# Patient Record
Sex: FEMALE | Race: WHITE | Employment: OTHER | URBAN - METROPOLITAN AREA
[De-identification: names, ages, dates, MRNs, and addresses within clinical notes are randomized per-mention and may not be internally consistent; named-entity substitution may affect disease eponyms.]

---

## 2017-01-04 ENCOUNTER — TELEPHONE (OUTPATIENT)
Dept: FAMILY MEDICINE CLINIC | Age: 77
End: 2017-01-04

## 2017-01-04 ENCOUNTER — HOSPITAL ENCOUNTER (OUTPATIENT)
Dept: PHYSICAL THERAPY | Age: 77
Discharge: HOME | End: 2017-01-31
Admitting: ORTHOPAEDIC SURGERY

## 2017-01-05 RX ORDER — AMOXICILLIN AND CLAVULANATE POTASSIUM 875; 125 MG/1; MG/1
1 TABLET, FILM COATED ORAL 2 TIMES DAILY
Qty: 20 TABLET | Refills: 0 | Status: SHIPPED | OUTPATIENT
Start: 2017-01-05 | End: 2017-06-16 | Stop reason: SDUPTHER

## 2017-01-09 ENCOUNTER — OFFICE VISIT (OUTPATIENT)
Dept: FAMILY MEDICINE CLINIC | Age: 77
End: 2017-01-09

## 2017-01-09 ENCOUNTER — HOSPITAL ENCOUNTER (OUTPATIENT)
Dept: OTHER | Age: 77
Discharge: HOME | End: 2017-01-09
Attending: FAMILY MEDICINE | Admitting: FAMILY MEDICINE

## 2017-01-09 VITALS
OXYGEN SATURATION: 96 % | HEIGHT: 62 IN | HEART RATE: 106 BPM | WEIGHT: 213.8 LBS | BODY MASS INDEX: 39.34 KG/M2 | DIASTOLIC BLOOD PRESSURE: 86 MMHG | SYSTOLIC BLOOD PRESSURE: 126 MMHG | TEMPERATURE: 97.6 F

## 2017-01-09 DIAGNOSIS — Z01.818 PRE-OPERATIVE GENERAL PHYSICAL EXAMINATION: ICD-10-CM

## 2017-01-09 DIAGNOSIS — M17.11 PRIMARY OSTEOARTHRITIS OF RIGHT KNEE: ICD-10-CM

## 2017-01-09 DIAGNOSIS — Z01.818 PRE-OPERATIVE GENERAL PHYSICAL EXAMINATION: Primary | ICD-10-CM

## 2017-01-09 DIAGNOSIS — G47.33 OBSTRUCTIVE SLEEP APNEA SYNDROME: ICD-10-CM

## 2017-01-09 DIAGNOSIS — I10 ESSENTIAL HYPERTENSION: ICD-10-CM

## 2017-01-09 DIAGNOSIS — M06.9 RHEUMATOID ARTHRITIS INVOLVING MULTIPLE SITES, UNSPECIFIED RHEUMATOID FACTOR PRESENCE: ICD-10-CM

## 2017-01-09 DIAGNOSIS — E78.2 MIXED HYPERLIPIDEMIA: ICD-10-CM

## 2017-01-09 DIAGNOSIS — K21.9 GASTROESOPHAGEAL REFLUX DISEASE WITHOUT ESOPHAGITIS: ICD-10-CM

## 2017-01-09 DIAGNOSIS — J30.1 NON-SEASONAL ALLERGIC RHINITIS DUE TO POLLEN: ICD-10-CM

## 2017-01-09 DIAGNOSIS — F32.89 DEPRESSIVE DISORDER, NOT ELSEWHERE CLASSIFIED: ICD-10-CM

## 2017-01-09 DIAGNOSIS — J45.20 MILD INTERMITTENT ASTHMA WITHOUT COMPLICATION: ICD-10-CM

## 2017-01-09 LAB
A/G RATIO: 1.3 (ref 1.1–2.2)
ALBUMIN SERPL-MCNC: 4 G/DL (ref 3.4–5)
ALP BLD-CCNC: 56 U/L (ref 40–129)
ALT SERPL-CCNC: 18 U/L (ref 10–40)
ANION GAP SERPL CALCULATED.3IONS-SCNC: 15 MMOL/L (ref 3–16)
AST SERPL-CCNC: 29 U/L (ref 15–37)
BILIRUB SERPL-MCNC: 0.5 MG/DL (ref 0–1)
BUN BLDV-MCNC: 19 MG/DL (ref 7–20)
CALCIUM SERPL-MCNC: 11.2 MG/DL (ref 8.3–10.6)
CHLORIDE BLD-SCNC: 96 MMOL/L (ref 99–110)
CHOLESTEROL, TOTAL: 155 MG/DL (ref 0–199)
CO2: 28 MMOL/L (ref 21–32)
CREAT SERPL-MCNC: 1 MG/DL (ref 0.6–1.2)
GFR AFRICAN AMERICAN: >60
GFR NON-AFRICAN AMERICAN: 54
GLOBULIN: 3 G/DL
GLUCOSE BLD-MCNC: 97 MG/DL (ref 70–99)
HDLC SERPL-MCNC: 51 MG/DL (ref 40–60)
LDL CHOLESTEROL CALCULATED: 72 MG/DL
POTASSIUM SERPL-SCNC: 4.8 MMOL/L (ref 3.5–5.1)
SODIUM BLD-SCNC: 139 MMOL/L (ref 136–145)
TOTAL PROTEIN: 7 G/DL (ref 6.4–8.2)
TRIGL SERPL-MCNC: 161 MG/DL (ref 0–150)
VLDLC SERPL CALC-MCNC: 32 MG/DL

## 2017-01-09 PROCEDURE — 93000 ELECTROCARDIOGRAM COMPLETE: CPT | Performed by: FAMILY MEDICINE

## 2017-01-09 PROCEDURE — 99215 OFFICE O/P EST HI 40 MIN: CPT | Performed by: FAMILY MEDICINE

## 2017-01-10 ENCOUNTER — TELEPHONE (OUTPATIENT)
Dept: FAMILY MEDICINE CLINIC | Age: 77
End: 2017-01-10

## 2017-01-10 DIAGNOSIS — J84.9 INTERSTITIAL LUNG DISEASE (HCC): Primary | ICD-10-CM

## 2017-01-13 ENCOUNTER — HOSPITAL ENCOUNTER (OUTPATIENT)
Dept: CT IMAGING | Age: 77
Discharge: HOME | End: 2017-01-13
Attending: FAMILY MEDICINE | Admitting: FAMILY MEDICINE

## 2017-01-13 DIAGNOSIS — J84.9 INTERSTITIAL PULMONARY DISEASE (HCC): ICD-10-CM

## 2017-01-13 DIAGNOSIS — J84.9 INTERSTITIAL LUNG DISEASE (HCC): ICD-10-CM

## 2017-01-14 ENCOUNTER — PATIENT MESSAGE (OUTPATIENT)
Dept: FAMILY MEDICINE CLINIC | Age: 77
End: 2017-01-14

## 2017-01-14 DIAGNOSIS — J84.9 INTERSTITIAL LUNG DISEASE (HCC): Primary | ICD-10-CM

## 2017-01-16 ENCOUNTER — TELEPHONE (OUTPATIENT)
Dept: PULMONOLOGY | Age: 77
End: 2017-01-16

## 2017-01-17 ENCOUNTER — TELEPHONE (OUTPATIENT)
Dept: ORTHOPEDIC SURGERY | Age: 77
End: 2017-01-17

## 2017-01-18 ENCOUNTER — HOSPITAL ENCOUNTER (OUTPATIENT)
Dept: PREADMISSION TESTING | Age: 77
Discharge: HOME | End: 2017-01-18
Admitting: ORTHOPAEDIC SURGERY

## 2017-01-18 VITALS
HEIGHT: 62 IN | TEMPERATURE: 97.9 F | WEIGHT: 212.13 LBS | RESPIRATION RATE: 18 BRPM | SYSTOLIC BLOOD PRESSURE: 111 MMHG | DIASTOLIC BLOOD PRESSURE: 64 MMHG | BODY MASS INDEX: 39.04 KG/M2 | HEART RATE: 84 BPM

## 2017-01-18 LAB
A/G RATIO: 1.1 (ref 1.1–2.2)
ABO/RH: NORMAL
ALBUMIN SERPL-MCNC: 4 G/DL (ref 3.4–5)
ALP BLD-CCNC: 49 U/L (ref 40–129)
ALT SERPL-CCNC: 15 U/L (ref 10–40)
ANION GAP SERPL CALCULATED.3IONS-SCNC: 12 MMOL/L (ref 3–16)
ANTIBODY SCREEN: NORMAL
APTT: 35.7 SEC (ref 25.2–36.4)
AST SERPL-CCNC: 28 U/L (ref 15–37)
BACTERIA: ABNORMAL /HPF
BASOPHILS ABSOLUTE: 0.4 K/UL (ref 0–0.2)
BASOPHILS RELATIVE PERCENT: 3.6 %
BILIRUB SERPL-MCNC: 0.6 MG/DL (ref 0–1)
BILIRUBIN URINE: NEGATIVE
BLOOD, URINE: NEGATIVE
BUN BLDV-MCNC: 19 MG/DL (ref 7–20)
C-REACTIVE PROTEIN: 1.8 MG/L (ref 0–5.1)
CALCIUM SERPL-MCNC: 10.7 MG/DL (ref 8.3–10.6)
CHLORIDE BLD-SCNC: 96 MMOL/L (ref 99–110)
CLARITY: ABNORMAL
CO2: 30 MMOL/L (ref 21–32)
COLOR: YELLOW
CREAT SERPL-MCNC: 1 MG/DL (ref 0.6–1.2)
EOSINOPHILS ABSOLUTE: 0.6 K/UL (ref 0–0.6)
EOSINOPHILS RELATIVE PERCENT: 6 %
EPITHELIAL CELLS, UA: ABNORMAL /HPF
GFR AFRICAN AMERICAN: >60
GFR NON-AFRICAN AMERICAN: 54
GLOBULIN: 3.8 G/DL
GLUCOSE BLD-MCNC: 91 MG/DL (ref 70–99)
GLUCOSE URINE: NEGATIVE MG/DL
HCT VFR BLD CALC: 46.4 % (ref 36–48)
HEMOGLOBIN: 15 G/DL (ref 12–16)
INR BLD: 1.03 (ref 0.85–1.16)
KETONES, URINE: NEGATIVE MG/DL
LEUKOCYTE ESTERASE, URINE: ABNORMAL
LYMPHOCYTES ABSOLUTE: 3.7 K/UL (ref 1–5.1)
LYMPHOCYTES RELATIVE PERCENT: 37.1 %
MCH RBC QN AUTO: 29.3 PG (ref 26–34)
MCHC RBC AUTO-ENTMCNC: 32.4 G/DL (ref 31–36)
MCV RBC AUTO: 90.6 FL (ref 80–100)
MICROSCOPIC EXAMINATION: YES
MONOCYTES ABSOLUTE: 1 K/UL (ref 0–1.3)
MONOCYTES RELATIVE PERCENT: 10.2 %
NEUTROPHILS ABSOLUTE: 4.3 K/UL (ref 1.7–7.7)
NEUTROPHILS RELATIVE PERCENT: 43.1 %
NITRITE, URINE: NEGATIVE
PDW BLD-RTO: 15.3 % (ref 12.4–15.4)
PH UA: 6.5
PLATELET # BLD: ABNORMAL K/UL (ref 135–450)
PLATELET SLIDE REVIEW: ABNORMAL
PMV BLD AUTO: ABNORMAL FL (ref 5–10.5)
POTASSIUM SERPL-SCNC: 3.8 MMOL/L (ref 3.5–5.1)
PROTEIN UA: NEGATIVE MG/DL
PROTHROMBIN TIME: 11.8 SEC (ref 9.8–13)
RBC # BLD: 5.12 M/UL (ref 4–5.2)
RBC UA: ABNORMAL /HPF (ref 0–2)
SEDIMENTATION RATE, ERYTHROCYTE: 12 MM/HR (ref 0–30)
SODIUM BLD-SCNC: 138 MMOL/L (ref 136–145)
SPECIFIC GRAVITY UA: 1.01
TOTAL PROTEIN: 7.8 G/DL (ref 6.4–8.2)
UROBILINOGEN, URINE: 0.2 E.U./DL
WBC # BLD: 10.1 K/UL (ref 4–11)
WBC UA: ABNORMAL /HPF (ref 0–5)

## 2017-01-18 RX ORDER — IBUPROFEN 200 MG
1 CAPSULE ORAL DAILY
Status: ON HOLD | COMMUNITY
End: 2018-08-10 | Stop reason: CLARIF

## 2017-01-18 RX ORDER — OXYCODONE HYDROCHLORIDE AND ACETAMINOPHEN 5; 325 MG/1; MG/1
1 TABLET ORAL EVERY 4 HOURS PRN
COMMUNITY
End: 2017-02-09

## 2017-01-18 RX ORDER — CHLORHEXIDINE GLUCONATE 0.12 MG/ML
15 RINSE ORAL 2 TIMES DAILY
Status: CANCELLED | OUTPATIENT
Start: 2017-01-18

## 2017-01-19 ENCOUNTER — TELEPHONE (OUTPATIENT)
Dept: FAMILY MEDICINE CLINIC | Age: 77
End: 2017-01-19

## 2017-01-19 LAB — MRSA SCREEN RT-PCR: NORMAL

## 2017-01-19 RX ORDER — AMPICILLIN 500 MG/1
500 CAPSULE ORAL 4 TIMES DAILY
Qty: 28 CAPSULE | Refills: 0 | Status: SHIPPED | OUTPATIENT
Start: 2017-01-19 | End: 2017-01-26

## 2017-01-20 LAB
ORGANISM: ABNORMAL
URINE CULTURE, ROUTINE: ABNORMAL
URINE CULTURE, ROUTINE: ABNORMAL

## 2017-02-01 ENCOUNTER — OFFICE VISIT (OUTPATIENT)
Dept: FAMILY MEDICINE CLINIC | Age: 77
End: 2017-02-01

## 2017-02-01 ENCOUNTER — TELEPHONE (OUTPATIENT)
Dept: FAMILY MEDICINE CLINIC | Age: 77
End: 2017-02-01

## 2017-02-01 VITALS
TEMPERATURE: 97.9 F | WEIGHT: 212.4 LBS | OXYGEN SATURATION: 94 % | SYSTOLIC BLOOD PRESSURE: 110 MMHG | HEIGHT: 62 IN | DIASTOLIC BLOOD PRESSURE: 60 MMHG | HEART RATE: 97 BPM | BODY MASS INDEX: 39.08 KG/M2

## 2017-02-01 DIAGNOSIS — R21 RASH: ICD-10-CM

## 2017-02-01 DIAGNOSIS — I10 ESSENTIAL HYPERTENSION: ICD-10-CM

## 2017-02-01 DIAGNOSIS — N39.0 URINARY TRACT INFECTION, SITE UNSPECIFIED: Primary | ICD-10-CM

## 2017-02-01 LAB
BILIRUBIN, POC: NORMAL
BLOOD URINE, POC: NORMAL
CLARITY, POC: NORMAL
COLOR, POC: NORMAL
GLUCOSE URINE, POC: NORMAL
KETONES, POC: NORMAL
LEUKOCYTE EST, POC: NORMAL
NITRITE, POC: NORMAL
PH, POC: 7
PROTEIN, POC: NORMAL
SPECIFIC GRAVITY, POC: 1
UROBILINOGEN, POC: 0.2

## 2017-02-01 PROCEDURE — 4040F PNEUMOC VAC/ADMIN/RCVD: CPT | Performed by: NURSE PRACTITIONER

## 2017-02-01 PROCEDURE — 1123F ACP DISCUSS/DSCN MKR DOCD: CPT | Performed by: NURSE PRACTITIONER

## 2017-02-01 PROCEDURE — G8427 DOCREV CUR MEDS BY ELIG CLIN: HCPCS | Performed by: NURSE PRACTITIONER

## 2017-02-01 PROCEDURE — 1090F PRES/ABSN URINE INCON ASSESS: CPT | Performed by: NURSE PRACTITIONER

## 2017-02-01 PROCEDURE — G8399 PT W/DXA RESULTS DOCUMENT: HCPCS | Performed by: NURSE PRACTITIONER

## 2017-02-01 PROCEDURE — 81002 URINALYSIS NONAUTO W/O SCOPE: CPT | Performed by: NURSE PRACTITIONER

## 2017-02-01 PROCEDURE — 1036F TOBACCO NON-USER: CPT | Performed by: NURSE PRACTITIONER

## 2017-02-01 PROCEDURE — G8419 CALC BMI OUT NRM PARAM NOF/U: HCPCS | Performed by: NURSE PRACTITIONER

## 2017-02-01 PROCEDURE — 3014F SCREEN MAMMO DOC REV: CPT | Performed by: NURSE PRACTITIONER

## 2017-02-01 PROCEDURE — 99214 OFFICE O/P EST MOD 30 MIN: CPT | Performed by: NURSE PRACTITIONER

## 2017-02-01 PROCEDURE — 3017F COLORECTAL CA SCREEN DOC REV: CPT | Performed by: NURSE PRACTITIONER

## 2017-02-01 PROCEDURE — G8484 FLU IMMUNIZE NO ADMIN: HCPCS | Performed by: NURSE PRACTITIONER

## 2017-02-01 RX ORDER — CIPROFLOXACIN 500 MG/1
500 TABLET, FILM COATED ORAL 2 TIMES DAILY
Qty: 14 TABLET | Refills: 0 | Status: SHIPPED | OUTPATIENT
Start: 2017-02-01 | End: 2017-02-08

## 2017-02-01 RX ORDER — MOMETASONE FUROATE 1 MG/G
CREAM TOPICAL
Qty: 1 TUBE | Refills: 1 | Status: SHIPPED | OUTPATIENT
Start: 2017-02-01 | End: 2017-07-17

## 2017-02-02 ASSESSMENT — ENCOUNTER SYMPTOMS
ALLERGIC/IMMUNOLOGIC NEGATIVE: 1
EYE PAIN: 0
RHINORRHEA: 0
SORE THROAT: 0
DIARRHEA: 0
EYES NEGATIVE: 1
GASTROINTESTINAL NEGATIVE: 1
ORTHOPNEA: 0
BLURRED VISION: 0
RESPIRATORY NEGATIVE: 1
NAUSEA: 0
NAIL CHANGES: 0
VOMITING: 0
SHORTNESS OF BREATH: 0
COUGH: 0

## 2017-02-03 DIAGNOSIS — I10 ESSENTIAL HYPERTENSION: Primary | ICD-10-CM

## 2017-02-03 RX ORDER — LISINOPRIL 40 MG/1
40 TABLET ORAL DAILY
Qty: 180 TABLET | Refills: 1 | Status: SHIPPED | OUTPATIENT
Start: 2017-02-03 | End: 2017-04-13 | Stop reason: SDUPTHER

## 2017-02-05 LAB
ORGANISM: ABNORMAL
URINE CULTURE, ROUTINE: ABNORMAL

## 2017-02-08 ENCOUNTER — HOSPITAL ENCOUNTER (OUTPATIENT)
Dept: PULMONOLOGY | Age: 77
Discharge: HOME | End: 2017-02-08
Attending: INTERNAL MEDICINE | Admitting: INTERNAL MEDICINE

## 2017-02-08 VITALS — OXYGEN SATURATION: 96 %

## 2017-02-08 RX ORDER — ALBUTEROL SULFATE 2.5 MG/3ML
2.5 SOLUTION RESPIRATORY (INHALATION) ONCE
Status: COMPLETED | OUTPATIENT
Start: 2017-02-08 | End: 2017-02-08

## 2017-02-08 RX ADMIN — ALBUTEROL SULFATE 2.5 MG: 2.5 SOLUTION RESPIRATORY (INHALATION) at 10:54

## 2017-02-09 ENCOUNTER — HOSPITAL ENCOUNTER (OUTPATIENT)
Dept: PREADMISSION TESTING | Age: 77
Discharge: HOME | End: 2017-02-09
Attending: ORTHOPAEDIC SURGERY | Admitting: ORTHOPAEDIC SURGERY

## 2017-02-09 VITALS
HEART RATE: 67 BPM | DIASTOLIC BLOOD PRESSURE: 86 MMHG | HEIGHT: 62 IN | SYSTOLIC BLOOD PRESSURE: 144 MMHG | RESPIRATION RATE: 16 BRPM | TEMPERATURE: 96.5 F | WEIGHT: 212 LBS | BODY MASS INDEX: 39.01 KG/M2 | OXYGEN SATURATION: 98 %

## 2017-02-09 LAB
A/G RATIO: 1.6 (ref 1.1–2.2)
ABO/RH: NORMAL
ALBUMIN SERPL-MCNC: 4.3 G/DL (ref 3.4–5)
ALP BLD-CCNC: 41 U/L (ref 40–129)
ALT SERPL-CCNC: 10 U/L (ref 10–40)
ANION GAP SERPL CALCULATED.3IONS-SCNC: 12 MMOL/L (ref 3–16)
ANTIBODY SCREEN: NORMAL
APTT: 29.6 SEC (ref 25.2–36.4)
AST SERPL-CCNC: 17 U/L (ref 15–37)
BASOPHILS ABSOLUTE: 0.1 K/UL (ref 0–0.2)
BASOPHILS RELATIVE PERCENT: 1.4 %
BILIRUB SERPL-MCNC: 0.4 MG/DL (ref 0–1)
BILIRUBIN URINE: NEGATIVE
BLOOD, URINE: NEGATIVE
BUN BLDV-MCNC: 20 MG/DL (ref 7–20)
CALCIUM SERPL-MCNC: 10.1 MG/DL (ref 8.3–10.6)
CHLORIDE BLD-SCNC: 97 MMOL/L (ref 99–110)
CLARITY: CLEAR
CO2: 30 MMOL/L (ref 21–32)
COLOR: YELLOW
CREAT SERPL-MCNC: 0.9 MG/DL (ref 0.6–1.2)
EOSINOPHILS ABSOLUTE: 0.2 K/UL (ref 0–0.6)
EOSINOPHILS RELATIVE PERCENT: 1.4 %
GFR AFRICAN AMERICAN: >60
GFR NON-AFRICAN AMERICAN: >60
GLOBULIN: 2.7 G/DL
GLUCOSE BLD-MCNC: 97 MG/DL (ref 70–99)
GLUCOSE URINE: NEGATIVE MG/DL
HCT VFR BLD CALC: 43.8 % (ref 36–48)
HEMOGLOBIN: 14.1 G/DL (ref 12–16)
INR BLD: 1.05 (ref 0.85–1.16)
KETONES, URINE: NEGATIVE MG/DL
LEUKOCYTE ESTERASE, URINE: NEGATIVE
LYMPHOCYTES ABSOLUTE: 2.8 K/UL (ref 1–5.1)
LYMPHOCYTES RELATIVE PERCENT: 26 %
MCH RBC QN AUTO: 28.5 PG (ref 26–34)
MCHC RBC AUTO-ENTMCNC: 32.2 G/DL (ref 31–36)
MCV RBC AUTO: 88.5 FL (ref 80–100)
MICROSCOPIC EXAMINATION: NORMAL
MONOCYTES ABSOLUTE: 1 K/UL (ref 0–1.3)
MONOCYTES RELATIVE PERCENT: 9.4 %
NEUTROPHILS ABSOLUTE: 6.7 K/UL (ref 1.7–7.7)
NEUTROPHILS RELATIVE PERCENT: 61.8 %
NITRITE, URINE: NEGATIVE
PDW BLD-RTO: 15.1 % (ref 12.4–15.4)
PH UA: 7
PLATELET # BLD: 326 K/UL (ref 135–450)
PMV BLD AUTO: 8.7 FL (ref 5–10.5)
POTASSIUM SERPL-SCNC: 4.4 MMOL/L (ref 3.5–5.1)
PROTEIN UA: NEGATIVE MG/DL
PROTHROMBIN TIME: 12 SEC (ref 9.8–13)
RBC # BLD: 4.94 M/UL (ref 4–5.2)
SODIUM BLD-SCNC: 139 MMOL/L (ref 136–145)
SPECIFIC GRAVITY UA: 1.01
TOTAL PROTEIN: 7 G/DL (ref 6.4–8.2)
URINE TYPE: NORMAL
UROBILINOGEN, URINE: 0.2 E.U./DL
WBC # BLD: 10.8 K/UL (ref 4–11)

## 2017-02-09 RX ORDER — GABAPENTIN 300 MG/1
300 CAPSULE ORAL 3 TIMES DAILY
COMMUNITY

## 2017-02-10 LAB — MRSA SCREEN RT-PCR: NORMAL

## 2017-02-11 LAB — URINE CULTURE, ROUTINE: NORMAL

## 2017-02-15 ENCOUNTER — OFFICE VISIT (OUTPATIENT)
Dept: PULMONOLOGY | Age: 77
End: 2017-02-15

## 2017-02-15 VITALS
OXYGEN SATURATION: 97 % | SYSTOLIC BLOOD PRESSURE: 126 MMHG | HEIGHT: 61 IN | RESPIRATION RATE: 18 BRPM | BODY MASS INDEX: 39.46 KG/M2 | HEART RATE: 66 BPM | DIASTOLIC BLOOD PRESSURE: 76 MMHG | WEIGHT: 209 LBS

## 2017-02-15 DIAGNOSIS — J84.9 ILD (INTERSTITIAL LUNG DISEASE) (HCC): Primary | ICD-10-CM

## 2017-02-15 DIAGNOSIS — R06.09 DOE (DYSPNEA ON EXERTION): ICD-10-CM

## 2017-02-15 DIAGNOSIS — R05.3 CHRONIC COUGH: ICD-10-CM

## 2017-02-15 PROCEDURE — G8399 PT W/DXA RESULTS DOCUMENT: HCPCS | Performed by: INTERNAL MEDICINE

## 2017-02-15 PROCEDURE — 1090F PRES/ABSN URINE INCON ASSESS: CPT | Performed by: INTERNAL MEDICINE

## 2017-02-15 PROCEDURE — 1036F TOBACCO NON-USER: CPT | Performed by: INTERNAL MEDICINE

## 2017-02-15 PROCEDURE — G8427 DOCREV CUR MEDS BY ELIG CLIN: HCPCS | Performed by: INTERNAL MEDICINE

## 2017-02-15 PROCEDURE — 3014F SCREEN MAMMO DOC REV: CPT | Performed by: INTERNAL MEDICINE

## 2017-02-15 PROCEDURE — 1123F ACP DISCUSS/DSCN MKR DOCD: CPT | Performed by: INTERNAL MEDICINE

## 2017-02-15 PROCEDURE — 4040F PNEUMOC VAC/ADMIN/RCVD: CPT | Performed by: INTERNAL MEDICINE

## 2017-02-15 PROCEDURE — G8484 FLU IMMUNIZE NO ADMIN: HCPCS | Performed by: INTERNAL MEDICINE

## 2017-02-15 PROCEDURE — 99204 OFFICE O/P NEW MOD 45 MIN: CPT | Performed by: INTERNAL MEDICINE

## 2017-02-15 PROCEDURE — G8417 CALC BMI ABV UP PARAM F/U: HCPCS | Performed by: INTERNAL MEDICINE

## 2017-02-15 PROCEDURE — 3017F COLORECTAL CA SCREEN DOC REV: CPT | Performed by: INTERNAL MEDICINE

## 2017-02-20 PROBLEM — Z96.651 STATUS POST TOTAL RIGHT KNEE REPLACEMENT USING CEMENT: Status: ACTIVE | Noted: 2017-02-20

## 2017-02-23 ENCOUNTER — CARE COORDINATION (OUTPATIENT)
Dept: CASE MANAGEMENT | Age: 77
End: 2017-02-23

## 2017-02-23 ENCOUNTER — TELEPHONE (OUTPATIENT)
Dept: PHARMACY | Facility: CLINIC | Age: 77
End: 2017-02-23

## 2017-02-23 RX ORDER — SENNA AND DOCUSATE SODIUM 50; 8.6 MG/1; MG/1
1 TABLET, FILM COATED ORAL DAILY PRN
COMMUNITY
End: 2017-04-17

## 2017-02-24 ENCOUNTER — CARE COORDINATION (OUTPATIENT)
Dept: CASE MANAGEMENT | Age: 77
End: 2017-02-24

## 2017-03-01 ENCOUNTER — CARE COORDINATION (OUTPATIENT)
Dept: CASE MANAGEMENT | Age: 77
End: 2017-03-01

## 2017-03-06 ENCOUNTER — HOSPITAL ENCOUNTER (OUTPATIENT)
Dept: GENERAL RADIOLOGY | Facility: MEDICAL CENTER | Age: 77
Discharge: HOME | End: 2017-03-06
Attending: ORTHOPAEDIC SURGERY | Admitting: ORTHOPAEDIC SURGERY

## 2017-03-06 ENCOUNTER — OFFICE VISIT (OUTPATIENT)
Dept: ORTHOPEDIC SURGERY | Age: 77
End: 2017-03-06

## 2017-03-06 VITALS
SYSTOLIC BLOOD PRESSURE: 139 MMHG | DIASTOLIC BLOOD PRESSURE: 71 MMHG | BODY MASS INDEX: 39.83 KG/M2 | WEIGHT: 210.98 LBS | HEART RATE: 81 BPM | HEIGHT: 61 IN

## 2017-03-06 DIAGNOSIS — G89.29 CHRONIC PAIN OF RIGHT KNEE: ICD-10-CM

## 2017-03-06 DIAGNOSIS — M25.561 CHRONIC PAIN OF RIGHT KNEE: ICD-10-CM

## 2017-03-06 DIAGNOSIS — Z96.651 STATUS POST TOTAL RIGHT KNEE REPLACEMENT USING CEMENT: Primary | ICD-10-CM

## 2017-03-06 PROCEDURE — 73562 X-RAY EXAM OF KNEE 3: CPT | Performed by: ORTHOPAEDIC SURGERY

## 2017-03-06 PROCEDURE — 99024 POSTOP FOLLOW-UP VISIT: CPT | Performed by: ORTHOPAEDIC SURGERY

## 2017-03-08 ENCOUNTER — CARE COORDINATION (OUTPATIENT)
Dept: CASE MANAGEMENT | Age: 77
End: 2017-03-08

## 2017-03-13 ENCOUNTER — HOSPITAL ENCOUNTER (OUTPATIENT)
Dept: PHYSICAL THERAPY | Age: 77
Discharge: HOME | End: 2017-03-31
Admitting: ORTHOPAEDIC SURGERY

## 2017-03-15 ENCOUNTER — TELEPHONE (OUTPATIENT)
Dept: ORTHOPEDIC SURGERY | Age: 77
End: 2017-03-15

## 2017-03-15 DIAGNOSIS — Z96.651 STATUS POST TOTAL RIGHT KNEE REPLACEMENT USING CEMENT: ICD-10-CM

## 2017-03-15 DIAGNOSIS — G89.29 CHRONIC PAIN OF RIGHT KNEE: Primary | ICD-10-CM

## 2017-03-15 DIAGNOSIS — M25.561 CHRONIC PAIN OF RIGHT KNEE: Primary | ICD-10-CM

## 2017-03-15 RX ORDER — METHYLPREDNISOLONE 4 MG/1
TABLET ORAL
Qty: 1 KIT | Refills: 0 | Status: SHIPPED | OUTPATIENT
Start: 2017-03-15 | End: 2017-04-17

## 2017-03-16 ENCOUNTER — CARE COORDINATION (OUTPATIENT)
Dept: CASE MANAGEMENT | Age: 77
End: 2017-03-16

## 2017-03-22 ENCOUNTER — TELEPHONE (OUTPATIENT)
Dept: ORTHOPEDIC SURGERY | Age: 77
End: 2017-03-22

## 2017-03-25 ENCOUNTER — CARE COORDINATION (OUTPATIENT)
Dept: CASE MANAGEMENT | Age: 77
End: 2017-03-25

## 2017-03-30 ENCOUNTER — TELEPHONE (OUTPATIENT)
Dept: PULMONOLOGY | Age: 77
End: 2017-03-30

## 2017-04-03 ENCOUNTER — TELEPHONE (OUTPATIENT)
Dept: PULMONOLOGY | Age: 77
End: 2017-04-03

## 2017-04-03 ENCOUNTER — HOSPITAL ENCOUNTER (OUTPATIENT)
Dept: OTHER | Age: 77
Discharge: HOME | End: 2017-04-03
Attending: INTERNAL MEDICINE | Admitting: INTERNAL MEDICINE

## 2017-04-03 DIAGNOSIS — R05.9 COUGH: ICD-10-CM

## 2017-04-03 DIAGNOSIS — R05.9 COUGH: Primary | ICD-10-CM

## 2017-04-04 ENCOUNTER — OFFICE VISIT (OUTPATIENT)
Dept: PULMONOLOGY | Age: 77
End: 2017-04-04

## 2017-04-04 VITALS
RESPIRATION RATE: 16 BRPM | HEIGHT: 61 IN | DIASTOLIC BLOOD PRESSURE: 82 MMHG | SYSTOLIC BLOOD PRESSURE: 120 MMHG | WEIGHT: 209 LBS | BODY MASS INDEX: 39.46 KG/M2 | OXYGEN SATURATION: 93 % | HEART RATE: 77 BPM

## 2017-04-04 DIAGNOSIS — J84.9 ILD (INTERSTITIAL LUNG DISEASE) (HCC): Primary | ICD-10-CM

## 2017-04-04 DIAGNOSIS — M06.9 RHEUMATOID ARTHRITIS, INVOLVING UNSPECIFIED SITE, UNSPECIFIED RHEUMATOID FACTOR PRESENCE: ICD-10-CM

## 2017-04-04 DIAGNOSIS — Z92.21 HX OF METHOTREXATE THERAPY: ICD-10-CM

## 2017-04-04 PROCEDURE — 99214 OFFICE O/P EST MOD 30 MIN: CPT | Performed by: INTERNAL MEDICINE

## 2017-04-04 PROCEDURE — 1090F PRES/ABSN URINE INCON ASSESS: CPT | Performed by: INTERNAL MEDICINE

## 2017-04-04 PROCEDURE — G8427 DOCREV CUR MEDS BY ELIG CLIN: HCPCS | Performed by: INTERNAL MEDICINE

## 2017-04-04 PROCEDURE — 1123F ACP DISCUSS/DSCN MKR DOCD: CPT | Performed by: INTERNAL MEDICINE

## 2017-04-04 PROCEDURE — 3017F COLORECTAL CA SCREEN DOC REV: CPT | Performed by: INTERNAL MEDICINE

## 2017-04-04 PROCEDURE — 1036F TOBACCO NON-USER: CPT | Performed by: INTERNAL MEDICINE

## 2017-04-04 PROCEDURE — 4040F PNEUMOC VAC/ADMIN/RCVD: CPT | Performed by: INTERNAL MEDICINE

## 2017-04-04 PROCEDURE — G8399 PT W/DXA RESULTS DOCUMENT: HCPCS | Performed by: INTERNAL MEDICINE

## 2017-04-04 PROCEDURE — G8417 CALC BMI ABV UP PARAM F/U: HCPCS | Performed by: INTERNAL MEDICINE

## 2017-04-10 ENCOUNTER — TELEPHONE (OUTPATIENT)
Dept: FAMILY MEDICINE CLINIC | Age: 77
End: 2017-04-10

## 2017-04-10 DIAGNOSIS — I10 ESSENTIAL HYPERTENSION: ICD-10-CM

## 2017-04-10 DIAGNOSIS — E78.2 MIXED HYPERLIPIDEMIA: Primary | ICD-10-CM

## 2017-04-12 DIAGNOSIS — E78.2 MIXED HYPERLIPIDEMIA: ICD-10-CM

## 2017-04-12 DIAGNOSIS — I10 ESSENTIAL HYPERTENSION: ICD-10-CM

## 2017-04-12 LAB
A/G RATIO: 1.5 (ref 1.1–2.2)
ALBUMIN SERPL-MCNC: 4.1 G/DL (ref 3.4–5)
ALP BLD-CCNC: 58 U/L (ref 40–129)
ALT SERPL-CCNC: 16 U/L (ref 10–40)
ANION GAP SERPL CALCULATED.3IONS-SCNC: 13 MMOL/L (ref 3–16)
AST SERPL-CCNC: 20 U/L (ref 15–37)
BILIRUB SERPL-MCNC: 0.4 MG/DL (ref 0–1)
BUN BLDV-MCNC: 21 MG/DL (ref 7–20)
CALCIUM SERPL-MCNC: 10.6 MG/DL (ref 8.3–10.6)
CHLORIDE BLD-SCNC: 101 MMOL/L (ref 99–110)
CO2: 30 MMOL/L (ref 21–32)
CREAT SERPL-MCNC: 0.9 MG/DL (ref 0.6–1.2)
GFR AFRICAN AMERICAN: >60
GFR NON-AFRICAN AMERICAN: >60
GLOBULIN: 2.8 G/DL
GLUCOSE BLD-MCNC: 91 MG/DL (ref 70–99)
POTASSIUM SERPL-SCNC: 4.6 MMOL/L (ref 3.5–5.1)
SODIUM BLD-SCNC: 144 MMOL/L (ref 136–145)
TOTAL PROTEIN: 6.9 G/DL (ref 6.4–8.2)

## 2017-04-13 ENCOUNTER — OFFICE VISIT (OUTPATIENT)
Dept: FAMILY MEDICINE CLINIC | Age: 77
End: 2017-04-13

## 2017-04-13 ENCOUNTER — HOSPITAL ENCOUNTER (OUTPATIENT)
Dept: PHYSICAL THERAPY | Age: 77
Discharge: HOME | End: 2017-04-13
Admitting: ORTHOPAEDIC SURGERY

## 2017-04-13 VITALS
TEMPERATURE: 98.5 F | BODY MASS INDEX: 39.68 KG/M2 | OXYGEN SATURATION: 90 % | SYSTOLIC BLOOD PRESSURE: 130 MMHG | DIASTOLIC BLOOD PRESSURE: 78 MMHG | WEIGHT: 210 LBS | HEART RATE: 89 BPM

## 2017-04-13 DIAGNOSIS — F32.4 MAJOR DEPRESSIVE DISORDER WITH SINGLE EPISODE, IN PARTIAL REMISSION (HCC): Primary | ICD-10-CM

## 2017-04-13 DIAGNOSIS — F51.04 PSYCHOPHYSIOLOGICAL INSOMNIA: ICD-10-CM

## 2017-04-13 DIAGNOSIS — I10 ESSENTIAL HYPERTENSION: ICD-10-CM

## 2017-04-13 DIAGNOSIS — F32.89 DEPRESSIVE DISORDER, NOT ELSEWHERE CLASSIFIED: ICD-10-CM

## 2017-04-13 DIAGNOSIS — K21.9 GASTROESOPHAGEAL REFLUX DISEASE WITHOUT ESOPHAGITIS: ICD-10-CM

## 2017-04-13 DIAGNOSIS — E78.2 MIXED HYPERLIPIDEMIA: ICD-10-CM

## 2017-04-13 PROCEDURE — 3017F COLORECTAL CA SCREEN DOC REV: CPT | Performed by: FAMILY MEDICINE

## 2017-04-13 PROCEDURE — G8417 CALC BMI ABV UP PARAM F/U: HCPCS | Performed by: FAMILY MEDICINE

## 2017-04-13 PROCEDURE — 1036F TOBACCO NON-USER: CPT | Performed by: FAMILY MEDICINE

## 2017-04-13 PROCEDURE — 99214 OFFICE O/P EST MOD 30 MIN: CPT | Performed by: FAMILY MEDICINE

## 2017-04-13 PROCEDURE — G8399 PT W/DXA RESULTS DOCUMENT: HCPCS | Performed by: FAMILY MEDICINE

## 2017-04-13 PROCEDURE — 1123F ACP DISCUSS/DSCN MKR DOCD: CPT | Performed by: FAMILY MEDICINE

## 2017-04-13 PROCEDURE — 1090F PRES/ABSN URINE INCON ASSESS: CPT | Performed by: FAMILY MEDICINE

## 2017-04-13 PROCEDURE — 4040F PNEUMOC VAC/ADMIN/RCVD: CPT | Performed by: FAMILY MEDICINE

## 2017-04-13 PROCEDURE — G8428 CUR MEDS NOT DOCUMENT: HCPCS | Performed by: FAMILY MEDICINE

## 2017-04-13 RX ORDER — LISINOPRIL 40 MG/1
40 TABLET ORAL DAILY
Qty: 90 TABLET | Refills: 1 | Status: SHIPPED | OUTPATIENT
Start: 2017-04-13 | End: 2017-10-10 | Stop reason: SDUPTHER

## 2017-04-13 RX ORDER — OXYCODONE HYDROCHLORIDE AND ACETAMINOPHEN 5; 325 MG/1; MG/1
1 TABLET ORAL EVERY 4 HOURS PRN
COMMUNITY
End: 2017-10-09 | Stop reason: SDUPTHER

## 2017-04-13 RX ORDER — FENOFIBRATE 145 MG/1
145 TABLET, COATED ORAL DAILY
Qty: 90 TABLET | Refills: 1 | Status: SHIPPED | OUTPATIENT
Start: 2017-04-13 | End: 2017-10-10 | Stop reason: SDUPTHER

## 2017-04-13 RX ORDER — ESCITALOPRAM OXALATE 10 MG/1
10 TABLET ORAL DAILY
Qty: 90 TABLET | Refills: 1 | Status: SHIPPED | OUTPATIENT
Start: 2017-04-13 | End: 2017-10-10 | Stop reason: SDUPTHER

## 2017-04-13 RX ORDER — HYDROCHLOROTHIAZIDE 25 MG/1
25 TABLET ORAL DAILY
Qty: 90 TABLET | Refills: 1 | Status: SHIPPED | OUTPATIENT
Start: 2017-04-13 | End: 2018-01-29 | Stop reason: SDUPTHER

## 2017-04-13 RX ORDER — TRAZODONE HYDROCHLORIDE 50 MG/1
50 TABLET ORAL NIGHTLY
Qty: 90 TABLET | Refills: 1 | Status: SHIPPED | OUTPATIENT
Start: 2017-04-13 | End: 2019-04-04 | Stop reason: CLARIF

## 2017-04-16 ASSESSMENT — ENCOUNTER SYMPTOMS
EYES NEGATIVE: 1
GASTROINTESTINAL NEGATIVE: 1
RESPIRATORY NEGATIVE: 1

## 2017-04-17 ENCOUNTER — OFFICE VISIT (OUTPATIENT)
Dept: ORTHOPEDIC SURGERY | Age: 77
End: 2017-04-17

## 2017-04-17 VITALS
HEART RATE: 83 BPM | BODY MASS INDEX: 39.65 KG/M2 | DIASTOLIC BLOOD PRESSURE: 80 MMHG | WEIGHT: 210 LBS | HEIGHT: 61 IN | SYSTOLIC BLOOD PRESSURE: 143 MMHG

## 2017-04-17 DIAGNOSIS — Z96.651 STATUS POST TOTAL RIGHT KNEE REPLACEMENT USING CEMENT: ICD-10-CM

## 2017-04-17 DIAGNOSIS — G89.29 CHRONIC PAIN OF RIGHT KNEE: Primary | ICD-10-CM

## 2017-04-17 DIAGNOSIS — M25.561 CHRONIC PAIN OF RIGHT KNEE: Primary | ICD-10-CM

## 2017-04-17 PROCEDURE — 99024 POSTOP FOLLOW-UP VISIT: CPT | Performed by: PHYSICIAN ASSISTANT

## 2017-04-21 ENCOUNTER — OFFICE VISIT (OUTPATIENT)
Dept: ORTHOPEDIC SURGERY | Age: 77
End: 2017-04-21

## 2017-04-21 VITALS
BODY MASS INDEX: 39.67 KG/M2 | DIASTOLIC BLOOD PRESSURE: 78 MMHG | SYSTOLIC BLOOD PRESSURE: 142 MMHG | WEIGHT: 210.1 LBS | HEART RATE: 66 BPM | HEIGHT: 61 IN

## 2017-04-21 DIAGNOSIS — Z96.611 S/P REVERSE TOTAL SHOULDER ARTHROPLASTY, RIGHT: Primary | ICD-10-CM

## 2017-04-21 PROCEDURE — 1036F TOBACCO NON-USER: CPT | Performed by: ORTHOPAEDIC SURGERY

## 2017-04-21 PROCEDURE — 4040F PNEUMOC VAC/ADMIN/RCVD: CPT | Performed by: ORTHOPAEDIC SURGERY

## 2017-04-21 PROCEDURE — G8417 CALC BMI ABV UP PARAM F/U: HCPCS | Performed by: ORTHOPAEDIC SURGERY

## 2017-04-21 PROCEDURE — 1123F ACP DISCUSS/DSCN MKR DOCD: CPT | Performed by: ORTHOPAEDIC SURGERY

## 2017-04-21 PROCEDURE — 3017F COLORECTAL CA SCREEN DOC REV: CPT | Performed by: ORTHOPAEDIC SURGERY

## 2017-04-21 PROCEDURE — 1090F PRES/ABSN URINE INCON ASSESS: CPT | Performed by: ORTHOPAEDIC SURGERY

## 2017-04-21 PROCEDURE — G8428 CUR MEDS NOT DOCUMENT: HCPCS | Performed by: ORTHOPAEDIC SURGERY

## 2017-04-21 PROCEDURE — 73030 X-RAY EXAM OF SHOULDER: CPT | Performed by: ORTHOPAEDIC SURGERY

## 2017-04-21 PROCEDURE — 99213 OFFICE O/P EST LOW 20 MIN: CPT | Performed by: ORTHOPAEDIC SURGERY

## 2017-04-21 PROCEDURE — G8399 PT W/DXA RESULTS DOCUMENT: HCPCS | Performed by: ORTHOPAEDIC SURGERY

## 2017-04-24 DIAGNOSIS — K21.9 GASTROESOPHAGEAL REFLUX DISEASE WITHOUT ESOPHAGITIS: ICD-10-CM

## 2017-04-24 RX ORDER — DEXLANSOPRAZOLE 60 MG/1
60 CAPSULE, DELAYED RELEASE ORAL DAILY
Qty: 90 CAPSULE | Refills: 1 | Status: SHIPPED | OUTPATIENT
Start: 2017-04-24 | End: 2017-10-11 | Stop reason: SDUPTHER

## 2017-05-23 ENCOUNTER — OFFICE VISIT (OUTPATIENT)
Dept: FAMILY MEDICINE CLINIC | Age: 77
End: 2017-05-23

## 2017-05-23 ENCOUNTER — HOSPITAL ENCOUNTER (OUTPATIENT)
Dept: OTHER | Age: 77
Discharge: HOME | End: 2017-05-23
Attending: FAMILY MEDICINE | Admitting: FAMILY MEDICINE

## 2017-05-23 VITALS
TEMPERATURE: 97.6 F | DIASTOLIC BLOOD PRESSURE: 76 MMHG | BODY MASS INDEX: 38.85 KG/M2 | WEIGHT: 205.8 LBS | SYSTOLIC BLOOD PRESSURE: 112 MMHG

## 2017-05-23 DIAGNOSIS — M06.09 RHEUMATOID ARTHRITIS OF MULTIPLE SITES WITH NEGATIVE RHEUMATOID FACTOR (HCC): ICD-10-CM

## 2017-05-23 DIAGNOSIS — M47.816 OSTEOARTHRITIS OF LUMBAR SPINE, UNSPECIFIED SPINAL OSTEOARTHRITIS COMPLICATION STATUS: Primary | ICD-10-CM

## 2017-05-23 DIAGNOSIS — M47.816 OSTEOARTHRITIS OF LUMBAR SPINE, UNSPECIFIED SPINAL OSTEOARTHRITIS COMPLICATION STATUS: ICD-10-CM

## 2017-05-23 DIAGNOSIS — L72.3 SEBACEOUS CYST: ICD-10-CM

## 2017-05-23 DIAGNOSIS — R60.0 BILATERAL EDEMA OF LOWER EXTREMITY: ICD-10-CM

## 2017-05-23 PROBLEM — M06.9 RHEUMATOID ARTHRITIS INVOLVING MULTIPLE SITES (HCC): Status: RESOLVED | Noted: 2017-01-09 | Resolved: 2017-05-23

## 2017-05-23 LAB
BASOPHILS ABSOLUTE: 0.1 K/UL (ref 0–0.2)
BASOPHILS RELATIVE PERCENT: 1.2 %
EOSINOPHILS ABSOLUTE: 0.3 K/UL (ref 0–0.6)
EOSINOPHILS RELATIVE PERCENT: 3.8 %
HCT VFR BLD CALC: 41.8 % (ref 36–48)
HEMOGLOBIN: 13.1 G/DL (ref 12–16)
LYMPHOCYTES ABSOLUTE: 3.1 K/UL (ref 1–5.1)
LYMPHOCYTES RELATIVE PERCENT: 43.4 %
MCH RBC QN AUTO: 26.8 PG (ref 26–34)
MCHC RBC AUTO-ENTMCNC: 31.4 G/DL (ref 31–36)
MCV RBC AUTO: 85.4 FL (ref 80–100)
MONOCYTES ABSOLUTE: 0.6 K/UL (ref 0–1.3)
MONOCYTES RELATIVE PERCENT: 8.7 %
NEUTROPHILS ABSOLUTE: 3 K/UL (ref 1.7–7.7)
NEUTROPHILS RELATIVE PERCENT: 42.9 %
PDW BLD-RTO: 14.9 % (ref 12.4–15.4)
PLATELET # BLD: 238 K/UL (ref 135–450)
PMV BLD AUTO: 10.4 FL (ref 5–10.5)
RBC # BLD: 4.89 M/UL (ref 4–5.2)
WBC # BLD: 7 K/UL (ref 4–11)

## 2017-05-23 PROCEDURE — 99214 OFFICE O/P EST MOD 30 MIN: CPT | Performed by: FAMILY MEDICINE

## 2017-05-23 PROCEDURE — G8399 PT W/DXA RESULTS DOCUMENT: HCPCS | Performed by: FAMILY MEDICINE

## 2017-05-23 PROCEDURE — 1090F PRES/ABSN URINE INCON ASSESS: CPT | Performed by: FAMILY MEDICINE

## 2017-05-23 PROCEDURE — G8427 DOCREV CUR MEDS BY ELIG CLIN: HCPCS | Performed by: FAMILY MEDICINE

## 2017-05-23 PROCEDURE — G8417 CALC BMI ABV UP PARAM F/U: HCPCS | Performed by: FAMILY MEDICINE

## 2017-05-23 PROCEDURE — 1036F TOBACCO NON-USER: CPT | Performed by: FAMILY MEDICINE

## 2017-05-23 PROCEDURE — 3017F COLORECTAL CA SCREEN DOC REV: CPT | Performed by: FAMILY MEDICINE

## 2017-05-23 PROCEDURE — 1123F ACP DISCUSS/DSCN MKR DOCD: CPT | Performed by: FAMILY MEDICINE

## 2017-05-23 PROCEDURE — 4040F PNEUMOC VAC/ADMIN/RCVD: CPT | Performed by: FAMILY MEDICINE

## 2017-05-23 RX ORDER — PREDNISONE 1 MG/1
5 TABLET ORAL DAILY
Qty: 90 TABLET | Refills: 1 | Status: SHIPPED | OUTPATIENT
Start: 2017-05-23 | End: 2017-06-26 | Stop reason: ALTCHOICE

## 2017-05-23 ASSESSMENT — ENCOUNTER SYMPTOMS
GASTROINTESTINAL NEGATIVE: 1
RESPIRATORY NEGATIVE: 1
EYES NEGATIVE: 1

## 2017-05-24 LAB
ALBUMIN SERPL-MCNC: 4.4 G/DL (ref 3.4–5)
ANION GAP SERPL CALCULATED.3IONS-SCNC: 17 MMOL/L (ref 3–16)
BUN BLDV-MCNC: 18 MG/DL (ref 7–20)
CALCIUM SERPL-MCNC: 10.8 MG/DL (ref 8.3–10.6)
CHLORIDE BLD-SCNC: 98 MMOL/L (ref 99–110)
CO2: 26 MMOL/L (ref 21–32)
CREAT SERPL-MCNC: 1 MG/DL (ref 0.6–1.2)
GFR AFRICAN AMERICAN: >60
GFR NON-AFRICAN AMERICAN: 54
GLUCOSE BLD-MCNC: 102 MG/DL (ref 70–99)
PHOSPHORUS: 3.2 MG/DL (ref 2.5–4.9)
POTASSIUM SERPL-SCNC: 4.2 MMOL/L (ref 3.5–5.1)
SODIUM BLD-SCNC: 141 MMOL/L (ref 136–145)
T4 FREE: 1 NG/DL (ref 0.9–1.8)
TSH SERPL DL<=0.05 MIU/L-ACNC: 1.01 UIU/ML (ref 0.27–4.2)

## 2017-06-02 ENCOUNTER — CARE COORDINATION (OUTPATIENT)
Dept: CASE MANAGEMENT | Age: 77
End: 2017-06-02

## 2017-06-16 ENCOUNTER — OFFICE VISIT (OUTPATIENT)
Dept: FAMILY MEDICINE CLINIC | Age: 77
End: 2017-06-16

## 2017-06-16 VITALS
DIASTOLIC BLOOD PRESSURE: 88 MMHG | BODY MASS INDEX: 37.1 KG/M2 | TEMPERATURE: 98 F | SYSTOLIC BLOOD PRESSURE: 110 MMHG | HEIGHT: 62 IN | HEART RATE: 66 BPM | OXYGEN SATURATION: 95 % | WEIGHT: 201.6 LBS

## 2017-06-16 DIAGNOSIS — J40 BRONCHITIS: Primary | ICD-10-CM

## 2017-06-16 PROCEDURE — 99213 OFFICE O/P EST LOW 20 MIN: CPT | Performed by: FAMILY MEDICINE

## 2017-06-16 PROCEDURE — 1090F PRES/ABSN URINE INCON ASSESS: CPT | Performed by: FAMILY MEDICINE

## 2017-06-16 PROCEDURE — G8427 DOCREV CUR MEDS BY ELIG CLIN: HCPCS | Performed by: FAMILY MEDICINE

## 2017-06-16 PROCEDURE — G8399 PT W/DXA RESULTS DOCUMENT: HCPCS | Performed by: FAMILY MEDICINE

## 2017-06-16 PROCEDURE — 1123F ACP DISCUSS/DSCN MKR DOCD: CPT | Performed by: FAMILY MEDICINE

## 2017-06-16 PROCEDURE — 4040F PNEUMOC VAC/ADMIN/RCVD: CPT | Performed by: FAMILY MEDICINE

## 2017-06-16 PROCEDURE — G8417 CALC BMI ABV UP PARAM F/U: HCPCS | Performed by: FAMILY MEDICINE

## 2017-06-16 PROCEDURE — 1036F TOBACCO NON-USER: CPT | Performed by: FAMILY MEDICINE

## 2017-06-16 PROCEDURE — 3017F COLORECTAL CA SCREEN DOC REV: CPT | Performed by: FAMILY MEDICINE

## 2017-06-16 RX ORDER — AMOXICILLIN AND CLAVULANATE POTASSIUM 875; 125 MG/1; MG/1
1 TABLET, FILM COATED ORAL 2 TIMES DAILY
Qty: 20 TABLET | Refills: 0 | Status: SHIPPED | OUTPATIENT
Start: 2017-06-16 | End: 2017-06-26 | Stop reason: ALTCHOICE

## 2017-06-17 ASSESSMENT — ENCOUNTER SYMPTOMS
GASTROINTESTINAL NEGATIVE: 1
RESPIRATORY NEGATIVE: 1
EYES NEGATIVE: 1

## 2017-06-26 ENCOUNTER — OFFICE VISIT (OUTPATIENT)
Dept: FAMILY MEDICINE CLINIC | Age: 77
End: 2017-06-26

## 2017-06-26 VITALS
BODY MASS INDEX: 38.44 KG/M2 | TEMPERATURE: 97.6 F | SYSTOLIC BLOOD PRESSURE: 116 MMHG | WEIGHT: 203.6 LBS | OXYGEN SATURATION: 96 % | HEIGHT: 61 IN | DIASTOLIC BLOOD PRESSURE: 64 MMHG | HEART RATE: 101 BPM

## 2017-06-26 DIAGNOSIS — J40 BRONCHITIS: Primary | ICD-10-CM

## 2017-06-26 DIAGNOSIS — M47.819 OSTEOARTHRITIS OF SPINE AT MULTIPLE LEVELS: ICD-10-CM

## 2017-06-26 DIAGNOSIS — M06.09 RHEUMATOID ARTHRITIS OF MULTIPLE SITES WITH NEGATIVE RHEUMATOID FACTOR (HCC): ICD-10-CM

## 2017-06-26 PROCEDURE — G8417 CALC BMI ABV UP PARAM F/U: HCPCS | Performed by: NURSE PRACTITIONER

## 2017-06-26 PROCEDURE — 99213 OFFICE O/P EST LOW 20 MIN: CPT | Performed by: NURSE PRACTITIONER

## 2017-06-26 PROCEDURE — 3017F COLORECTAL CA SCREEN DOC REV: CPT | Performed by: NURSE PRACTITIONER

## 2017-06-26 PROCEDURE — G8427 DOCREV CUR MEDS BY ELIG CLIN: HCPCS | Performed by: NURSE PRACTITIONER

## 2017-06-26 PROCEDURE — 4040F PNEUMOC VAC/ADMIN/RCVD: CPT | Performed by: NURSE PRACTITIONER

## 2017-06-26 PROCEDURE — G8399 PT W/DXA RESULTS DOCUMENT: HCPCS | Performed by: NURSE PRACTITIONER

## 2017-06-26 PROCEDURE — 1123F ACP DISCUSS/DSCN MKR DOCD: CPT | Performed by: NURSE PRACTITIONER

## 2017-06-26 PROCEDURE — 1090F PRES/ABSN URINE INCON ASSESS: CPT | Performed by: NURSE PRACTITIONER

## 2017-06-26 PROCEDURE — 1036F TOBACCO NON-USER: CPT | Performed by: NURSE PRACTITIONER

## 2017-06-26 RX ORDER — LEVOFLOXACIN 500 MG/1
500 TABLET, FILM COATED ORAL DAILY
Qty: 10 TABLET | Refills: 0 | Status: SHIPPED | OUTPATIENT
Start: 2017-06-26 | End: 2018-03-26 | Stop reason: SDUPTHER

## 2017-06-26 ASSESSMENT — ENCOUNTER SYMPTOMS
HEMOPTYSIS: 0
SORE THROAT: 0
HEARTBURN: 0
COUGH: 1
SHORTNESS OF BREATH: 0
GASTROINTESTINAL NEGATIVE: 1
RHINORRHEA: 1
SINUS PRESSURE: 1
EYES NEGATIVE: 1
WHEEZING: 0
ALLERGIC/IMMUNOLOGIC NEGATIVE: 1

## 2017-06-28 ENCOUNTER — OFFICE VISIT (OUTPATIENT)
Dept: PULMONOLOGY | Age: 77
End: 2017-06-28

## 2017-06-28 ENCOUNTER — HOSPITAL ENCOUNTER (OUTPATIENT)
Dept: CT IMAGING | Age: 77
Discharge: HOME | End: 2017-06-28
Attending: INTERNAL MEDICINE | Admitting: INTERNAL MEDICINE

## 2017-06-28 VITALS
OXYGEN SATURATION: 96 % | RESPIRATION RATE: 16 BRPM | DIASTOLIC BLOOD PRESSURE: 82 MMHG | SYSTOLIC BLOOD PRESSURE: 122 MMHG | HEART RATE: 80 BPM | HEIGHT: 61 IN | WEIGHT: 206 LBS | BODY MASS INDEX: 38.89 KG/M2

## 2017-06-28 DIAGNOSIS — J84.9 ILD (INTERSTITIAL LUNG DISEASE) (HCC): ICD-10-CM

## 2017-06-28 DIAGNOSIS — J84.9 ILD (INTERSTITIAL LUNG DISEASE) (HCC): Primary | ICD-10-CM

## 2017-06-28 DIAGNOSIS — J20.9 ACUTE BRONCHITIS, UNSPECIFIED ORGANISM: ICD-10-CM

## 2017-06-28 DIAGNOSIS — M06.9 RHEUMATOID ARTHRITIS, INVOLVING UNSPECIFIED SITE, UNSPECIFIED RHEUMATOID FACTOR PRESENCE: ICD-10-CM

## 2017-06-28 DIAGNOSIS — Z92.21 HX OF METHOTREXATE THERAPY: ICD-10-CM

## 2017-06-28 DIAGNOSIS — J84.9 INTERSTITIAL PULMONARY DISEASE (HCC): ICD-10-CM

## 2017-06-28 PROCEDURE — 1123F ACP DISCUSS/DSCN MKR DOCD: CPT | Performed by: INTERNAL MEDICINE

## 2017-06-28 PROCEDURE — G8427 DOCREV CUR MEDS BY ELIG CLIN: HCPCS | Performed by: INTERNAL MEDICINE

## 2017-06-28 PROCEDURE — 4040F PNEUMOC VAC/ADMIN/RCVD: CPT | Performed by: INTERNAL MEDICINE

## 2017-06-28 PROCEDURE — G8399 PT W/DXA RESULTS DOCUMENT: HCPCS | Performed by: INTERNAL MEDICINE

## 2017-06-28 PROCEDURE — 3017F COLORECTAL CA SCREEN DOC REV: CPT | Performed by: INTERNAL MEDICINE

## 2017-06-28 PROCEDURE — G8417 CALC BMI ABV UP PARAM F/U: HCPCS | Performed by: INTERNAL MEDICINE

## 2017-06-28 PROCEDURE — 1090F PRES/ABSN URINE INCON ASSESS: CPT | Performed by: INTERNAL MEDICINE

## 2017-06-28 PROCEDURE — 1036F TOBACCO NON-USER: CPT | Performed by: INTERNAL MEDICINE

## 2017-06-28 PROCEDURE — 99214 OFFICE O/P EST MOD 30 MIN: CPT | Performed by: INTERNAL MEDICINE

## 2017-07-05 DIAGNOSIS — J30.1 NON-SEASONAL ALLERGIC RHINITIS DUE TO POLLEN: ICD-10-CM

## 2017-07-05 RX ORDER — MONTELUKAST SODIUM 10 MG/1
10 TABLET ORAL NIGHTLY
Qty: 90 TABLET | Refills: 1 | Status: SHIPPED | OUTPATIENT
Start: 2017-07-05 | End: 2017-12-21 | Stop reason: SDUPTHER

## 2017-07-11 DIAGNOSIS — I10 ESSENTIAL HYPERTENSION: ICD-10-CM

## 2017-07-11 DIAGNOSIS — E78.2 MIXED HYPERLIPIDEMIA: ICD-10-CM

## 2017-07-11 LAB
A/G RATIO: 1.3 (ref 1.1–2.2)
ALBUMIN SERPL-MCNC: 4.1 G/DL (ref 3.4–5)
ALP BLD-CCNC: 58 U/L (ref 40–129)
ALT SERPL-CCNC: 14 U/L (ref 10–40)
ANION GAP SERPL CALCULATED.3IONS-SCNC: 14 MMOL/L (ref 3–16)
AST SERPL-CCNC: 23 U/L (ref 15–37)
BILIRUB SERPL-MCNC: 0.7 MG/DL (ref 0–1)
BUN BLDV-MCNC: 22 MG/DL (ref 7–20)
CALCIUM SERPL-MCNC: 10.6 MG/DL (ref 8.3–10.6)
CHLORIDE BLD-SCNC: 99 MMOL/L (ref 99–110)
CHOLESTEROL, TOTAL: 158 MG/DL (ref 0–199)
CO2: 30 MMOL/L (ref 21–32)
CREAT SERPL-MCNC: 0.9 MG/DL (ref 0.6–1.2)
GFR AFRICAN AMERICAN: >60
GFR NON-AFRICAN AMERICAN: >60
GLOBULIN: 3.1 G/DL
GLUCOSE BLD-MCNC: 78 MG/DL (ref 70–99)
HDLC SERPL-MCNC: 67 MG/DL (ref 40–60)
LDL CHOLESTEROL CALCULATED: 64 MG/DL
POTASSIUM SERPL-SCNC: 4.5 MMOL/L (ref 3.5–5.1)
SODIUM BLD-SCNC: 143 MMOL/L (ref 136–145)
TOTAL PROTEIN: 7.2 G/DL (ref 6.4–8.2)
TRIGL SERPL-MCNC: 134 MG/DL (ref 0–150)
VLDLC SERPL CALC-MCNC: 27 MG/DL

## 2017-07-14 ENCOUNTER — OFFICE VISIT (OUTPATIENT)
Dept: FAMILY MEDICINE CLINIC | Age: 77
End: 2017-07-14

## 2017-07-14 VITALS
HEART RATE: 82 BPM | BODY MASS INDEX: 37.99 KG/M2 | OXYGEN SATURATION: 95 % | WEIGHT: 201.2 LBS | HEIGHT: 61 IN | TEMPERATURE: 97.4 F | DIASTOLIC BLOOD PRESSURE: 60 MMHG | SYSTOLIC BLOOD PRESSURE: 110 MMHG

## 2017-07-14 DIAGNOSIS — J30.1 NON-SEASONAL ALLERGIC RHINITIS DUE TO POLLEN: Primary | ICD-10-CM

## 2017-07-14 DIAGNOSIS — I10 ESSENTIAL HYPERTENSION: ICD-10-CM

## 2017-07-14 DIAGNOSIS — E78.2 MIXED HYPERLIPIDEMIA: ICD-10-CM

## 2017-07-14 PROCEDURE — 1123F ACP DISCUSS/DSCN MKR DOCD: CPT | Performed by: FAMILY MEDICINE

## 2017-07-14 PROCEDURE — 4040F PNEUMOC VAC/ADMIN/RCVD: CPT | Performed by: FAMILY MEDICINE

## 2017-07-14 PROCEDURE — 1090F PRES/ABSN URINE INCON ASSESS: CPT | Performed by: FAMILY MEDICINE

## 2017-07-14 PROCEDURE — 99214 OFFICE O/P EST MOD 30 MIN: CPT | Performed by: FAMILY MEDICINE

## 2017-07-14 PROCEDURE — G8417 CALC BMI ABV UP PARAM F/U: HCPCS | Performed by: FAMILY MEDICINE

## 2017-07-14 PROCEDURE — 1036F TOBACCO NON-USER: CPT | Performed by: FAMILY MEDICINE

## 2017-07-14 PROCEDURE — G8427 DOCREV CUR MEDS BY ELIG CLIN: HCPCS | Performed by: FAMILY MEDICINE

## 2017-07-14 PROCEDURE — G8399 PT W/DXA RESULTS DOCUMENT: HCPCS | Performed by: FAMILY MEDICINE

## 2017-07-14 PROCEDURE — 3017F COLORECTAL CA SCREEN DOC REV: CPT | Performed by: FAMILY MEDICINE

## 2017-07-14 RX ORDER — LEFLUNOMIDE 20 MG/1
20 TABLET ORAL DAILY
Status: ON HOLD | COMMUNITY
Start: 2017-06-29 | End: 2019-04-17 | Stop reason: HOSPADM

## 2017-07-16 ASSESSMENT — ENCOUNTER SYMPTOMS
GASTROINTESTINAL NEGATIVE: 1
RESPIRATORY NEGATIVE: 1
EYES NEGATIVE: 1

## 2017-07-17 ENCOUNTER — OFFICE VISIT (OUTPATIENT)
Dept: ORTHOPEDIC SURGERY | Age: 77
End: 2017-07-17

## 2017-07-17 VITALS
DIASTOLIC BLOOD PRESSURE: 85 MMHG | HEART RATE: 76 BPM | WEIGHT: 210 LBS | BODY MASS INDEX: 39.65 KG/M2 | HEIGHT: 61 IN | SYSTOLIC BLOOD PRESSURE: 139 MMHG

## 2017-07-17 DIAGNOSIS — Z96.651 STATUS POST TOTAL RIGHT KNEE REPLACEMENT USING CEMENT: ICD-10-CM

## 2017-07-17 DIAGNOSIS — M06.09 RHEUMATOID ARTHRITIS OF MULTIPLE SITES WITH NEGATIVE RHEUMATOID FACTOR (HCC): ICD-10-CM

## 2017-07-17 DIAGNOSIS — G89.29 CHRONIC PAIN OF RIGHT KNEE: Primary | ICD-10-CM

## 2017-07-17 DIAGNOSIS — M25.561 CHRONIC PAIN OF RIGHT KNEE: Primary | ICD-10-CM

## 2017-07-17 PROCEDURE — 1123F ACP DISCUSS/DSCN MKR DOCD: CPT | Performed by: ORTHOPAEDIC SURGERY

## 2017-07-17 PROCEDURE — 1036F TOBACCO NON-USER: CPT | Performed by: ORTHOPAEDIC SURGERY

## 2017-07-17 PROCEDURE — 4040F PNEUMOC VAC/ADMIN/RCVD: CPT | Performed by: ORTHOPAEDIC SURGERY

## 2017-07-17 PROCEDURE — 1090F PRES/ABSN URINE INCON ASSESS: CPT | Performed by: ORTHOPAEDIC SURGERY

## 2017-07-17 PROCEDURE — 99213 OFFICE O/P EST LOW 20 MIN: CPT | Performed by: ORTHOPAEDIC SURGERY

## 2017-07-17 PROCEDURE — G8427 DOCREV CUR MEDS BY ELIG CLIN: HCPCS | Performed by: ORTHOPAEDIC SURGERY

## 2017-07-17 PROCEDURE — G8399 PT W/DXA RESULTS DOCUMENT: HCPCS | Performed by: ORTHOPAEDIC SURGERY

## 2017-07-17 PROCEDURE — G8417 CALC BMI ABV UP PARAM F/U: HCPCS | Performed by: ORTHOPAEDIC SURGERY

## 2017-07-17 PROCEDURE — 3017F COLORECTAL CA SCREEN DOC REV: CPT | Performed by: ORTHOPAEDIC SURGERY

## 2017-07-20 ENCOUNTER — TELEPHONE (OUTPATIENT)
Dept: FAMILY MEDICINE CLINIC | Age: 77
End: 2017-07-20

## 2017-09-07 ENCOUNTER — OFFICE VISIT (OUTPATIENT)
Dept: FAMILY MEDICINE CLINIC | Age: 77
End: 2017-09-07

## 2017-09-07 VITALS
WEIGHT: 206.6 LBS | DIASTOLIC BLOOD PRESSURE: 70 MMHG | SYSTOLIC BLOOD PRESSURE: 106 MMHG | HEIGHT: 61 IN | TEMPERATURE: 98.3 F | BODY MASS INDEX: 39.01 KG/M2

## 2017-09-07 DIAGNOSIS — L72.3 SEBACEOUS CYST: Primary | ICD-10-CM

## 2017-09-07 DIAGNOSIS — S39.012A LUMBAR STRAIN, INITIAL ENCOUNTER: ICD-10-CM

## 2017-09-07 DIAGNOSIS — M47.816 OSTEOARTHRITIS OF LUMBAR SPINE, UNSPECIFIED SPINAL OSTEOARTHRITIS COMPLICATION STATUS: ICD-10-CM

## 2017-09-07 PROCEDURE — G8399 PT W/DXA RESULTS DOCUMENT: HCPCS | Performed by: FAMILY MEDICINE

## 2017-09-07 PROCEDURE — 4040F PNEUMOC VAC/ADMIN/RCVD: CPT | Performed by: FAMILY MEDICINE

## 2017-09-07 PROCEDURE — 1090F PRES/ABSN URINE INCON ASSESS: CPT | Performed by: FAMILY MEDICINE

## 2017-09-07 PROCEDURE — G8417 CALC BMI ABV UP PARAM F/U: HCPCS | Performed by: FAMILY MEDICINE

## 2017-09-07 PROCEDURE — 1036F TOBACCO NON-USER: CPT | Performed by: FAMILY MEDICINE

## 2017-09-07 PROCEDURE — 99213 OFFICE O/P EST LOW 20 MIN: CPT | Performed by: FAMILY MEDICINE

## 2017-09-07 PROCEDURE — G8427 DOCREV CUR MEDS BY ELIG CLIN: HCPCS | Performed by: FAMILY MEDICINE

## 2017-09-07 PROCEDURE — 3017F COLORECTAL CA SCREEN DOC REV: CPT | Performed by: FAMILY MEDICINE

## 2017-09-07 PROCEDURE — 1123F ACP DISCUSS/DSCN MKR DOCD: CPT | Performed by: FAMILY MEDICINE

## 2017-09-10 ASSESSMENT — ENCOUNTER SYMPTOMS
EYES NEGATIVE: 1
RESPIRATORY NEGATIVE: 1
GASTROINTESTINAL NEGATIVE: 1

## 2017-10-06 DIAGNOSIS — I10 ESSENTIAL HYPERTENSION: ICD-10-CM

## 2017-10-06 DIAGNOSIS — E78.2 MIXED HYPERLIPIDEMIA: ICD-10-CM

## 2017-10-06 LAB
A/G RATIO: 1.4 (ref 1.1–2.2)
ALBUMIN SERPL-MCNC: 3.9 G/DL (ref 3.4–5)
ALP BLD-CCNC: 42 U/L (ref 40–129)
ALT SERPL-CCNC: 25 U/L (ref 10–40)
ANION GAP SERPL CALCULATED.3IONS-SCNC: 12 MMOL/L (ref 3–16)
AST SERPL-CCNC: 22 U/L (ref 15–37)
BILIRUB SERPL-MCNC: 0.6 MG/DL (ref 0–1)
BUN BLDV-MCNC: 24 MG/DL (ref 7–20)
CALCIUM SERPL-MCNC: 10.4 MG/DL (ref 8.3–10.6)
CHLORIDE BLD-SCNC: 100 MMOL/L (ref 99–110)
CHOLESTEROL, TOTAL: 213 MG/DL (ref 0–199)
CO2: 30 MMOL/L (ref 21–32)
CREAT SERPL-MCNC: 0.8 MG/DL (ref 0.6–1.2)
GFR AFRICAN AMERICAN: >60
GFR NON-AFRICAN AMERICAN: >60
GLOBULIN: 2.7 G/DL
GLUCOSE BLD-MCNC: 88 MG/DL (ref 70–99)
HDLC SERPL-MCNC: 55 MG/DL (ref 40–60)
LDL CHOLESTEROL CALCULATED: 109 MG/DL
POTASSIUM SERPL-SCNC: 4 MMOL/L (ref 3.5–5.1)
SODIUM BLD-SCNC: 142 MMOL/L (ref 136–145)
TOTAL PROTEIN: 6.6 G/DL (ref 6.4–8.2)
TRIGL SERPL-MCNC: 245 MG/DL (ref 0–150)
VLDLC SERPL CALC-MCNC: 49 MG/DL

## 2017-10-09 ENCOUNTER — OFFICE VISIT (OUTPATIENT)
Dept: FAMILY MEDICINE CLINIC | Age: 77
End: 2017-10-09

## 2017-10-09 ENCOUNTER — HOSPITAL ENCOUNTER (OUTPATIENT)
Dept: MRI IMAGING | Age: 77
Discharge: HOME | End: 2017-10-09
Attending: NURSE PRACTITIONER | Admitting: NURSE PRACTITIONER

## 2017-10-09 VITALS
HEART RATE: 101 BPM | HEIGHT: 60 IN | BODY MASS INDEX: 40.64 KG/M2 | DIASTOLIC BLOOD PRESSURE: 80 MMHG | OXYGEN SATURATION: 90 % | TEMPERATURE: 98 F | SYSTOLIC BLOOD PRESSURE: 140 MMHG | WEIGHT: 207 LBS

## 2017-10-09 DIAGNOSIS — M25.511 PAIN IN RIGHT SHOULDER: ICD-10-CM

## 2017-10-09 DIAGNOSIS — E78.2 MIXED HYPERLIPIDEMIA: ICD-10-CM

## 2017-10-09 DIAGNOSIS — R35.0 URINARY FREQUENCY: ICD-10-CM

## 2017-10-09 DIAGNOSIS — M25.511 ACUTE PAIN OF RIGHT SHOULDER: ICD-10-CM

## 2017-10-09 DIAGNOSIS — M25.511 ACUTE PAIN OF RIGHT SHOULDER: Primary | ICD-10-CM

## 2017-10-09 LAB
BILIRUBIN, POC: NORMAL
BLOOD URINE, POC: NORMAL
CLARITY, POC: CLEAR
COLOR, POC: YELLOW
GLUCOSE URINE, POC: NORMAL
KETONES, POC: NORMAL
LEUKOCYTE EST, POC: NORMAL
NITRITE, POC: NORMAL
PH, POC: 6.5
PROTEIN, POC: NORMAL
SPECIFIC GRAVITY, POC: 1.01
UROBILINOGEN, POC: 0.2

## 2017-10-09 PROCEDURE — 1123F ACP DISCUSS/DSCN MKR DOCD: CPT | Performed by: NURSE PRACTITIONER

## 2017-10-09 PROCEDURE — G8399 PT W/DXA RESULTS DOCUMENT: HCPCS | Performed by: NURSE PRACTITIONER

## 2017-10-09 PROCEDURE — 81002 URINALYSIS NONAUTO W/O SCOPE: CPT | Performed by: NURSE PRACTITIONER

## 2017-10-09 PROCEDURE — 4040F PNEUMOC VAC/ADMIN/RCVD: CPT | Performed by: NURSE PRACTITIONER

## 2017-10-09 PROCEDURE — 3017F COLORECTAL CA SCREEN DOC REV: CPT | Performed by: NURSE PRACTITIONER

## 2017-10-09 PROCEDURE — G8427 DOCREV CUR MEDS BY ELIG CLIN: HCPCS | Performed by: NURSE PRACTITIONER

## 2017-10-09 PROCEDURE — G8417 CALC BMI ABV UP PARAM F/U: HCPCS | Performed by: NURSE PRACTITIONER

## 2017-10-09 PROCEDURE — 1090F PRES/ABSN URINE INCON ASSESS: CPT | Performed by: NURSE PRACTITIONER

## 2017-10-09 PROCEDURE — 1036F TOBACCO NON-USER: CPT | Performed by: NURSE PRACTITIONER

## 2017-10-09 PROCEDURE — G8484 FLU IMMUNIZE NO ADMIN: HCPCS | Performed by: NURSE PRACTITIONER

## 2017-10-09 PROCEDURE — 99214 OFFICE O/P EST MOD 30 MIN: CPT | Performed by: NURSE PRACTITIONER

## 2017-10-09 RX ORDER — CIPROFLOXACIN 500 MG/1
500 TABLET, FILM COATED ORAL 2 TIMES DAILY
Qty: 20 TABLET | Refills: 0 | Status: SHIPPED | OUTPATIENT
Start: 2017-10-09 | End: 2017-10-19

## 2017-10-09 RX ORDER — BIOTIN 10000 MCG
CAPSULE ORAL
Status: ON HOLD | COMMUNITY
End: 2018-08-10 | Stop reason: CLARIF

## 2017-10-09 RX ORDER — MINOCYCLINE HYDROCHLORIDE 100 MG/1
CAPSULE ORAL
COMMUNITY
Start: 2017-10-03 | End: 2017-10-26 | Stop reason: SDUPTHER

## 2017-10-09 RX ORDER — OXYCODONE HYDROCHLORIDE AND ACETAMINOPHEN 5; 325 MG/1; MG/1
1 TABLET ORAL 2 TIMES DAILY
Status: ON HOLD | COMMUNITY
Start: 2017-10-03 | End: 2018-08-10 | Stop reason: CLARIF

## 2017-10-09 ASSESSMENT — ENCOUNTER SYMPTOMS
SHORTNESS OF BREATH: 0
EYES NEGATIVE: 1
VOMITING: 0
GASTROINTESTINAL NEGATIVE: 1
NAUSEA: 0
RESPIRATORY NEGATIVE: 1
ALLERGIC/IMMUNOLOGIC NEGATIVE: 1

## 2017-10-09 NOTE — PROGRESS NOTES
10/9/17    Trisha Tierney  1940      Chief Complaint   Patient presents with    Urinary Frequency    Shoulder Pain     Rt-shoulder pain     Hyperlipidemia       Vitals:    10/09/17 1043   BP: (!) 140/80   Pulse: 101   Temp: 98 °F (36.7 °C)   SpO2: 90%         Immunization History   Administered Date(s) Administered    Influenza Nasal 10/22/2008    Influenza Vaccine, unspecified formulation 09/30/2016, 09/27/2017    Influenza, High Dose 10/08/2015, 09/27/2017    Pneumococcal 13-valent Conjugate ([id number]) 10/19/2015    Pneumococcal Polysaccharide ([id number]) 09/30/2009    Tdap (Boostrix, Adacel) 08/22/2014    Zoster 09/30/2009       Allergies   Allergen Reactions    Sulfa Antibiotics     Lomotil  [Diphenoxylate-Atropine] Other (See Comments)     Changes in vision     Outpatient Prescriptions Marked as Taking for the 10/9/17 encounter (Office Visit) with Brent Hudson NP   Medication Sig Dispense Refill    oxyCODONE-acetaminophen (PERCOCET) 5-325 MG per tablet Take 1 tablet by mouth .  Biotin 10 MG CAPS Take 1 capsule by mouth daily.  LACTOBACILLUS PO Take 1 capsule by mouth daily.       minocycline (MINOCIN;DYNACIN) 100 MG capsule       ciprofloxacin (CIPRO) 500 MG tablet Take 1 tablet by mouth 2 times daily for 10 days 20 tablet 0    leflunomide (ARAVA) 20 MG tablet Take 20 mg by mouth      triamcinolone (KENALOG) 0.1 % ointment       montelukast (SINGULAIR) 10 MG tablet Take 1 tablet by mouth nightly 90 tablet 1    dexlansoprazole (DEXILANT) 60 MG CPDR delayed release capsule Take 1 capsule by mouth daily 90 capsule 1    lisinopril (PRINIVIL;ZESTRIL) 40 MG tablet Take 1 tablet by mouth daily 90 tablet 1    escitalopram (LEXAPRO) 10 MG tablet Take 1 tablet by mouth daily 90 tablet 1    fenofibrate (TRICOR) 145 MG tablet Take 1 tablet by mouth daily 90 tablet 1    hydrochlorothiazide (HYDRODIURIL) 25 MG tablet Take 1 tablet by mouth daily 90 tablet 1    traZODone KNEE ARTHROPLASTY             SALPINGO-OOPHORECTOMY      SHOULDER ARTHROPLASTY Right      Family History   Problem Relation Age of Onset    Cancer Mother     Heart Disease Father     High Blood Pressure Father     Diabetes Brother     High Blood Pressure Brother     Arthritis Sister     Asthma Sister     High Cholesterol Sister      Social History     Social History    Marital status:      Spouse name: N/A    Number of children: N/A    Years of education: N/A     Occupational History    Not on file. Social History Main Topics    Smoking status: Former Smoker    Smokeless tobacco: Former User      Comment: quit age 29    Alcohol use Yes      Comment: social     Drug use: No    Sexual activity: No     Other Topics Concern    Not on file     Social History Narrative    No narrative on file         Any recent diagnostic tests, hospital reports, office notes or consultation letters were reviewed prior to and during the visit. Urinary Frequency    This is a new problem. The current episode started 1 to 4 weeks ago. The problem has been gradually worsening. The patient is experiencing no pain. There has been no fever. Associated symptoms include frequency and urgency. Pertinent negatives include no chills, discharge, flank pain, hematuria, hesitancy, nausea, sweats or vomiting. She has tried nothing for the symptoms. Shoulder Pain    The pain is present in the right shoulder. This is a new problem. The current episode started 1 to 4 weeks ago. There has been no history of extremity trauma. The problem occurs intermittently. The problem has been unchanged. The quality of the pain is described as aching and sharp. The pain is moderate. Associated symptoms include a limited range of motion. Pertinent negatives include no fever, inability to bear weight, itching, joint locking, joint swelling, numbness, stiffness or tingling. Her past medical history is significant for osteoarthritis. There is no history of diabetes. Hyperlipidemia   This is a chronic problem. The current episode started more than 1 year ago. Recent lipid tests were reviewed and are high. She has no history of chronic renal disease, diabetes, hypothyroidism or liver disease. Pertinent negatives include no chest pain, focal sensory loss, focal weakness, leg pain, myalgias or shortness of breath. Current antihyperlipidemic treatment includes fibric acid derivatives. Compliance problems include adherence to diet. Review of Systems   Constitutional: Negative. Negative for chills and fever. HENT: Negative. Eyes: Negative. Respiratory: Negative. Negative for shortness of breath. Cardiovascular: Negative. Negative for chest pain. Gastrointestinal: Negative. Negative for nausea and vomiting. Endocrine: Negative. Genitourinary: Positive for frequency and urgency. Negative for flank pain, hematuria and hesitancy. Musculoskeletal: Negative. Negative for myalgias and stiffness. Right shoulder pain   Skin: Negative. Negative for itching. Allergic/Immunologic: Negative. Neurological: Negative. Negative for tingling, focal weakness and numbness. Hematological: Negative. Psychiatric/Behavioral: Negative. Physical Exam   Constitutional: She is oriented to person, place, and time. She appears well-developed and well-nourished. HENT:   Head: Normocephalic. Cardiovascular: Normal rate, regular rhythm, normal heart sounds and intact distal pulses. Pulmonary/Chest: Effort normal and breath sounds normal. No respiratory distress. She has no wheezes. She has no rales. Abdominal: Soft. Bowel sounds are normal. She exhibits no distension. There is no tenderness. There is no rebound. Musculoskeletal:        Right shoulder: She exhibits decreased range of motion, tenderness, bony tenderness, pain and decreased strength.  She exhibits no swelling, no effusion, no deformity, no

## 2017-10-10 DIAGNOSIS — E78.2 MIXED HYPERLIPIDEMIA: ICD-10-CM

## 2017-10-10 DIAGNOSIS — F32.89 DEPRESSIVE DISORDER, NOT ELSEWHERE CLASSIFIED: ICD-10-CM

## 2017-10-10 DIAGNOSIS — I10 ESSENTIAL HYPERTENSION: ICD-10-CM

## 2017-10-10 RX ORDER — LISINOPRIL 40 MG/1
40 TABLET ORAL DAILY
Qty: 90 TABLET | Refills: 1 | Status: SHIPPED | OUTPATIENT
Start: 2017-10-10 | End: 2018-03-05 | Stop reason: SDUPTHER

## 2017-10-10 RX ORDER — ESCITALOPRAM OXALATE 10 MG/1
10 TABLET ORAL DAILY
Qty: 90 TABLET | Refills: 1 | Status: SHIPPED | OUTPATIENT
Start: 2017-10-10 | End: 2018-03-05 | Stop reason: SDUPTHER

## 2017-10-10 RX ORDER — FENOFIBRATE 145 MG/1
145 TABLET, COATED ORAL DAILY
Qty: 90 TABLET | Refills: 1 | Status: SHIPPED | OUTPATIENT
Start: 2017-10-10 | End: 2018-03-05 | Stop reason: SDUPTHER

## 2017-10-11 DIAGNOSIS — K21.9 GASTROESOPHAGEAL REFLUX DISEASE WITHOUT ESOPHAGITIS: ICD-10-CM

## 2017-10-11 RX ORDER — DEXLANSOPRAZOLE 60 MG/1
60 CAPSULE, DELAYED RELEASE ORAL DAILY
Qty: 90 CAPSULE | Refills: 1 | Status: SHIPPED | OUTPATIENT
Start: 2017-10-11 | End: 2017-10-13 | Stop reason: CLARIF

## 2017-10-13 DIAGNOSIS — K21.9 GASTROESOPHAGEAL REFLUX DISEASE WITHOUT ESOPHAGITIS: Primary | ICD-10-CM

## 2017-10-13 RX ORDER — PANTOPRAZOLE SODIUM 40 MG/1
40 TABLET, DELAYED RELEASE ORAL DAILY
Qty: 90 TABLET | Refills: 1 | Status: SHIPPED | OUTPATIENT
Start: 2017-10-13 | End: 2018-03-05 | Stop reason: SDUPTHER

## 2017-10-20 ENCOUNTER — HOSPITAL ENCOUNTER (OUTPATIENT)
Dept: MRI IMAGING | Age: 77
Discharge: HOME | End: 2017-10-20
Attending: ORTHOPAEDIC SURGERY | Admitting: ORTHOPAEDIC SURGERY

## 2017-10-20 ENCOUNTER — OFFICE VISIT (OUTPATIENT)
Dept: ORTHOPEDIC SURGERY | Age: 77
End: 2017-10-20

## 2017-10-20 VITALS — WEIGHT: 207.01 LBS | BODY MASS INDEX: 40.64 KG/M2 | HEIGHT: 60 IN

## 2017-10-20 DIAGNOSIS — M25.512 PAIN IN LEFT SHOULDER: ICD-10-CM

## 2017-10-20 DIAGNOSIS — M75.102 TEAR OF LEFT ROTATOR CUFF, UNSPECIFIED TEAR EXTENT: ICD-10-CM

## 2017-10-20 DIAGNOSIS — M25.512 LEFT SHOULDER PAIN, UNSPECIFIED CHRONICITY: ICD-10-CM

## 2017-10-20 DIAGNOSIS — Z96.611 STATUS POST REVERSE TOTAL REPLACEMENT OF RIGHT SHOULDER: ICD-10-CM

## 2017-10-20 DIAGNOSIS — M25.512 LEFT SHOULDER PAIN, UNSPECIFIED CHRONICITY: Primary | ICD-10-CM

## 2017-10-20 DIAGNOSIS — M25.511 RIGHT SHOULDER PAIN, UNSPECIFIED CHRONICITY: ICD-10-CM

## 2017-10-20 PROCEDURE — G8399 PT W/DXA RESULTS DOCUMENT: HCPCS | Performed by: PHYSICIAN ASSISTANT

## 2017-10-20 PROCEDURE — G8427 DOCREV CUR MEDS BY ELIG CLIN: HCPCS | Performed by: PHYSICIAN ASSISTANT

## 2017-10-20 PROCEDURE — 4040F PNEUMOC VAC/ADMIN/RCVD: CPT | Performed by: PHYSICIAN ASSISTANT

## 2017-10-20 PROCEDURE — G8484 FLU IMMUNIZE NO ADMIN: HCPCS | Performed by: PHYSICIAN ASSISTANT

## 2017-10-20 PROCEDURE — 1036F TOBACCO NON-USER: CPT | Performed by: PHYSICIAN ASSISTANT

## 2017-10-20 PROCEDURE — 1090F PRES/ABSN URINE INCON ASSESS: CPT | Performed by: PHYSICIAN ASSISTANT

## 2017-10-20 PROCEDURE — G8417 CALC BMI ABV UP PARAM F/U: HCPCS | Performed by: PHYSICIAN ASSISTANT

## 2017-10-20 PROCEDURE — 1123F ACP DISCUSS/DSCN MKR DOCD: CPT | Performed by: PHYSICIAN ASSISTANT

## 2017-10-20 PROCEDURE — 99213 OFFICE O/P EST LOW 20 MIN: CPT | Performed by: PHYSICIAN ASSISTANT

## 2017-10-20 PROCEDURE — 3017F COLORECTAL CA SCREEN DOC REV: CPT | Performed by: PHYSICIAN ASSISTANT

## 2017-10-20 NOTE — PROGRESS NOTES
Patient History Form dated on October 20, 2017 and available in the patient's chart under the Media tab. PHYSICAL EXAMINATION: Inspection of the left shoulder reveals warm, dry, intact skin. There is no adenopathy. The distal neurovascular exam is grossly intact. She has tenderness over the infraspinatus and greater tuberosity. She also has mild tenderness over the anterior aspect of the shoulder. Range of motion reveals 30° of active forward elevation, 130° of passive forward elevation. She is unable position her hand her head. She has 30° of external rotation with her arm at her side. She has 85° of shoulder abduction. She has 80° of external rotation and 15° of internal rotation with arm maximally abducted. She has 4 minus out of 5 strength in forward elevation and external rotation. She has discomfort and weakness with a Whipple's test.  She has a negative belly press test.  She has a negative Yergason's test.    Examination of the contralateral shoulder reveals no atrophy or deformity. The skin is warm and dry. The incision is well-healed. She is tenderness over scapular spine. She denies tenderness over the acromioclavicular joint. There is expected atrophy about the right shoulder. Range of motion reveals 120° of active forward elevation. She has some difficulty positioning her hand behind her head. She has 30° of external rotation with the arm side. She has 70° of shoulder abduction. She has 70° of external rotation and 15° of internal rotation with her arm maximally abducted. She has 4 out of 5 strength in forward elevation and external rotation. The distal neurovascular exam is grossly intact. Cervical spine: The skin is warm and dry. There is no swelling, warmth, or erythema. Range of motion is within normal limits. There is no paraspinal or spinous process tenderness. Spurling's sign is negative and did not produce shoulder pain.  The distal neurovascular exam is grossly intact. X-RAYS: 4 views of the left shoulder were obtained in the office and reviewed today. The glenohumeral joint is minimally diminished. There is osteophytes formation visualized inferior aspect of humeral head and superior aspect of the glenoid. There is a laterally positioned acromion with changes the undersurface the lateral acromion and greater tuberosity. There are degenerative changes about the acromioclavicular joint. There is gently curving type II acromion visualized in the outlet view. Lung fields are clear with normal pulmonary markings. True AP and axillary views of the right shoulder reveal appropriately placed, well located reverse total shoulder arthroplasty components and cerclage cable. MRI: Images obtained October 9, 2017  Essentially nondiagnostic examination for the assessment of   intra-articular structures due to a significant amount a artifact   from the arthroplasty. If there is persistent concern, CT   examination would provide more diagnostic information. ASSESSMENT:    1. Left shoulder - acute left shoulder pain with probable rotator cuff tear  2. Right shoulder - 1.5 years after reverse total shoulder arthroplasty with probable scapular spine fracture. PLAN:  I had detailed discussion with Shaggy Canseco and her daughter regarding diagnosis and treatment options. In regards to her left shoulder, I recommended an MRI to evaluate the integrity of rotator cuff. In regards to her right shoulder, I recommended a CT scan with metal subtraction to evaluate the scapular spine for possible fracture. I will see her back for reevaluation following the scans, hopefully next week to determine her ability to continue with her trip to [facility]. She understands that her shoulder function will probably not change between now and her trip. We will consider a cortisone injection of the left shoulder at her follow-up appointment.   She and her daughter are in agreement with this

## 2017-10-23 ENCOUNTER — OFFICE VISIT (OUTPATIENT)
Dept: ORTHOPEDIC SURGERY | Age: 77
End: 2017-10-23

## 2017-10-23 VITALS — WEIGHT: 205.03 LBS | HEIGHT: 60 IN | BODY MASS INDEX: 40.25 KG/M2

## 2017-10-23 DIAGNOSIS — Z96.611 STATUS POST REVERSE TOTAL REPLACEMENT OF RIGHT SHOULDER: Primary | ICD-10-CM

## 2017-10-23 PROCEDURE — G8427 DOCREV CUR MEDS BY ELIG CLIN: HCPCS | Performed by: ORTHOPAEDIC SURGERY

## 2017-10-23 PROCEDURE — 99213 OFFICE O/P EST LOW 20 MIN: CPT | Performed by: ORTHOPAEDIC SURGERY

## 2017-10-23 PROCEDURE — 1036F TOBACCO NON-USER: CPT | Performed by: ORTHOPAEDIC SURGERY

## 2017-10-23 PROCEDURE — 1123F ACP DISCUSS/DSCN MKR DOCD: CPT | Performed by: ORTHOPAEDIC SURGERY

## 2017-10-23 PROCEDURE — 3017F COLORECTAL CA SCREEN DOC REV: CPT | Performed by: ORTHOPAEDIC SURGERY

## 2017-10-23 PROCEDURE — 1090F PRES/ABSN URINE INCON ASSESS: CPT | Performed by: ORTHOPAEDIC SURGERY

## 2017-10-23 PROCEDURE — G8399 PT W/DXA RESULTS DOCUMENT: HCPCS | Performed by: ORTHOPAEDIC SURGERY

## 2017-10-23 PROCEDURE — G8417 CALC BMI ABV UP PARAM F/U: HCPCS | Performed by: ORTHOPAEDIC SURGERY

## 2017-10-23 PROCEDURE — 4040F PNEUMOC VAC/ADMIN/RCVD: CPT | Performed by: ORTHOPAEDIC SURGERY

## 2017-10-23 PROCEDURE — G8484 FLU IMMUNIZE NO ADMIN: HCPCS | Performed by: ORTHOPAEDIC SURGERY

## 2017-10-23 NOTE — PROGRESS NOTES
HISTORY:  The patient returns today for evaluation of bilateral shoulder pain. The symptoms are essentially unchanged from previous visit. REVIEW OF SYSTEMS: Pertinent items are noted in the HPI. Review of systems was reviewed from Patient History Form dated on October 20, 2017 and available in the patient's chart under the Media tab. PHYSICAL EXAMINATION: Inspection of the left shoulder reveals warm, dry, intact skin. There is no adenopathy. The distal neurovascular exam is grossly intact. She has tenderness over the infraspinatus and greater tuberosity. She also has mild tenderness over the anterior aspect of the shoulder. Range of motion reveals 30° of active forward elevation, 130° of passive forward elevation. She is unable position her hand her head. She has 30° of external rotation with her arm at her side. She has 85° of shoulder abduction. She has 80° of external rotation and 15° of internal rotation with arm maximally abducted. She has 4 minus out of 5 strength in forward elevation and external rotation. She has discomfort and weakness with a Whipple's test.  She has a negative belly press test.  She has a negative Yergason's test.    Examination of the contralateral shoulder reveals no atrophy or deformity. The skin is warm and dry. The incision is well-healed. She is tenderness over scapular spine. She denies tenderness over the acromioclavicular joint. There is expected atrophy about the right shoulder. Range of motion reveals 120° of active forward elevation. She has some difficulty positioning her hand behind her head. She has 30° of external rotation with the arm side. She has 70° of shoulder abduction. She has 70° of external rotation and 15° of internal rotation with her arm maximally abducted. She has 4 out of 5 strength in forward elevation and external rotation. The distal neurovascular exam is grossly intact. Cervical spine: The skin is warm and dry.  There is

## 2017-10-24 NOTE — ADDENDUM NOTE
Encounter addended by: Rimma Manning MA on: 10/24/2017  1:22 PM<BR>    Actions taken: Letter status changed

## 2017-10-24 NOTE — LETTER
[facility]  Surgery Scheduling & Billing Form    MARGOT Skaggs E Cyndie Gonzales      Surgeon: Dima Bansal. Jaqueline Roper M.D. Assistant: Nolvia Trimble PA-C    Surgical Procedure: Left shoulder, video arthroscopy,   subacromial decompression, rotator cuff repair      CPT CODE 27805, 88084      Scheduled Date: 11/15/2017 Scheduled Time: 11:30am    Length of Surgery: 1 hour    Diagnosis: Tear of left rotator cuff, unspecified tear extent ICD-10-CM: M75.102            Classification:  Outpatient        Same Day Admission  In-Patient  Day Admit    Cardiac Clearance Needed:  YES          NO  PULMONARY CLEARANCE NEEDED    Anesthesia Type:   General MAC    IV Regional Local  Other:     PATIENT TAKE PREDNISONE DAILY        SCD:  Knee High Thigh High    Pre-admission Testing: Labs EKG       X-Ray H&P    Allergies: Allergies   Allergen Reactions    Sulfa Antibiotics     Lomotil  [Diphenoxylate-Atropine] Other (See Comments)     Changes in vision     Latex: YES          NO    C-ARM needed  -   Large          Mini    Special Equipment:   Rep Notified:       If no pre-admission testing is needed, specify reason (i.e. Testing at primary; no testing needed; etc)    Special Requests:   Remarks/Comments:  PATIENT TAKE PREDNISONE DAILY       Scheduled By: Neela Hoang 10/24/2017                  1              Patient Name:  Roge Crane                                          Patient :  1940          Insurance:  Payor: Mora Soliman / Plan: MEDICARE PART A AND B / Product Type: *No Product type* /                     Comments:                 Marko Núñez MD                 10/24/17                                           2                             [facility]    IN ACCORDANCE WITH OUR FORMULARY SYSTEM, A GENERIC EQUIVALENT DRUG MAY BE DISPENSED AND ADMINISTERED UNLESS D. A. W. IS WRITTEN WITH THE MEDICATION ORDER  PRE-SURGICAL PHYSICIAN ORDERS  ORTHOPEDIC SURGERY Patient Name Luis Alberto Diaz        1940    Surgical Procedure Left shoulder, video arthroscopy, subacromial decompression, rotator cuff repair       Date of Surgery 11/15/2017       Admit as Inpatient                        Outpatient    Height  5'0\"     Weight  205LBS      Allergies Sulfa antibiotics and Lomotil  [diphenoxylate-atropine]     MRSA positive or history:  No       Pre-surgery Testing Orders:    Pre-surgical Anesthesia Orders Per Anesthesiologist    Additional Testing:    U/A                Urine C/S                              CBC w Diff                              Type & Screen                              PTINR            PTT            Transferrin          Albumin     BMP    Other   CXR (medical reason)        Preop Antibiotic Prophylaxis / DAY OF SURGERY     Cefazolin IVPB per weight base protocol  ? Cefazolin 2 grams if <119.9 kg  ? Cefazolin 3 grams if ?120kg (?264 lbs. )  ? Pediatric(<14yo) Dosinmg/kg (maximum 2 grams)     If allergic to PCN     Clindamycin 900 mg IVPB   If allergic to PCN/Cephalosporin, positive MRSA/history or ? 72  age (risk of C-difficile):        Vancomycin IVPB per weight base protocol  ? Vancomycin 1 gm if < 80kg (<176 lbs. )  ? Vancomycin 1.5 gm if ?80kg to <120kg (?176 to <264 lbs. )  ?  Vancomycin 2 gm if  if ?120kg (?264 lbs.)   ADDITIONAL MEDICATIONS:     OFIRMEV IVPB 1gm over 15 minutes (Adjust to body weight when needed)     DVT PREVENTION:     Knee high Antithrombic wraps (Age 16 & older)         Bilateral               Right                  Left     Thigh MARCELINO Hose                   Signature                                                   Date 10/24/17 1:02 PM    Please fax at time of booking the case to the Scheduling office at  [facility] at [phone]                                                  Updated 2015            3

## 2017-10-26 ENCOUNTER — OFFICE VISIT (OUTPATIENT)
Dept: FAMILY MEDICINE CLINIC | Age: 77
End: 2017-10-26

## 2017-10-26 VITALS
TEMPERATURE: 97.9 F | OXYGEN SATURATION: 93 % | BODY MASS INDEX: 40.37 KG/M2 | HEIGHT: 60 IN | WEIGHT: 205.6 LBS | DIASTOLIC BLOOD PRESSURE: 88 MMHG | HEART RATE: 85 BPM | SYSTOLIC BLOOD PRESSURE: 138 MMHG

## 2017-10-26 DIAGNOSIS — I10 ESSENTIAL HYPERTENSION: ICD-10-CM

## 2017-10-26 DIAGNOSIS — M75.122 COMPLETE TEAR OF LEFT ROTATOR CUFF: Primary | ICD-10-CM

## 2017-10-26 DIAGNOSIS — M06.09 RHEUMATOID ARTHRITIS OF MULTIPLE SITES WITH NEGATIVE RHEUMATOID FACTOR (HCC): ICD-10-CM

## 2017-10-26 DIAGNOSIS — E78.2 MIXED HYPERLIPIDEMIA: ICD-10-CM

## 2017-10-26 DIAGNOSIS — D84.9 IMMUNOSUPPRESSION (HCC): ICD-10-CM

## 2017-10-26 DIAGNOSIS — Z01.818 PREOP GENERAL PHYSICAL EXAM: Primary | ICD-10-CM

## 2017-10-26 DIAGNOSIS — J30.89 CHRONIC NONSEASONAL ALLERGIC RHINITIS DUE TO POLLEN: ICD-10-CM

## 2017-10-26 DIAGNOSIS — J45.20 MILD INTERMITTENT ASTHMA WITHOUT COMPLICATION: ICD-10-CM

## 2017-10-26 DIAGNOSIS — I87.2 VENOUS STASIS DERMATITIS OF LEFT LOWER EXTREMITY: ICD-10-CM

## 2017-10-26 DIAGNOSIS — K21.9 GASTROESOPHAGEAL REFLUX DISEASE WITHOUT ESOPHAGITIS: ICD-10-CM

## 2017-10-26 DIAGNOSIS — F32.4 MAJOR DEPRESSIVE DISORDER WITH SINGLE EPISODE, IN PARTIAL REMISSION (HCC): ICD-10-CM

## 2017-10-26 DIAGNOSIS — M19.012 PRIMARY OSTEOARTHRITIS OF LEFT SHOULDER: ICD-10-CM

## 2017-10-26 DIAGNOSIS — J84.9 INTERSTITIAL LUNG DISEASE (HCC): ICD-10-CM

## 2017-10-26 PROCEDURE — 93000 ELECTROCARDIOGRAM COMPLETE: CPT | Performed by: FAMILY MEDICINE

## 2017-10-26 PROCEDURE — G8427 DOCREV CUR MEDS BY ELIG CLIN: HCPCS | Performed by: FAMILY MEDICINE

## 2017-10-26 PROCEDURE — L3670 SO ACRO/CLAV CAN WEB PRE OTS: HCPCS | Performed by: ORTHOPAEDIC SURGERY

## 2017-10-26 PROCEDURE — 3017F COLORECTAL CA SCREEN DOC REV: CPT | Performed by: FAMILY MEDICINE

## 2017-10-26 PROCEDURE — 1090F PRES/ABSN URINE INCON ASSESS: CPT | Performed by: FAMILY MEDICINE

## 2017-10-26 PROCEDURE — G8484 FLU IMMUNIZE NO ADMIN: HCPCS | Performed by: FAMILY MEDICINE

## 2017-10-26 PROCEDURE — G8417 CALC BMI ABV UP PARAM F/U: HCPCS | Performed by: FAMILY MEDICINE

## 2017-10-26 PROCEDURE — 4040F PNEUMOC VAC/ADMIN/RCVD: CPT | Performed by: FAMILY MEDICINE

## 2017-10-26 PROCEDURE — 99215 OFFICE O/P EST HI 40 MIN: CPT | Performed by: FAMILY MEDICINE

## 2017-10-26 RX ORDER — MINOCYCLINE HYDROCHLORIDE 100 MG/1
100 CAPSULE ORAL 2 TIMES DAILY
Qty: 28 CAPSULE | Refills: 0 | Status: SHIPPED | OUTPATIENT
Start: 2017-10-26 | End: 2017-11-09

## 2017-10-26 RX ORDER — OMEPRAZOLE 20 MG/1
20 CAPSULE, DELAYED RELEASE ORAL DAILY
COMMUNITY
End: 2017-10-26 | Stop reason: CLARIF

## 2017-10-26 RX ORDER — PREDNISONE 1 MG/1
10 TABLET ORAL DAILY
Status: ON HOLD | COMMUNITY
Start: 2017-10-23 | End: 2018-08-10 | Stop reason: CLARIF

## 2017-10-26 NOTE — PROGRESS NOTES
Subjective:      Praveena Hammer is a 78 y.o.  female who presents to the office today for a preoperative consultation at the request of surgeon Dr. Whit Culver who plans on performing L shoulder Rotator cuff repair on November 15. This consultation is requested for the specific conditions prompting preoperative evaluation (i.e. because of potential affect on operative risk): medicalproblems. Planned anesthesia is General.  The patient has the following known anesthesia issues:   Patient has a bleeding risk of : no recent abnormal bleeding, no remote history of abnormal bleeding, no use of Ca-channel blockers  Patient does not have objection to receiving blood products if needed.   Past Medical History:   Diagnosis Date    Allergic rhinitis 5/4/2010    Asthma 5/18/2014    Chicken pox     Depression     Diverticulosis of large intestine 5/4/2010    Essential hypertension 8/20/2015    Gastroesophageal reflux disease without esophagitis 8/20/2015    GERD (gastroesophageal reflux disease)     Hyperlipidemia     Hypertension     Interstitial lung disease ([address]     Mitral valve regurgitation 2/16/2011    Mixed hyperlipidemia 3/10/2016    Obesity     Osteoarthritis     Osteoporosis 5/4/2010    Polio     Prolonged emergence from general anesthesia     sats dropped    RA (rheumatoid arthritis) ([address]     Rheumatoid arthritis ([address] 5/4/2010    Sleep apnea 05/04/2010    uses BPAP     Patient Active Problem List    Diagnosis Date Noted    Osteoarthritis of lumbar spine 09/07/2017    Rheumatoid arthritis of multiple sites with negative rheumatoid factor ([address] 05/23/2017    Major depressive disorder with single episode, in partial remission ([address] 04/13/2017    Status post total right knee replacement using cement on 2/20/2017 02/20/2017    Interstitial lung disease ([address] 01/10/2017    Chronic nonseasonal allergic rhinitis due to pollen 01/09/2017    Mild intermittent asthma without complication Sister     Depression Sister     Depression Brother      Social History     Social History    Marital status:      Spouse name: N/A    Number of children: N/A    Years of education: N/A     Social History Main Topics    Smoking status: Former Smoker    Smokeless tobacco: Former User      Comment: quit age 29    Alcohol use Yes      Comment: social     Drug use: No    Sexual activity: No     Other Topics Concern    None     Social History Narrative    None     Current Outpatient Prescriptions   Medication Sig Dispense Refill    predniSONE (DELTASONE) 5 MG tablet Take 10 mg by mouth      minocycline (MINOCIN;DYNACIN) 100 MG capsule Take 1 capsule by mouth 2 times daily for 14 days 28 capsule 0    pantoprazole (PROTONIX) 40 MG tablet Take 1 tablet by mouth daily 90 tablet 1    escitalopram (LEXAPRO) 10 MG tablet Take 1 tablet by mouth daily 90 tablet 1    fenofibrate (TRICOR) 145 MG tablet Take 1 tablet by mouth daily 90 tablet 1    lisinopril (PRINIVIL;ZESTRIL) 40 MG tablet Take 1 tablet by mouth daily 90 tablet 1    oxyCODONE-acetaminophen (PERCOCET) 5-325 MG per tablet Take 1 tablet by mouth .  Biotin 10 MG CAPS Take 1 capsule by mouth daily.  LACTOBACILLUS PO Take 1 capsule by mouth daily.       leflunomide (ARAVA) 20 MG tablet Take 20 mg by mouth      triamcinolone (KENALOG) 0.1 % ointment       montelukast (SINGULAIR) 10 MG tablet Take 1 tablet by mouth nightly 90 tablet 1    hydrochlorothiazide (HYDRODIURIL) 25 MG tablet Take 1 tablet by mouth daily 90 tablet 1    traZODone (DESYREL) 50 MG tablet Take 1 tablet by mouth nightly 90 tablet 1    vitamin D (CHOLECALCIFEROL) 1000 UNITS TABS tablet Take 1,000 Units by mouth 4 times daily      gabapentin (NEURONTIN) 300 MG capsule Take 300 mg by mouth 4 times daily      calcium citrate (CALCITRATE) 950 MG tablet Take 1 tablet by mouth daily      sulindac (CLINORIL) 200 MG tablet Take 1 tablet by mouth daily 90 tablet 1   

## 2017-10-27 ENCOUNTER — OFFICE VISIT (OUTPATIENT)
Dept: PULMONOLOGY | Age: 77
End: 2017-10-27

## 2017-10-27 VITALS
HEART RATE: 74 BPM | SYSTOLIC BLOOD PRESSURE: 130 MMHG | DIASTOLIC BLOOD PRESSURE: 80 MMHG | HEIGHT: 60 IN | OXYGEN SATURATION: 95 % | WEIGHT: 206.4 LBS | RESPIRATION RATE: 16 BRPM | BODY MASS INDEX: 40.52 KG/M2

## 2017-10-27 DIAGNOSIS — Z92.21 HX OF METHOTREXATE THERAPY: ICD-10-CM

## 2017-10-27 DIAGNOSIS — J84.9 ILD (INTERSTITIAL LUNG DISEASE) (HCC): Primary | ICD-10-CM

## 2017-10-27 DIAGNOSIS — Z01.811 PREOP PULMONARY/RESPIRATORY EXAM: ICD-10-CM

## 2017-10-27 PROCEDURE — G8484 FLU IMMUNIZE NO ADMIN: HCPCS | Performed by: INTERNAL MEDICINE

## 2017-10-27 PROCEDURE — 1090F PRES/ABSN URINE INCON ASSESS: CPT | Performed by: INTERNAL MEDICINE

## 2017-10-27 PROCEDURE — 1123F ACP DISCUSS/DSCN MKR DOCD: CPT | Performed by: INTERNAL MEDICINE

## 2017-10-27 PROCEDURE — G8427 DOCREV CUR MEDS BY ELIG CLIN: HCPCS | Performed by: INTERNAL MEDICINE

## 2017-10-27 PROCEDURE — 4040F PNEUMOC VAC/ADMIN/RCVD: CPT | Performed by: INTERNAL MEDICINE

## 2017-10-27 PROCEDURE — 99214 OFFICE O/P EST MOD 30 MIN: CPT | Performed by: INTERNAL MEDICINE

## 2017-10-27 PROCEDURE — G8417 CALC BMI ABV UP PARAM F/U: HCPCS | Performed by: INTERNAL MEDICINE

## 2017-10-27 PROCEDURE — 1036F TOBACCO NON-USER: CPT | Performed by: INTERNAL MEDICINE

## 2017-10-27 PROCEDURE — G8399 PT W/DXA RESULTS DOCUMENT: HCPCS | Performed by: INTERNAL MEDICINE

## 2017-10-27 PROCEDURE — 3017F COLORECTAL CA SCREEN DOC REV: CPT | Performed by: INTERNAL MEDICINE

## 2017-10-27 NOTE — PROGRESS NOTES
90 tablet 1    lisinopril (PRINIVIL;ZESTRIL) 40 MG tablet Take 1 tablet by mouth daily 90 tablet 1    oxyCODONE-acetaminophen (PERCOCET) 5-325 MG per tablet Take 1 tablet by mouth .  Biotin 10 MG CAPS Take 1 capsule by mouth daily.  LACTOBACILLUS PO Take 1 capsule by mouth daily.  leflunomide (ARAVA) 20 MG tablet Take 20 mg by mouth      triamcinolone (KENALOG) 0.1 % ointment       montelukast (SINGULAIR) 10 MG tablet Take 1 tablet by mouth nightly 90 tablet 1    hydrochlorothiazide (HYDRODIURIL) 25 MG tablet Take 1 tablet by mouth daily 90 tablet 1    traZODone (DESYREL) 50 MG tablet Take 1 tablet by mouth nightly 90 tablet 1    vitamin D (CHOLECALCIFEROL) 1000 UNITS TABS tablet Take 1,000 Units by mouth 4 times daily      gabapentin (NEURONTIN) 300 MG capsule Take 300 mg by mouth 4 times daily      calcium citrate (CALCITRATE) 950 MG tablet Take 1 tablet by mouth daily      sulindac (CLINORIL) 200 MG tablet Take 1 tablet by mouth daily 90 tablet 1    cetirizine (ZYRTEC) 10 MG tablet Take 10 mg by mouth nightly       B Complex Vitamins (B COMPLEX 1) TABS Take 1 tablet by mouth Daily.  adalimumab (HUMIRA) 40 MG/0.8ML injection Inject 40 mg into the skin every 10 minutes Every 10 days      predniSONE (DELTASONE) 5 MG tablet Take 10 mg by mouth      Multiple Vitamins-Minerals (CENTRUM SILVER) TABS Take 1 tablet by mouth daily       folic acid (FOLVITE) 1 MG tablet Take 1 tablet by mouth daily. 90 tablet 1    Probiotic Product (PROBIOTIC FORMULA) CAPS Take 1 capsule by mouth daily. No current facility-administered medications on file prior to visit.         REVIEW OF SYSTEMS:    CONSTITUTIONAL: Negative for fevers and chills  HEENT: Negative for oropharyngeal exudate, post nasal drip, sinus pain / pressure, nasal congestion, ear pain  RESPIRATORY:  See HPI  CARDIOVASCULAR: Negative for chest pain, palpitations, edema  GASTROINTESTINAL: Negative for nausea, vomiting, diarrhea, discontinuation of her methotrexate. Her PFTs show a normal total lung capacity with only a mild decrease in diffusion capacity. Her oxygen saturation at baseline is 95%. In regards to her shoulder surgery, she is okay to proceed with surgery from a pulmonary perspective. Her risk for pulmonary complications is no more than the average patient.   Routine precautions for any postsurgical patient should be employed,such as incentive spirometry, early ambulation, and DVT prophylaxis but there are no other specific recommendations for Walker Weaver - a copy of note will be sent electronically to Dr. Sam MARES 1 year

## 2017-11-13 DIAGNOSIS — I87.2 VENOUS STASIS DERMATITIS OF LEFT LOWER EXTREMITY: ICD-10-CM

## 2017-11-13 DIAGNOSIS — I10 ESSENTIAL HYPERTENSION: ICD-10-CM

## 2017-11-13 DIAGNOSIS — Z01.818 PREOP GENERAL PHYSICAL EXAM: ICD-10-CM

## 2017-11-13 LAB
A/G RATIO: 1.3 (ref 1.1–2.2)
ALBUMIN SERPL-MCNC: 3.8 G/DL (ref 3.4–5)
ALP BLD-CCNC: 47 U/L (ref 40–129)
ALT SERPL-CCNC: 33 U/L (ref 10–40)
ANION GAP SERPL CALCULATED.3IONS-SCNC: 17 MMOL/L (ref 3–16)
AST SERPL-CCNC: 33 U/L (ref 15–37)
BASOPHILS ABSOLUTE: 0.1 K/UL (ref 0–0.2)
BASOPHILS RELATIVE PERCENT: 0.9 %
BILIRUB SERPL-MCNC: 0.9 MG/DL (ref 0–1)
BUN BLDV-MCNC: 23 MG/DL (ref 7–20)
CALCIUM SERPL-MCNC: 10.9 MG/DL (ref 8.3–10.6)
CHLORIDE BLD-SCNC: 97 MMOL/L (ref 99–110)
CO2: 27 MMOL/L (ref 21–32)
CREAT SERPL-MCNC: 1.1 MG/DL (ref 0.6–1.2)
EOSINOPHILS ABSOLUTE: 0.1 K/UL (ref 0–0.6)
EOSINOPHILS RELATIVE PERCENT: 1.8 %
GFR AFRICAN AMERICAN: 58
GFR NON-AFRICAN AMERICAN: 48
GLOBULIN: 2.9 G/DL
GLUCOSE BLD-MCNC: 114 MG/DL (ref 70–99)
HCT VFR BLD CALC: 42.9 % (ref 36–48)
HEMOGLOBIN: 13.8 G/DL (ref 12–16)
LYMPHOCYTES ABSOLUTE: 2 K/UL (ref 1–5.1)
LYMPHOCYTES RELATIVE PERCENT: 26.7 %
MCH RBC QN AUTO: 28.5 PG (ref 26–34)
MCHC RBC AUTO-ENTMCNC: 32.3 G/DL (ref 31–36)
MCV RBC AUTO: 88.5 FL (ref 80–100)
MONOCYTES ABSOLUTE: 0.8 K/UL (ref 0–1.3)
MONOCYTES RELATIVE PERCENT: 10.4 %
NEUTROPHILS ABSOLUTE: 4.5 K/UL (ref 1.7–7.7)
NEUTROPHILS RELATIVE PERCENT: 60.2 %
PDW BLD-RTO: 15.4 % (ref 12.4–15.4)
PLATELET # BLD: 206 K/UL (ref 135–450)
PMV BLD AUTO: 10.5 FL (ref 5–10.5)
POTASSIUM SERPL-SCNC: 4.2 MMOL/L (ref 3.5–5.1)
RBC # BLD: 4.84 M/UL (ref 4–5.2)
SODIUM BLD-SCNC: 141 MMOL/L (ref 136–145)
TOTAL PROTEIN: 6.7 G/DL (ref 6.4–8.2)
WBC # BLD: 7.4 K/UL (ref 4–11)

## 2017-11-15 ENCOUNTER — HOSPITAL ENCOUNTER (OUTPATIENT)
Dept: SURGERY | Age: 77
Discharge: HOME | End: 2017-11-15
Attending: ORTHOPAEDIC SURGERY | Admitting: ORTHOPAEDIC SURGERY

## 2017-11-15 VITALS
RESPIRATION RATE: 16 BRPM | HEIGHT: 60 IN | TEMPERATURE: 97.2 F | SYSTOLIC BLOOD PRESSURE: 134 MMHG | OXYGEN SATURATION: 93 % | HEART RATE: 69 BPM | DIASTOLIC BLOOD PRESSURE: 54 MMHG | WEIGHT: 206 LBS | BODY MASS INDEX: 40.44 KG/M2

## 2017-11-15 RX ORDER — OXYCODONE HYDROCHLORIDE AND ACETAMINOPHEN 5; 325 MG/1; MG/1
2 TABLET ORAL PRN
Status: ACTIVE | OUTPATIENT
Start: 2017-11-15 | End: 2017-11-15

## 2017-11-15 RX ORDER — MORPHINE SULFATE 2 MG/ML
1 INJECTION, SOLUTION INTRAMUSCULAR; INTRAVENOUS EVERY 5 MIN PRN
Status: DISCONTINUED | OUTPATIENT
Start: 2017-11-15 | End: 2017-11-16 | Stop reason: HOSPADM

## 2017-11-15 RX ORDER — LABETALOL HYDROCHLORIDE 5 MG/ML
5 INJECTION, SOLUTION INTRAVENOUS EVERY 10 MIN PRN
Status: DISCONTINUED | OUTPATIENT
Start: 2017-11-15 | End: 2017-11-16 | Stop reason: HOSPADM

## 2017-11-15 RX ORDER — HYDRALAZINE HYDROCHLORIDE 20 MG/ML
5 INJECTION INTRAMUSCULAR; INTRAVENOUS EVERY 10 MIN PRN
Status: DISCONTINUED | OUTPATIENT
Start: 2017-11-15 | End: 2017-11-16 | Stop reason: HOSPADM

## 2017-11-15 RX ORDER — ONDANSETRON 2 MG/ML
4 INJECTION INTRAMUSCULAR; INTRAVENOUS
Status: ACTIVE | OUTPATIENT
Start: 2017-11-15 | End: 2017-11-15

## 2017-11-15 RX ORDER — DIPHENHYDRAMINE HYDROCHLORIDE 50 MG/ML
12.5 INJECTION INTRAMUSCULAR; INTRAVENOUS
Status: ACTIVE | OUTPATIENT
Start: 2017-11-15 | End: 2017-11-15

## 2017-11-15 RX ORDER — FENTANYL CITRATE 50 UG/ML
INJECTION, SOLUTION INTRAMUSCULAR; INTRAVENOUS
Status: DISPENSED
Start: 2017-11-15 | End: 2017-11-15

## 2017-11-15 RX ORDER — LIDOCAINE HYDROCHLORIDE 10 MG/ML
2 INJECTION, SOLUTION INFILTRATION; PERINEURAL
Status: ACTIVE | OUTPATIENT
Start: 2017-11-15 | End: 2017-11-15

## 2017-11-15 RX ORDER — PROMETHAZINE HYDROCHLORIDE 25 MG/ML
6.25 INJECTION, SOLUTION INTRAMUSCULAR; INTRAVENOUS
Status: ACTIVE | OUTPATIENT
Start: 2017-11-15 | End: 2017-11-15

## 2017-11-15 RX ORDER — ACETAMINOPHEN 10 MG/ML
1000 INJECTION, SOLUTION INTRAVENOUS ONCE
Status: COMPLETED | OUTPATIENT
Start: 2017-11-15 | End: 2017-11-15

## 2017-11-15 RX ORDER — MORPHINE SULFATE 2 MG/ML
2 INJECTION, SOLUTION INTRAMUSCULAR; INTRAVENOUS EVERY 5 MIN PRN
Status: DISCONTINUED | OUTPATIENT
Start: 2017-11-15 | End: 2017-11-16 | Stop reason: HOSPADM

## 2017-11-15 RX ORDER — OXYCODONE HYDROCHLORIDE AND ACETAMINOPHEN 5; 325 MG/1; MG/1
1 TABLET ORAL PRN
Status: ACTIVE | OUTPATIENT
Start: 2017-11-15 | End: 2017-11-15

## 2017-11-15 RX ORDER — SODIUM CHLORIDE, SODIUM LACTATE, POTASSIUM CHLORIDE, CALCIUM CHLORIDE 600; 310; 30; 20 MG/100ML; MG/100ML; MG/100ML; MG/100ML
INJECTION, SOLUTION INTRAVENOUS CONTINUOUS
Status: DISCONTINUED | OUTPATIENT
Start: 2017-11-15 | End: 2017-11-16 | Stop reason: HOSPADM

## 2017-11-15 RX ADMIN — SODIUM CHLORIDE, SODIUM LACTATE, POTASSIUM CHLORIDE, CALCIUM CHLORIDE: 600; 310; 30; 20 INJECTION, SOLUTION INTRAVENOUS at 10:28

## 2017-11-15 RX ADMIN — ACETAMINOPHEN 1000 MG: 10 INJECTION, SOLUTION INTRAVENOUS at 10:29

## 2017-11-15 ASSESSMENT — PAIN SCALES - GENERAL
PAINLEVEL_OUTOF10: 0

## 2017-11-15 ASSESSMENT — PAIN - FUNCTIONAL ASSESSMENT: PAIN_FUNCTIONAL_ASSESSMENT: 0-10

## 2017-11-15 NOTE — ANESTHESIA POST-OP
Postoperative Anesthesia Note    Name:    Estevan Rodriguez  MRN:      [id number]    Patient Vitals for the past 12 hrs:   BP Temp Temp src Pulse Resp SpO2 Height Weight   11/15/17 1420 (!) 134/54 - - 69 16 96 % - -   11/15/17 1405 (!) 143/61 - - 68 16 98 % - -   11/15/17 1350 (!) 142/61 - - 92 16 94 % - -   11/15/17 1335 (!) 146/71 - - 91 16 94 % - -   11/15/17 1320 (!) 145/66 97.2 °F (36.2 °C) - 79 16 95 % - -   11/15/17 1315 139/61 - - 94 16 95 % - -   11/15/17 1310 (!) 133/59 - - 96 16 96 % - -   11/15/17 1305 134/75 97 °F (36.1 °C) Temporal 100 12 100 % - -   11/15/17 1000 (!) 161/77 97.1 °F (36.2 °C) Temporal 61 18 97 % 5' (1.524 m) 206 lb (93.4 kg)        LABS:    CBC  Lab Results   Component Value Date/Time    WBC 7.4 11/13/2017 01:23 PM    HGB 13.8 11/13/2017 01:23 PM    HCT 42.9 11/13/2017 01:23 PM     11/13/2017 01:23 PM     RENAL  Lab Results   Component Value Date/Time     11/13/2017 01:23 PM    K 4.2 11/13/2017 01:23 PM    CL 97 (L) 11/13/2017 01:23 PM    CO2 27 11/13/2017 01:23 PM    BUN 23 (H) 11/13/2017 01:23 PM    CREATININE 1.1 11/13/2017 01:23 PM    GLUCOSE 114 (H) 11/13/2017 01:23 PM    GLUCOSE 107 (H) 03/19/2012 09:03 AM     COAGS  Lab Results   Component Value Date/Time    PROTIME 11.0 02/20/2017 06:15 AM    INR 0.97 02/20/2017 06:15 AM    APTT 29.6 02/09/2017 09:55 AM       Intake & Output: In: 1200 [I.V.:1200]  Out: -     Nausea & Vomiting:  No    Level of Consciousness:  Awake    Pain Assessment:  Adequate analgesia    Anesthesia Complications:  No apparent anesthetic complications    SUMMARY      Vital signs stable  OK to discharge from Stage I post anesthesia care.   Care transferred from Anesthesiology department on discharge from perioperative area

## 2017-11-15 NOTE — ANESTHESIA PRE-OP
Department of Anesthesiology  Preprocedure Note       Name:  José Miguel Watkins   Age:  78 y.o.  :  1940                                          MRN:  [id number]         Date:  11/15/2017      Surgeon:    Procedure:    Medications prior to admission:   Prior to Admission medications    Medication Sig Start Date End Date Taking? Authorizing Provider   predniSONE (DELTASONE) 5 MG tablet Take 10 mg by mouth daily  10/23/17  Yes Historical Provider, MD   pantoprazole (PROTONIX) 40 MG tablet Take 1 tablet by mouth daily 10/13/17 10/13/18 Yes Luiz Navarro MD   escitalopram (LEXAPRO) 10 MG tablet Take 1 tablet by mouth daily 10/10/17  Yes Luiz Navarro MD   fenofibrate (TRICOR) 145 MG tablet Take 1 tablet by mouth daily 10/10/17  Yes Luiz Navarro MD   lisinopril (PRINIVIL;ZESTRIL) 40 MG tablet Take 1 tablet by mouth daily 10/10/17  Yes Luiz Navarro MD   oxyCODONE-acetaminophen (PERCOCET) 5-325 MG per tablet Take 1 tablet by mouth 2 times daily  . 10/3/17  Yes Historical Provider, MD   Biotin 10 MG CAPS Take 1 capsule by mouth daily.    Yes Historical Provider, MD   leflunomide (ARAVA) 20 MG tablet Take 20 mg by mouth daily  17  Yes Historical Provider, MD   triamcinolone (KENALOG) 0.1 % ointment Apply topically as needed  17  Yes Historical Provider, MD   montelukast (SINGULAIR) 10 MG tablet Take 1 tablet by mouth nightly 17  Yes Luiz Navarro MD   hydrochlorothiazide (HYDRODIURIL) 25 MG tablet Take 1 tablet by mouth daily 17  Yes Luiz Navarro MD   traZODone (DESYREL) 50 MG tablet Take 1 tablet by mouth nightly 17  Yes Luiz Navarro MD   vitamin D (CHOLECALCIFEROL) 1000 UNITS TABS tablet Take 1,000 Units by mouth 4 times daily   Yes Historical Provider, MD   gabapentin (NEURONTIN) 300 MG capsule Take 300 mg by mouth 4 times daily   Yes Historical Provider, MD   calcium citrate (CALCITRATE) 950 MG tablet Take 1 tablet by mouth daily   Yes Historical Provider, MD   sulindac (CLINORIL) 200 MG Q10 Min PRN Adalberto Bhatti MD        hydrALAZINE (APRESOLINE) injection 5 mg  5 mg Intravenous Q10 Min PRN Adalberto Bhatti MD           Allergies:     Allergies   Allergen Reactions    Sulfa Antibiotics     Lomotil  [Diphenoxylate-Atropine] Other (See Comments)     Changes in vision       Problem List:    Patient Active Problem List   Diagnosis Code    Candidiasis of skin and nails B37.2    Dermatophytosis of nail B35.1    Depressive disorder, not elsewhere classified F32.9    Sleep apnea G47.30    Diverticulosis of large intestine K57.30    Allergic rhinitis J30.9    Osteoporosis M81.0    Rheumatoid arthritis (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    Chronic pain of right knee M25.561, G89.29    Morbid obesity with BMI of 40.0-44.9, adult ([facility]) E66.01, Z68.41    Chronic nonseasonal allergic rhinitis due to pollen J30.89    Mild intermittent asthma without complication C23.38    Interstitial lung disease ([facility]) J84.9    Chronic low back pain M54.5, G89.29    Disorder resulting from impaired renal function N25.9    Spinal stenosis M48.00    Status post total right knee replacement using cement on 2/20/2017 Z96.651    Major depressive disorder with single episode, in partial remission ([facility]) F32.4    Rheumatoid arthritis of multiple sites with negative rheumatoid factor ([facility]) M06.09    Osteoarthritis of lumbar spine M47.816    Immunosuppression ([facility] [address] D89.9       Past Medical History:

## 2017-11-15 NOTE — PROCEDURES
Sofia Talamantes   1940  78 y.o. ULTRASOUND GUIDED INTERSCALENE BRACHIAL PLEXUS NERVE BLOCK   Discussed with patient risks, benefits and alternatives of block (including but not limited to infection, bleeding, and damage to nerves) all questions answered patient agrees with planned procedure  Surgical procedure: VAS Shoulder  Side: left  Requested by  for post operative pain control time-out completed   Pt sedated with Fentanyl 100mcg  Monitor on, patient supine, and site prepped with Chloraprep  Lidocaine 1% used for topical local anesthetic  22 gauge 50 mm Stimuplex needle used  Continuous Inplane dynamic ultrasound technique used, relevant anatomy identified (nerves, vessels, muscles), Local anesthetic spread visualized around nerves  Bupivacaine 0.5% 20cc injected in 5 cc increments after negative aspiration each time. Pt tolerated procedure well. No complications.   Comments:     Cicero Mcardle

## 2017-11-15 NOTE — OP NOTE
neck to fingertips was prepped with Hibiclens and alcohol and draped in the standard fashion. The forearm was well padded and secured in the [employer] extremity positioning device. A standard midglenoid posterior portal was established. The trocar and cannula were inserted. The trocar was removed and the arthroscope and inflow inserted. Systematic arthroscopy was performed and the findings are summarized below. 1.  Superior Labrum/Biceps Tendon-  Absent biceps tendon. Some labral fraying  2. Anterior/Posterior Labrum- Intact without fraying  3. Rotator Cuff- The subscapularis tendon was intact. There were full thickness tears of the supraspinatus tendon and anterior infraspinatus tendons. 4. Inferior Axillary Recess-  No loose bodies  5. Articular Surfaces-  Intact with some chondrocalcinosis    Intra-articular Debridement  Labral debridement was performed. Subacromial Decompression  The trocar and cannula were redirected to the subacromial space. The arthroscope and inflow were inserted. A lateral portal was established after localizing the subacromial space with a spinal needle. Using a both a shaver and a radiofrequency probe, the bursa and the tissue beneath the acromion were removed. The coracromial ligament was divided. A 5.5 mm bur was placed through the lateral portal and the anterior aspect of the acromion was removed. The arthroscope was then switched to the lateral portal and with the bur posteriorly, a smooth flat acromion was created. The arm was abducted to 90 degrees and internally and externally rotated. There was no evidence of outlet impingement. Rotator Cuff Repair  Hypertrophic bursa was debrided. The supraspinatus tendon tear was exposed and the configuration identified. There was a central flap with delamination. The tissue was fair in quality. The tendon was mobilized to achieve a tension free repair.   The greater tuberosity was abraded to a bleeding bony surface. The bone was fair in quality. Two medial row anchors were placed. Sutures were retrieved in a horizontal fashion using a retrograde technique. Suture limbs were isolated and tied using a modified locking sliding knot with one half hitch. Two [employer] Knotless anchors were place laterally to create a transosseus equivalent repair. The repair was under minimal tension. The arm was abducted, internally and externally rotated. Threre was no evidence of impingement. The [employer] bovine xenograft was inserted through a lateral portal and deployed. The graft was secured medially and peripherally with soft tissue staples. With the graft under some tension, 3 lateral bone anchors were placed. The graft was securely fixed. Closure  The shoulder was drained. Arthroscopic equipment was removed and the portals closed with 3-0 Nylon. Sterile dressings were applied. A sling was applied. The patient was awakened and taken to the postoperative area in stable condition.

## 2017-11-16 ENCOUNTER — TELEPHONE (OUTPATIENT)
Dept: FAMILY MEDICINE CLINIC | Age: 77
End: 2017-11-16

## 2017-11-16 NOTE — TELEPHONE ENCOUNTER
[facility] called requesting an order for SN/ Wound Care Nurse and [facility]  Surgery yesterday, 2nd time on same shoulder and unable to drive to come in for an appt or bathe  [place] health nurse says pt has multiple boils in her groin area and legs that are bleeding from the friction of her legs due to pts size  Also would like an order for a bath 2-3 times a week as she is unable to bathe herself due to surgery  Also has an open sore on her back that pt states she took care of but needs wound care as well  Please call Billee Dates at number listed

## 2017-11-16 NOTE — TELEPHONE ENCOUNTER
Evita Jacobson at [address] and gave her verbal ok. Jarochojoy Monica stated she will call us back if she needs anything.

## 2017-11-21 ENCOUNTER — OFFICE VISIT (OUTPATIENT)
Dept: ORTHOPEDIC SURGERY | Age: 77
End: 2017-11-21

## 2017-11-21 VITALS — WEIGHT: 205.91 LBS | HEIGHT: 60 IN | BODY MASS INDEX: 40.43 KG/M2

## 2017-11-21 DIAGNOSIS — Z98.890 S/P LEFT ROTATOR CUFF REPAIR: ICD-10-CM

## 2017-11-21 PROCEDURE — 99024 POSTOP FOLLOW-UP VISIT: CPT | Performed by: ORTHOPAEDIC SURGERY

## 2017-11-22 ENCOUNTER — HOSPITAL ENCOUNTER (OUTPATIENT)
Dept: PHYSICAL THERAPY | Age: 77
Discharge: HOME | End: 2017-11-30
Admitting: ORTHOPAEDIC SURGERY

## 2017-11-22 NOTE — PLAN OF CARE
[facility]  Orthopaedics and Sports Rehabilitation, [facility]  [address], [place], [address]  Phone: [phone]   Fax:     [phone]                                                       Physical Therapy Certification    Dear Referring Practitioner: Dr. Paty Wilson,    We had the pleasure of evaluating the following patient for physical therapy services at [address] [place] [address]. A summary of our findings can be found in the initial assessment below. This includes our plan of care. If you have any questions or concerns regarding these findings, please do not hesitate to contact me at the office phone number checked above. Thank you for the referral.       Physician Signature:_______________________________Date:__________________  By signing above (or electronic signature), therapists plan is approved by physician      Patient: Estevan Rodriguez   : 1940   MRN: [id number]  Referring Physician: Referring Practitioner: Dr. Paty Wilson      Evaluation Date: 2017      Medical Diagnosis Information:  Diagnosis: M75.102 (ICD-10-CM) - Tear of left rotator cuff   Treatment Diagnosis: Left Shoulder Pain                                         Insurance information: PT Insurance Information: Medicare    Precautions/ Contra-indications: RA, HTN, Osteoporosis, OA  Latex Allergy:  [x]NO      []YES  Preferred Language for Healthcare:   [x]English       []other:    SUBJECTIVE: Patient stated complaint:History of left shoulder pain for 2 months after a lifting injury. Patient states she then went to [facility] for 2 weeks and used a cane to help with ambulation which increased shoulder pain. Patient states she had left shoulder massive RCR on 11/15/17. Patient does live at home by herself, but may go stay with daughter for 6 weeks.      Relevant Medical History:Right Reverse TSA, RIght TKA  Functional Disability Index:Quickdash:    Pain Scale: 5/10  Easing factors: ice and (which will affect course of rehabilitation):   []None           Arthritic conditions   [x]Rheumatoid arthritis (M05.9)  [x]Osteoarthritis (M19.91)   Cardiovascular conditions   [x]Hypertension (I10)  []Hyperlipidemia (E78.5)  []Angina pectoris (I20)  []Atherosclerosis (I70)   Musculoskeletal conditions   []Disc pathology   []Congenital spine pathologies   []Prior surgical intervention  [x]Osteoporosis (M81.8)  []Osteopenia (M85.8)   Endocrine conditions   []Hypothyroid (E03.9)  []Hyperthyroid Gastrointestinal conditions   []Constipation (C55.19)   Metabolic conditions   []Morbid obesity (E66.01)  []Diabetes type 1(E10.65) or 2 (E11.65)   []Neuropathy (G60.9)     Pulmonary conditions   []Asthma (J45)  []Coughing   []COPD (J44.9)   Psychological Disorders  []Anxiety (F41.9)  []Depression (F32.9)   []Other:   []Other:          Barriers to/and or personal factors that will affect rehab potential:              [x]Age  []Sex              []Motivation/Lack of Motivation                        [x]Co-Morbidities              []Cognitive Function, education/learning barriers              []Environmental, home barriers              []profession/work barriers  []past PT/medical experience  []other:  Justification: RA, HTN, Osteoporosis can affect integrity of graft healing. PACEMAKER:  - Denied having a pacemaker that would contraindicate the use of electrical modalities. METAL IMPLANTS:  - Denied metal implants that would contraindicate the use of thermal modalities. CANCER HISTORY:  - Denied a history of cancer that would contraindicate thermal modalities. Falls Risk Assessment (30 days):   [x] Falls Risk assessed and no intervention required.   [] Falls Risk assessed and Patient requires intervention due to being higher risk   TUG score (>12s at risk):     [] Falls education provided, including       G-Codes:  PT G-Codes  Functional Assessment Tool Used: Kathlen Nydia  Score: 68%  Functional Limitation: Carrying, moving and handling objects  Carrying, Moving and Handling Objects Current Status (): At least 60 percent but less than 80 percent impaired, limited or restricted  Carrying, Moving and Handling Objects Goal Status (): At least 20 percent but less than 40 percent impaired, limited or restricted         MEDICARE CAP EXCEPTION DOCUMENTATION      I certify that this patient meets one of the below criteria necessary for becoming an exception to the Medicare cap on therapy services:    []  The patient has a condition identified by an ICD-9 code that has a direct and significant impact on the need for therapy. (Significantly impacts the rate of recovery.)                   []  The patient has a complexity identified by an ICD-9 code that has a direct and significant impact on the need for therapy. (Significantly impacts the rate of recovery and is associated with a primary condition.)         []  The patient has associated variables that influence the amount of treatment to include:  Social support, self-efficacy/motivation, prognosis, time since onset/acuity. []  The patient has generalized musculoskeletal conditions or a condition affecting multiple sites that will have a direct impact on the rate of recovery. [x]  The patient had a prior episode of outpatient therapy during this calendar year for a different condition. []  The patient has a mental or cognitive disorder in addition to the condition being treated that will have a direct and significant impact on the rate of recovery.               ASSESSMENT:   Functional Impairments   []Noted spinal or UE joint hypomobility   []Noted spinal or UE joint hypermobility   [x]Decreased UE functional ROM   [x]Decreased UE functional strength   []Abnormal reflexes/sensation/myotomal/dermatomal deficits   [x]Decreased RC/scapular/core strength and neuromuscular control   []other:      Functional Activity Limitations (from functional questionnaire and intake)   [x]Reduced ability to tolerate prolonged functional positions   [x]Reduced ability or difficulty with changes of positions or transfers between positions   [x]Reduced ability to maintain good posture and demonstrate good body mechanics with sitting, bending, and lifting   [x] Reduced ability or tolerance with driving and/or computer work   [x]Reduced ability to sleep   [x]Reduced ability to perform lifting, reaching, carrying tasks   [x]Reduced ability to tolerate impact through UE   [x]Reduced ability to reach behind back   [x]Reduced ability to  or hold objects   [x]Reduced ability to throw or toss an object   []other:    Participation Restrictions   [x]Reduced participation in self care activities   [x]Reduced participation in home management activities   [x]Reduced participation in work activities   [x]Reduced participation in social activities. []Reduced participation in sport/recreation activities. Classification:   [x]Signs/symptoms consistent with post-surgical status including decreased ROM, strength and function.   []Signs/symptoms consistent with joint sprain/strain   []Signs/symptoms consistent with shoulder impingement   []Signs/symptoms consistent with shoulder/elbow/wrist tendinopathy   []Signs/symptoms consistent with Rotator cuff tear   []Signs/symptoms consistent with labral tear   []Signs/symptoms consistent with postural dysfunction    []Signs/symptoms consistent with Glenohumeral IR Deficit - <45 degrees   []Signs/symptoms consistent with facet dysfunction of cervical/thoracic spine    []Signs/symptoms consistent with pathology which may benefit from Dry needling     []other:     Prognosis/Rehab Potential:      []Excellent   [x]Good    []Fair   []Poor    Tolerance of evaluation/treatment:    []Excellent   [x]Good    []Fair   []Poor    Physical Therapy Evaluation Complexity Justification  [x] A history of present problem with:  [] no personal factors and/or comorbidities weeks  1. Independent in HEP and progression per patient tolerance, in order to prevent re-injury. 2. Patient will have a decrease in pain to facilitate improvement in movement, function, and ADLs as indicated by Functional Deficits. Long Term Goals: To be achieved in: 12 weeks after next MD visit. 1. Disability index score of 30% or less for the Quickdash to assist with reaching prior level of function. 2. Patient will demonstrate increased AROM to 140 flex, 130 abd, C7 ER and L5 IR to allow for proper joint functioning as indicated by patients Functional Deficits. 3. Patient will demonstrate an increase in Strength to 4/5 left shoudler to allow for proper functional mobility as indicated by patients Functional Deficits. 4. Patient will return to all ADL reaching without increased symptoms or restriction.          Electronically signed by:  Jim Hill, Sil Hill 1

## 2017-12-01 ENCOUNTER — HOSPITAL ENCOUNTER (OUTPATIENT)
Dept: PHYSICAL THERAPY | Age: 77
Discharge: HOME | End: 2017-12-31
Attending: ORTHOPAEDIC SURGERY | Admitting: ORTHOPAEDIC SURGERY

## 2017-12-21 DIAGNOSIS — J30.1 NON-SEASONAL ALLERGIC RHINITIS DUE TO POLLEN: ICD-10-CM

## 2017-12-21 RX ORDER — MONTELUKAST SODIUM 10 MG/1
10 TABLET ORAL NIGHTLY
Qty: 90 TABLET | Refills: 1 | Status: SHIPPED | OUTPATIENT
Start: 2017-12-21 | End: 2018-03-05 | Stop reason: SDUPTHER

## 2017-12-26 ENCOUNTER — TELEPHONE (OUTPATIENT)
Dept: ORTHOPEDIC SURGERY | Age: 77
End: 2017-12-26

## 2017-12-26 NOTE — TELEPHONE ENCOUNTER
Patient left a voicemail stating something snapped in her shoulder this morning. She is wanting to be seen. She is leaving to go to her daughter's in [place]. She has percocet and is taking that and prednisone for her rheumatoid arthritis. She is not able to stay alone. After speaking with her on the phone she is going to see how she does taking the prednisone and if no better will go to an ER in [place] because she is not staying here.

## 2018-01-05 ENCOUNTER — OFFICE VISIT (OUTPATIENT)
Dept: ORTHOPEDIC SURGERY | Age: 78
End: 2018-01-05

## 2018-01-05 VITALS — WEIGHT: 205.91 LBS | HEIGHT: 60 IN | BODY MASS INDEX: 40.43 KG/M2

## 2018-01-05 DIAGNOSIS — Z98.890 S/P LEFT ROTATOR CUFF REPAIR: Primary | ICD-10-CM

## 2018-01-05 DIAGNOSIS — Z96.611 STATUS POST REVERSE TOTAL REPLACEMENT OF RIGHT SHOULDER: ICD-10-CM

## 2018-01-05 PROCEDURE — 99024 POSTOP FOLLOW-UP VISIT: CPT | Performed by: ORTHOPAEDIC SURGERY

## 2018-01-08 ENCOUNTER — TELEPHONE (OUTPATIENT)
Dept: FAMILY MEDICINE CLINIC | Age: 78
End: 2018-01-08

## 2018-01-10 ENCOUNTER — TELEPHONE (OUTPATIENT)
Dept: ORTHOPEDIC SURGERY | Age: 78
End: 2018-01-10

## 2018-01-10 NOTE — TELEPHONE ENCOUNTER
This kranthi called not stating where she was calling from [facility] the doctors shoulder protocol faxed to her @   F# [phone]

## 2018-01-11 ENCOUNTER — TELEPHONE (OUTPATIENT)
Dept: ORTHOPEDIC SURGERY | Age: 78
End: 2018-01-11

## 2018-01-29 DIAGNOSIS — I10 ESSENTIAL HYPERTENSION: ICD-10-CM

## 2018-01-29 RX ORDER — HYDROCHLOROTHIAZIDE 25 MG/1
25 TABLET ORAL DAILY
Qty: 90 TABLET | Refills: 1 | Status: SHIPPED | OUTPATIENT
Start: 2018-01-29 | End: 2018-03-05 | Stop reason: SDUPTHER

## 2018-02-05 ENCOUNTER — TELEPHONE (OUTPATIENT)
Dept: ORTHOPEDIC SURGERY | Age: 78
End: 2018-02-05

## 2018-02-05 DIAGNOSIS — M25.512 LEFT SHOULDER PAIN, UNSPECIFIED CHRONICITY: Primary | ICD-10-CM

## 2018-02-08 ENCOUNTER — TELEPHONE (OUTPATIENT)
Dept: ORTHOPEDIC SURGERY | Age: 78
End: 2018-02-08

## 2018-02-09 ENCOUNTER — TELEPHONE (OUTPATIENT)
Dept: ORTHOPEDIC SURGERY | Age: 78
End: 2018-02-09

## 2018-03-05 ENCOUNTER — OFFICE VISIT (OUTPATIENT)
Dept: FAMILY MEDICINE CLINIC | Age: 78
End: 2018-03-05

## 2018-03-05 VITALS
HEIGHT: 60 IN | SYSTOLIC BLOOD PRESSURE: 110 MMHG | DIASTOLIC BLOOD PRESSURE: 80 MMHG | TEMPERATURE: 98.6 F | WEIGHT: 218 LBS | BODY MASS INDEX: 42.8 KG/M2

## 2018-03-05 DIAGNOSIS — R73.9 HYPERGLYCEMIA: ICD-10-CM

## 2018-03-05 DIAGNOSIS — E78.2 MIXED HYPERLIPIDEMIA: ICD-10-CM

## 2018-03-05 DIAGNOSIS — K21.9 GASTROESOPHAGEAL REFLUX DISEASE WITHOUT ESOPHAGITIS: ICD-10-CM

## 2018-03-05 DIAGNOSIS — F32.4 MAJOR DEPRESSIVE DISORDER WITH SINGLE EPISODE, IN PARTIAL REMISSION (HCC): ICD-10-CM

## 2018-03-05 DIAGNOSIS — R29.6 FREQUENT FALLS: ICD-10-CM

## 2018-03-05 DIAGNOSIS — I10 ESSENTIAL HYPERTENSION: Primary | ICD-10-CM

## 2018-03-05 DIAGNOSIS — J30.89 CHRONIC NONSEASONAL ALLERGIC RHINITIS DUE TO POLLEN: ICD-10-CM

## 2018-03-05 PROCEDURE — 1090F PRES/ABSN URINE INCON ASSESS: CPT | Performed by: FAMILY MEDICINE

## 2018-03-05 PROCEDURE — G8428 CUR MEDS NOT DOCUMENT: HCPCS | Performed by: FAMILY MEDICINE

## 2018-03-05 PROCEDURE — G8399 PT W/DXA RESULTS DOCUMENT: HCPCS | Performed by: FAMILY MEDICINE

## 2018-03-05 PROCEDURE — G8417 CALC BMI ABV UP PARAM F/U: HCPCS | Performed by: FAMILY MEDICINE

## 2018-03-05 PROCEDURE — G8484 FLU IMMUNIZE NO ADMIN: HCPCS | Performed by: FAMILY MEDICINE

## 2018-03-05 PROCEDURE — 1123F ACP DISCUSS/DSCN MKR DOCD: CPT | Performed by: FAMILY MEDICINE

## 2018-03-05 PROCEDURE — 1036F TOBACCO NON-USER: CPT | Performed by: FAMILY MEDICINE

## 2018-03-05 PROCEDURE — 4040F PNEUMOC VAC/ADMIN/RCVD: CPT | Performed by: FAMILY MEDICINE

## 2018-03-05 PROCEDURE — 99214 OFFICE O/P EST MOD 30 MIN: CPT | Performed by: FAMILY MEDICINE

## 2018-03-05 RX ORDER — LISINOPRIL 40 MG/1
40 TABLET ORAL DAILY
Qty: 90 TABLET | Refills: 1 | Status: ON HOLD | OUTPATIENT
Start: 2018-03-05 | End: 2018-08-10 | Stop reason: CLARIF

## 2018-03-05 RX ORDER — MONTELUKAST SODIUM 10 MG/1
10 TABLET ORAL NIGHTLY
Qty: 90 TABLET | Refills: 1 | Status: SHIPPED | OUTPATIENT
Start: 2018-03-05 | End: 2019-04-04 | Stop reason: CLARIF

## 2018-03-05 RX ORDER — ESCITALOPRAM OXALATE 10 MG/1
10 TABLET ORAL DAILY
Qty: 90 TABLET | Refills: 1 | Status: SHIPPED | OUTPATIENT
Start: 2018-03-05 | End: 2018-08-09 | Stop reason: SDUPTHER

## 2018-03-05 RX ORDER — PANTOPRAZOLE SODIUM 40 MG/1
40 TABLET, DELAYED RELEASE ORAL DAILY
Qty: 90 TABLET | Refills: 1 | Status: SHIPPED | OUTPATIENT
Start: 2018-03-05 | End: 2018-05-10

## 2018-03-05 RX ORDER — HYDROCHLOROTHIAZIDE 25 MG/1
25 TABLET ORAL DAILY
Qty: 90 TABLET | Refills: 1 | Status: ON HOLD | OUTPATIENT
Start: 2018-03-05 | End: 2018-08-10 | Stop reason: CLARIF

## 2018-03-05 RX ORDER — FENOFIBRATE 145 MG/1
145 TABLET, COATED ORAL DAILY
Qty: 90 TABLET | Refills: 1 | Status: ON HOLD | OUTPATIENT
Start: 2018-03-05 | End: 2019-03-25

## 2018-03-05 ASSESSMENT — ENCOUNTER SYMPTOMS
RESPIRATORY NEGATIVE: 1
GASTROINTESTINAL NEGATIVE: 1
EYES NEGATIVE: 1

## 2018-03-05 NOTE — PROGRESS NOTES
3/5/18    Viral Lose  1940      Chief Complaint   Patient presents with    Hyperlipidemia    Hypertension     Hypertension: Patient here for follow-up of elevated blood pressure. She is exercising and is adherent to low salt diet. Blood pressure is well controlled at home. Cardiac symptoms dyspnea and fatigue. Patient denies chest pressure/discomfort, claudication and exertional chest pressure/discomfort. Cardiovascular risk factors: dyslipidemia and hypertension. Use of agents associated with hypertension: none. History of target organ damage: none. Dyslipidemia: Patient presents for evaluation of lipids. Compliance with treatment thus far has been good. A repeat fasting lipid profile was not done. The patient does not use medications that may worsen dyslipidemias (corticosteroids, progestins, anabolic steroids, diuretics, beta-blockers, amiodarone, cyclosporine, olanzapine). The patient exercises intermittently. The patient is not known to have coexisting coronary artery disease. Depression: Patient complains of depression. She complains of anhedonia. Onset was approximately several years ago, controlled since that time. She denies current suicidal and homicidal plan or intent. Family history significant for no psychiatric illness. Possible organic causes contributing are: none. Risk factors: previous episode of depression Previous treatment includes  and none. She complains of the following side effects from the treatment: none. GERD: Faiza complains of heartburn. This has been associated with no other symptoms. She denies belching, choking on food, difficulty swallowing, early satiety and heartburn. Symptoms have been present for several years. She denies dysphagia. She has not lost weight. She denies melena, hematochezia, hematemesis, and coffee ground emesis. Medical therapy in the past has included proton pump inhibitors.       /80   Temp 98.6 °F (37 °C)

## 2018-03-06 ENCOUNTER — OFFICE VISIT (OUTPATIENT)
Dept: ORTHOPEDIC SURGERY | Age: 78
End: 2018-03-06

## 2018-03-06 VITALS — HEIGHT: 60 IN | WEIGHT: 218.03 LBS | BODY MASS INDEX: 42.81 KG/M2

## 2018-03-06 DIAGNOSIS — M25.512 LEFT SHOULDER PAIN, UNSPECIFIED CHRONICITY: ICD-10-CM

## 2018-03-06 DIAGNOSIS — M25.511 RIGHT SHOULDER PAIN, UNSPECIFIED CHRONICITY: Primary | ICD-10-CM

## 2018-03-06 PROCEDURE — 4040F PNEUMOC VAC/ADMIN/RCVD: CPT | Performed by: PHYSICIAN ASSISTANT

## 2018-03-06 PROCEDURE — 1036F TOBACCO NON-USER: CPT | Performed by: PHYSICIAN ASSISTANT

## 2018-03-06 PROCEDURE — G8427 DOCREV CUR MEDS BY ELIG CLIN: HCPCS | Performed by: PHYSICIAN ASSISTANT

## 2018-03-06 PROCEDURE — 1123F ACP DISCUSS/DSCN MKR DOCD: CPT | Performed by: PHYSICIAN ASSISTANT

## 2018-03-06 PROCEDURE — 1090F PRES/ABSN URINE INCON ASSESS: CPT | Performed by: PHYSICIAN ASSISTANT

## 2018-03-06 PROCEDURE — 99213 OFFICE O/P EST LOW 20 MIN: CPT | Performed by: PHYSICIAN ASSISTANT

## 2018-03-06 PROCEDURE — G8417 CALC BMI ABV UP PARAM F/U: HCPCS | Performed by: PHYSICIAN ASSISTANT

## 2018-03-06 PROCEDURE — G8399 PT W/DXA RESULTS DOCUMENT: HCPCS | Performed by: PHYSICIAN ASSISTANT

## 2018-03-06 PROCEDURE — G8484 FLU IMMUNIZE NO ADMIN: HCPCS | Performed by: PHYSICIAN ASSISTANT

## 2018-03-06 NOTE — PROGRESS NOTES
shoulder reveals:  Images obtained February 14, 2018  1. Status post stress infraspinatus tendon repair. Recurrent, full-thickness, retracted tendon tears of both tendons. Spurring of a tiny volume of 4 anterior supraspinatus tendon fibers, otherwise complete in degree. There is severe atrophy and mild fatty infiltration of the supraspinatus muscle. Moderate atrophy and fatty infiltration of the infraspinatus muscle. 2.  Absence normal intra-articular biceps tendon tissue. ASSESSMENT:    1. Left shoulder - recurrent rotator cuff tear  2. Scaular spine fracture - improving after immobilization with abduction sling    PLAN:  I had a detailed discussion with the patient and her daughter. 1.  Left shoulder - she is not currently happy with her left shoulder and like to proceed with left reverse total shoulder arthroplasty and biceps tenodesis. Surgery will be scheduled at a mutually convenient time. She and her daughter are in agreement with this plan. Right shoulder - I recommended that she resume activities with her right shoulder as tolerated. I recommended she discontinue use of the sling. I will see her back for reevaluationas symptoms dictate.     Patient seen and examined by Kumar Boyer PA-C and Dr. Mary Perkins MD

## 2018-03-08 ENCOUNTER — TELEPHONE (OUTPATIENT)
Dept: FAMILY MEDICINE CLINIC | Age: 78
End: 2018-03-08

## 2018-03-08 RX ORDER — AMOXICILLIN 500 MG/1
500 CAPSULE ORAL 3 TIMES DAILY
Qty: 30 CAPSULE | Refills: 0 | Status: SHIPPED | OUTPATIENT
Start: 2018-03-08 | End: 2018-03-18

## 2018-03-08 NOTE — TELEPHONE ENCOUNTER
Pt reporting that she thinks she has a sinus infection. She had a low fever last night and has sinus congestion and pressure. Has an old RX left over from travel for Cipro and wants to know if she can take that for sinus infection? Please advise.   Thanks

## 2018-03-12 DIAGNOSIS — K21.9 GASTROESOPHAGEAL REFLUX DISEASE WITHOUT ESOPHAGITIS: ICD-10-CM

## 2018-03-12 DIAGNOSIS — E78.2 MIXED HYPERLIPIDEMIA: ICD-10-CM

## 2018-03-12 DIAGNOSIS — I10 ESSENTIAL HYPERTENSION: ICD-10-CM

## 2018-03-12 DIAGNOSIS — R73.9 HYPERGLYCEMIA: ICD-10-CM

## 2018-03-12 LAB
A/G RATIO: 1.7 (ref 1.1–2.2)
ALBUMIN SERPL-MCNC: 4.3 G/DL (ref 3.4–5)
ALP BLD-CCNC: 53 U/L (ref 40–129)
ALT SERPL-CCNC: 28 U/L (ref 10–40)
ANION GAP SERPL CALCULATED.3IONS-SCNC: 18 MMOL/L (ref 3–16)
AST SERPL-CCNC: 29 U/L (ref 15–37)
BASOPHILS ABSOLUTE: 0.1 K/UL (ref 0–0.2)
BASOPHILS RELATIVE PERCENT: 1.6 %
BILIRUB SERPL-MCNC: 0.7 MG/DL (ref 0–1)
BUN BLDV-MCNC: 20 MG/DL (ref 7–20)
CALCIUM SERPL-MCNC: 10.3 MG/DL (ref 8.3–10.6)
CHLORIDE BLD-SCNC: 99 MMOL/L (ref 99–110)
CHOLESTEROL, TOTAL: 188 MG/DL (ref 0–199)
CO2: 29 MMOL/L (ref 21–32)
CREAT SERPL-MCNC: 0.8 MG/DL (ref 0.6–1.2)
EOSINOPHILS ABSOLUTE: 0.7 K/UL (ref 0–0.6)
EOSINOPHILS RELATIVE PERCENT: 10.3 %
ESTIMATED AVERAGE GLUCOSE: 108.3 MG/DL
GFR AFRICAN AMERICAN: >60
GFR NON-AFRICAN AMERICAN: >60
GLOBULIN: 2.6 G/DL
GLUCOSE BLD-MCNC: 113 MG/DL (ref 70–99)
HBA1C MFR BLD: 5.4 %
HCT VFR BLD CALC: 40.5 % (ref 36–48)
HDLC SERPL-MCNC: 40 MG/DL (ref 40–60)
HEMOGLOBIN: 13.4 G/DL (ref 12–16)
LDL CHOLESTEROL CALCULATED: 91 MG/DL
LYMPHOCYTES ABSOLUTE: 3.5 K/UL (ref 1–5.1)
LYMPHOCYTES RELATIVE PERCENT: 50.7 %
MCH RBC QN AUTO: 28.8 PG (ref 26–34)
MCHC RBC AUTO-ENTMCNC: 33.2 G/DL (ref 31–36)
MCV RBC AUTO: 86.9 FL (ref 80–100)
MONOCYTES ABSOLUTE: 0.8 K/UL (ref 0–1.3)
MONOCYTES RELATIVE PERCENT: 11.2 %
NEUTROPHILS ABSOLUTE: 1.8 K/UL (ref 1.7–7.7)
NEUTROPHILS RELATIVE PERCENT: 26.2 %
PDW BLD-RTO: 14.7 % (ref 12.4–15.4)
PLATELET # BLD: 259 K/UL (ref 135–450)
PMV BLD AUTO: 9.9 FL (ref 5–10.5)
POTASSIUM SERPL-SCNC: 3.9 MMOL/L (ref 3.5–5.1)
RBC # BLD: 4.66 M/UL (ref 4–5.2)
SODIUM BLD-SCNC: 146 MMOL/L (ref 136–145)
T4 FREE: 1.1 NG/DL (ref 0.9–1.8)
TOTAL PROTEIN: 6.9 G/DL (ref 6.4–8.2)
TRIGL SERPL-MCNC: 284 MG/DL (ref 0–150)
TSH SERPL DL<=0.05 MIU/L-ACNC: 3.48 UIU/ML (ref 0.27–4.2)
VLDLC SERPL CALC-MCNC: 57 MG/DL
WBC # BLD: 6.9 K/UL (ref 4–11)

## 2018-03-19 ENCOUNTER — OFFICE VISIT (OUTPATIENT)
Dept: FAMILY MEDICINE CLINIC | Age: 78
End: 2018-03-19

## 2018-03-19 DIAGNOSIS — E66.01 MORBID OBESITY WITH BMI OF 40.0-44.9, ADULT (HCC): ICD-10-CM

## 2018-03-19 DIAGNOSIS — I10 ESSENTIAL HYPERTENSION: ICD-10-CM

## 2018-03-19 DIAGNOSIS — M06.9 RHEUMATOID ARTHRITIS, INVOLVING UNSPECIFIED SITE, UNSPECIFIED RHEUMATOID FACTOR PRESENCE: ICD-10-CM

## 2018-03-19 DIAGNOSIS — R05.9 COUGH: ICD-10-CM

## 2018-03-19 DIAGNOSIS — R82.90 ABNORMAL URINE FINDING: ICD-10-CM

## 2018-03-19 DIAGNOSIS — N39.0 URINARY TRACT INFECTION WITHOUT HEMATURIA, SITE UNSPECIFIED: Primary | ICD-10-CM

## 2018-03-19 LAB
BILIRUBIN, POC: NORMAL
BLOOD URINE, POC: NORMAL
CLARITY, POC: CLEAR
COLOR, POC: YELLOW
GLUCOSE URINE, POC: NORMAL
KETONES, POC: NORMAL
LEUKOCYTE EST, POC: NORMAL
NITRITE, POC: NORMAL
PH, POC: 7
PROTEIN, POC: NORMAL
SPECIFIC GRAVITY, POC: 1
UROBILINOGEN, POC: 0.2

## 2018-03-20 VITALS
WEIGHT: 218.8 LBS | HEIGHT: 60 IN | DIASTOLIC BLOOD PRESSURE: 88 MMHG | HEART RATE: 91 BPM | OXYGEN SATURATION: 95 % | BODY MASS INDEX: 42.96 KG/M2 | TEMPERATURE: 98.5 F | SYSTOLIC BLOOD PRESSURE: 150 MMHG

## 2018-03-20 ASSESSMENT — ENCOUNTER SYMPTOMS
RHINORRHEA: 0
EYES NEGATIVE: 1
BLURRED VISION: 0
SHORTNESS OF BREATH: 0
SORE THROAT: 0
NAUSEA: 0
COUGH: 1
HEARTBURN: 0
ALLERGIC/IMMUNOLOGIC NEGATIVE: 1
HEMOPTYSIS: 0
ORTHOPNEA: 0
GASTROINTESTINAL NEGATIVE: 1
WHEEZING: 1
VOMITING: 0

## 2018-03-20 NOTE — PROGRESS NOTES
mouth 4 times daily      calcium citrate (CALCITRATE) 950 MG tablet Take 1 tablet by mouth daily      sulindac (CLINORIL) 200 MG tablet Take 1 tablet by mouth daily 90 tablet 1    Probiotic Product (PROBIOTIC FORMULA) CAPS Take 1 capsule by mouth daily.  cetirizine (ZYRTEC) 10 MG tablet Take 10 mg by mouth nightly       B Complex Vitamins (B COMPLEX 1) TABS Take 1 tablet by mouth Daily.         adalimumab (HUMIRA) 40 MG/0.8ML injection Inject 40 mg into the skin every 10 minutes Every 10 days         Past Medical History:   Diagnosis Date    Allergic rhinitis 5/4/2010    Asthma 5/18/2014    Chicken pox     Depression     Diverticulosis of large intestine 5/4/2010    Essential hypertension 8/20/2015    Gastroesophageal reflux disease without esophagitis 8/20/2015    GERD (gastroesophageal reflux disease)     Hyperlipidemia     Hypertension     Interstitial lung disease ([address]     Mitral valve regurgitation 2/16/2011    Mixed hyperlipidemia 3/10/2016    Obesity     Osteoarthritis     Osteoporosis 5/4/2010    Polio     Prolonged emergence from general anesthesia     sats dropped    RA (rheumatoid arthritis) ([address]     Rheumatoid arthritis ([address] 5/4/2010    Sleep apnea 05/04/2010    uses BPAP     Past Surgical History:   Procedure Laterality Date    APPENDECTOMY      CARPAL TUNNEL RELEASE Bilateral     CHOLECYSTECTOMY      FOOT SURGERY Bilateral     metatarsal removal    HYSTERECTOMY      HYSTERECTOMY, TOTAL ABDOMINAL      JOINT REPLACEMENT Right 04/25/2016    Dr. Myke Hall , shoulder    OTHER SURGICAL HISTORY Right 02/20/2017    RIGHT TOTAL KNEE ARTHROPLASTY             SALPINGO-OOPHORECTOMY      SHOULDER ARTHROPLASTY Right     SHOULDER ARTHROSCOPY Left 11/15/2017    TONSILLECTOMY       Family History   Problem Relation Age of Onset    Cancer Mother     Heart Disease Father     High Blood Pressure Father     Diabetes Brother     High Blood Pressure Brother     Depression Brother     Arthritis Sister     Asthma Sister     High Cholesterol Sister     Depression Sister     Depression Brother      Social History     Social History    Marital status:      Spouse name: N/A    Number of children: N/A    Years of education: N/A     Occupational History    Not on file. Social History Main Topics    Smoking status: Former Smoker     Packs/day: 1.00     Years: 14.00     Types: Cigarettes     Quit date: 1/1/1976    Smokeless tobacco: Never Used      Comment: quit age 29    Alcohol use 0.6 - 1.2 oz/week     1 - 2 Glasses of wine per week    Drug use: No    Sexual activity: No     Other Topics Concern    Not on file     Social History Narrative    No narrative on file         Any recent diagnostic tests, hospital reports, office notes or consultation letters were reviewed prior to and during the visit. Urinary Tract Infection    This is a new problem. The current episode started in the past 7 days. The problem has been gradually improving. The quality of the pain is described as burning. There has been no fever. Associated symptoms include frequency. Pertinent negatives include no chills, discharge, flank pain, hematuria, hesitancy, nausea, sweats, urgency or vomiting. She has tried increased fluids for the symptoms. Cough   This is a new problem. The current episode started 1 to 4 weeks ago. The problem has been unchanged. The cough is non-productive. Associated symptoms include wheezing. Pertinent negatives include no chest pain, chills, ear congestion, ear pain, fever, headaches, heartburn, hemoptysis, myalgias, nasal congestion, postnasal drip, rash, rhinorrhea, sore throat, shortness of breath, sweats or weight loss. She has tried leukotriene antagonists for the symptoms. Hypertension   This is a chronic problem. The current episode started more than 1 year ago.  Pertinent negatives include no anxiety, blurred vision, chest pain, headaches, malaise/fatigue, neck pain, orthopnea, palpitations, peripheral edema, PND, shortness of breath or sweats. Past treatments include ACE inhibitors. Review of Systems   Constitutional: Negative. Negative for chills, fever, malaise/fatigue and weight loss. HENT: Negative. Negative for ear pain, postnasal drip, rhinorrhea and sore throat. Eyes: Negative. Negative for blurred vision. Respiratory: Positive for cough and wheezing. Negative for hemoptysis and shortness of breath. Cardiovascular: Negative. Negative for chest pain, palpitations, orthopnea and PND. Gastrointestinal: Negative. Negative for heartburn, nausea and vomiting. Endocrine: Negative. Genitourinary: Positive for dysuria and frequency. Negative for flank pain, hematuria, hesitancy and urgency. Musculoskeletal: Negative. Negative for myalgias and neck pain. Skin: Negative. Negative for rash. Allergic/Immunologic: Negative. Neurological: Negative. Negative for headaches. Hematological: Negative. Psychiatric/Behavioral: Negative. Physical Exam   Constitutional: She is oriented to person, place, and time. She appears well-developed and well-nourished. HENT:   Head: Normocephalic. Cardiovascular: Normal rate, regular rhythm, normal heart sounds and intact distal pulses. Pulmonary/Chest: Effort normal and breath sounds normal. No respiratory distress. She has no wheezes. She has no rales. Abdominal: Soft. Bowel sounds are normal. She exhibits no distension. There is tenderness in the suprapubic area. There is no rebound. Neurological: She is alert and oriented to person, place, and time. Skin: Skin is warm and dry. Psychiatric: She has a normal mood and affect. Her behavior is normal. Judgment and thought content normal.         1. Urinary tract infection without hematuria, site unspecified  Needs further eval, cx sent. POCT Urinalysis no Micro   2.  Essential hypertension  Recommended home

## 2018-03-21 LAB — URINE CULTURE, ROUTINE: NORMAL

## 2018-03-26 ENCOUNTER — OFFICE VISIT (OUTPATIENT)
Dept: FAMILY MEDICINE CLINIC | Age: 78
End: 2018-03-26

## 2018-03-26 VITALS
WEIGHT: 213.4 LBS | TEMPERATURE: 98.9 F | HEART RATE: 112 BPM | HEIGHT: 60 IN | OXYGEN SATURATION: 88 % | SYSTOLIC BLOOD PRESSURE: 140 MMHG | DIASTOLIC BLOOD PRESSURE: 80 MMHG | BODY MASS INDEX: 41.9 KG/M2

## 2018-03-26 DIAGNOSIS — F32.4 MAJOR DEPRESSIVE DISORDER WITH SINGLE EPISODE, IN PARTIAL REMISSION (HCC): ICD-10-CM

## 2018-03-26 DIAGNOSIS — I10 ESSENTIAL HYPERTENSION: ICD-10-CM

## 2018-03-26 DIAGNOSIS — J40 BRONCHITIS: Primary | ICD-10-CM

## 2018-03-26 RX ORDER — ALBUTEROL SULFATE 90 UG/1
2 AEROSOL, METERED RESPIRATORY (INHALATION) EVERY 6 HOURS PRN
Qty: 1 INHALER | Refills: 3 | Status: SHIPPED | OUTPATIENT
Start: 2018-03-26 | End: 2018-12-21 | Stop reason: SDUPTHER

## 2018-03-26 RX ORDER — LEVOFLOXACIN 500 MG/1
500 TABLET, FILM COATED ORAL DAILY
Qty: 10 TABLET | Refills: 0 | Status: SHIPPED | OUTPATIENT
Start: 2018-03-26 | End: 2018-04-05

## 2018-03-26 RX ORDER — BROMPHENIRAMINE MALEATE, PSEUDOEPHEDRINE HYDROCHLORIDE, AND DEXTROMETHORPHAN HYDROBROMIDE 2; 30; 10 MG/5ML; MG/5ML; MG/5ML
5 SYRUP ORAL 4 TIMES DAILY PRN
Qty: 200 ML | Refills: 0 | Status: SHIPPED | OUTPATIENT
Start: 2018-03-26 | End: 2018-06-18 | Stop reason: ALTCHOICE

## 2018-03-28 ENCOUNTER — TELEPHONE (OUTPATIENT)
Dept: FAMILY MEDICINE CLINIC | Age: 78
End: 2018-03-28

## 2018-03-28 ASSESSMENT — ENCOUNTER SYMPTOMS
GASTROINTESTINAL NEGATIVE: 1
SHORTNESS OF BREATH: 1
CHEST TIGHTNESS: 1
BLURRED VISION: 0
ALLERGIC/IMMUNOLOGIC NEGATIVE: 1
WHEEZING: 1
EYES NEGATIVE: 1
SORE THROAT: 0
COUGH: 1
HEARTBURN: 0
RHINORRHEA: 0
ORTHOPNEA: 0
HEMOPTYSIS: 0

## 2018-03-28 NOTE — TELEPHONE ENCOUNTER
Pt informed. Will call back in a week and let us know. States she is splitting her meds AM/PM as recommended.   Thanks

## 2018-03-28 NOTE — PROGRESS NOTES
pantoprazole (PROTONIX) 40 MG tablet Take 1 tablet by mouth daily 90 tablet 1    hydrochlorothiazide (HYDRODIURIL) 25 MG tablet Take 1 tablet by mouth daily 90 tablet 1    oxyCODONE-acetaminophen (PERCOCET) 5-325 MG per tablet Take 1 tablet by mouth 2 times daily  .  Biotin 10 MG CAPS Take 1 capsule by mouth daily.  leflunomide (ARAVA) 20 MG tablet Take 20 mg by mouth daily       triamcinolone (KENALOG) 0.1 % ointment Apply topically as needed       traZODone (DESYREL) 50 MG tablet Take 1 tablet by mouth nightly 90 tablet 1    vitamin D (CHOLECALCIFEROL) 1000 UNITS TABS tablet Take 1,000 Units by mouth 4 times daily      gabapentin (NEURONTIN) 300 MG capsule Take 300 mg by mouth 4 times daily      calcium citrate (CALCITRATE) 950 MG tablet Take 1 tablet by mouth daily      sulindac (CLINORIL) 200 MG tablet Take 1 tablet by mouth daily 90 tablet 1    Multiple Vitamins-Minerals (CENTRUM SILVER) TABS Take 1 tablet by mouth daily       Probiotic Product (PROBIOTIC FORMULA) CAPS Take 1 capsule by mouth daily.  cetirizine (ZYRTEC) 10 MG tablet Take 10 mg by mouth nightly       B Complex Vitamins (B COMPLEX 1) TABS Take 1 tablet by mouth Daily.         adalimumab (HUMIRA) 40 MG/0.8ML injection Inject 40 mg into the skin every 10 minutes Every 10 days         Past Medical History:   Diagnosis Date    Allergic rhinitis 5/4/2010    Asthma 5/18/2014    Chicken pox     Depression     Diverticulosis of large intestine 5/4/2010    Essential hypertension 8/20/2015    Gastroesophageal reflux disease without esophagitis 8/20/2015    GERD (gastroesophageal reflux disease)     Hyperlipidemia     Hypertension     Interstitial lung disease ([address]     Mitral valve regurgitation 2/16/2011    Mixed hyperlipidemia 3/10/2016    Obesity     Osteoarthritis     Osteoporosis 5/4/2010    Polio     Prolonged emergence from general anesthesia     sats dropped    RA (rheumatoid arthritis) ([address]     fever, headaches, heartburn, hemoptysis, myalgias, nasal congestion, postnasal drip, rash, rhinorrhea, sore throat, sweats or weight loss. She has tried leukotriene antagonists and a beta-agonist inhaler for the symptoms. Hypertension   This is a chronic problem. The current episode started more than 1 year ago. Associated symptoms include shortness of breath. Pertinent negatives include no anxiety, blurred vision, chest pain, headaches, malaise/fatigue, neck pain, orthopnea, palpitations, peripheral edema, PND or sweats. Past treatments include beta blockers, ACE inhibitors and diuretics. Depression  Patient presents for follow up of depression. Current symptoms include none. Symptoms have been stable since that time. Patient denies anhedonia and depressed mood. Previous treatment includes: medication. Patient complains of the following side effects from the treatment: none. Review of Systems   Constitutional: Negative. Negative for chills, fever, malaise/fatigue and weight loss. HENT: Positive for congestion. Negative for ear pain, postnasal drip, rhinorrhea and sore throat. Eyes: Negative. Negative for blurred vision. Respiratory: Positive for cough, chest tightness, shortness of breath and wheezing. Negative for hemoptysis. Cardiovascular: Negative. Negative for chest pain, palpitations, orthopnea and PND. Gastrointestinal: Negative. Negative for heartburn. Endocrine: Negative. Genitourinary: Negative. Musculoskeletal: Negative. Negative for myalgias and neck pain. Skin: Negative. Negative for rash. Allergic/Immunologic: Negative. Neurological: Negative. Negative for headaches. Hematological: Negative. Psychiatric/Behavioral: Negative. Physical Exam   Constitutional: She is oriented to person, place, and time. She appears well-developed and well-nourished. HENT:   Head: Normocephalic.    Cardiovascular: Normal rate, regular rhythm, normal heart sounds will take all medications as prescribed by my doctor, and I will call the office if I am having any medication problems.   Barriers to success: none  Plan for overcoming my barriers: N/A     Confidence: 9/10  Date goal set: 3/28/18  Date goal attained:         Subhash Marquis NP

## 2018-03-28 NOTE — TELEPHONE ENCOUNTER
Have her do her BP daily x 1 week and let us know how they are trending at that point.  Also make sure she is splitting her BP medications - taking either lisinopril in the am and metoprolol in the evening or vice versa

## 2018-04-03 ENCOUNTER — TELEPHONE (OUTPATIENT)
Dept: FAMILY MEDICINE CLINIC | Age: 78
End: 2018-04-03

## 2018-04-17 ENCOUNTER — OFFICE VISIT (OUTPATIENT)
Dept: ORTHOPEDIC SURGERY | Age: 78
End: 2018-04-17

## 2018-04-17 DIAGNOSIS — Z98.890 S/P LEFT ROTATOR CUFF REPAIR: Primary | ICD-10-CM

## 2018-04-17 DIAGNOSIS — M25.511 RIGHT SHOULDER PAIN, UNSPECIFIED CHRONICITY: ICD-10-CM

## 2018-04-17 PROCEDURE — 1090F PRES/ABSN URINE INCON ASSESS: CPT | Performed by: PHYSICIAN ASSISTANT

## 2018-04-17 PROCEDURE — 1036F TOBACCO NON-USER: CPT | Performed by: PHYSICIAN ASSISTANT

## 2018-04-17 PROCEDURE — 99213 OFFICE O/P EST LOW 20 MIN: CPT | Performed by: PHYSICIAN ASSISTANT

## 2018-04-17 PROCEDURE — G8428 CUR MEDS NOT DOCUMENT: HCPCS | Performed by: PHYSICIAN ASSISTANT

## 2018-04-17 PROCEDURE — G8399 PT W/DXA RESULTS DOCUMENT: HCPCS | Performed by: PHYSICIAN ASSISTANT

## 2018-04-17 PROCEDURE — G8417 CALC BMI ABV UP PARAM F/U: HCPCS | Performed by: PHYSICIAN ASSISTANT

## 2018-04-17 PROCEDURE — 4040F PNEUMOC VAC/ADMIN/RCVD: CPT | Performed by: PHYSICIAN ASSISTANT

## 2018-04-17 PROCEDURE — 1123F ACP DISCUSS/DSCN MKR DOCD: CPT | Performed by: PHYSICIAN ASSISTANT

## 2018-04-19 ENCOUNTER — TELEPHONE (OUTPATIENT)
Dept: FAMILY MEDICINE CLINIC | Age: 78
End: 2018-04-19

## 2018-04-20 ENCOUNTER — OFFICE VISIT (OUTPATIENT)
Dept: FAMILY MEDICINE CLINIC | Age: 78
End: 2018-04-20

## 2018-04-20 ENCOUNTER — HOSPITAL ENCOUNTER (OUTPATIENT)
Dept: OTHER | Age: 78
Discharge: HOME | End: 2018-04-20
Attending: FAMILY MEDICINE | Admitting: FAMILY MEDICINE

## 2018-04-20 VITALS
WEIGHT: 208 LBS | DIASTOLIC BLOOD PRESSURE: 62 MMHG | BODY MASS INDEX: 40.62 KG/M2 | TEMPERATURE: 97.9 F | SYSTOLIC BLOOD PRESSURE: 130 MMHG

## 2018-04-20 DIAGNOSIS — I10 ESSENTIAL HYPERTENSION: ICD-10-CM

## 2018-04-20 DIAGNOSIS — J45.20 MILD INTERMITTENT REACTIVE AIRWAY DISEASE WITHOUT COMPLICATION: ICD-10-CM

## 2018-04-20 DIAGNOSIS — J40 BRONCHITIS: ICD-10-CM

## 2018-04-20 DIAGNOSIS — J45.20 MILD INTERMITTENT REACTIVE AIRWAY DISEASE WITHOUT COMPLICATION: Primary | ICD-10-CM

## 2018-04-20 DIAGNOSIS — M19.012 PRIMARY OSTEOARTHRITIS OF BOTH SHOULDERS: ICD-10-CM

## 2018-04-20 DIAGNOSIS — M19.011 PRIMARY OSTEOARTHRITIS OF BOTH SHOULDERS: ICD-10-CM

## 2018-04-20 LAB
A/G RATIO: 1.6 (ref 1.1–2.2)
ALBUMIN SERPL-MCNC: 4.2 G/DL (ref 3.4–5)
ALP BLD-CCNC: 49 U/L (ref 40–129)
ALT SERPL-CCNC: 17 U/L (ref 10–40)
ANION GAP SERPL CALCULATED.3IONS-SCNC: 16 MMOL/L (ref 3–16)
AST SERPL-CCNC: 28 U/L (ref 15–37)
BASOPHILS ABSOLUTE: 0.1 K/UL (ref 0–0.2)
BASOPHILS RELATIVE PERCENT: 1.3 %
BILIRUB SERPL-MCNC: 1.4 MG/DL (ref 0–1)
BUN BLDV-MCNC: 12 MG/DL (ref 7–20)
CALCIUM SERPL-MCNC: 10.5 MG/DL (ref 8.3–10.6)
CHLORIDE BLD-SCNC: 97 MMOL/L (ref 99–110)
CO2: 27 MMOL/L (ref 21–32)
CREAT SERPL-MCNC: 0.8 MG/DL (ref 0.6–1.2)
EOSINOPHILS ABSOLUTE: 0.9 K/UL (ref 0–0.6)
EOSINOPHILS RELATIVE PERCENT: 10.9 %
GFR AFRICAN AMERICAN: >60
GFR NON-AFRICAN AMERICAN: >60
GLOBULIN: 2.6 G/DL
GLUCOSE BLD-MCNC: 104 MG/DL (ref 70–99)
HCT VFR BLD CALC: 42.1 % (ref 36–48)
HEMOGLOBIN: 13.5 G/DL (ref 12–16)
LYMPHOCYTES ABSOLUTE: 3.9 K/UL (ref 1–5.1)
LYMPHOCYTES RELATIVE PERCENT: 47.2 %
MCH RBC QN AUTO: 28.2 PG (ref 26–34)
MCHC RBC AUTO-ENTMCNC: 32.1 G/DL (ref 31–36)
MCV RBC AUTO: 88 FL (ref 80–100)
MONOCYTES ABSOLUTE: 0.9 K/UL (ref 0–1.3)
MONOCYTES RELATIVE PERCENT: 11.3 %
NEUTROPHILS ABSOLUTE: 2.4 K/UL (ref 1.7–7.7)
NEUTROPHILS RELATIVE PERCENT: 29.3 %
PDW BLD-RTO: 14.9 % (ref 12.4–15.4)
PLATELET # BLD: 230 K/UL (ref 135–450)
PMV BLD AUTO: 10.7 FL (ref 5–10.5)
POTASSIUM SERPL-SCNC: 3.8 MMOL/L (ref 3.5–5.1)
RBC # BLD: 4.79 M/UL (ref 4–5.2)
SODIUM BLD-SCNC: 140 MMOL/L (ref 136–145)
TOTAL PROTEIN: 6.8 G/DL (ref 6.4–8.2)
WBC # BLD: 8.3 K/UL (ref 4–11)

## 2018-04-20 PROCEDURE — 4040F PNEUMOC VAC/ADMIN/RCVD: CPT | Performed by: FAMILY MEDICINE

## 2018-04-20 PROCEDURE — 1090F PRES/ABSN URINE INCON ASSESS: CPT | Performed by: FAMILY MEDICINE

## 2018-04-20 PROCEDURE — 94640 AIRWAY INHALATION TREATMENT: CPT | Performed by: FAMILY MEDICINE

## 2018-04-20 PROCEDURE — 99213 OFFICE O/P EST LOW 20 MIN: CPT | Performed by: FAMILY MEDICINE

## 2018-04-20 PROCEDURE — 1123F ACP DISCUSS/DSCN MKR DOCD: CPT | Performed by: FAMILY MEDICINE

## 2018-04-20 PROCEDURE — G8427 DOCREV CUR MEDS BY ELIG CLIN: HCPCS | Performed by: FAMILY MEDICINE

## 2018-04-20 PROCEDURE — G8417 CALC BMI ABV UP PARAM F/U: HCPCS | Performed by: FAMILY MEDICINE

## 2018-04-20 PROCEDURE — G8399 PT W/DXA RESULTS DOCUMENT: HCPCS | Performed by: FAMILY MEDICINE

## 2018-04-20 PROCEDURE — 1036F TOBACCO NON-USER: CPT | Performed by: FAMILY MEDICINE

## 2018-04-20 RX ORDER — ALBUTEROL SULFATE 2.5 MG/3ML
2.5 SOLUTION RESPIRATORY (INHALATION) ONCE
Status: COMPLETED | OUTPATIENT
Start: 2018-04-20 | End: 2018-04-20

## 2018-04-20 RX ADMIN — ALBUTEROL SULFATE 2.5 MG: 2.5 SOLUTION RESPIRATORY (INHALATION) at 12:30

## 2018-04-20 ASSESSMENT — ENCOUNTER SYMPTOMS
RESPIRATORY NEGATIVE: 1
GASTROINTESTINAL NEGATIVE: 1
EYES NEGATIVE: 1

## 2018-04-23 ENCOUNTER — OFFICE VISIT (OUTPATIENT)
Dept: FAMILY MEDICINE CLINIC | Age: 78
End: 2018-04-23

## 2018-04-23 ENCOUNTER — TELEPHONE (OUTPATIENT)
Dept: ORTHOPEDIC SURGERY | Age: 78
End: 2018-04-23

## 2018-04-23 VITALS
HEART RATE: 107 BPM | BODY MASS INDEX: 40.8 KG/M2 | OXYGEN SATURATION: 84 % | DIASTOLIC BLOOD PRESSURE: 86 MMHG | WEIGHT: 207.8 LBS | TEMPERATURE: 98.6 F | HEIGHT: 60 IN | SYSTOLIC BLOOD PRESSURE: 132 MMHG

## 2018-04-23 DIAGNOSIS — M06.09 RHEUMATOID ARTHRITIS OF MULTIPLE SITES WITH NEGATIVE RHEUMATOID FACTOR (HCC): ICD-10-CM

## 2018-04-23 DIAGNOSIS — E78.2 MIXED HYPERLIPIDEMIA: ICD-10-CM

## 2018-04-23 DIAGNOSIS — D84.9 IMMUNOSUPPRESSION (HCC): ICD-10-CM

## 2018-04-23 DIAGNOSIS — J30.89 CHRONIC NONSEASONAL ALLERGIC RHINITIS DUE TO POLLEN: ICD-10-CM

## 2018-04-23 DIAGNOSIS — G47.33 OBSTRUCTIVE SLEEP APNEA SYNDROME: ICD-10-CM

## 2018-04-23 DIAGNOSIS — Z01.818 PREOP GENERAL PHYSICAL EXAM: Primary | ICD-10-CM

## 2018-04-23 DIAGNOSIS — F32.4 MAJOR DEPRESSIVE DISORDER WITH SINGLE EPISODE, IN PARTIAL REMISSION (HCC): ICD-10-CM

## 2018-04-23 DIAGNOSIS — M19.012 PRIMARY OSTEOARTHRITIS OF LEFT SHOULDER: ICD-10-CM

## 2018-04-23 DIAGNOSIS — K21.9 GASTROESOPHAGEAL REFLUX DISEASE WITHOUT ESOPHAGITIS: ICD-10-CM

## 2018-04-23 DIAGNOSIS — R82.90 ABNORMAL URINE: ICD-10-CM

## 2018-04-23 DIAGNOSIS — J84.10 GRANULOMATOUS LUNG DISEASE (HCC): ICD-10-CM

## 2018-04-23 DIAGNOSIS — I10 ESSENTIAL HYPERTENSION: ICD-10-CM

## 2018-04-23 DIAGNOSIS — J84.9 INTERSTITIAL LUNG DISEASE (HCC): ICD-10-CM

## 2018-04-23 DIAGNOSIS — J45.20 MILD INTERMITTENT ASTHMA WITHOUT COMPLICATION: ICD-10-CM

## 2018-04-23 LAB
BILIRUBIN, POC: NORMAL
BLOOD URINE, POC: NORMAL
CLARITY, POC: CLEAR
COLOR, POC: YELLOW
GLUCOSE URINE, POC: NORMAL
KETONES, POC: NORMAL
LEUKOCYTE EST, POC: NORMAL
NITRITE, POC: NORMAL
PH, POC: 6
PROTEIN, POC: NORMAL
SPECIFIC GRAVITY, POC: 1
UROBILINOGEN, POC: 0.2

## 2018-04-23 PROCEDURE — 93000 ELECTROCARDIOGRAM COMPLETE: CPT | Performed by: FAMILY MEDICINE

## 2018-04-23 PROCEDURE — 99215 OFFICE O/P EST HI 40 MIN: CPT | Performed by: FAMILY MEDICINE

## 2018-04-23 PROCEDURE — 81002 URINALYSIS NONAUTO W/O SCOPE: CPT | Performed by: FAMILY MEDICINE

## 2018-04-23 PROCEDURE — G8428 CUR MEDS NOT DOCUMENT: HCPCS | Performed by: FAMILY MEDICINE

## 2018-04-23 PROCEDURE — G8417 CALC BMI ABV UP PARAM F/U: HCPCS | Performed by: FAMILY MEDICINE

## 2018-04-23 PROCEDURE — 1090F PRES/ABSN URINE INCON ASSESS: CPT | Performed by: FAMILY MEDICINE

## 2018-04-24 ENCOUNTER — TELEPHONE (OUTPATIENT)
Dept: FAMILY MEDICINE CLINIC | Age: 78
End: 2018-04-24

## 2018-04-24 DIAGNOSIS — Z01.818 PREOP GENERAL PHYSICAL EXAM: ICD-10-CM

## 2018-04-24 DIAGNOSIS — M75.122 COMPLETE TEAR OF LEFT ROTATOR CUFF: ICD-10-CM

## 2018-04-24 DIAGNOSIS — B00.1 RECURRENT COLD SORES: Primary | ICD-10-CM

## 2018-04-24 RX ORDER — VALACYCLOVIR HYDROCHLORIDE 1 G/1
2000 TABLET, FILM COATED ORAL 2 TIMES DAILY
Qty: 12 TABLET | Refills: 0 | Status: SHIPPED | OUTPATIENT
Start: 2018-04-24 | End: 2018-04-30 | Stop reason: SDUPTHER

## 2018-04-25 LAB — URINE CULTURE, ROUTINE: NORMAL

## 2018-04-30 DIAGNOSIS — B00.1 RECURRENT COLD SORES: ICD-10-CM

## 2018-04-30 RX ORDER — VALACYCLOVIR HYDROCHLORIDE 1 G/1
2000 TABLET, FILM COATED ORAL 2 TIMES DAILY
Qty: 12 TABLET | Refills: 3 | Status: SHIPPED | OUTPATIENT
Start: 2018-04-30 | End: 2018-05-01

## 2018-04-30 RX ORDER — VALACYCLOVIR HYDROCHLORIDE 1 G/1
2000 TABLET, FILM COATED ORAL 2 TIMES DAILY
Qty: 12 TABLET | Refills: 0 | Status: CANCELLED | OUTPATIENT
Start: 2018-04-30 | End: 2018-05-01

## 2018-05-02 ENCOUNTER — NURSE ONLY (OUTPATIENT)
Dept: FAMILY MEDICINE CLINIC | Age: 78
End: 2018-05-02

## 2018-05-02 DIAGNOSIS — Z01.818 PRE-OP EXAMINATION: Primary | ICD-10-CM

## 2018-05-05 LAB — CULTURE NOSE: NORMAL

## 2018-05-07 ENCOUNTER — OFFICE VISIT (OUTPATIENT)
Dept: PULMONOLOGY | Age: 78
End: 2018-05-07

## 2018-05-07 VITALS
OXYGEN SATURATION: 95 % | DIASTOLIC BLOOD PRESSURE: 80 MMHG | HEIGHT: 60 IN | WEIGHT: 207 LBS | BODY MASS INDEX: 40.64 KG/M2 | HEART RATE: 75 BPM | RESPIRATION RATE: 16 BRPM | SYSTOLIC BLOOD PRESSURE: 140 MMHG

## 2018-05-07 DIAGNOSIS — J84.9 ILD (INTERSTITIAL LUNG DISEASE) (HCC): Primary | ICD-10-CM

## 2018-05-07 DIAGNOSIS — Z92.21 HX OF METHOTREXATE THERAPY: ICD-10-CM

## 2018-05-07 DIAGNOSIS — Z01.811 PREOP PULMONARY/RESPIRATORY EXAM: ICD-10-CM

## 2018-05-07 PROCEDURE — G8417 CALC BMI ABV UP PARAM F/U: HCPCS | Performed by: INTERNAL MEDICINE

## 2018-05-07 PROCEDURE — 1036F TOBACCO NON-USER: CPT | Performed by: INTERNAL MEDICINE

## 2018-05-07 PROCEDURE — 99214 OFFICE O/P EST MOD 30 MIN: CPT | Performed by: INTERNAL MEDICINE

## 2018-05-07 PROCEDURE — G8427 DOCREV CUR MEDS BY ELIG CLIN: HCPCS | Performed by: INTERNAL MEDICINE

## 2018-05-07 PROCEDURE — 1090F PRES/ABSN URINE INCON ASSESS: CPT | Performed by: INTERNAL MEDICINE

## 2018-05-07 PROCEDURE — G8399 PT W/DXA RESULTS DOCUMENT: HCPCS | Performed by: INTERNAL MEDICINE

## 2018-05-07 PROCEDURE — 1123F ACP DISCUSS/DSCN MKR DOCD: CPT | Performed by: INTERNAL MEDICINE

## 2018-05-07 PROCEDURE — 4040F PNEUMOC VAC/ADMIN/RCVD: CPT | Performed by: INTERNAL MEDICINE

## 2018-05-07 RX ORDER — FLUOCINONIDE 0.5 MG/G
OINTMENT TOPICAL
Status: ON HOLD | COMMUNITY
End: 2018-08-10 | Stop reason: CLARIF

## 2018-05-09 DIAGNOSIS — M75.122 COMPLETE TEAR OF LEFT ROTATOR CUFF: Primary | ICD-10-CM

## 2018-05-09 PROCEDURE — L3670 SO ACRO/CLAV CAN WEB PRE OTS: HCPCS | Performed by: ORTHOPAEDIC SURGERY

## 2018-05-10 ENCOUNTER — TELEPHONE (OUTPATIENT)
Dept: FAMILY MEDICINE CLINIC | Age: 78
End: 2018-05-10

## 2018-05-10 RX ORDER — SULINDAC 200 MG/1
200 TABLET ORAL NIGHTLY
Status: ON HOLD | COMMUNITY
End: 2019-04-17 | Stop reason: HOSPADM

## 2018-05-10 RX ORDER — PANTOPRAZOLE SODIUM 40 MG/1
40 TABLET, DELAYED RELEASE ORAL
Status: ON HOLD | COMMUNITY
End: 2018-08-10

## 2018-05-21 ENCOUNTER — TELEPHONE (OUTPATIENT)
Dept: ORTHOPEDIC SURGERY | Age: 78
End: 2018-05-21

## 2018-05-25 ENCOUNTER — OFFICE VISIT (OUTPATIENT)
Dept: ORTHOPEDIC SURGERY | Age: 78
End: 2018-05-25

## 2018-05-25 VITALS — BODY MASS INDEX: 40.64 KG/M2 | WEIGHT: 207.01 LBS | HEIGHT: 60 IN

## 2018-05-25 DIAGNOSIS — R52 PAIN: Primary | ICD-10-CM

## 2018-05-25 DIAGNOSIS — Z96.612 S/P REVERSE TOTAL SHOULDER ARTHROPLASTY, LEFT: ICD-10-CM

## 2018-05-25 PROCEDURE — 99024 POSTOP FOLLOW-UP VISIT: CPT | Performed by: ORTHOPAEDIC SURGERY

## 2018-06-04 ENCOUNTER — TELEPHONE (OUTPATIENT)
Dept: FAMILY MEDICINE CLINIC | Age: 78
End: 2018-06-04

## 2018-06-04 DIAGNOSIS — I10 ESSENTIAL HYPERTENSION: Primary | ICD-10-CM

## 2018-06-04 DIAGNOSIS — E78.2 MIXED HYPERLIPIDEMIA: ICD-10-CM

## 2018-06-11 ENCOUNTER — TELEPHONE (OUTPATIENT)
Dept: FAMILY MEDICINE CLINIC | Age: 78
End: 2018-06-11

## 2018-06-18 ENCOUNTER — OFFICE VISIT (OUTPATIENT)
Dept: FAMILY MEDICINE CLINIC | Age: 78
End: 2018-06-18

## 2018-06-18 VITALS
DIASTOLIC BLOOD PRESSURE: 76 MMHG | SYSTOLIC BLOOD PRESSURE: 110 MMHG | BODY MASS INDEX: 37.15 KG/M2 | TEMPERATURE: 98.2 F | WEIGHT: 190.2 LBS

## 2018-06-18 DIAGNOSIS — R19.7 DIARRHEA OF PRESUMED INFECTIOUS ORIGIN: Primary | ICD-10-CM

## 2018-06-18 DIAGNOSIS — I10 ESSENTIAL HYPERTENSION: ICD-10-CM

## 2018-06-18 DIAGNOSIS — R19.7 DIARRHEA OF PRESUMED INFECTIOUS ORIGIN: ICD-10-CM

## 2018-06-18 LAB
A/G RATIO: 1.6 (ref 1.1–2.2)
ALBUMIN SERPL-MCNC: 4.2 G/DL (ref 3.4–5)
ALP BLD-CCNC: 55 U/L (ref 40–129)
ALT SERPL-CCNC: 14 U/L (ref 10–40)
ANION GAP SERPL CALCULATED.3IONS-SCNC: 20 MMOL/L (ref 3–16)
AST SERPL-CCNC: 34 U/L (ref 15–37)
BILIRUB SERPL-MCNC: 1.2 MG/DL (ref 0–1)
BILIRUBIN, POC: NORMAL
BLOOD URINE, POC: NORMAL
BUN BLDV-MCNC: 11 MG/DL (ref 7–20)
CALCIUM SERPL-MCNC: 10.8 MG/DL (ref 8.3–10.6)
CHLORIDE BLD-SCNC: 92 MMOL/L (ref 99–110)
CLARITY, POC: NORMAL
CO2: 21 MMOL/L (ref 21–32)
COLOR, POC: YELLOW
CREAT SERPL-MCNC: 1.3 MG/DL (ref 0.6–1.2)
GFR AFRICAN AMERICAN: 48
GFR NON-AFRICAN AMERICAN: 40
GLOBULIN: 2.6 G/DL
GLUCOSE BLD-MCNC: 107 MG/DL (ref 70–99)
GLUCOSE URINE, POC: NORMAL
KETONES, POC: NORMAL
LEUKOCYTE EST, POC: NORMAL
NITRITE, POC: NORMAL
PH, POC: 5
POTASSIUM SERPL-SCNC: 3.6 MMOL/L (ref 3.5–5.1)
PROTEIN, POC: 30
REASON FOR REJECTION: NORMAL
REJECTED TEST: NORMAL
SODIUM BLD-SCNC: 133 MMOL/L (ref 136–145)
SPECIFIC GRAVITY, POC: 1.01
TOTAL PROTEIN: 6.8 G/DL (ref 6.4–8.2)
UROBILINOGEN, POC: 0.2

## 2018-06-18 PROCEDURE — 1123F ACP DISCUSS/DSCN MKR DOCD: CPT | Performed by: FAMILY MEDICINE

## 2018-06-18 PROCEDURE — 99213 OFFICE O/P EST LOW 20 MIN: CPT | Performed by: FAMILY MEDICINE

## 2018-06-18 PROCEDURE — 1036F TOBACCO NON-USER: CPT | Performed by: FAMILY MEDICINE

## 2018-06-18 PROCEDURE — G8399 PT W/DXA RESULTS DOCUMENT: HCPCS | Performed by: FAMILY MEDICINE

## 2018-06-18 PROCEDURE — G8427 DOCREV CUR MEDS BY ELIG CLIN: HCPCS | Performed by: FAMILY MEDICINE

## 2018-06-18 PROCEDURE — 1090F PRES/ABSN URINE INCON ASSESS: CPT | Performed by: FAMILY MEDICINE

## 2018-06-18 PROCEDURE — 81002 URINALYSIS NONAUTO W/O SCOPE: CPT | Performed by: FAMILY MEDICINE

## 2018-06-18 PROCEDURE — 4040F PNEUMOC VAC/ADMIN/RCVD: CPT | Performed by: FAMILY MEDICINE

## 2018-06-18 PROCEDURE — G8417 CALC BMI ABV UP PARAM F/U: HCPCS | Performed by: FAMILY MEDICINE

## 2018-06-18 RX ORDER — DIPHENOXYLATE HYDROCHLORIDE AND ATROPINE SULFATE 2.5; .025 MG/1; MG/1
1 TABLET ORAL 4 TIMES DAILY PRN
Qty: 60 TABLET | Refills: 0 | Status: SHIPPED | OUTPATIENT
Start: 2018-06-18 | End: 2018-06-29 | Stop reason: SDUPTHER

## 2018-06-18 RX ORDER — METRONIDAZOLE 500 MG/1
500 TABLET ORAL 3 TIMES DAILY
Qty: 30 TABLET | Refills: 0 | Status: SHIPPED | OUTPATIENT
Start: 2018-06-18 | End: 2018-06-29 | Stop reason: SDUPTHER

## 2018-06-18 RX ORDER — CEPHALEXIN 500 MG/1
500 CAPSULE ORAL 4 TIMES DAILY
Status: ON HOLD | COMMUNITY
End: 2018-08-10 | Stop reason: CLARIF

## 2018-06-19 ENCOUNTER — TELEPHONE (OUTPATIENT)
Dept: FAMILY MEDICINE CLINIC | Age: 78
End: 2018-06-19

## 2018-06-19 ENCOUNTER — HOSPITAL ENCOUNTER (OUTPATIENT)
Dept: PHYSICAL THERAPY | Age: 78
Discharge: HOME | End: 2018-06-30
Admitting: ORTHOPAEDIC SURGERY

## 2018-06-19 DIAGNOSIS — A09 DIARRHEA, INFECTIOUS, ADULT: ICD-10-CM

## 2018-06-19 DIAGNOSIS — A09 DIARRHEA, INFECTIOUS, ADULT: Primary | ICD-10-CM

## 2018-06-19 LAB
BASOPHILS ABSOLUTE: 0.2 K/UL (ref 0–0.2)
BASOPHILS RELATIVE PERCENT: 2.3 %
EOSINOPHILS ABSOLUTE: 0.6 K/UL (ref 0–0.6)
EOSINOPHILS RELATIVE PERCENT: 9.3 %
HCT VFR BLD CALC: 34.8 % (ref 36–48)
HEMOGLOBIN: 11.6 G/DL (ref 12–16)
LYMPHOCYTES ABSOLUTE: 2.7 K/UL (ref 1–5.1)
LYMPHOCYTES RELATIVE PERCENT: 40.9 %
MCH RBC QN AUTO: 28.6 PG (ref 26–34)
MCHC RBC AUTO-ENTMCNC: 33.3 G/DL (ref 31–36)
MCV RBC AUTO: 85.8 FL (ref 80–100)
MONOCYTES ABSOLUTE: 1 K/UL (ref 0–1.3)
MONOCYTES RELATIVE PERCENT: 14.4 %
NEUTROPHILS ABSOLUTE: 2.2 K/UL (ref 1.7–7.7)
NEUTROPHILS RELATIVE PERCENT: 33.1 %
PDW BLD-RTO: 16.4 % (ref 12.4–15.4)
PLATELET # BLD: 300 K/UL (ref 135–450)
PMV BLD AUTO: 8.8 FL (ref 5–10.5)
RBC # BLD: 4.05 M/UL (ref 4–5.2)
WBC # BLD: 6.7 K/UL (ref 4–11)

## 2018-06-19 NOTE — PLAN OF CARE
[facility]  Orthopaedics and Sports Rehabilitation, [facility]  [address], [place], [address]  Phone: [phone]   Fax:     [phone]                                                       Physical Therapy Certification    Dear Referring Practitioner: Dr. Adelaida Armstrong,    We had the pleasure of evaluating the following patient for physical therapy services at [address] [place] [address]. A summary of our findings can be found in the initial assessment below. This includes our plan of care. If you have any questions or concerns regarding these findings, please do not hesitate to contact me at the office phone number checked above. Thank you for the referral.       Physician Signature:_______________________________Date:__________________  By signing above (or electronic signature), therapists plan is approved by physician      Patient: Dahlia Rudolph   : 1940   MRN: [id number]  Referring Physician: Referring Practitioner: Dr. Adelaida Armstrong      Evaluation Date: 2018      Medical Diagnosis Information:  Diagnosis: M45.326 (ICD-10-CM) - Left shoulder pain, unspecified chronicity- S/P Left TSA   Treatment Diagnosis: Left Shoulder Pain                                         Insurance information: PT Insurance Information: Medicare    Precautions/ Contra-indications: Right Scapular Fracture, OA, RA, OA, Osteoporosis, Fall Risk  Latex Allergy:  [x]NO      []YES  Preferred Language for Healthcare:   [x]English       []other:    SUBJECTIVE: Patient stated complaint:Had left shoulder reverse TSA 18. Patient states having RCR last year and falling causing failure of repair. Patient states doing x4 visits of PT in [facility] after sx. Patient states at this time not having much pain and is trying to use shoulder as much as she can right now. Relevant Medical History:As above.    Functional Disability Index:Quickdash:57%    Pain Scale: 0-4/10  Easing factors: percocet   Provocative factors: reaching and lifting     Type: []Constant   [x]Intermittent  []Radiating []Localized []other:     Numbness/Tingling: denied. Functional Limitations/Impairments: [x]Lifting/reaching [x]Grooming [x]Carrying    [x]ADL's [x]Driving [x]Sports/Recreations   []Other:    Occupation/School: Retired. Living Status/Prior Level of Function: Independent with ADLs and IADLs, prior to fall in early 2018  (insert highest prior level of function)      OBJECTIVE:     CERV ROM     Cervical Flexion     Cervical Extension     Cervical SB     Cervical rotation          ROM Left  PROM Right  AROM Supine   Shoulder Flex 90 125   Shoulder Abd 80    Shoulder ER NT    Shoulder IR NT    Elbow Flex     Elbow Ext      Strength (Dynomomiter)           Strength  Left  Not Tested Right  Not tested secondary scapular fracture. Shoulder Flex     Shoulder Abd     Shoulder ER     Shoulder IR     Shoulder Prone Scap     Shoulder Prone Ext      Shoulder Prone HAB       Single Leg Squat:    Reflexes/Sensation (myotomes/dermatomes):  [x]Dermatomes/Myotomes intact    [x]Reflexes equal and normal bilaterally   []Other:    Joint mobility: NT   []Normal    []Hypo   []Hyper    Palpation: No TTP    Functional Mobility/Transfers: Slow and labored    Posture: forward head and rounded shoulder    Scapular Position:protracted. Bandages/Dressings/Incisions: no sign of infection. Gait: (include devices/WB status) Shuffled with increased lateral sway. Orthopedic Special Tests:                        [x] Patient history, allergies, meds reviewed. Medical chart reviewed. See intake form. Review Of Systems (ROS):  [x]Performed Review of systems (Integumentary, CardioPulmonary, Neurological) by intake and observation. Intake form has been scanned into medical record. Patient has been instructed to contact their primary care physician regarding ROS issues if not already being addressed at this time. Co-morbidities/Complexities (which will affect course of rehabilitation):   []None           Arthritic conditions   [x]Rheumatoid arthritis (M05.9)  [x]Osteoarthritis (M19.91)   Cardiovascular conditions   [x]Hypertension (I10)  []Hyperlipidemia (E78.5)  []Angina pectoris (I20)  []Atherosclerosis (I70)   Musculoskeletal conditions   []Disc pathology   []Congenital spine pathologies   []Prior surgical intervention  [x]Osteoporosis (M81.8)  []Osteopenia (M85.8)   Endocrine conditions   []Hypothyroid (E03.9)  []Hyperthyroid Gastrointestinal conditions   []Constipation (I10.75)   Metabolic conditions   []Morbid obesity (E66.01)  []Diabetes type 1(E10.65) or 2 (E11.65)   []Neuropathy (G60.9)     Pulmonary conditions   []Asthma (J45)  []Coughing   []COPD (J44.9)   Psychological Disorders  []Anxiety (F41.9)  []Depression (F32.9)   []Other:   [x]Other: History of falls. Barriers to/and or personal factors that will affect rehab potential:              []Age  []Sex              []Motivation/Lack of Motivation                        [x]Co-Morbidities              []Cognitive Function, education/learning barriers              []Environmental, home barriers              []profession/work barriers  []past PT/medical experience  []other:  Justification: PMH will affect healing. PACEMAKER:  - Denied having a pacemaker that would contraindicate the use of electrical modalities. METAL IMPLANTS:    - Has a metal implant in the treatment area that would make the use of thermal agents contraindicated. CANCER HISTORY:  - Denied a history of cancer that would contraindicate thermal modalities. Falls Risk Assessment (30 days):   [] Falls Risk assessed and no intervention required.   [x] Falls Risk assessed and Patient requires intervention due to being higher risk   TUG score (>12s at risk):     [x] Falls education provided, including using AD (cane and walking)      G-Codes:  PT G-Codes  Functional Assessment Tool Used: Paramjit Barry   Score: 57%  Functional Limitation: Carrying, moving and handling objects  Carrying, Moving and Handling Objects Current Status (): At least 40 percent but less than 60 percent impaired, limited or restricted  Carrying, Moving and Handling Objects Goal Status (): At least 1 percent but less than 20 percent impaired, limited or restricted    ASSESSMENT:   Functional Impairments   []Noted spinal or UE joint hypomobility   []Noted spinal or UE joint hypermobility   [x]Decreased UE functional ROM   [x]Decreased UE functional strength   []Abnormal reflexes/sensation/myotomal/dermatomal deficits   [x]Decreased RC/scapular/core strength and neuromuscular control   []other:      Functional Activity Limitations (from functional questionnaire and intake)   [x]Reduced ability to tolerate prolonged functional positions   [x]Reduced ability or difficulty with changes of positions or transfers between positions   [x]Reduced ability to maintain good posture and demonstrate good body mechanics with sitting, bending, and lifting   [] Reduced ability or tolerance with driving and/or computer work   []Reduced ability to sleep   [x]Reduced ability to perform lifting, reaching, carrying tasks   [x]Reduced ability to tolerate impact through UE   [x]Reduced ability to reach behind back   [x]Reduced ability to  or hold objects   [x]Reduced ability to throw or toss an object   []other:    Participation Restrictions   [x]Reduced participation in self care activities   [x]Reduced participation in home management activities   []Reduced participation in work activities   [x]Reduced participation in social activities. []Reduced participation in sport/recreation activities. Classification:   [x]Signs/symptoms consistent with post-surgical status including decreased ROM, strength and function.   []Signs/symptoms consistent with joint sprain/strain   []Signs/symptoms consistent with shoulder

## 2018-06-19 NOTE — FLOWSHEET NOTE
Grade-     STM-                   Therapeutic Exercise and NMR EXR  [] (17377) Provided verbal/tactile cueing for activities related to strengthening, flexibility, endurance, ROM  for improvements in scapular, scapulothoracic and UE control with self care, reaching, carrying, lifting, house/yardwork, driving/computer work.    [] (19671) Provided verbal/tactile cueing for activities related to improving balance, coordination, kinesthetic sense, posture, motor skill, proprioception  to assist with  scapular, scapulothoracic and UE control with self care, reaching, carrying, lifting, house/yardwork, driving/computer work. Therapeutic Activities:    [] (68346 or 41083) Provided verbal/tactile cueing for activities related to improving balance, coordination, kinesthetic sense, posture, motor skill, proprioception and motor activation to allow for proper function of scapular, scapulothoracic and UE control with self care, carrying, lifting, driving/computer work.      Home Exercise Program:    [x] (33258) Reviewed/Progressed HEP activities related to strengthening, flexibility, endurance, ROM of scapular, scapulothoracic and UE control with self care, reaching, carrying, lifting, house/yardwork, driving/computer work  [] (70062) Reviewed/Progressed HEP activities related to improving balance, coordination, kinesthetic sense, posture, motor skill, proprioception of scapular, scapulothoracic and UE control with self care, reaching, carrying, lifting, house/yardwork, driving/computer work      Manual Treatments:  PROM / STM / Oscillations-Mobs:  G-I, II, III, IV (PA's, Inf., Post.)  [x] (96375) Provided manual therapy to mobilize soft tissue/joints of cervical/CT, scapular GHJ and UE for the purpose of modulating pain, promoting relaxation,  increasing ROM, reducing/eliminating soft tissue swelling/inflammation/restriction, improving soft tissue extensibility and allowing for proper ROM for normal function with self care, reaching, carrying, lifting, house/yardwork, driving/computer work    Modalities:      Charges:  Timed Code Treatment Minutes: 24   Total Treatment Minutes: 50     [x] EVAL (LOW) 82743 (typically 20 minutes face-to-face)  [] EVAL (MOD) 94798 (typically 30 minutes face-to-face)  [] EVAL (HIGH) 12411 (typically 45 minutes face-to-face)  [] RE-EVAL     [x] QZ(78357) x  1   [] IONTO  [] NMR (46374) x      [] VASO  [x] Manual (50447) x  1    [] Other:  [] TA x       [] Mech Traction (32323)  [] ES(attended) (16106)      [] ES (un) (60960):     GOALS:  Short Term Goals: To be achieved in: 2 weeks  1. Independent in HEP and progression per patient tolerance, in order to prevent re-injury. 2. Patient will have a decrease in pain to facilitate improvement in movement, function, and ADLs as indicated by Functional Deficits.     Long Term Goals: To be achieved in: 12 weeks  1. Disability index score of 20% or less for the Quickdash to assist with reaching prior level of function. 2. Patient will demonstrate increased AROM to 120 flex, 110 abd to allow for proper joint functioning as indicated by patients Functional Deficits. 3. Patient will demonstrate an increase in Strength to 4/5 left shoulder to allow for proper functional mobility as indicated by patients Functional Deficits. 4. Patient will return to comb her hair without increased symptoms or restriction. 5. Patient will be able to reaching to lower shelf in cabinet. Progression Towards Functional goals:  [] Patient is progressing as expected towards functional goals listed. [] Progression is slowed due to complexities listed. [] Progression has been slowed due to co-morbidities.   [x] Plan just implemented, too soon to assess goals progression  [] Other:     ASSESSMENT:  See eval    Treatment/Activity Tolerance:  [] Patient tolerated treatment well [] Patient limited by fatique  [] Patient limited by pain  [] Patient limited by other medical

## 2018-06-20 ASSESSMENT — ENCOUNTER SYMPTOMS
EYES NEGATIVE: 1
GASTROINTESTINAL NEGATIVE: 1
ALLERGIC/IMMUNOLOGIC NEGATIVE: 1
RESPIRATORY NEGATIVE: 1

## 2018-06-21 RX ORDER — METOPROLOL SUCCINATE 25 MG/1
25 TABLET, EXTENDED RELEASE ORAL DAILY
Qty: 30 TABLET | Refills: 1 | Status: ON HOLD | OUTPATIENT
Start: 2018-06-21 | End: 2018-08-10 | Stop reason: CLARIF

## 2018-06-22 ENCOUNTER — TELEPHONE (OUTPATIENT)
Dept: FAMILY MEDICINE CLINIC | Age: 78
End: 2018-06-22

## 2018-06-22 ENCOUNTER — HOSPITAL ENCOUNTER (OUTPATIENT)
Dept: PHYSICAL THERAPY | Age: 78
Discharge: HOME | End: 2018-06-23
Admitting: ORTHOPAEDIC SURGERY

## 2018-06-22 RX ORDER — FLUCONAZOLE 150 MG/1
150 TABLET ORAL ONCE
Qty: 2 TABLET | Refills: 0 | Status: SHIPPED | OUTPATIENT
Start: 2018-06-22 | End: 2018-06-22

## 2018-06-25 ENCOUNTER — TELEPHONE (OUTPATIENT)
Dept: FAMILY MEDICINE CLINIC | Age: 78
End: 2018-06-25

## 2018-06-29 ENCOUNTER — TELEPHONE (OUTPATIENT)
Dept: FAMILY MEDICINE CLINIC | Age: 78
End: 2018-06-29

## 2018-06-29 DIAGNOSIS — R19.7 DIARRHEA OF PRESUMED INFECTIOUS ORIGIN: ICD-10-CM

## 2018-06-29 RX ORDER — DIPHENOXYLATE HYDROCHLORIDE AND ATROPINE SULFATE 2.5; .025 MG/1; MG/1
1 TABLET ORAL 4 TIMES DAILY PRN
Qty: 60 TABLET | Refills: 0 | Status: ON HOLD | OUTPATIENT
Start: 2018-06-29 | End: 2018-08-10 | Stop reason: CLARIF

## 2018-06-29 RX ORDER — METRONIDAZOLE 500 MG/1
500 TABLET ORAL 3 TIMES DAILY
Qty: 30 TABLET | Refills: 0 | Status: SHIPPED | OUTPATIENT
Start: 2018-06-29 | End: 2018-07-09

## 2018-07-01 ENCOUNTER — HOSPITAL ENCOUNTER (OUTPATIENT)
Dept: PHYSICAL THERAPY | Age: 78
Discharge: HOME | End: 2018-07-01
Attending: ORTHOPAEDIC SURGERY | Admitting: ORTHOPAEDIC SURGERY

## 2018-07-05 ENCOUNTER — TELEPHONE (OUTPATIENT)
Dept: FAMILY MEDICINE CLINIC | Age: 78
End: 2018-07-05

## 2018-07-05 DIAGNOSIS — K52.9 CHRONIC DIARRHEA: Primary | ICD-10-CM

## 2018-07-09 ENCOUNTER — TELEPHONE (OUTPATIENT)
Dept: FAMILY MEDICINE CLINIC | Age: 78
End: 2018-07-09

## 2018-07-09 NOTE — TELEPHONE ENCOUNTER
Patient's daughter in law is calling stating that Mrs. Samaniego's diarrhea is back full force. She will be traveling from [place] by plane and would like to know if there is something she can take to stop the diarrhea for the plane ride? Patient stopped the antibiotic Metronidazole on Saturday evening due to feeling sick and having a loss of appetite. Daughter in law is wanting to know is it feasible to send her on a plane? Could she take a steroid due to an inflammation being in the colon? Is Lamotil the only option as far as diarrhea medication is concerned? Should she make an appointment to be seen by you as soon as she returns home from [place]? They have tried to schedule an appointment with the GI doctor but cant get in until August 12. She has seen a different GI physician in the past and they are also trying to see if she can get in with him as well.

## 2018-07-09 NOTE — TELEPHONE ENCOUNTER
Okay to try two pills at once and see how she does. She needs to see Gi ASAP. Anyone will do. Discussed with her.  Do not recommend steroids

## 2018-07-11 LAB
ALBUMIN SERPL-MCNC: 3.5 G/DL (ref 3.4–5)
ALP BLD-CCNC: 51 U/L (ref 40–129)
ALT SERPL-CCNC: 26 U/L (ref 10–40)
ANION GAP SERPL CALCULATED.3IONS-SCNC: 17 MMOL/L (ref 3–16)
AST SERPL-CCNC: 57 U/L (ref 15–37)
BASOPHILS ABSOLUTE: 0 K/UL (ref 0–0.2)
BASOPHILS RELATIVE PERCENT: 0.8 %
BILIRUB SERPL-MCNC: 0.7 MG/DL (ref 0–1)
BILIRUBIN DIRECT: 0.7 MG/DL (ref 0–0.3)
BILIRUBIN, INDIRECT: 0 MG/DL (ref 0–1)
BUN BLDV-MCNC: 8 MG/DL (ref 7–20)
CALCIUM SERPL-MCNC: 10.4 MG/DL (ref 8.3–10.6)
CHLORIDE BLD-SCNC: 100 MMOL/L (ref 99–110)
CO2: 22 MMOL/L (ref 21–32)
CREAT SERPL-MCNC: 1 MG/DL (ref 0.6–1.2)
EOSINOPHILS ABSOLUTE: 0.4 K/UL (ref 0–0.6)
EOSINOPHILS RELATIVE PERCENT: 6.1 %
GFR AFRICAN AMERICAN: >60
GFR NON-AFRICAN AMERICAN: 54
GLUCOSE BLD-MCNC: 96 MG/DL (ref 70–99)
HCT VFR BLD CALC: 35.9 % (ref 36–48)
HEMOGLOBIN: 11.5 G/DL (ref 12–16)
LIPASE: 32 U/L (ref 13–60)
LYMPHOCYTES ABSOLUTE: 2.8 K/UL (ref 1–5.1)
LYMPHOCYTES RELATIVE PERCENT: 48.9 %
MCH RBC QN AUTO: 28.1 PG (ref 26–34)
MCHC RBC AUTO-ENTMCNC: 32.1 G/DL (ref 31–36)
MCV RBC AUTO: 87.5 FL (ref 80–100)
MONOCYTES ABSOLUTE: 0.8 K/UL (ref 0–1.3)
MONOCYTES RELATIVE PERCENT: 14.5 %
NEUTROPHILS ABSOLUTE: 1.7 K/UL (ref 1.7–7.7)
NEUTROPHILS RELATIVE PERCENT: 29.7 %
PDW BLD-RTO: 16.3 % (ref 12.4–15.4)
PLATELET # BLD: 198 K/UL (ref 135–450)
PMV BLD AUTO: 8.5 FL (ref 5–10.5)
POTASSIUM SERPL-SCNC: 3.9 MMOL/L (ref 3.5–5.1)
RBC # BLD: 4.11 M/UL (ref 4–5.2)
SODIUM BLD-SCNC: 139 MMOL/L (ref 136–145)
TOTAL PROTEIN: 6.5 G/DL (ref 6.4–8.2)
WBC # BLD: 5.8 K/UL (ref 4–11)

## 2018-07-11 PROCEDURE — 96361 HYDRATE IV INFUSION ADD-ON: CPT

## 2018-07-11 PROCEDURE — 9990 CHARGE CONVERSION

## 2018-07-11 PROCEDURE — 36415 COLL VENOUS BLD VENIPUNCTURE: CPT

## 2018-07-11 PROCEDURE — 85025 COMPLETE CBC W/AUTO DIFF WBC: CPT

## 2018-07-11 PROCEDURE — 80076 HEPATIC FUNCTION PANEL: CPT

## 2018-07-11 PROCEDURE — 80048 BASIC METABOLIC PNL TOTAL CA: CPT

## 2018-07-11 PROCEDURE — 99284 EMERGENCY DEPT VISIT MOD MDM: CPT

## 2018-07-11 PROCEDURE — 83690 ASSAY OF LIPASE: CPT

## 2018-07-11 PROCEDURE — 96360 HYDRATION IV INFUSION INIT: CPT

## 2018-07-11 RX ORDER — 0.9 % SODIUM CHLORIDE 0.9 %
1000 INTRAVENOUS SOLUTION INTRAVENOUS ONCE
Status: COMPLETED | OUTPATIENT
Start: 2018-07-11 | End: 2018-07-11

## 2018-07-11 RX ADMIN — Medication 1000 ML: at 20:54

## 2018-07-11 ASSESSMENT — ENCOUNTER SYMPTOMS
BACK PAIN: 0
ABDOMINAL PAIN: 0
DIARRHEA: 1
CHEST TIGHTNESS: 0
VOMITING: 0
SHORTNESS OF BREATH: 0
NAUSEA: 0

## 2018-07-12 ENCOUNTER — HOSPITAL ENCOUNTER (INPATIENT)
Dept: TELEMETRY | Age: 78
LOS: 2 days | Discharge: HOME HEALTH | DRG: 392 | End: 2018-07-14
Attending: EMERGENCY MEDICINE | Admitting: INTERNAL MEDICINE
Payer: MEDICARE

## 2018-07-12 DIAGNOSIS — R19.7 DIARRHEA, UNSPECIFIED TYPE: Primary | ICD-10-CM

## 2018-07-12 LAB
ANION GAP SERPL CALCULATED.3IONS-SCNC: 14 MMOL/L (ref 3–16)
BUN BLDV-MCNC: 7 MG/DL (ref 7–20)
C DIFFICILE TOXIN, EIA: NORMAL
CALCIUM SERPL-MCNC: 10.9 MG/DL (ref 8.3–10.6)
CHLORIDE BLD-SCNC: 105 MMOL/L (ref 99–110)
CO2: 25 MMOL/L (ref 21–32)
CREAT SERPL-MCNC: 0.8 MG/DL (ref 0.6–1.2)
GFR AFRICAN AMERICAN: >60
GFR NON-AFRICAN AMERICAN: >60
GLUCOSE BLD-MCNC: 82 MG/DL (ref 70–99)
POTASSIUM REFLEX MAGNESIUM: 3.7 MMOL/L (ref 3.5–5.1)
SODIUM BLD-SCNC: 144 MMOL/L (ref 136–145)

## 2018-07-12 PROCEDURE — 97116 GAIT TRAINING THERAPY: CPT

## 2018-07-12 PROCEDURE — 94760 N-INVAS EAR/PLS OXIMETRY 1: CPT

## 2018-07-12 PROCEDURE — G8988 SELF CARE GOAL STATUS: HCPCS

## 2018-07-12 PROCEDURE — 87324 CLOSTRIDIUM AG IA: CPT

## 2018-07-12 PROCEDURE — 97535 SELF CARE MNGMENT TRAINING: CPT

## 2018-07-12 PROCEDURE — 97167 OT EVAL HIGH COMPLEX 60 MIN: CPT

## 2018-07-12 PROCEDURE — 97530 THERAPEUTIC ACTIVITIES: CPT

## 2018-07-12 PROCEDURE — G8979 MOBILITY GOAL STATUS: HCPCS

## 2018-07-12 PROCEDURE — 80048 BASIC METABOLIC PNL TOTAL CA: CPT

## 2018-07-12 PROCEDURE — 97110 THERAPEUTIC EXERCISES: CPT

## 2018-07-12 PROCEDURE — G8978 MOBILITY CURRENT STATUS: HCPCS

## 2018-07-12 PROCEDURE — 9990 CHARGE CONVERSION

## 2018-07-12 PROCEDURE — 36415 COLL VENOUS BLD VENIPUNCTURE: CPT

## 2018-07-12 PROCEDURE — 94664 DEMO&/EVAL PT USE INHALER: CPT

## 2018-07-12 PROCEDURE — 97162 PT EVAL MOD COMPLEX 30 MIN: CPT

## 2018-07-12 PROCEDURE — 94150 VITAL CAPACITY TEST: CPT

## 2018-07-12 PROCEDURE — G8987 SELF CARE CURRENT STATUS: HCPCS

## 2018-07-12 PROCEDURE — 87449 NOS EACH ORGANISM AG IA: CPT

## 2018-07-12 RX ORDER — ALBUTEROL SULFATE 90 UG/1
2 AEROSOL, METERED RESPIRATORY (INHALATION) EVERY 6 HOURS PRN
Status: DISCONTINUED | OUTPATIENT
Start: 2018-07-12 | End: 2018-07-12

## 2018-07-12 RX ORDER — FENOFIBRATE 160 MG/1
160 TABLET ORAL DAILY
Status: DISCONTINUED | OUTPATIENT
Start: 2018-07-12 | End: 2018-07-14 | Stop reason: HOSPADM

## 2018-07-12 RX ORDER — LISINOPRIL 20 MG/1
20 TABLET ORAL DAILY
Status: DISCONTINUED | OUTPATIENT
Start: 2018-07-12 | End: 2018-07-14 | Stop reason: HOSPADM

## 2018-07-12 RX ORDER — OXYCODONE HYDROCHLORIDE AND ACETAMINOPHEN 5; 325 MG/1; MG/1
1 TABLET ORAL 2 TIMES DAILY
Status: DISCONTINUED | OUTPATIENT
Start: 2018-07-12 | End: 2018-07-14 | Stop reason: HOSPADM

## 2018-07-12 RX ORDER — LACTOBACILLUS RHAMNOSUS GG 10B CELL
1 CAPSULE ORAL DAILY
Status: DISCONTINUED | OUTPATIENT
Start: 2018-07-12 | End: 2018-07-14 | Stop reason: HOSPADM

## 2018-07-12 RX ORDER — LISINOPRIL 40 MG/1
40 TABLET ORAL DAILY
Status: DISCONTINUED | OUTPATIENT
Start: 2018-07-12 | End: 2018-07-12

## 2018-07-12 RX ORDER — TRAZODONE HYDROCHLORIDE 50 MG/1
50 TABLET ORAL NIGHTLY
Status: DISCONTINUED | OUTPATIENT
Start: 2018-07-12 | End: 2018-07-14 | Stop reason: HOSPADM

## 2018-07-12 RX ORDER — GABAPENTIN 300 MG/1
300 CAPSULE ORAL 4 TIMES DAILY
Status: DISCONTINUED | OUTPATIENT
Start: 2018-07-12 | End: 2018-07-14 | Stop reason: HOSPADM

## 2018-07-12 RX ORDER — SODIUM CHLORIDE, SODIUM LACTATE, POTASSIUM CHLORIDE, CALCIUM CHLORIDE 600; 310; 30; 20 MG/100ML; MG/100ML; MG/100ML; MG/100ML
INJECTION, SOLUTION INTRAVENOUS CONTINUOUS
Status: ACTIVE | OUTPATIENT
Start: 2018-07-12 | End: 2018-07-13

## 2018-07-12 RX ORDER — DIPHENOXYLATE HYDROCHLORIDE AND ATROPINE SULFATE 2.5; .025 MG/1; MG/1
1 TABLET ORAL 4 TIMES DAILY PRN
Status: DISCONTINUED | OUTPATIENT
Start: 2018-07-12 | End: 2018-07-14 | Stop reason: HOSPADM

## 2018-07-12 RX ORDER — SODIUM CHLORIDE 0.9 % (FLUSH) 0.9 %
10 SYRINGE (ML) INJECTION EVERY 12 HOURS SCHEDULED
Status: DISCONTINUED | OUTPATIENT
Start: 2018-07-12 | End: 2018-07-14 | Stop reason: HOSPADM

## 2018-07-12 RX ORDER — VITAMIN B COMPLEX
1 CAPSULE ORAL DAILY
Status: DISCONTINUED | OUTPATIENT
Start: 2018-07-12 | End: 2018-07-14 | Stop reason: HOSPADM

## 2018-07-12 RX ORDER — CETIRIZINE HYDROCHLORIDE 10 MG/1
10 TABLET ORAL NIGHTLY
Status: DISCONTINUED | OUTPATIENT
Start: 2018-07-12 | End: 2018-07-14 | Stop reason: HOSPADM

## 2018-07-12 RX ORDER — METOPROLOL SUCCINATE 25 MG/1
25 TABLET, EXTENDED RELEASE ORAL DAILY
Status: DISCONTINUED | OUTPATIENT
Start: 2018-07-12 | End: 2018-07-12

## 2018-07-12 RX ORDER — ONDANSETRON 2 MG/ML
4 INJECTION INTRAMUSCULAR; INTRAVENOUS EVERY 6 HOURS PRN
Status: DISCONTINUED | OUTPATIENT
Start: 2018-07-12 | End: 2018-07-14 | Stop reason: HOSPADM

## 2018-07-12 RX ORDER — ESCITALOPRAM OXALATE 10 MG/1
10 TABLET ORAL DAILY
Status: DISCONTINUED | OUTPATIENT
Start: 2018-07-12 | End: 2018-07-14 | Stop reason: HOSPADM

## 2018-07-12 RX ORDER — MONTELUKAST SODIUM 10 MG/1
10 TABLET ORAL NIGHTLY
Status: DISCONTINUED | OUTPATIENT
Start: 2018-07-12 | End: 2018-07-14 | Stop reason: HOSPADM

## 2018-07-12 RX ORDER — SODIUM CHLORIDE 0.9 % (FLUSH) 0.9 %
10 SYRINGE (ML) INJECTION PRN
Status: DISCONTINUED | OUTPATIENT
Start: 2018-07-12 | End: 2018-07-14 | Stop reason: HOSPADM

## 2018-07-12 RX ORDER — ALBUTEROL SULFATE 90 UG/1
2 AEROSOL, METERED RESPIRATORY (INHALATION) EVERY 4 HOURS PRN
Status: DISCONTINUED | OUTPATIENT
Start: 2018-07-12 | End: 2018-07-14 | Stop reason: HOSPADM

## 2018-07-12 RX ORDER — METOPROLOL SUCCINATE 25 MG/1
12.5 TABLET, EXTENDED RELEASE ORAL DAILY
Status: DISCONTINUED | OUTPATIENT
Start: 2018-07-12 | End: 2018-07-14 | Stop reason: HOSPADM

## 2018-07-12 RX ADMIN — GABAPENTIN 300 MG: 300 CAPSULE ORAL at 09:34

## 2018-07-12 RX ADMIN — MONTELUKAST SODIUM 10 MG: 10 TABLET ORAL at 21:23

## 2018-07-12 RX ADMIN — ONDANSETRON 4 MG: 2 INJECTION INTRAMUSCULAR; INTRAVENOUS at 21:21

## 2018-07-12 RX ADMIN — CETIRIZINE HYDROCHLORIDE 10 MG: 10 TABLET ORAL at 21:23

## 2018-07-12 RX ADMIN — ENOXAPARIN SODIUM 40 MG: 40 INJECTION SUBCUTANEOUS at 11:14

## 2018-07-12 RX ADMIN — VITAMIN D, TAB 1000IU (100/BT) 1000 UNITS: 25 TAB at 18:21

## 2018-07-12 RX ADMIN — ESCITALOPRAM OXALATE 10 MG: 10 TABLET ORAL at 09:32

## 2018-07-12 RX ADMIN — VITAMIN D, TAB 1000IU (100/BT) 1000 UNITS: 25 TAB at 09:31

## 2018-07-12 RX ADMIN — LISINOPRIL 20 MG: 20 TABLET ORAL at 11:16

## 2018-07-12 RX ADMIN — SODIUM CHLORIDE, SODIUM LACTATE, POTASSIUM CHLORIDE, CALCIUM CHLORIDE: 600; 310; 30; 20 INJECTION, SOLUTION INTRAVENOUS at 10:12

## 2018-07-12 RX ADMIN — Medication 10 ML: at 21:22

## 2018-07-12 RX ADMIN — FENOFIBRATE 160 MG: 160 TABLET ORAL at 09:30

## 2018-07-12 RX ADMIN — Medication 10 ML: at 10:32

## 2018-07-12 RX ADMIN — GABAPENTIN 300 MG: 300 CAPSULE ORAL at 13:45

## 2018-07-12 RX ADMIN — GABAPENTIN 300 MG: 300 CAPSULE ORAL at 18:21

## 2018-07-12 RX ADMIN — OXYCODONE HYDROCHLORIDE AND ACETAMINOPHEN 1 TABLET: 5; 325 TABLET ORAL at 22:45

## 2018-07-12 RX ADMIN — VITAMIN D, TAB 1000IU (100/BT) 1000 UNITS: 25 TAB at 21:23

## 2018-07-12 RX ADMIN — OXYCODONE HYDROCHLORIDE AND ACETAMINOPHEN 1 TABLET: 5; 325 TABLET ORAL at 11:17

## 2018-07-12 RX ADMIN — VITAMIN D, TAB 1000IU (100/BT) 1000 UNITS: 25 TAB at 13:44

## 2018-07-12 RX ADMIN — Medication 1 CAPSULE: at 09:30

## 2018-07-12 RX ADMIN — METOPROLOL SUCCINATE 12.5 MG: 25 TABLET, EXTENDED RELEASE ORAL at 09:33

## 2018-07-12 RX ADMIN — TRAZODONE HYDROCHLORIDE 50 MG: 50 TABLET ORAL at 21:23

## 2018-07-12 RX ADMIN — GABAPENTIN 300 MG: 300 CAPSULE ORAL at 21:23

## 2018-07-12 RX ADMIN — Medication 1 CAPSULE: at 18:21

## 2018-07-12 ASSESSMENT — PAIN DESCRIPTION - FREQUENCY
FREQUENCY: INTERMITTENT
FREQUENCY: INTERMITTENT

## 2018-07-12 ASSESSMENT — PAIN DESCRIPTION - LOCATION
LOCATION: BACK
LOCATION: BACK

## 2018-07-12 ASSESSMENT — PAIN SCALES - GENERAL
PAINLEVEL_OUTOF10: 3
PAINLEVEL_OUTOF10: 0

## 2018-07-12 ASSESSMENT — PAIN DESCRIPTION - PROGRESSION
CLINICAL_PROGRESSION: NOT CHANGED
CLINICAL_PROGRESSION: NOT CHANGED

## 2018-07-12 ASSESSMENT — PAIN DESCRIPTION - ONSET
ONSET: ON-GOING
ONSET: ON-GOING

## 2018-07-12 ASSESSMENT — PAIN DESCRIPTION - PAIN TYPE
TYPE: CHRONIC PAIN
TYPE: CHRONIC PAIN

## 2018-07-12 ASSESSMENT — PAIN DESCRIPTION - ORIENTATION
ORIENTATION: LOWER
ORIENTATION: LOWER;MID

## 2018-07-12 ASSESSMENT — PAIN DESCRIPTION - DESCRIPTORS
DESCRIPTORS: ACHING
DESCRIPTORS: ACHING

## 2018-07-12 NOTE — PROGRESS NOTES
Therapy will be held for heart rate (HR) greater than 140 beats per minute, pending direction from physician. 3. Bronchodilators will be administered via Metered Dose Inhaler (MDI) with spacer when the following criteria are met:  a. Alert and cooperative     b. HR < 140 bpm  c. RR < 30 bpm                d. Can demonstrate a 23 second inspiratory hold  4. Bronchodilators will be administered via Hand Held Nebulizer [facility] to patients when ANY of the following criteria are met  a. Incognizant or uncooperative          b. Patients treated with HHN at Home        c. Unable to demonstrate proper use of MDI with spacer     d. RR > 30 bpm   5. Bronchodilators will be delivered via Metered Dose Inhaler (MDI), HHN, Aerogen to intubated patients on mechanical ventilation. 6. Inhalation medication orders will be delivered and/or substituted as outlined below. Aerosolized Medications Ordering and Administration Guidelines:    1. All Medications will be ordered by a physician, and their frequency and/or modality will be adjusted as defined by the patients Respiratory Severity Index (RSI) score. 2. If the patient does not have documented COPD, consider discontinuing anticholinergics when RSI is less than 9.  3. If the bronchospasm worsens (increased RSI), then the bronchodilator frequency can be increased to a maximum of every 4 hours. If greater than every 4 hours is required, the physician will be contacted. 4. If the bronchospasm improves, the frequency of the bronchodilator can be decreased, based on the patient's RSI, but not less than home treatment regimen frequency. 5. Bronchodilator(s) will be discontinued if patient has a RSI less than 9 and has received no scheduled or as needed treatment for 72  Hrs. Patients Ordered on a Mucolytic Agent:    1. Must always be administered with a bronchodilator.     2. Discontinue if patient experiences worsened bronchospasm, or secretions have lessened to the point that the patient is able to clear them with a cough. Anti-inflammatory and Combination Medications:    1. If the patient lacks prior history of lung disease, is not using inhaled anti-inflammatory medication at home, and lacks wheezing by examination or by history for at least 24 hours, contact physician for possible discontinuation.

## 2018-07-12 NOTE — PROGRESS NOTES
Physical Therapy    Facility/Department: [address] [facility] [address]  Initial Assessment/treatment note      NAME: Clayton Combs  : 1940  MRN: [id number]    Date of Service: 2018    Discharge Recommendations:Dory Christopher scored a 19/ on the AM-PAC short mobility form. Current research shows that an AM-PAC score of 17 or less is typically not associated with a discharge to the patient's home setting. Based on the patients AM-PAC score and their current functional mobility deficits, it is recommended that the patient have 3-5 sessions per week of Physical Therapy at d/c to increase the patients independence. PT Equipment Recommendations  Equipment Needed:  (defer)    Patient Diagnosis(es): The encounter diagnosis was Diarrhea, unspecified type. has a past medical history of Allergic rhinitis; Asthma; Chicken pox; Depression; Diverticulosis of large intestine; Essential hypertension; Gastroesophageal reflux disease without esophagitis; GERD (gastroesophageal reflux disease); Hyperlipidemia; Hypertension; Interstitial lung disease ([address] Mitral valve regurgitation; Mixed hyperlipidemia; Obesity; Osteoarthritis; Osteoporosis; Polio; Prolonged emergence from general anesthesia; RA (rheumatoid arthritis) ([address] Rheumatoid arthritis ([address] and Sleep apnea. has a past surgical history that includes Appendectomy; Cholecystectomy; Salpingo-oophorectomy; Carpal tunnel release (Bilateral); Foot surgery (Bilateral); Total shoulder arthroplasty (Right); other surgical history (Right, 2017); Hysterectomy, total abdominal; Tonsillectomy; Shoulder arthroscopy (Left, 11/15/2017); joint replacement (Right, 2016); and joint replacement (Right). Restrictions  Position Activity Restriction  Other position/activity restrictions:  Up with assist/as tolerated     Vision/Hearing  Vision: Within Functional Limits (glasses at bedside)  Hearing: Within functional limits by assistance (HOB up and use of rail; pt reports sleeping in lift chair since TSR)     Transfers  Sit to Stand: Contact guard assistance (x3 trials with vc for hand placement)  Stand to sit: Contact guard assistance (x3 trials with vc for hand placement)     Ambulation  Device: Rolling Walker  Assistance: Contact guard assistance  Quality of Gait: slight forward posture with slow,small steps; pt fatigued quickly  Distance: 10 ft, 35 ft     Balance  Sitting - Static: Good  Sitting - Dynamic: Good  Standing - Static: Good (with B UE support of rolling walker)  Standing - Dynamic: Good (with B UE support of rolling walker)  Exercises  Comments: Pt encouraged to perform seated B LE x10: LAQ, marches, AP     Assessment   Body structures, Functions, Activity limitations: Decreased functional mobility ; Decreased endurance;Decreased balance  Assessment: Pt demo mobility below her reported baseline of independent at home without AD 6 weeks ago. Pt now requiring CGA with all mobility and use of rolling walker. Pt reports 6 weeks of diarrhea and 30# weight loss. Pt also has RA and recent TSR complicating her condition. Pt not safe to return home alone. Pt hopeful to return home with 24 hour assist initially. IF home, recommend 24 hour supervision initially, home PT, use of rolling walker for safety. Lengthy discussion with pt/daughter-daughter reports she is working on 2 4hour assist at home but she is from [place]. Pt also pending GI input regarding diarrhea. Treatment Diagnosis: mobility impairment due to diarrhea  Decision Making: Medium Complexity  Patient Education: Pt educated on PT role, need to call for assist to get up and she verbalized understanding. REQUIRES PT FOLLOW UP: Yes  Activity Tolerance  Activity Tolerance: Patient limited by endurance; Patient limited by cognitive status         Plan   Plan  Times per week: 2-5  Current Treatment Recommendations: Functional Mobility Training, Endurance Training,

## 2018-07-12 NOTE — H&P
tablet by mouth daily 3/5/18   Mack Gomez MD   hydrochlorothiazide (HYDRODIURIL) 25 MG tablet Take 1 tablet by mouth daily 3/5/18   Mack Gomez MD   predniSONE (DELTASONE) 5 MG tablet Take 10 mg by mouth daily  10/23/17   Historical Provider, MD   Biotin 10 MG CAPS Take 1 capsule by mouth daily. Historical Provider, MD   leflunomide (ARAVA) 20 MG tablet Take 20 mg by mouth daily  6/29/17   Historical Provider, MD   triamcinolone (KENALOG) 0.1 % ointment Apply topically as needed  7/11/17   Historical Provider, MD   traZODone (DESYREL) 50 MG tablet Take 1 tablet by mouth nightly 4/13/17   Mack Gomez MD   vitamin D (CHOLECALCIFEROL) 1000 UNITS TABS tablet Take 1,000 Units by mouth 4 times daily    Historical Provider, MD   gabapentin (NEURONTIN) 300 MG capsule Take 300 mg by mouth 4 times daily    Historical Provider, MD   calcium citrate (CALCITRATE) 950 MG tablet Take 1 tablet by mouth daily    Historical Provider, MD   Multiple Vitamins-Minerals (CENTRUM SILVER) TABS Take 1 tablet by mouth daily     Historical Provider, MD   Probiotic Product (PROBIOTIC FORMULA) CAPS Take 1 capsule by mouth daily. Historical Provider, MD   cetirizine (ZYRTEC) 10 MG tablet Take 10 mg by mouth nightly     Historical Provider, MD   B Complex Vitamins (B COMPLEX 1) TABS Take 1 tablet by mouth Daily. Historical Provider, MD   adalimumab (HUMIRA) 40 MG/0.8ML injection Inject 40 mg into the skin every 10 minutes Every 10 days 6/23/10   Historical Provider, MD       Allergies:  Sulfa antibiotics; Lamotrigine; Lomotil  [diphenoxylate-atropine]; and Sulfacetamide sodium    Social History:    TOBACCO:   reports that she quit smoking about 42 years ago. Her smoking use included Cigarettes. She has a 14.00 pack-year smoking history. She has never used smokeless tobacco.  ETOH:   reports that she drinks about 0.6 - 1.2 oz of alcohol per week . Family History:    Reviewed in detail and negative for DM, CAD, Cancer, CVA. 0.7*   BILITOT  0.7   ALKPHOS  51     No results for input(s): INR in the last 72 hours. No results for input(s): Lemond Moots in the last 72 hours. Urinalysis:      Lab Results   Component Value Date    NITRU Negative 02/09/2017    WBCUA 10-20 01/18/2017    BACTERIA 1+ 01/18/2017    RBCUA None seen 01/18/2017    BLOODU trace 06/18/2018    BLOODU Negative 02/09/2017    SPECGRAV 1.015 06/18/2018    SPECGRAV 1.015 02/09/2017    GLUCOSEU neg 06/18/2018    GLUCOSEU Negative 02/09/2017       Radiology:     No orders to display       ASSESSMENT:    Active Hospital Problems    Diagnosis Date Noted    Diarrhea [R19.7] 07/12/2018   Hx of RA  Benign HTN    PLAN:  Gentle IV hydration  Diet as tolerated, clear liquid from AM   Continue Lomotil prn  Hold Sulindec and PPIs  GI consult  PT/OT  SS consult    DVT Prophylaxis: Lovenox  Diet: General  Code Status: Full    PT/OT Eval Status: Yes    Dispo - Home with ? Vega Lin MD    Thank you Rich Poole MD for the opportunity to be involved in this patient's care. If you have any questions or concerns please feel free to contact me at [phone].

## 2018-07-12 NOTE — ED PROVIDER NOTES
[facility]          ATTENDING PHYSICIAN NOTE       Date of evaluation: 7/11/2018    Chief Complaint     Dehydration (Pt has been having diarrhea for a few weeks. Pt has been to the hospital four times for the same issue. Pt reports she has tested neg for c.diff. ) and Diarrhea      History of Present Illness     Liliana Plata is a 70 y.o. female who presents With elevated heart rate and low blood pressure seen at her GI office today. Patient has had a three-week history of nonbloody diarrhea. Per patient and daughter, every time she eats she has watery stools. Denied any nausea, vomiting, abdominal pain. Denied any fevers or chills. Denies any recent international travel. Several weeks ago patient completed an antibiotic for a urinary tract infection, patient has been tested for C. difficile, was negative. Patient had her first appointment with the GI doctor today after numerous ED visits and admissions for diarrhea and dehydration. Due to these abnormal vital signs, patient was referred here for admission with plans for colonoscopy tomorrow morning. Patient does feel dizzy, described as lightheaded. Denied any syncope. Did note some stumbling and falls. Denied any LOC. She denied any chest pain or chest pressure. Review of Systems     Review of Systems   Constitutional: Negative for activity change, appetite change, chills and fever. Respiratory: Negative for chest tightness and shortness of breath. Cardiovascular: Negative for chest pain. Gastrointestinal: Positive for diarrhea. Negative for abdominal pain, nausea and vomiting. Numerous episodes of watery nonbloody diarrhea a day for 3 weeks    Genitourinary: Negative for dysuria and hematuria. Musculoskeletal: Negative for back pain. Skin: Negative. Neurological: Positive for dizziness and light-headedness. Negative for syncope and headaches.        Past Medical, Surgical, Family, and Social difficile toxin/antigen   Result Value Ref Range    C difficile Toxin, EIA       Negative for Clostridium difficile antigen and toxin  Normal Range: Negative     CBC Auto Differential   Result Value Ref Range    WBC 5.8 4.0 - 11.0 K/uL    RBC 4.11 4.00 - 5.20 M/uL    Hemoglobin 11.5 (L) 12.0 - 16.0 g/dL    Hematocrit 35.9 (L) 36.0 - 48.0 %    MCV 87.5 80.0 - 100.0 fL    MCH 28.1 26.0 - 34.0 pg    MCHC 32.1 31.0 - 36.0 g/dL    RDW 16.3 (H) 12.4 - 15.4 %    Platelets 426 703 - 131 K/uL    MPV 8.5 5.0 - 10.5 fL    Neutrophils % 29.7 %    Lymphocytes % 48.9 %    Monocytes % 14.5 %    Eosinophils % 6.1 %    Basophils % 0.8 %    Neutrophils # 1.7 1.7 - 7.7 K/uL    Lymphocytes # 2.8 1.0 - 5.1 K/uL    Monocytes # 0.8 0.0 - 1.3 K/uL    Eosinophils # 0.4 0.0 - 0.6 K/uL    Basophils # 0.0 0.0 - 0.2 K/uL   Basic Metabolic Panel   Result Value Ref Range    Sodium 139 136 - 145 mmol/L    Potassium 3.9 3.5 - 5.1 mmol/L    Chloride 100 99 - 110 mmol/L    CO2 22 21 - 32 mmol/L    Anion Gap 17 (H) 3 - 16    Glucose 96 70 - 99 mg/dL    BUN 8 7 - 20 mg/dL    CREATININE 1.0 0.6 - 1.2 mg/dL    GFR Non-African American 54 (A) >60    GFR African American >60 >60    Calcium 10.4 8.3 - 10.6 mg/dL   Lipase   Result Value Ref Range    Lipase 32.0 13.0 - 60.0 U/L   Hepatic function panel (LFTs)   Result Value Ref Range    Total Protein 6.5 6.4 - 8.2 g/dL    Alb 3.5 3.4 - 5.0 g/dL    Alkaline Phosphatase 51 40 - 129 U/L    ALT 26 10 - 40 U/L    AST 57 (H) 15 - 37 U/L    Total Bilirubin 0.7 0.0 - 1.0 mg/dL    Bilirubin, Direct 0.7 (H) 0.0 - 0.3 mg/dL    Bilirubin, Indirect 0.0 0.0 - 1.0 mg/dL   Basic Metabolic Panel w/ Reflex to MG   Result Value Ref Range    Sodium 144 136 - 145 mmol/L    Potassium reflex Magnesium 3.7 3.5 - 5.1 mmol/L    Chloride 105 99 - 110 mmol/L    CO2 25 21 - 32 mmol/L    Anion Gap 14 3 - 16    Glucose 82 70 - 99 mg/dL    BUN 7 7 - 20 mg/dL    CREATININE 0.8 0.6 - 1.2 mg/dL    GFR Non-African American >60 >60    GFR

## 2018-07-12 NOTE — PROGRESS NOTES
Occupational Therapy   Occupational Therapy Initial Assessment and Treatment Note  Date: 2018   Patient Name: Darlene Downing  MRN: [id number]     : 1940    Date of Service: 2018    Discharge Recommendations:    Darlene Downing scored a 18/24 on the AM-PAC ADL Inpatient form. Current research shows that an AM-PAC score of 18 or greater is typically associated with a discharge to the patient's home setting. Based on the patients AM-PAC score and their current ADL deficits, it is recommended that the patient have 2-3 sessions per week of Occupational Therapy at d/c to increase the patients independence. OT Equipment Recommendations  Equipment Needed: Yes  Mobility Devices: ADL Assistive Devices  ADL Assistive Devices: Sock-Aid Soft      Patient Diagnosis(es): The encounter diagnosis was Diarrhea, unspecified type. has a past medical history of Allergic rhinitis; Asthma; Chicken pox; Depression; Diverticulosis of large intestine; Essential hypertension; Gastroesophageal reflux disease without esophagitis; GERD (gastroesophageal reflux disease); Hyperlipidemia; Hypertension; Interstitial lung disease ([address] Mitral valve regurgitation; Mixed hyperlipidemia; Obesity; Osteoarthritis; Osteoporosis; Polio; Prolonged emergence from general anesthesia; RA (rheumatoid arthritis) ([address] Rheumatoid arthritis ([address] and Sleep apnea. has a past surgical history that includes Appendectomy; Cholecystectomy; Salpingo-oophorectomy; Carpal tunnel release (Bilateral); Foot surgery (Bilateral); Total shoulder arthroplasty (Right); other surgical history (Right, 2017); Hysterectomy, total abdominal; Tonsillectomy; Shoulder arthroscopy (Left, 11/15/2017); joint replacement (Right, 2016); and joint replacement (Right).     Treatment Diagnosis: Impaired functional mobility/transfers, ADLs, ROM, strength, and endurance 2/2 diarrhea/dehydration      Restrictions  Position Activity Restriction  Other

## 2018-07-12 NOTE — PAYOR INFORMATION
Patient Veronica Brice:  [de-identified]  Primary AUTH/CERT:    [address] Name:   [address] Name:  MEDICARE INPT/OUTPT  Primary Insurance Group Number:    Primary Insurance Plan Type: Maria Victoria King  Primary Insurance Policy Number:  [id number]    Secondary AUTH/CERT:    [address] [facility] Name:   Jonathan Guardado  Secondary Insurance Plan Name:  [facility]  Secondary Insurance Group Number:    Secondary Insurance Plan Type: C  Secondary Insurance Policy Number:  [id number]

## 2018-07-12 NOTE — PLAN OF CARE
Problem: Falls - Risk of:  Goal: Will remain free from falls  Will remain free from falls   Outcome: Ongoing  Patient is a medium fall risk, patient requests assistance to bathroom due to weakness, patient calls appropriately, patient remains free from falls.

## 2018-07-12 NOTE — CONSULTS
SHOULDER ARTHROSCOPY Left 11/15/2017    TONSILLECTOMY        Past Endoscopic History see above    Admission Meds  No current facility-administered medications on file prior to encounter. Current Outpatient Prescriptions on File Prior to Encounter   Medication Sig Dispense Refill    oxyCODONE-acetaminophen (PERCOCET) 5-325 MG per tablet Take 1 tablet by mouth 2 times daily  .  diphenoxylate-atropine (DIPHENATOL) 2.5-0.025 MG per tablet Take 1 tablet by mouth 4 times daily as needed for Diarrhea. . 60 tablet 0    metoprolol succinate (TOPROL XL) 25 MG extended release tablet Take 1 tablet by mouth daily 30 tablet 1    cephALEXin (KEFLEX) 500 MG capsule Take 500 mg by mouth 4 times daily      sulindac (CLINORIL) 200 MG tablet Take 200 mg by mouth      pantoprazole (PROTONIX) 40 MG tablet Take 40 mg by mouth      fluocinonide (LIDEX) 0.05 % ointment fluocinonide 0.05 % topical ointment   APPLY TO THE AFFECTED AREA(S) BY TOPICAL ROUTE 2 TIMES PER DAY for 2 weeks then three times a week for flares      albuterol sulfate HFA (VENTOLIN HFA) 108 (90 Base) MCG/ACT inhaler Inhale 2 puffs into the lungs every 6 hours as needed for Wheezing MAY USE ALTERNATIVE PER INSURANCE 1 Inhaler 3    zoster recombinant adjuvanted vaccine (SHINGRIX) 50 MCG SUSR injection Give vaccine as directed 1 each 0    lisinopril (PRINIVIL;ZESTRIL) 40 MG tablet Take 1 tablet by mouth daily 90 tablet 1    montelukast (SINGULAIR) 10 MG tablet Take 1 tablet by mouth nightly 90 tablet 1    fenofibrate (TRICOR) 145 MG tablet Take 1 tablet by mouth daily 90 tablet 1    escitalopram (LEXAPRO) 10 MG tablet Take 1 tablet by mouth daily 90 tablet 1    hydrochlorothiazide (HYDRODIURIL) 25 MG tablet Take 1 tablet by mouth daily 90 tablet 1    predniSONE (DELTASONE) 5 MG tablet Take 10 mg by mouth daily       Biotin 10 MG CAPS Take 1 capsule by mouth daily.       leflunomide (ARAVA) 20 MG tablet Take 20 mg by mouth daily       triamcinolone

## 2018-07-12 NOTE — CARE COORDINATION
2018  [facility])  Clinical Case Management Department    Patient: Zee Forde  MRN: [id number] / : 1940  Tree Lamas [de-identified]          Admission Documentation  Attending Provider: Sohail Pedro MD  Admit date/time: 2018 12:47 AM  Status: Inpatient  Diagnosis: Diarrhea     Readmission within last 30 days:  no     Living Situation  From home alone         Service [facility]/Skilled Nursing  Home care at home? No Provider: MARGARET  Provider Phone: NA           Home Medical Care  NA    Pharmacy: Meds to [facility]   Potential Assistance Purchasing Medications:     Does patient want to participate in local refill/meds to beds program?:      Goals of Care     Patient plans for SNF: NO      Mode of transport from hospital: Private care      Barriers to discharge: admitted from Gi appt , elevated HR low BP and cont diarrhea  GI consulted :  Diarrhea is ongoing, lasted past 6 weeks. She has lost 25pounds in this time, C Diff (-) .      Eufemia Michaud, JACKSON  [facility]  Case Management Department  Ph:  [phone]

## 2018-07-13 LAB
ANION GAP SERPL CALCULATED.3IONS-SCNC: 12 MMOL/L (ref 3–16)
BUN BLDV-MCNC: 6 MG/DL (ref 7–20)
CALCIUM SERPL-MCNC: 10.2 MG/DL (ref 8.3–10.6)
CHLORIDE BLD-SCNC: 105 MMOL/L (ref 99–110)
CO2: 25 MMOL/L (ref 21–32)
CREAT SERPL-MCNC: 0.7 MG/DL (ref 0.6–1.2)
GFR AFRICAN AMERICAN: >60
GFR NON-AFRICAN AMERICAN: >60
GLUCOSE BLD-MCNC: 89 MG/DL (ref 70–99)
MAGNESIUM: 1.2 MG/DL (ref 1.8–2.4)
POTASSIUM REFLEX MAGNESIUM: 3.2 MMOL/L (ref 3.5–5.1)
SODIUM BLD-SCNC: 142 MMOL/L (ref 136–145)

## 2018-07-13 PROCEDURE — 0DD68ZX EXTRACTION OF STOMACH, VIA NATURAL OR ARTIFICIAL OPENING ENDOSCOPIC, DIAGNOSTIC: ICD-10-PCS | Performed by: INTERNAL MEDICINE

## 2018-07-13 PROCEDURE — 36415 COLL VENOUS BLD VENIPUNCTURE: CPT

## 2018-07-13 PROCEDURE — 80048 BASIC METABOLIC PNL TOTAL CA: CPT

## 2018-07-13 PROCEDURE — 0DDN8ZX EXTRACTION OF SIGMOID COLON, VIA NATURAL OR ARTIFICIAL OPENING ENDOSCOPIC, DIAGNOSTIC: ICD-10-PCS | Performed by: INTERNAL MEDICINE

## 2018-07-13 PROCEDURE — 83735 ASSAY OF MAGNESIUM: CPT

## 2018-07-13 PROCEDURE — 88305 TISSUE EXAM BY PATHOLOGIST: CPT

## 2018-07-13 PROCEDURE — 9990 CHARGE CONVERSION

## 2018-07-13 RX ORDER — MAGNESIUM SULFATE IN WATER 40 MG/ML
2 INJECTION, SOLUTION INTRAVENOUS ONCE
Status: COMPLETED | OUTPATIENT
Start: 2018-07-13 | End: 2018-07-13

## 2018-07-13 RX ORDER — LOPERAMIDE HYDROCHLORIDE 2 MG/1
2 CAPSULE ORAL 4 TIMES DAILY PRN
Status: DISCONTINUED | OUTPATIENT
Start: 2018-07-13 | End: 2018-07-14 | Stop reason: HOSPADM

## 2018-07-13 RX ADMIN — OXYCODONE HYDROCHLORIDE AND ACETAMINOPHEN 1 TABLET: 5; 325 TABLET ORAL at 09:00

## 2018-07-13 RX ADMIN — GABAPENTIN 300 MG: 300 CAPSULE ORAL at 09:00

## 2018-07-13 RX ADMIN — LOPERAMIDE HYDROCHLORIDE 2 MG: 2 CAPSULE ORAL at 21:42

## 2018-07-13 RX ADMIN — LISINOPRIL 20 MG: 20 TABLET ORAL at 09:02

## 2018-07-13 RX ADMIN — Medication 10 ML: at 21:43

## 2018-07-13 RX ADMIN — VITAMIN D, TAB 1000IU (100/BT) 1000 UNITS: 25 TAB at 21:42

## 2018-07-13 RX ADMIN — VITAMIN D, TAB 1000IU (100/BT) 1000 UNITS: 25 TAB at 17:16

## 2018-07-13 RX ADMIN — FENOFIBRATE 160 MG: 160 TABLET ORAL at 09:01

## 2018-07-13 RX ADMIN — MAGNESIUM SULFATE IN WATER 2 G: 40 INJECTION, SOLUTION INTRAVENOUS at 08:05

## 2018-07-13 RX ADMIN — CETIRIZINE HYDROCHLORIDE 10 MG: 10 TABLET ORAL at 21:43

## 2018-07-13 RX ADMIN — ONDANSETRON 4 MG: 2 INJECTION INTRAMUSCULAR; INTRAVENOUS at 21:42

## 2018-07-13 RX ADMIN — MONTELUKAST SODIUM 10 MG: 10 TABLET ORAL at 21:43

## 2018-07-13 RX ADMIN — Medication 1 CAPSULE: at 09:01

## 2018-07-13 RX ADMIN — METOPROLOL SUCCINATE 12.5 MG: 25 TABLET, EXTENDED RELEASE ORAL at 09:02

## 2018-07-13 RX ADMIN — ESCITALOPRAM OXALATE 10 MG: 10 TABLET ORAL at 09:01

## 2018-07-13 RX ADMIN — GABAPENTIN 300 MG: 300 CAPSULE ORAL at 17:16

## 2018-07-13 RX ADMIN — GABAPENTIN 300 MG: 300 CAPSULE ORAL at 21:42

## 2018-07-13 RX ADMIN — Medication 1 CAPSULE: at 17:16

## 2018-07-13 RX ADMIN — OXYCODONE HYDROCHLORIDE AND ACETAMINOPHEN 1 TABLET: 5; 325 TABLET ORAL at 21:42

## 2018-07-13 RX ADMIN — TRAZODONE HYDROCHLORIDE 50 MG: 50 TABLET ORAL at 21:42

## 2018-07-13 ASSESSMENT — LIFESTYLE VARIABLES: SMOKING_STATUS: 0

## 2018-07-13 ASSESSMENT — PAIN SCALES - GENERAL
PAINLEVEL_OUTOF10: 0
PAINLEVEL_OUTOF10: 0
PAINLEVEL_OUTOF10: 4

## 2018-07-13 NOTE — PROGRESS NOTES
Issue --> cont. home regimen as appropriate or otherwise noted:  htn  RA  obesity    DVT Prophylaxis: lovenox  Diet: Diet NPO, After Midnight  Code Status: Full Code    PT/OT Eval Status: are following    Dispo - once acute medical processes have resolved    Rajeev Hicks MD

## 2018-07-13 NOTE — PLAN OF CARE
Problem: Falls - Risk of:  Goal: Will remain free from falls  Will remain free from falls   Outcome: Met This Shift  Pt remains free from falls and without incident or injury. Pt utilizes call light for assistance with ambulation to BR or otherwise. Goal: Absence of physical injury  Absence of physical injury   Outcome: Met This Shift  Pt remains free from falls and without incident or injury. Pt utilizes call light for assistance with ambulation to BR or otherwise.

## 2018-07-13 NOTE — OP NOTE
Endoscopy Note    Patient: Jacinto Gleason  : 1940  Acct#:     Procedure: Esophagogastroduodenoscopy with biopsy                       Colonoscopy with biopsy    Date:  2018     Surgeon:   Kosta Ramirez MD    Referring Physician:  Zenobia Olivier MD    Indications: This is a 70y.o. year old female who presents today with significant weight loss and diarrhea- with nocturnal stooling    Postoperative Diagnosis:  Duodenal ulcers, small  Patulous colon  Sigmoid diverticulosis    Anesthesia:per anesthesia team    Consent:  The patient or their legal guardian has signed a consent, and is aware of the potential risks, benefits, alternatives, and potential complications of this procedure. These include, but are not limited to hemorrhage, bleeding, post procedural pain, perforation, phlebitis, aspiration, hypotension, hypoxia, cardiovascular events such as arryhthmia, and possibly death. Additionally, the possibility of missed colonic polyps and interval colon cancer was discussed in the consent. Description of Procedure: The Olympus scope was inserted atraumatically into the esophagus and advanced under direct vision into the second portion of the duodenum without difficulty. The duodenal bulb and second portion of the duodenum were visualized. The stomach was viewed in the forward and retroflexed fashion and the esophagus was visualized in its entirety. The duodenal bulb has multiple very small healing ulcers. Gastric biopsies are obtained or h pylori  Duodenal biopsies are obtained for Celiac/malabsorption    The Olympus scope was inserted atraumatically into the rectum and advanced under direct vision to the cecum. The appendiceal orifice and ileocecal valve were noted. The cecum, ascending, transverse, descending, sigmoid and rectum were visualized. The scope was retroflexed in the rectum.       The Olympus scope was inserted atraumatically into the rectum and advanced under direct

## 2018-07-13 NOTE — PROGRESS NOTES
Pt alert; oriented X 4; speech clear; breathing easily on RA; up to BR with daughter, with minimal assist except to get up out of bed; standby for safety; pt currently NPO in preparation for colonoscopy, and consent is on chart. Pt able to take all medications whole with water without problem; still having loose stools, but currently clear per daughter since bowel prep through the night; perianal area pink/red but all intact; have place barrier cream to same, and also provided barrier cloths for wiping; pt received 2 gms of magnesium IV for level of 1.2. Pt currently in bed watching TV with daughter at bedside.      Notified Dr. Taye Boyle in the morning via Perfect serve that pt's potassium was 3.2

## 2018-07-13 NOTE — ANESTHESIA PRE-OP
Department of Anesthesiology  Preprocedure Note       Name:  Benjamin Hooks   Age:  70 y.o.  :  1940                                          MRN:  [id number]         Date:  2018      Surgeon:Jayde    Procedure:EGD Colonoscopy    History of Present Illness  Patient is a 70 y.o.  female admitted with [address] who is seen in consult for diarrhea     [place] is ongoing, lasted past 6 weeks. She has lost 25pounds in this time. She has a poor appetite and early satiety  Accompanied by occ blood in stool but mainly on 5-6 bm with wiping. She has nocturnal stooling and wakes up 2-3 times a night - has not slept well    Medications prior to admission:   Prior to Admission medications    Medication Sig Start Date End Date Taking? Authorizing Provider   oxyCODONE-acetaminophen (PERCOCET) 5-325 MG per tablet Take 1 tablet by mouth 2 times daily  . 10/3/17  Yes Historical Provider, MD   diphenoxylate-atropine (DIPHENATOL) 2.5-0.025 MG per tablet Take 1 tablet by mouth 4 times daily as needed for Diarrhea. . 18  Josephine Wood MD   metoprolol succinate (TOPROL XL) 25 MG extended release tablet Take 1 tablet by mouth daily 18   ANA Logan CNP   cephALEXin (KEFLEX) 500 MG capsule Take 500 mg by mouth 4 times daily    Historical Provider, MD   sulindac (CLINORIL) 200 MG tablet Take 200 mg by mouth    Historical Provider, MD   pantoprazole (PROTONIX) 40 MG tablet Take 40 mg by mouth    Historical Provider, MD   fluocinonide (LIDEX) 0.05 % ointment fluocinonide 0.05 % topical ointment   APPLY TO THE AFFECTED AREA(S) BY TOPICAL ROUTE 2 TIMES PER DAY for 2 weeks then three times a week for flares    Historical Provider, MD   albuterol sulfate HFA (VENTOLIN HFA) 108 (90 Base) MCG/ACT inhaler Inhale 2 puffs into the lungs every 6 hours as needed for Wheezing MAY USE ALTERNATIVE PER INSURANCE 3/26/18   ANA Lgoan CNP   zoster recombinant adjuvanted vaccine

## 2018-07-14 VITALS
OXYGEN SATURATION: 94 % | DIASTOLIC BLOOD PRESSURE: 77 MMHG | TEMPERATURE: 98 F | WEIGHT: 172.62 LBS | HEIGHT: 60 IN | BODY MASS INDEX: 33.89 KG/M2 | HEART RATE: 75 BPM | RESPIRATION RATE: 18 BRPM | SYSTOLIC BLOOD PRESSURE: 148 MMHG

## 2018-07-14 LAB
A/G RATIO: 1.2 (ref 1.1–2.2)
ALBUMIN SERPL-MCNC: 3.4 G/DL (ref 3.4–5)
ALP BLD-CCNC: 53 U/L (ref 40–129)
ALT SERPL-CCNC: 24 U/L (ref 10–40)
ANION GAP SERPL CALCULATED.3IONS-SCNC: 12 MMOL/L (ref 3–16)
AST SERPL-CCNC: 52 U/L (ref 15–37)
BILIRUB SERPL-MCNC: 0.6 MG/DL (ref 0–1)
BUN BLDV-MCNC: 6 MG/DL (ref 7–20)
CALCIUM SERPL-MCNC: 9.7 MG/DL (ref 8.3–10.6)
CHLORIDE BLD-SCNC: 102 MMOL/L (ref 99–110)
CO2: 28 MMOL/L (ref 21–32)
CREAT SERPL-MCNC: 0.7 MG/DL (ref 0.6–1.2)
GFR AFRICAN AMERICAN: >60
GFR NON-AFRICAN AMERICAN: >60
GLOBULIN: 2.8 G/DL
GLUCOSE BLD-MCNC: 85 MG/DL (ref 70–99)
POTASSIUM REFLEX MAGNESIUM: 3.6 MMOL/L (ref 3.5–5.1)
SODIUM BLD-SCNC: 142 MMOL/L (ref 136–145)
TOTAL PROTEIN: 6.2 G/DL (ref 6.4–8.2)

## 2018-07-14 PROCEDURE — 6360000002 HC RX W HCPCS: Performed by: INTERNAL MEDICINE

## 2018-07-14 PROCEDURE — 2580000003 HC RX 258: Performed by: INTERNAL MEDICINE

## 2018-07-14 PROCEDURE — 9990 CHARGE CONVERSION

## 2018-07-14 PROCEDURE — 6370000000 HC RX 637 (ALT 250 FOR IP): Performed by: INTERNAL MEDICINE

## 2018-07-14 PROCEDURE — 36415 COLL VENOUS BLD VENIPUNCTURE: CPT

## 2018-07-14 PROCEDURE — 80053 COMPREHEN METABOLIC PANEL: CPT

## 2018-07-14 RX ADMIN — ONDANSETRON 4 MG: 2 INJECTION INTRAMUSCULAR; INTRAVENOUS at 11:18

## 2018-07-14 RX ADMIN — FENOFIBRATE 160 MG: 160 TABLET ORAL at 10:03

## 2018-07-14 RX ADMIN — Medication 10 ML: at 10:03

## 2018-07-14 RX ADMIN — METOPROLOL SUCCINATE 12.5 MG: 25 TABLET, EXTENDED RELEASE ORAL at 10:16

## 2018-07-14 RX ADMIN — Medication 1 CAPSULE: at 09:59

## 2018-07-14 RX ADMIN — OXYCODONE HYDROCHLORIDE AND ACETAMINOPHEN 1 TABLET: 5; 325 TABLET ORAL at 10:01

## 2018-07-14 RX ADMIN — ENOXAPARIN SODIUM 40 MG: 40 INJECTION SUBCUTANEOUS at 09:59

## 2018-07-14 RX ADMIN — GABAPENTIN 300 MG: 300 CAPSULE ORAL at 10:00

## 2018-07-14 RX ADMIN — LISINOPRIL 20 MG: 20 TABLET ORAL at 10:00

## 2018-07-14 RX ADMIN — VITAMIN D, TAB 1000IU (100/BT) 1000 UNITS: 25 TAB at 09:59

## 2018-07-14 RX ADMIN — ESCITALOPRAM OXALATE 10 MG: 10 TABLET ORAL at 09:59

## 2018-07-14 ASSESSMENT — PAIN SCALES - GENERAL
PAINLEVEL_OUTOF10: 0

## 2018-07-14 NOTE — CARE COORDINATION
2018  [facility])  Clinical Case Management Department    Patient: Monique Ojeda  MRN: [id number] / : 1940  Salbador Cheney [de-identified]         Admission Documentation  Attending Provider: Israel Tomas MD  Admit date/time: 2018 12:47 AM  Status: Inpatient  Diagnosis: Diarrhea     Readmission within last 30 days:  no     Living Situation       Service Assessment            Advance Directives (For Healthcare)  Pre-existing DNR Comfort Care/DNR Arrest/DNI Order: No           Destination  Return to home with home care     Durable Medical Equipment   No DME needs    Home Health/Skilled Nursing  Home care at home?  Yes Provider: Maninder Roe [phone] Provider [id number]          [id number] [facility]  Pharmacy: [facility] [address] Medications:     Does patient want to participate in local refill/meds to beds program?:      Goals of Care     Return to home with home care: General acute hospital to provide SN, PT, OT and aide services         Mode of transport from hospital: TBD    Barriers to discharge: Transport/pt may need an ambulette Kimi Gonzalez RN  [facility]  Case Management Department  Ph: [phone] Fax: [phone]

## 2018-07-14 NOTE — DISCHARGE SUMMARY
pantoprazole (PROTONIX) 40 MG tablet Take 40 mg by mouth      fluocinonide (LIDEX) 0.05 % ointment fluocinonide 0.05 % topical ointment   APPLY TO THE AFFECTED AREA(S) BY TOPICAL ROUTE 2 TIMES PER DAY for 2 weeks then three times a week for flares      albuterol sulfate HFA (VENTOLIN HFA) 108 (90 Base) MCG/ACT inhaler Inhale 2 puffs into the lungs every 6 hours as needed for Wheezing MAY USE ALTERNATIVE PER INSURANCE  Qty: 1 Inhaler, Refills: 3    Associated Diagnoses: Bronchitis      zoster recombinant adjuvanted vaccine (SHINGRIX) 50 MCG SUSR injection Give vaccine as directed  Qty: 1 each, Refills: 0      lisinopril (PRINIVIL;ZESTRIL) 40 MG tablet Take 1 tablet by mouth daily  Qty: 90 tablet, Refills: 1    Associated Diagnoses: Essential hypertension      montelukast (SINGULAIR) 10 MG tablet Take 1 tablet by mouth nightly  Qty: 90 tablet, Refills: 1    Associated Diagnoses: Chronic nonseasonal allergic rhinitis due to pollen      fenofibrate (TRICOR) 145 MG tablet Take 1 tablet by mouth daily  Qty: 90 tablet, Refills: 1    Associated Diagnoses: Mixed hyperlipidemia      escitalopram (LEXAPRO) 10 MG tablet Take 1 tablet by mouth daily  Qty: 90 tablet, Refills: 1    Associated Diagnoses: Major depressive disorder with single episode, in partial remission (HCC)      hydrochlorothiazide (HYDRODIURIL) 25 MG tablet Take 1 tablet by mouth daily  Qty: 90 tablet, Refills: 1    Associated Diagnoses: Essential hypertension      predniSONE (DELTASONE) 5 MG tablet Take 10 mg by mouth daily       Biotin 10 MG CAPS Take 1 capsule by mouth daily.       leflunomide (ARAVA) 20 MG tablet Take 20 mg by mouth daily       triamcinolone (KENALOG) 0.1 % ointment Apply topically as needed       traZODone (DESYREL) 50 MG tablet Take 1 tablet by mouth nightly  Qty: 90 tablet, Refills: 1    Associated Diagnoses: Psychophysiological insomnia      vitamin D (CHOLECALCIFEROL) 1000 UNITS TABS tablet Take 1,000 Units by mouth 4 times daily gabapentin (NEURONTIN) 300 MG capsule Take 300 mg by mouth 4 times daily      calcium citrate (CALCITRATE) 950 MG tablet Take 1 tablet by mouth daily      Multiple Vitamins-Minerals (CENTRUM SILVER) TABS Take 1 tablet by mouth daily       Probiotic Product (PROBIOTIC FORMULA) CAPS Take 1 capsule by mouth daily. cetirizine (ZYRTEC) 10 MG tablet Take 10 mg by mouth nightly       B Complex Vitamins (B COMPLEX 1) TABS Take 1 tablet by mouth Daily. adalimumab (HUMIRA) 40 MG/0.8ML injection Inject 40 mg into the skin every 10 minutes Every 10 days           Current Discharge Medication List          Follow-up appointments and focus:    Please follow up with your primary care provider in 1-2 weeks for routine hospital follow up. Condition at Discharge:  Stable    The patient was seen and examined on day of discharge and this discharge summary is in conjunction with any daily progress note from day of discharge. Time Spent on discharge is 45 minutes  in the examination, evaluation, counseling and review of medications and discharge plan. Signed:    Chelo Maldonado MD   7/14/2018      Thank you Sanket Patrick MD for the opportunity to be involved in this patient's care.  If you have any questions or concerns please feel free to contact me at [phone] (pager)

## 2018-07-14 NOTE — PROGRESS NOTES
Discharged order received, discharge paperwork reviewed with patient and daughter. Pt discharged home with daughter in a wheelchair to home.

## 2018-07-16 ENCOUNTER — TELEPHONE (OUTPATIENT)
Dept: FAMILY MEDICINE CLINIC | Age: 78
End: 2018-07-16

## 2018-07-20 ENCOUNTER — TELEPHONE (OUTPATIENT)
Dept: FAMILY MEDICINE CLINIC | Age: 78
End: 2018-07-20

## 2018-07-20 DIAGNOSIS — R11.0 NAUSEA: Primary | ICD-10-CM

## 2018-07-20 RX ORDER — NITROFURANTOIN 25; 75 MG/1; MG/1
100 CAPSULE ORAL 2 TIMES DAILY
Qty: 14 CAPSULE | Refills: 0 | Status: SHIPPED | OUTPATIENT
Start: 2018-07-20 | End: 2018-07-27

## 2018-07-20 RX ORDER — PHENAZOPYRIDINE HYDROCHLORIDE 100 MG/1
100 TABLET, FILM COATED ORAL 3 TIMES DAILY PRN
Qty: 21 TABLET | Refills: 0 | Status: ON HOLD | OUTPATIENT
Start: 2018-07-20 | End: 2018-08-10 | Stop reason: CLARIF

## 2018-07-20 RX ORDER — ONDANSETRON 4 MG/1
4 TABLET, FILM COATED ORAL EVERY 8 HOURS PRN
Qty: 60 TABLET | Refills: 1 | Status: ON HOLD | OUTPATIENT
Start: 2018-07-20 | End: 2018-08-10 | Stop reason: CLARIF

## 2018-07-20 NOTE — TELEPHONE ENCOUNTER
Symptoms of diarrhea has come back full force and has a lot of abdominal discomfort and nausea and gerd for 2 days  Wants a script for Zofran that she was given in the hospital  She also has a UTI but waiting to hear from [facility]  before she takes anything else for that  Doesn't have an appt, but talked to Gwendolyn Palencia at [address] [place] [address] Dr already  Had an Upper GI and Colonoscopy

## 2018-07-24 ENCOUNTER — TELEPHONE (OUTPATIENT)
Dept: FAMILY MEDICINE CLINIC | Age: 78
End: 2018-07-24

## 2018-07-24 NOTE — TELEPHONE ENCOUNTER
Patient would like to know which you recommend for her as a physician due to her many ailments  The  is at her house now until 5 pm   Needs to know who her dr will be for her continued home health  Needs name, address and phone number  No preference but would like a female but the  needs to know pretty quickly so she can fill olut her paper work with the new drs information for the orders  Please advise

## 2018-07-25 ENCOUNTER — TELEPHONE (OUTPATIENT)
Dept: INTERNAL MEDICINE CLINIC | Age: 78
End: 2018-07-25

## 2018-07-25 NOTE — TELEPHONE ENCOUNTER
Patient has already scheduled with a primary care physician who specializes in [facility], so therefore won't be needing to establish here. PHONE ENCOUNTER COMPLETED.

## 2018-08-01 ENCOUNTER — TELEPHONE (OUTPATIENT)
Dept: ORTHOPEDIC SURGERY | Age: 78
End: 2018-08-01

## 2018-08-01 NOTE — TELEPHONE ENCOUNTER
SUBMITTED A PA VIA CMM FOR Ondansetron HCl 4MG OR TABS  Key: [facility]  PA Case ID: [id number]  Rx #: [id number]   STATUS: PENDING

## 2018-08-09 DIAGNOSIS — F32.4 MAJOR DEPRESSIVE DISORDER WITH SINGLE EPISODE, IN PARTIAL REMISSION (HCC): ICD-10-CM

## 2018-08-10 ENCOUNTER — HOSPITAL ENCOUNTER (INPATIENT)
Age: 78
LOS: 2 days | Discharge: HOME HEALTH | DRG: 690 | End: 2018-08-13
Attending: EMERGENCY MEDICINE | Admitting: INTERNAL MEDICINE
Payer: MEDICARE

## 2018-08-10 ENCOUNTER — APPOINTMENT (OUTPATIENT)
Dept: GENERAL RADIOLOGY | Age: 78
DRG: 690 | End: 2018-08-10
Payer: MEDICARE

## 2018-08-10 ENCOUNTER — APPOINTMENT (OUTPATIENT)
Dept: CT IMAGING | Age: 78
DRG: 690 | End: 2018-08-10
Payer: MEDICARE

## 2018-08-10 DIAGNOSIS — K56.7 ILEUS (HCC): ICD-10-CM

## 2018-08-10 DIAGNOSIS — K59.1 FUNCTIONAL DIARRHEA: ICD-10-CM

## 2018-08-10 DIAGNOSIS — N12 PYELONEPHRITIS: Primary | ICD-10-CM

## 2018-08-10 DIAGNOSIS — R19.7 DIARRHEA, UNSPECIFIED TYPE: ICD-10-CM

## 2018-08-10 PROBLEM — R11.10 VOMITING: Status: ACTIVE | Noted: 2018-08-10

## 2018-08-10 LAB
ALBUMIN SERPL-MCNC: 3.6 G/DL (ref 3.4–5)
ALP BLD-CCNC: 50 U/L (ref 40–129)
ALT SERPL-CCNC: 39 U/L (ref 10–40)
AMORPHOUS: ABNORMAL /HPF
ANION GAP SERPL CALCULATED.3IONS-SCNC: 15 MMOL/L (ref 3–16)
AST SERPL-CCNC: 76 U/L (ref 15–37)
BACTERIA: ABNORMAL /HPF
BASE EXCESS VENOUS: 2 (ref -3–3)
BASOPHILS ABSOLUTE: 0.1 K/UL (ref 0–0.2)
BASOPHILS RELATIVE PERCENT: 1 %
BILIRUB SERPL-MCNC: 0.9 MG/DL (ref 0–1)
BILIRUBIN DIRECT: 0.7 MG/DL (ref 0–0.3)
BILIRUBIN URINE: NEGATIVE MG/DL
BILIRUBIN, INDIRECT: 0.2 MG/DL (ref 0–1)
BLOOD, URINE: NEGATIVE
BUN BLDV-MCNC: 7 MG/DL (ref 7–20)
C DIFFICILE TOXIN, EIA: NORMAL
CALCIUM SERPL-MCNC: 10.1 MG/DL (ref 8.3–10.6)
CHLORIDE BLD-SCNC: 99 MMOL/L (ref 99–110)
CLARITY: ABNORMAL
CO2: 24 MMOL/L (ref 21–32)
COLOR: ABNORMAL
CREAT SERPL-MCNC: 0.6 MG/DL (ref 0.6–1.2)
EOSINOPHILS ABSOLUTE: 0.1 K/UL (ref 0–0.6)
EOSINOPHILS RELATIVE PERCENT: 1.9 %
EPITHELIAL CELLS, UA: ABNORMAL /HPF
GFR AFRICAN AMERICAN: >60
GFR NON-AFRICAN AMERICAN: >60
GLUCOSE BLD-MCNC: 89 MG/DL (ref 70–99)
GLUCOSE URINE: NEGATIVE MG/DL
HCO3 VENOUS: 24.2 MMOL/L (ref 23–29)
HCT VFR BLD CALC: 35.5 % (ref 36–48)
HEMOGLOBIN: 11.8 G/DL (ref 12–16)
KETONES, URINE: NEGATIVE MG/DL
LACTATE: 1.48 MMOL/L (ref 0.4–2)
LEUKOCYTE ESTERASE, URINE: ABNORMAL
LIPASE: 41 U/L (ref 13–60)
LYMPHOCYTES ABSOLUTE: 3 K/UL (ref 1–5.1)
LYMPHOCYTES RELATIVE PERCENT: 45.3 %
MCH RBC QN AUTO: 27.7 PG (ref 26–34)
MCHC RBC AUTO-ENTMCNC: 33.2 G/DL (ref 31–36)
MCV RBC AUTO: 83.5 FL (ref 80–100)
MICROSCOPIC EXAMINATION: YES
MONOCYTES ABSOLUTE: 0.7 K/UL (ref 0–1.3)
MONOCYTES RELATIVE PERCENT: 11.1 %
NEUTROPHILS ABSOLUTE: 2.7 K/UL (ref 1.7–7.7)
NEUTROPHILS RELATIVE PERCENT: 40.7 %
NITRITE, URINE: POSITIVE
O2 SAT, VEN: 49 %
PCO2, VEN: 26.9 MM HG (ref 40–50)
PDW BLD-RTO: 15.8 % (ref 12.4–15.4)
PERFORMED ON: ABNORMAL
PH UA: 8.5
PH VENOUS: 7.56 (ref 7.35–7.45)
PLATELET # BLD: 240 K/UL (ref 135–450)
PMV BLD AUTO: 9.8 FL (ref 5–10.5)
PO2, VEN: 22 MM HG
POC SAMPLE TYPE: ABNORMAL
POTASSIUM SERPL-SCNC: 3.5 MMOL/L (ref 3.5–5.1)
PRO-BNP: 1235 PG/ML (ref 0–449)
PROTEIN UA: ABNORMAL MG/DL
RBC # BLD: 4.25 M/UL (ref 4–5.2)
RBC UA: ABNORMAL /HPF (ref 0–2)
SODIUM BLD-SCNC: 138 MMOL/L (ref 136–145)
SPECIFIC GRAVITY UA: 1.01
TCO2 CALC VENOUS: 25 MMOL/L
TOTAL PROTEIN: 6.5 G/DL (ref 6.4–8.2)
TROPONIN: <0.01 NG/ML
UROBILINOGEN, URINE: 0.2 E.U./DL
WBC # BLD: 6.7 K/UL (ref 4–11)
WBC UA: ABNORMAL /HPF (ref 0–5)

## 2018-08-10 PROCEDURE — 85025 COMPLETE CBC W/AUTO DIFF WBC: CPT

## 2018-08-10 PROCEDURE — 96365 THER/PROPH/DIAG IV INF INIT: CPT

## 2018-08-10 PROCEDURE — 82803 BLOOD GASES ANY COMBINATION: CPT

## 2018-08-10 PROCEDURE — 2580000003 HC RX 258: Performed by: INTERNAL MEDICINE

## 2018-08-10 PROCEDURE — 87449 NOS EACH ORGANISM AG IA: CPT

## 2018-08-10 PROCEDURE — 93005 ELECTROCARDIOGRAM TRACING: CPT | Performed by: EMERGENCY MEDICINE

## 2018-08-10 PROCEDURE — 84484 ASSAY OF TROPONIN QUANT: CPT

## 2018-08-10 PROCEDURE — G0378 HOSPITAL OBSERVATION PER HR: HCPCS

## 2018-08-10 PROCEDURE — 99285 EMERGENCY DEPT VISIT HI MDM: CPT

## 2018-08-10 PROCEDURE — 83605 ASSAY OF LACTIC ACID: CPT

## 2018-08-10 PROCEDURE — 36415 COLL VENOUS BLD VENIPUNCTURE: CPT

## 2018-08-10 PROCEDURE — 6360000002 HC RX W HCPCS: Performed by: PHYSICIAN ASSISTANT

## 2018-08-10 PROCEDURE — 87324 CLOSTRIDIUM AG IA: CPT

## 2018-08-10 PROCEDURE — 96361 HYDRATE IV INFUSION ADD-ON: CPT

## 2018-08-10 PROCEDURE — 71046 X-RAY EXAM CHEST 2 VIEWS: CPT

## 2018-08-10 PROCEDURE — 74177 CT ABD & PELVIS W/CONTRAST: CPT

## 2018-08-10 PROCEDURE — 2580000003 HC RX 258: Performed by: PHYSICIAN ASSISTANT

## 2018-08-10 PROCEDURE — 87086 URINE CULTURE/COLONY COUNT: CPT

## 2018-08-10 PROCEDURE — 83880 ASSAY OF NATRIURETIC PEPTIDE: CPT

## 2018-08-10 PROCEDURE — 96375 TX/PRO/DX INJ NEW DRUG ADDON: CPT

## 2018-08-10 PROCEDURE — 80048 BASIC METABOLIC PNL TOTAL CA: CPT

## 2018-08-10 PROCEDURE — 81001 URINALYSIS AUTO W/SCOPE: CPT

## 2018-08-10 PROCEDURE — 6360000004 HC RX CONTRAST MEDICATION: Performed by: EMERGENCY MEDICINE

## 2018-08-10 PROCEDURE — 83690 ASSAY OF LIPASE: CPT

## 2018-08-10 PROCEDURE — 80076 HEPATIC FUNCTION PANEL: CPT

## 2018-08-10 RX ORDER — SODIUM CHLORIDE 9 MG/ML
INJECTION, SOLUTION INTRAVENOUS CONTINUOUS
Status: ACTIVE | OUTPATIENT
Start: 2018-08-10 | End: 2018-08-11

## 2018-08-10 RX ORDER — MONTELUKAST SODIUM 10 MG/1
10 TABLET ORAL NIGHTLY
Status: DISCONTINUED | OUTPATIENT
Start: 2018-08-10 | End: 2018-08-13 | Stop reason: HOSPADM

## 2018-08-10 RX ORDER — LACTOBACILLUS RHAMNOSUS GG 10B CELL
1 CAPSULE ORAL DAILY
Status: DISCONTINUED | OUTPATIENT
Start: 2018-08-11 | End: 2018-08-13 | Stop reason: HOSPADM

## 2018-08-10 RX ORDER — ESCITALOPRAM OXALATE 10 MG/1
10 TABLET ORAL DAILY
Status: DISCONTINUED | OUTPATIENT
Start: 2018-08-11 | End: 2018-08-13 | Stop reason: HOSPADM

## 2018-08-10 RX ORDER — LISINOPRIL 40 MG/1
40 TABLET ORAL DAILY
Status: DISCONTINUED | OUTPATIENT
Start: 2018-08-11 | End: 2018-08-11

## 2018-08-10 RX ORDER — ONDANSETRON 2 MG/ML
4 INJECTION INTRAMUSCULAR; INTRAVENOUS EVERY 6 HOURS PRN
Status: DISCONTINUED | OUTPATIENT
Start: 2018-08-10 | End: 2018-08-13 | Stop reason: HOSPADM

## 2018-08-10 RX ORDER — 0.9 % SODIUM CHLORIDE 0.9 %
500 INTRAVENOUS SOLUTION INTRAVENOUS ONCE
Status: COMPLETED | OUTPATIENT
Start: 2018-08-10 | End: 2018-08-10

## 2018-08-10 RX ORDER — FENOFIBRATE 160 MG/1
160 TABLET ORAL DAILY
Status: DISCONTINUED | OUTPATIENT
Start: 2018-08-11 | End: 2018-08-13 | Stop reason: HOSPADM

## 2018-08-10 RX ORDER — METOPROLOL SUCCINATE 25 MG/1
12.5 TABLET, EXTENDED RELEASE ORAL DAILY
Status: ON HOLD | COMMUNITY
End: 2018-09-04 | Stop reason: SDUPTHER

## 2018-08-10 RX ORDER — OXYCODONE HYDROCHLORIDE AND ACETAMINOPHEN 5; 325 MG/1; MG/1
0.5 TABLET ORAL 3 TIMES DAILY
Status: ON HOLD | COMMUNITY
End: 2018-08-13 | Stop reason: HOSPADM

## 2018-08-10 RX ORDER — METOPROLOL SUCCINATE 25 MG/1
25 TABLET, EXTENDED RELEASE ORAL DAILY
Status: DISCONTINUED | OUTPATIENT
Start: 2018-08-11 | End: 2018-08-11

## 2018-08-10 RX ORDER — SODIUM CHLORIDE 0.9 % (FLUSH) 0.9 %
10 SYRINGE (ML) INJECTION EVERY 12 HOURS SCHEDULED
Status: DISCONTINUED | OUTPATIENT
Start: 2018-08-10 | End: 2018-08-13 | Stop reason: HOSPADM

## 2018-08-10 RX ORDER — MONTELUKAST SODIUM 4 MG/1
1 TABLET, CHEWABLE ORAL DAILY PRN
COMMUNITY

## 2018-08-10 RX ORDER — CIPROFLOXACIN 500 MG/1
500 TABLET, FILM COATED ORAL 2 TIMES DAILY
Status: ON HOLD | COMMUNITY
Start: 2018-08-09 | End: 2018-08-13 | Stop reason: HOSPADM

## 2018-08-10 RX ORDER — GABAPENTIN 300 MG/1
300 CAPSULE ORAL 4 TIMES DAILY
Status: DISCONTINUED | OUTPATIENT
Start: 2018-08-10 | End: 2018-08-13 | Stop reason: HOSPADM

## 2018-08-10 RX ORDER — HYDROCHLOROTHIAZIDE 25 MG/1
25 TABLET ORAL DAILY
Status: DISCONTINUED | OUTPATIENT
Start: 2018-08-11 | End: 2018-08-11

## 2018-08-10 RX ORDER — SODIUM CHLORIDE 0.9 % (FLUSH) 0.9 %
10 SYRINGE (ML) INJECTION PRN
Status: DISCONTINUED | OUTPATIENT
Start: 2018-08-10 | End: 2018-08-13 | Stop reason: HOSPADM

## 2018-08-10 RX ORDER — TRAZODONE HYDROCHLORIDE 50 MG/1
50 TABLET ORAL NIGHTLY
Status: DISCONTINUED | OUTPATIENT
Start: 2018-08-10 | End: 2018-08-13 | Stop reason: HOSPADM

## 2018-08-10 RX ORDER — LEFLUNOMIDE 20 MG/1
20 TABLET ORAL DAILY
Status: DISCONTINUED | OUTPATIENT
Start: 2018-08-11 | End: 2018-08-13 | Stop reason: HOSPADM

## 2018-08-10 RX ORDER — DIPHENOXYLATE HYDROCHLORIDE AND ATROPINE SULFATE 2.5; .025 MG/1; MG/1
1 TABLET ORAL 4 TIMES DAILY PRN
Status: DISCONTINUED | OUTPATIENT
Start: 2018-08-10 | End: 2018-08-13 | Stop reason: HOSPADM

## 2018-08-10 RX ADMIN — SODIUM CHLORIDE: 9 INJECTION, SOLUTION INTRAVENOUS at 21:09

## 2018-08-10 RX ADMIN — ONDANSETRON HYDROCHLORIDE 4 MG: 2 INJECTION, SOLUTION INTRAMUSCULAR; INTRAVENOUS at 16:46

## 2018-08-10 RX ADMIN — IOPAMIDOL 80 ML: 755 INJECTION, SOLUTION INTRAVENOUS at 17:26

## 2018-08-10 RX ADMIN — CEFTRIAXONE 1 G: 1 INJECTION, POWDER, FOR SOLUTION INTRAMUSCULAR; INTRAVENOUS at 18:39

## 2018-08-10 RX ADMIN — SODIUM CHLORIDE 500 ML: 9 INJECTION, SOLUTION INTRAVENOUS at 16:45

## 2018-08-10 ASSESSMENT — ENCOUNTER SYMPTOMS
ABDOMINAL PAIN: 1
VOMITING: 1
BLOOD IN STOOL: 0
NAUSEA: 1
BACK PAIN: 1
COUGH: 0
DIARRHEA: 1

## 2018-08-10 ASSESSMENT — PAIN SCALES - GENERAL: PAINLEVEL_OUTOF10: 0

## 2018-08-10 NOTE — ED PROVIDER NOTES
disease); Hyperlipidemia; Hypertension; Interstitial lung disease ([address] Mitral valve regurgitation; Mixed hyperlipidemia; Obesity; Osteoarthritis; Osteoporosis; Polio; Prolonged emergence from general anesthesia; RA (rheumatoid arthritis) ([facility] [address] Rheumatoid arthritis ([facility] [address] and Sleep apnea. She has a past surgical history that includes Appendectomy; Cholecystectomy; Salpingo-oophorectomy; Carpal tunnel release (Bilateral); Foot surgery (Bilateral); Total shoulder arthroplasty (Right); other surgical history (Right, 02/20/2017); Hysterectomy, total abdominal; Tonsillectomy; Shoulder arthroscopy (Left, 11/15/2017); joint replacement (Right, 04/25/2016); and joint replacement (Right). Her family history includes Arthritis in her sister; Asthma in her sister; Cancer in her mother; Depression in her brother, brother, and sister; Diabetes in her brother; Heart Disease in her father; High Blood Pressure in her brother and father; High Cholesterol in her sister; Psoriasis in her sister. She reports that she quit smoking about 42 years ago. Her smoking use included Cigarettes. She has a 14.00 pack-year smoking history. She has never used smokeless tobacco. She reports that she drinks about 0.6 - 1.2 oz of alcohol per week . She reports that she does not use drugs. Medications     Previous Medications    ADALIMUMAB (HUMIRA) 40 MG/0.8ML INJECTION    Inject 40 mg into the skin every 10 minutes Every 10 days    ALBUTEROL SULFATE HFA (VENTOLIN HFA) 108 (90 BASE) MCG/ACT INHALER    Inhale 2 puffs into the lungs every 6 hours as needed for Wheezing MAY USE ALTERNATIVE PER INSURANCE    B COMPLEX VITAMINS (B COMPLEX 1) TABS    Take 1 tablet by mouth Daily. BIOTIN 10 MG CAPS    Take 1 capsule by mouth daily.     CALCIUM CITRATE (CALCITRATE) 950 MG TABLET    Take 1 tablet by mouth daily    CEPHALEXIN (KEFLEX) 500 MG CAPSULE    Take 500 mg by mouth 4 times daily    CETIRIZINE (ZYRTEC) 10 MG TABLET    Take 10 mg by mouth nightly     DIPHENOXYLATE-ATROPINE (DIPHENATOL) 2.5-0.025 MG PER TABLET    Take 1 tablet by mouth 4 times daily as needed for Diarrhea. .    ESCITALOPRAM (LEXAPRO) 10 MG TABLET    Take 1 tablet by mouth daily    FENOFIBRATE (TRICOR) 145 MG TABLET    Take 1 tablet by mouth daily    FLUOCINONIDE (LIDEX) 0.05 % OINTMENT    fluocinonide 0.05 % topical ointment   APPLY TO THE AFFECTED AREA(S) BY TOPICAL ROUTE 2 TIMES PER DAY for 2 weeks then three times a week for flares    GABAPENTIN (NEURONTIN) 300 MG CAPSULE    Take 300 mg by mouth 4 times daily    HYDROCHLOROTHIAZIDE (HYDRODIURIL) 25 MG TABLET    Take 1 tablet by mouth daily    LEFLUNOMIDE (ARAVA) 20 MG TABLET    Take 20 mg by mouth daily     LISINOPRIL (PRINIVIL;ZESTRIL) 40 MG TABLET    Take 1 tablet by mouth daily    METOPROLOL SUCCINATE (TOPROL XL) 25 MG EXTENDED RELEASE TABLET    Take 1 tablet by mouth daily    MONTELUKAST (SINGULAIR) 10 MG TABLET    Take 1 tablet by mouth nightly    MULTIPLE VITAMINS-MINERALS (CENTRUM SILVER) TABS    Take 1 tablet by mouth daily     ONDANSETRON (ZOFRAN) 4 MG TABLET    Take 1 tablet by mouth every 8 hours as needed for Nausea or Vomiting    OXYCODONE-ACETAMINOPHEN (PERCOCET) 5-325 MG PER TABLET    Take 1 tablet by mouth 2 times daily  . PANTOPRAZOLE (PROTONIX) 40 MG TABLET    Take 40 mg by mouth    PHENAZOPYRIDINE (PYRIDIUM) 100 MG TABLET    Take 1 tablet by mouth 3 times daily as needed for Pain    PREDNISONE (DELTASONE) 5 MG TABLET    Take 10 mg by mouth daily     PROBIOTIC PRODUCT (PROBIOTIC FORMULA) CAPS    Take 1 capsule by mouth daily.     SULINDAC (CLINORIL) 200 MG TABLET    Take 200 mg by mouth    TRAZODONE (DESYREL) 50 MG TABLET    Take 1 tablet by mouth nightly    TRIAMCINOLONE (KENALOG) 0.1 % OINTMENT    Apply topically as needed     VITAMIN D (CHOLECALCIFEROL) 1000 UNITS TABS TABLET    Take 1,000 Units by mouth 4 times daily    ZOSTER RECOMBINANT ADJUVANTED VACCINE (SHINGRIX) 50 MCG SUSR INJECTION    Give vaccine as directed       Allergies     She is allergic to sulfa antibiotics; lamotrigine; lomotil  [diphenoxylate-atropine]; and sulfacetamide sodium. Physical Exam     INITIAL VITALS: BP: (!) 146/81, Temp: 98.2 °F (36.8 °C), Pulse: 75, Resp: 16, SpO2: 98 %   Physical Exam   Constitutional: She appears well-developed and well-nourished. No distress. HENT:   Head: Normocephalic and atraumatic. Eyes: Conjunctivae are normal.   Neck: Neck supple. Cardiovascular: Normal rate, regular rhythm and normal heart sounds. Pulmonary/Chest: Effort normal. She has no wheezes. Bibasilar crackles   Abdominal: Soft. Bowel sounds are normal. She exhibits no distension. There is tenderness. There is no rebound and no guarding. +LLQ TTP. No rigidity. Nursing note and vitals reviewed.       Diagnostic Results     EKG   Interpreted in conjunction with emergency department physician No att. providers found  Rhythm: NSR  Rate: 70bpm  Axis: normal  ST Segments: no acute change  T Waves: inversion in  v1, III and aVr  Clinical Impression: no acute changes  Comparison:  4/23/18    RADIOLOGY:  XR CHEST STANDARD (2 VW)   Final Result      No acute pulmonary pathology or change from the prior study                  CT ABDOMEN PELVIS W IV CONTRAST Additional Contrast? None    (Results Pending)     LABS:   Results for orders placed or performed during the hospital encounter of 08/10/18   POCT Venous   Result Value Ref Range    pH, Lex 7.562 (H) 7.350 - 7.450    pCO2, Lex 26.9 (L) 40.0 - 50.0 mm Hg    pO2, Lex 22 Not Established mm Hg    HCO3, Venous 24.2 23.0 - 29.0 mmol/L    Base Excess, Lex 2 -3 - 3    O2 Sat, Lex 49 Not Established %    TC02 (Calc), Lex 25 Not Established mmol/L    Lactate 1.48 0.40 - 2.00 mmol/L    Sample Type LEX     Performed on SEE BELOW    POC URINE with Microscopic   Result Value Ref Range    Color, UA Not Entered Straw/Yellow    Clarity, UA Not Entered Clear    Glucose, Ur Negative Negative mg/dL    Bilirubin Urine Negative Negative mg/dL    Ketones, Urine Negative Negative mg/dL    Specific Gravity, UA 1.015 1.005 - 1.030    Blood, Urine Negative Negative    pH, UA 8.5 (A) 5.0 - 8.0    Protein, UA TRACE (A) Negative mg/dL    Urobilinogen, Urine 0.2 <2.0 E.U./dL    Nitrite, Urine POSITIVE (A) Negative    Leukocyte Esterase, Urine SMALL (A) Negative    Microscopic Examination Yes      RECENT VITALS:  BP: (!) 146/81, Temp: 98.2 °F (36.8 °C), Pulse: 75, Resp: 16, SpO2: 98 %     Procedures     None    ED Course     Nursing Notes, Past Medical Hx, Past Surgical Hx, Social Hx, Allergies, and Family Hx were reviewed. The patient was given the following medications:  Orders Placed This Encounter   Medications    0.9 % sodium chloride bolus    ondansetron (ZOFRAN) injection 4 mg     CONSULTS:  None    MEDICAL DECISION MAKING / ASSESSMENT / Rossana Vazquez is a 70 y.o. female here primarily for vomiting. Pt is afebrile and hemodynamically stable. UA concerning for UTI. VBG notable for respiratory alkalosis. At this time, labs, CXR and CT are pending. Care will be endorsed to my colleague, Grazyna Webb PA-C who will follow-up and dispo appropriately. I anticipate admission for IV ABX as she cannot tolerate PO and has sxs of pyelonephritis. 4:44 PM      This patient was also evaluated by the attending physician. All care plans were discussed and agreed upon. Clinical Impression     1. Pyelonephritis    2. Diarrhea, unspecified type        Disposition     PATIENT REFERRED TO:  No follow-up provider specified.     DISCHARGE MEDICATIONS:  New Prescriptions    No medications on file       [address] PA-C  08/10/18 [place], [place]  08/10/18 [id number]

## 2018-08-10 NOTE — H&P
3/5/18   Jey Leung MD   escitalopram (LEXAPRO) 10 MG tablet Take 1 tablet by mouth daily 3/5/18   Jey Leung MD   hydrochlorothiazide (HYDRODIURIL) 25 MG tablet Take 1 tablet by mouth daily 3/5/18   Jey Leung MD   predniSONE (DELTASONE) 5 MG tablet Take 10 mg by mouth daily  10/23/17   Historical Provider, MD   oxyCODONE-acetaminophen (PERCOCET) 5-325 MG per tablet Take 1 tablet by mouth 2 times daily  . 10/3/17   Historical Provider, MD   Biotin 10 MG CAPS Take 1 capsule by mouth daily. Historical Provider, MD   leflunomide (ARAVA) 20 MG tablet Take 20 mg by mouth daily  6/29/17   Historical Provider, MD   triamcinolone (KENALOG) 0.1 % ointment Apply topically as needed  7/11/17   Historical Provider, MD   traZODone (DESYREL) 50 MG tablet Take 1 tablet by mouth nightly 4/13/17   Jey Leung MD   vitamin D (CHOLECALCIFEROL) 1000 UNITS TABS tablet Take 1,000 Units by mouth 4 times daily    Historical Provider, MD   gabapentin (NEURONTIN) 300 MG capsule Take 300 mg by mouth 4 times daily    Historical Provider, MD   calcium citrate (CALCITRATE) 950 MG tablet Take 1 tablet by mouth daily    Historical Provider, MD   Multiple Vitamins-Minerals (CENTRUM SILVER) TABS Take 1 tablet by mouth daily     Historical Provider, MD   Probiotic Product (PROBIOTIC FORMULA) CAPS Take 1 capsule by mouth daily. Historical Provider, MD   cetirizine (ZYRTEC) 10 MG tablet Take 10 mg by mouth nightly     Historical Provider, MD   B Complex Vitamins (B COMPLEX 1) TABS Take 1 tablet by mouth Daily. Historical Provider, MD   adalimumab (HUMIRA) 40 MG/0.8ML injection Inject 40 mg into the skin every 10 minutes Every 10 days 6/23/10   Historical Provider, MD       Allergies:  Sulfa antibiotics; Lamotrigine; Lomotil  [diphenoxylate-atropine]; and Sulfacetamide sodium    Social History:    TOBACCO:   reports that she quit smoking about 42 years ago. Her smoking use included Cigarettes.  She has a 14.00 pack-year smoking

## 2018-08-10 NOTE — ED PROVIDER NOTES
Eosinophils # 0.1 0.0 - 0.6 K/uL    Basophils # 0.1 0.0 - 0.2 K/uL   Basic Metabolic Panel   Result Value Ref Range    Sodium 138 136 - 145 mmol/L    Potassium 3.5 3.5 - 5.1 mmol/L    Chloride 99 99 - 110 mmol/L    CO2 24 21 - 32 mmol/L    Anion Gap 15 3 - 16    Glucose 89 70 - 99 mg/dL    BUN 7 7 - 20 mg/dL    CREATININE 0.6 0.6 - 1.2 mg/dL    GFR Non-African American >60 >60    GFR African American >60 >60    Calcium 10.1 8.3 - 10.6 mg/dL   Hepatic function panel (LFTs)   Result Value Ref Range    Total Protein 6.5 6.4 - 8.2 g/dL    Alb 3.6 3.4 - 5.0 g/dL    Alkaline Phosphatase 50 40 - 129 U/L    ALT 39 10 - 40 U/L    AST 76 (H) 15 - 37 U/L    Total Bilirubin 0.9 0.0 - 1.0 mg/dL    Bilirubin, Direct 0.7 (H) 0.0 - 0.3 mg/dL    Bilirubin, Indirect 0.2 0.0 - 1.0 mg/dL   Lipase   Result Value Ref Range    Lipase 41.0 13.0 - 60.0 U/L   Brain Natriuretic Peptide   Result Value Ref Range    Pro-BNP 1,235 (H) 0 - 449 pg/mL   Microscopic Urinalysis   Result Value Ref Range    WBC, UA 3-5 0 - 5 /HPF    RBC, UA None seen 0 - 2 /HPF    Epi Cells 3-5 /HPF    Bacteria, UA 1+ (A) /HPF    Amorphous, UA 2+ (A) /HPF   Troponin   Result Value Ref Range    Troponin <0.01 <0.01 ng/mL   POCT Venous   Result Value Ref Range    pH, Lex 7.562 (H) 7.350 - 7.450    pCO2, Lex 26.9 (L) 40.0 - 50.0 mm Hg    pO2, Lex 22 Not Established mm Hg    HCO3, Venous 24.2 23.0 - 29.0 mmol/L    Base Excess, Lex 2 -3 - 3    O2 Sat, Lex 49 Not Established %    TC02 (Calc), Lex 25 Not Established mmol/L    Lactate 1.48 0.40 - 2.00 mmol/L    Sample Type LEX     Performed on SEE BELOW    POC URINE with Microscopic   Result Value Ref Range    Color, UA Not Entered Straw/Yellow    Clarity, UA Not Entered Clear    Glucose, Ur Negative Negative mg/dL    Bilirubin Urine Negative Negative mg/dL    Ketones, Urine Negative Negative mg/dL    Specific Gravity, UA 1.015 1.005 - 1.030    Blood, Urine Negative Negative    pH, UA 8.5 (A) 5.0 - 8.0    Protein, UA TRACE (A) Negative mg/dL    Urobilinogen, Urine 0.2 <2.0 E.U./dL    Nitrite, Urine POSITIVE (A) Negative    Leukocyte Esterase, Urine SMALL (A) Negative    Microscopic Examination Yes    EKG 12 Lead   Result Value Ref Range    Ventricular Rate 70 BPM    Atrial Rate 70 BPM    P-R Interval 154 ms    QRS Duration 86 ms    Q-T Interval 442 ms    QTc Calculation (Bazett) 477 ms    P Axis 17 degrees    R Axis 0 degrees    T Axis 0 degrees    Diagnosis       EKG performed in ER and to be interpreted by ER physician. Confirmed by MD, ER ([id number]),  Bertrand Vicente ([id number]) on 8/10/2018 5:31:23 PM       RECENT VITALS:  BP: (!) 149/74, Temp: 98.2 °F (36.8 °C), Pulse: 66, Resp: 17, SpO2: 100 %     Procedures         ED Course     The patient was given the following medications:  Orders Placed This Encounter   Medications    0.9 % sodium chloride bolus    ondansetron (ZOFRAN) injection 4 mg    iopamidol (ISOVUE-370) 76 % injection 80 mL    cefTRIAXone (ROCEPHIN) 1 g IVPB in 50 mL D5W minibag       CONSULTS:  IP CONSULT TO HOSPITALIST    MEDICAL Roxanna Infante / ERLIN / Eliseo Chava is a 70 y.o. female presents to emergency department for nausea and vomiting. Patient was diagnosed by her primary care provider with urinary tract infection and was prescribed Cipro, however, started her medication today but has not been able to keep it down because of her vomiting. Abdomen he had tenderness in the left lower quadrant she also reported flank pain. Urinalysis was positive nitrite, small leukocytes with microscopic 3-5 WBC and 1+ bacteria. Urine culture was sent. CBC had no leukocytosis. Because of mild left lower quadrant tenderness is CT scan was obtained and they seem patient's care awaiting imaging to ensure there is not an additional infection that would require antibiotics. Patient does not meet sepsis criteria as her vital signs are normal.  Lactate is normal and she has no leukocytosis. .      CT showed scattered air-fluid levels favoring a mild ileus type pattern. Gastroenteritis with the consideration. No findings for obstruction. Diverticulosis without diverticulitis. Patient was started on Rocephin for pyelonephritis. She reports she had only taken one dose of the Cipro last night and had vomited so I did not feel strongly that she needed to continue on the Cipro. Her nausea and vomiting may be related to infection versus the gastroenteritis or ileus and patient did have improvement in the emergency department of her nausea. Patient will be admitted to the hospital for continued antibiotics and symptomatic management of her nausea and vomiting. Patient was accepted by the hospitalist for continued management. This patient was also evaluated by the attending physician. All care plans were discussed and agreed upon. Clinical Impression     1. Pyelonephritis    2. Diarrhea, unspecified type    3. Ileus ([address]        Disposition     PATIENT REFERRED TO:  No follow-up provider specified.     DISCHARGE MEDICATIONS:  New Prescriptions    No medications on file       DISPOSITION Decision To Admit 08/10/2018 06:27:46 PM         Michael Lara  08/10/18 [id number]

## 2018-08-11 PROBLEM — K52.9 CHRONIC DIARRHEA: Status: ACTIVE | Noted: 2018-08-11

## 2018-08-11 PROBLEM — E87.6 HYPOKALEMIA: Status: ACTIVE | Noted: 2018-08-11

## 2018-08-11 PROBLEM — N39.0 UTI (URINARY TRACT INFECTION): Status: ACTIVE | Noted: 2018-08-11

## 2018-08-11 PROBLEM — R42 VERTIGO: Status: ACTIVE | Noted: 2018-08-11

## 2018-08-11 PROBLEM — R11.2 NAUSEA WITH VOMITING: Status: ACTIVE | Noted: 2018-08-10

## 2018-08-11 PROBLEM — R63.4 WEIGHT LOSS: Status: ACTIVE | Noted: 2018-08-11

## 2018-08-11 PROBLEM — E83.42 HYPOMAGNESEMIA: Status: ACTIVE | Noted: 2018-08-11

## 2018-08-11 PROBLEM — E44.0 MODERATE MALNUTRITION (HCC): Status: ACTIVE | Noted: 2018-08-11

## 2018-08-11 LAB
ANION GAP SERPL CALCULATED.3IONS-SCNC: 8 MMOL/L (ref 3–16)
BUN BLDV-MCNC: 6 MG/DL (ref 7–20)
CALCIUM SERPL-MCNC: 9.5 MG/DL (ref 8.3–10.6)
CHLORIDE BLD-SCNC: 103 MMOL/L (ref 99–110)
CO2: 27 MMOL/L (ref 21–32)
CREAT SERPL-MCNC: 0.6 MG/DL (ref 0.6–1.2)
GFR AFRICAN AMERICAN: >60
GFR NON-AFRICAN AMERICAN: >60
GLUCOSE BLD-MCNC: 112 MG/DL (ref 70–99)
GLUCOSE BLD-MCNC: 84 MG/DL (ref 70–99)
MAGNESIUM: 1.2 MG/DL (ref 1.8–2.4)
PERFORMED ON: ABNORMAL
POTASSIUM REFLEX MAGNESIUM: 3.2 MMOL/L (ref 3.5–5.1)
SODIUM BLD-SCNC: 138 MMOL/L (ref 136–145)

## 2018-08-11 PROCEDURE — 83735 ASSAY OF MAGNESIUM: CPT

## 2018-08-11 PROCEDURE — 96375 TX/PRO/DX INJ NEW DRUG ADDON: CPT

## 2018-08-11 PROCEDURE — 2580000003 HC RX 258: Performed by: INTERNAL MEDICINE

## 2018-08-11 PROCEDURE — 6360000002 HC RX W HCPCS: Performed by: INTERNAL MEDICINE

## 2018-08-11 PROCEDURE — 36415 COLL VENOUS BLD VENIPUNCTURE: CPT

## 2018-08-11 PROCEDURE — 96372 THER/PROPH/DIAG INJ SC/IM: CPT

## 2018-08-11 PROCEDURE — 96376 TX/PRO/DX INJ SAME DRUG ADON: CPT

## 2018-08-11 PROCEDURE — 6360000002 HC RX W HCPCS: Performed by: PHYSICIAN ASSISTANT

## 2018-08-11 PROCEDURE — 6370000000 HC RX 637 (ALT 250 FOR IP): Performed by: INTERNAL MEDICINE

## 2018-08-11 PROCEDURE — 1200000000 HC SEMI PRIVATE

## 2018-08-11 PROCEDURE — 96366 THER/PROPH/DIAG IV INF ADDON: CPT

## 2018-08-11 PROCEDURE — 80048 BASIC METABOLIC PNL TOTAL CA: CPT

## 2018-08-11 PROCEDURE — 96367 TX/PROPH/DG ADDL SEQ IV INF: CPT

## 2018-08-11 RX ORDER — LISINOPRIL 20 MG/1
20 TABLET ORAL DAILY
Status: DISCONTINUED | OUTPATIENT
Start: 2018-08-11 | End: 2018-08-11

## 2018-08-11 RX ORDER — MECLIZINE HYDROCHLORIDE 25 MG/1
12.5 TABLET ORAL 3 TIMES DAILY PRN
Status: DISCONTINUED | OUTPATIENT
Start: 2018-08-11 | End: 2018-08-13 | Stop reason: HOSPADM

## 2018-08-11 RX ORDER — POTASSIUM CHLORIDE 7.45 MG/ML
10 INJECTION INTRAVENOUS PRN
Status: DISCONTINUED | OUTPATIENT
Start: 2018-08-11 | End: 2018-08-13 | Stop reason: HOSPADM

## 2018-08-11 RX ORDER — POTASSIUM CHLORIDE 20 MEQ/1
40 TABLET, EXTENDED RELEASE ORAL ONCE
Status: COMPLETED | OUTPATIENT
Start: 2018-08-11 | End: 2018-08-11

## 2018-08-11 RX ORDER — POTASSIUM CHLORIDE 20 MEQ/1
40 TABLET, EXTENDED RELEASE ORAL PRN
Status: DISCONTINUED | OUTPATIENT
Start: 2018-08-11 | End: 2018-08-13 | Stop reason: HOSPADM

## 2018-08-11 RX ORDER — CHOLESTYRAMINE LIGHT 4 G/5.7G
4 POWDER, FOR SUSPENSION ORAL DAILY
Status: DISCONTINUED | OUTPATIENT
Start: 2018-08-11 | End: 2018-08-13 | Stop reason: HOSPADM

## 2018-08-11 RX ORDER — PROMETHAZINE HYDROCHLORIDE 25 MG/ML
12.5 INJECTION, SOLUTION INTRAMUSCULAR; INTRAVENOUS EVERY 6 HOURS PRN
Status: DISCONTINUED | OUTPATIENT
Start: 2018-08-11 | End: 2018-08-13 | Stop reason: HOSPADM

## 2018-08-11 RX ORDER — MAGNESIUM SULFATE IN WATER 40 MG/ML
2 INJECTION, SOLUTION INTRAVENOUS ONCE
Status: COMPLETED | OUTPATIENT
Start: 2018-08-11 | End: 2018-08-11

## 2018-08-11 RX ORDER — POTASSIUM CHLORIDE 1.5 G/1.77G
40 POWDER, FOR SOLUTION ORAL PRN
Status: DISCONTINUED | OUTPATIENT
Start: 2018-08-11 | End: 2018-08-13 | Stop reason: HOSPADM

## 2018-08-11 RX ORDER — METOPROLOL SUCCINATE 25 MG/1
12.5 TABLET, EXTENDED RELEASE ORAL DAILY
Status: DISCONTINUED | OUTPATIENT
Start: 2018-08-11 | End: 2018-08-13 | Stop reason: HOSPADM

## 2018-08-11 RX ADMIN — Medication 10 ML: at 21:29

## 2018-08-11 RX ADMIN — MAGNESIUM SULFATE HEPTAHYDRATE 2 G: 40 INJECTION, SOLUTION INTRAVENOUS at 13:00

## 2018-08-11 RX ADMIN — ONDANSETRON HYDROCHLORIDE 4 MG: 2 INJECTION, SOLUTION INTRAMUSCULAR; INTRAVENOUS at 00:26

## 2018-08-11 RX ADMIN — Medication 1 TABLET: at 18:16

## 2018-08-11 RX ADMIN — DIPHENOXYLATE HYDROCHLORIDE AND ATROPINE SULFATE 1 TABLET: 2.5; .025 TABLET ORAL at 13:05

## 2018-08-11 RX ADMIN — TRAZODONE HYDROCHLORIDE 50 MG: 50 TABLET ORAL at 21:28

## 2018-08-11 RX ADMIN — ONDANSETRON HYDROCHLORIDE 4 MG: 2 INJECTION, SOLUTION INTRAMUSCULAR; INTRAVENOUS at 09:54

## 2018-08-11 RX ADMIN — CEFTRIAXONE 1 G: 1 INJECTION, POWDER, FOR SOLUTION INTRAMUSCULAR; INTRAVENOUS at 18:13

## 2018-08-11 RX ADMIN — POTASSIUM CHLORIDE 40 MEQ: 20 TABLET, EXTENDED RELEASE ORAL at 13:08

## 2018-08-11 RX ADMIN — LEFLUNOMIDE 20 MG: 20 TABLET ORAL at 10:01

## 2018-08-11 RX ADMIN — FENOFIBRATE 160 MG: 160 TABLET ORAL at 09:57

## 2018-08-11 RX ADMIN — GABAPENTIN 300 MG: 300 CAPSULE ORAL at 00:25

## 2018-08-11 RX ADMIN — PROMETHAZINE HYDROCHLORIDE 12.5 MG: 25 INJECTION INTRAMUSCULAR; INTRAVENOUS at 13:08

## 2018-08-11 RX ADMIN — GABAPENTIN 300 MG: 300 CAPSULE ORAL at 18:12

## 2018-08-11 RX ADMIN — MONTELUKAST SODIUM 10 MG: 10 TABLET, FILM COATED ORAL at 21:28

## 2018-08-11 RX ADMIN — TRAZODONE HYDROCHLORIDE 50 MG: 50 TABLET ORAL at 00:25

## 2018-08-11 RX ADMIN — CHOLESTYRAMINE 4 G: 4 POWDER, FOR SUSPENSION ORAL at 18:13

## 2018-08-11 RX ADMIN — LISINOPRIL 20 MG: 20 TABLET ORAL at 09:58

## 2018-08-11 RX ADMIN — ENOXAPARIN SODIUM 40 MG: 40 INJECTION SUBCUTANEOUS at 09:54

## 2018-08-11 RX ADMIN — DIPHENOXYLATE HYDROCHLORIDE AND ATROPINE SULFATE 1 TABLET: 2.5; .025 TABLET ORAL at 18:50

## 2018-08-11 RX ADMIN — GABAPENTIN 300 MG: 300 CAPSULE ORAL at 21:28

## 2018-08-11 RX ADMIN — Medication 1 CAPSULE: at 09:57

## 2018-08-11 RX ADMIN — METOPROLOL SUCCINATE 12.5 MG: 25 TABLET, EXTENDED RELEASE ORAL at 09:57

## 2018-08-11 RX ADMIN — GABAPENTIN 300 MG: 300 CAPSULE ORAL at 13:05

## 2018-08-11 RX ADMIN — GABAPENTIN 300 MG: 300 CAPSULE ORAL at 09:58

## 2018-08-11 RX ADMIN — ESCITALOPRAM OXALATE 10 MG: 10 TABLET ORAL at 09:57

## 2018-08-11 RX ADMIN — PROMETHAZINE HYDROCHLORIDE 12.5 MG: 25 INJECTION INTRAMUSCULAR; INTRAVENOUS at 21:29

## 2018-08-11 RX ADMIN — MONTELUKAST SODIUM 10 MG: 10 TABLET, FILM COATED ORAL at 00:25

## 2018-08-11 ASSESSMENT — PAIN SCALES - GENERAL
PAINLEVEL_OUTOF10: 0

## 2018-08-11 NOTE — PROGRESS NOTES
Pharmacy Note- Medication Reconciliation    Home medication list not updated at time admission orders placed. Please re-review. Notable changes:    1) Metoprolol - Pt takes 1/2 of 25 mg XL tablet   (25 mg is ordered)    2) Lisinopril  - Pt takes 1/2 of 40 mg tablet daily   (40 mg is ordered)    3) HCTZ  - Pt no longer takes   (25 mg daily is ordered)    4) Colestipol -- Pt takes 1g TID  - not on med list at admission - not ordered    5) Cipro - 500 mg BID started yesterday. (now on Rocephin)    6) Humira-- Pt takes 40 mg every week on Mondays. This is not ordered. If pt still here Monday, may consider ordering. I told pt that if ordered, she would need to bring from home- she is OK with that.       Luis Fernando Carrillo [facility]   8/10/2018 11:26 PM

## 2018-08-11 NOTE — PROGRESS NOTES
Pt alert; oriented X 4; speech clear; breathing easily on RA; pt denies any pain; abdomen is soft, nontender throughout; pt having liquid diarrhea, large amount just now at 1230, yellow brown, that extended onto bedsheets; cleaned pt well, changed all linens and gown, placed barrier cream on pink periarea; all skin intact; pt also c/o nausea, and gave zofran at 1000; pt has had no emesis, ate small amount of jello in the morning, but then c/o continued nausea. No other meds available for nausea; sent Perfect[facility] to Dr. Abner Lorenz requesting other nausea meds and also notifying of potassium level of 3.2 and magnesium level of 1.2. Giving pt lomotil for diarrhea. Daughter of pt called this nurse and was given an update on pt. Family at bedside; Addendum:  Pt now stating \"when I turn to the right, I feel more nauseous; I think something is wrong with my ear:\"  Pt states when asked that she has had symptoms with her ear \"for a couple of weeks\" and states she has not informed anyone of same; Grandson at bedside states he did not know either. Dr. Abner Lorenz is on the floor, and has been notified of the above and has placed orders for potassium, magnesium, and lomotil.

## 2018-08-11 NOTE — PROGRESS NOTES
hypertension    Nausea with vomiting    Moderate malnutrition (HCC)    Chronic diarrhea    Hypokalemia    Hypomagnesemia    Weight loss    Vertigo  Resolved Problems:    * No resolved hospital problems. *       Assessment & Plan:   1. Cont Rocephin IV for UTI  2. Replete Mg/K  3. Meclizine PRN vertigo  4. ENT consult for vertigo  5. GI consult for N/V/D  6. Stop Lisinopril, ? Angioedema of bowels with chronic diarrhea  7. DNR-CCA  8. PT/OT eval (has home PT/OT)      Diet: DIET GENERAL;  Dietary Nutrition Supplements: Clear Liquid Oral Supplement  Code:DNR-CCA  DVT PPX Lovenox    Disposition Home with home PT/OT when ok with all    Discussed with patient and nursing. F/U UCx. See what GI and ENT think. Remains in observation today.       Linda Hough MD   8/11/2018 1:53 PM

## 2018-08-11 NOTE — CONSULTS
RD received consult for diarrhea. Dietitians following with nutrition assessment and recommendations in RD progress notes. Will continue to monitor per nutrition standards of care.      KURTIS MORGNA, LD  Pager:  [phone]  Office:  [phone]
 JOINT REPLACEMENT Right     OTHER SURGICAL HISTORY Right 02/20/2017    RIGHT TOTAL KNEE ARTHROPLASTY             SALPINGO-OOPHORECTOMY      SHOULDER ARTHROPLASTY Right     SHOULDER ARTHROSCOPY Left 11/15/2017    TONSILLECTOMY       Admission Meds  No current facility-administered medications on file prior to encounter. Current Outpatient Prescriptions on File Prior to Encounter   Medication Sig Dispense Refill    sulindac (CLINORIL) 200 MG tablet Take 200 mg by mouth daily       montelukast (SINGULAIR) 10 MG tablet Take 1 tablet by mouth nightly 90 tablet 1    fenofibrate (TRICOR) 145 MG tablet Take 1 tablet by mouth daily 90 tablet 1    escitalopram (LEXAPRO) 10 MG tablet Take 1 tablet by mouth daily 90 tablet 1    leflunomide (ARAVA) 20 MG tablet Take 20 mg by mouth daily       traZODone (DESYREL) 50 MG tablet Take 1 tablet by mouth nightly 90 tablet 1    vitamin D (CHOLECALCIFEROL) 1000 UNITS TABS tablet Take 1,000 Units by mouth 4 times daily      gabapentin (NEURONTIN) 300 MG capsule Take 300 mg by mouth 4 times daily      Probiotic Product (PROBIOTIC FORMULA) CAPS Take 1 capsule by mouth daily.  cetirizine (ZYRTEC) 10 MG tablet Take 10 mg by mouth nightly       B Complex Vitamins (B COMPLEX 1) TABS Take 1 tablet by mouth Daily.  adalimumab (HUMIRA) 40 MG/0.8ML injection Inject 40 mg into the skin once a week Mondays      albuterol sulfate HFA (VENTOLIN HFA) 108 (90 Base) MCG/ACT inhaler Inhale 2 puffs into the lungs every 6 hours as needed for Wheezing MAY USE ALTERNATIVE PER INSURANCE 1 Inhaler 3       Allergies  Allergies   Allergen Reactions    Sulfa Antibiotics Hives    Lamotrigine Other (See Comments)     Blurred vision    Lomotil  [Diphenoxylate-Atropine] Other (See Comments)     Changes in vision    Sulfacetamide Sodium      Other reaction(s):  Other (See Comments)      Social   Social History   Substance Use Topics    Smoking status: Former Smoker     Packs/day:

## 2018-08-11 NOTE — PLAN OF CARE
Problem: Risk for Impaired Skin Integrity  Goal: Tissue integrity - skin and mucous membranes  Structural intactness and normal physiological function of skin and  mucous membranes.      Intervention: SKIN ASSESSMENT  Pt at risk for impaired skin due to chronic diarrhea; all skin on buttocks intact, but pink; using barrier cream with all changes of depends; assisting with repositioning pt; heels elevated off of bed;

## 2018-08-11 NOTE — PROGRESS NOTES
4 Eyes Admission Assessment     I agree as the admission nurse that 2 RN's have performed a thorough Head to Toe Skin Assessment on the patient. ALL assessment sites listed below have been assessed on admission. Areas assessed by both nurses: yes  [x]   Head, Face, and Ears   [x]   Shoulders, Back, and Chest  [x]   Arms, Elbows, and Hands   [x]   Coccyx, Sacrum, and Ischum  [x]   Legs, Feet, and Heels        Does the Patient have Skin Breakdown?   No         Shaji Prevention initiated:  Yes   Wound Care Orders initiated:  No      [facility] nurse consulted for Pressure Injury (Stage 3,4, Unstageable, DTI, NWPT, and Complex wounds):  NA      Nurse 1 eSignature: Electronically signed by Joanna Cedillo RN on 8/11/18 at 1:34 AM    **SHARE this note so that the co-signing nurse is able to place an eSignature**    Nurse 2 eSignature: {Esignature:[id number]}

## 2018-08-12 LAB
ANION GAP SERPL CALCULATED.3IONS-SCNC: 11 MMOL/L (ref 3–16)
BASOPHILS ABSOLUTE: 0.1 K/UL (ref 0–0.2)
BASOPHILS RELATIVE PERCENT: 2.4 %
BUN BLDV-MCNC: 5 MG/DL (ref 7–20)
CALCIUM SERPL-MCNC: 9.3 MG/DL (ref 8.3–10.6)
CHLORIDE BLD-SCNC: 106 MMOL/L (ref 99–110)
CO2: 22 MMOL/L (ref 21–32)
CREAT SERPL-MCNC: 0.5 MG/DL (ref 0.6–1.2)
EOSINOPHILS ABSOLUTE: 0.3 K/UL (ref 0–0.6)
EOSINOPHILS RELATIVE PERCENT: 6.8 %
GFR AFRICAN AMERICAN: >60
GFR NON-AFRICAN AMERICAN: >60
GLUCOSE BLD-MCNC: 84 MG/DL (ref 70–99)
HCT VFR BLD CALC: 35.2 % (ref 36–48)
HEMOGLOBIN: 11.5 G/DL (ref 12–16)
LYMPHOCYTES ABSOLUTE: 2.4 K/UL (ref 1–5.1)
LYMPHOCYTES RELATIVE PERCENT: 49 %
MAGNESIUM: 1.5 MG/DL (ref 1.8–2.4)
MCH RBC QN AUTO: 27.6 PG (ref 26–34)
MCHC RBC AUTO-ENTMCNC: 32.7 G/DL (ref 31–36)
MCV RBC AUTO: 84.4 FL (ref 80–100)
MONOCYTES ABSOLUTE: 0.7 K/UL (ref 0–1.3)
MONOCYTES RELATIVE PERCENT: 13.7 %
NEUTROPHILS ABSOLUTE: 1.4 K/UL (ref 1.7–7.7)
NEUTROPHILS RELATIVE PERCENT: 28.1 %
PDW BLD-RTO: 16.2 % (ref 12.4–15.4)
PLATELET # BLD: 223 K/UL (ref 135–450)
PMV BLD AUTO: 9.4 FL (ref 5–10.5)
POTASSIUM SERPL-SCNC: 3.1 MMOL/L (ref 3.5–5.1)
RBC # BLD: 4.17 M/UL (ref 4–5.2)
SODIUM BLD-SCNC: 139 MMOL/L (ref 136–145)
URINE CULTURE, ROUTINE: NORMAL
WBC # BLD: 5 K/UL (ref 4–11)

## 2018-08-12 PROCEDURE — 85025 COMPLETE CBC W/AUTO DIFF WBC: CPT

## 2018-08-12 PROCEDURE — 94761 N-INVAS EAR/PLS OXIMETRY MLT: CPT

## 2018-08-12 PROCEDURE — 6370000000 HC RX 637 (ALT 250 FOR IP): Performed by: INTERNAL MEDICINE

## 2018-08-12 PROCEDURE — 80048 BASIC METABOLIC PNL TOTAL CA: CPT

## 2018-08-12 PROCEDURE — 6360000002 HC RX W HCPCS: Performed by: INTERNAL MEDICINE

## 2018-08-12 PROCEDURE — 1200000000 HC SEMI PRIVATE

## 2018-08-12 PROCEDURE — 83735 ASSAY OF MAGNESIUM: CPT

## 2018-08-12 PROCEDURE — 94660 CPAP INITIATION&MGMT: CPT

## 2018-08-12 PROCEDURE — 36415 COLL VENOUS BLD VENIPUNCTURE: CPT

## 2018-08-12 PROCEDURE — 94664 DEMO&/EVAL PT USE INHALER: CPT

## 2018-08-12 PROCEDURE — 2580000003 HC RX 258: Performed by: INTERNAL MEDICINE

## 2018-08-12 RX ADMIN — Medication 1 CAPSULE: at 08:11

## 2018-08-12 RX ADMIN — Medication 10 ML: at 20:38

## 2018-08-12 RX ADMIN — PROMETHAZINE HYDROCHLORIDE 12.5 MG: 25 INJECTION INTRAMUSCULAR; INTRAVENOUS at 14:45

## 2018-08-12 RX ADMIN — ESCITALOPRAM OXALATE 10 MG: 10 TABLET ORAL at 08:11

## 2018-08-12 RX ADMIN — GABAPENTIN 300 MG: 300 CAPSULE ORAL at 13:20

## 2018-08-12 RX ADMIN — FENOFIBRATE 160 MG: 160 TABLET ORAL at 08:11

## 2018-08-12 RX ADMIN — MONTELUKAST SODIUM 10 MG: 10 TABLET, FILM COATED ORAL at 20:38

## 2018-08-12 RX ADMIN — ENOXAPARIN SODIUM 40 MG: 40 INJECTION SUBCUTANEOUS at 08:11

## 2018-08-12 RX ADMIN — Medication 10 ML: at 08:13

## 2018-08-12 RX ADMIN — GABAPENTIN 300 MG: 300 CAPSULE ORAL at 20:38

## 2018-08-12 RX ADMIN — LEFLUNOMIDE 20 MG: 20 TABLET ORAL at 08:11

## 2018-08-12 RX ADMIN — GABAPENTIN 300 MG: 300 CAPSULE ORAL at 16:50

## 2018-08-12 RX ADMIN — CHOLESTYRAMINE 4 G: 4 POWDER, FOR SUSPENSION ORAL at 08:11

## 2018-08-12 RX ADMIN — GABAPENTIN 300 MG: 300 CAPSULE ORAL at 08:11

## 2018-08-12 RX ADMIN — Medication 1 TABLET: at 16:50

## 2018-08-12 RX ADMIN — DIPHENOXYLATE HYDROCHLORIDE AND ATROPINE SULFATE 1 TABLET: 2.5; .025 TABLET ORAL at 22:47

## 2018-08-12 RX ADMIN — DIPHENOXYLATE HYDROCHLORIDE AND ATROPINE SULFATE 1 TABLET: 2.5; .025 TABLET ORAL at 17:26

## 2018-08-12 RX ADMIN — POTASSIUM CHLORIDE 40 MEQ: 20 TABLET, EXTENDED RELEASE ORAL at 16:50

## 2018-08-12 RX ADMIN — METOPROLOL SUCCINATE 12.5 MG: 25 TABLET, EXTENDED RELEASE ORAL at 08:12

## 2018-08-12 RX ADMIN — TRAZODONE HYDROCHLORIDE 50 MG: 50 TABLET ORAL at 20:38

## 2018-08-12 ASSESSMENT — PAIN SCALES - GENERAL
PAINLEVEL_OUTOF10: 0

## 2018-08-12 NOTE — PROGRESS NOTES
murmurs, rubs or gallops. Abdomen: Soft, non-tender, non-distended with normal bowel sounds. Musculoskeletal: No clubbing, cyanosis or edema bilaterally. Full range of motion without deformity. Skin: Skin color, texture, turgor normal.  No rashes or lesions. Neurologic:  Neurovascularly intact without any focal sensory/motor deficits. Cranial nerves: II-XII intact, grossly non-focal.  Psychiatric: Alert and oriented, thought content appropriate, normal insight  Capillary Refill: Brisk,< 3 seconds   Peripheral Pulses: +2 palpable, equal bilaterally     Overall, this is a normal and unremarkable physical exam.    Labs:   Recent Labs      08/10/18   1628  08/12/18   0720   WBC  6.7  5.0   HGB  11.8*  11.5*   HCT  35.5*  35.2*   PLT  240  223     Recent Labs      08/10/18   1628  08/11/18   0550  08/12/18   0720   NA  138  138  139   K  3.5  3.2*  3.1*   CL  99  103  106   CO2  24  27  22   BUN  7  6*  5*   CREATININE  0.6  0.6  0.5*   CALCIUM  10.1  9.5  9.3     Recent Labs      08/10/18   1628   AST  76*   ALT  39   BILIDIR  0.7*   BILITOT  0.9   ALKPHOS  50     No results for input(s): INR in the last 72 hours. Recent Labs      08/10/18   1628   TROPONINI  <0.01       Urinalysis:    Lab Results   Component Value Date    NITRU POSITIVE 08/10/2018    WBCUA 3-5 08/10/2018    BACTERIA 1+ 08/10/2018    RBCUA None seen 08/10/2018    BLOODU Negative 08/10/2018    SPECGRAV 1.015 08/10/2018    GLUCOSEU Negative 08/10/2018       Radiology:  CT ABDOMEN PELVIS W IV CONTRAST Additional Contrast? None   Final Result      1. Scattered air-fluid levels within minimally distended large and small bowel loops favoring mild ileus-type pattern. Gastroenteritis would be a consideration. No findings for obstruction. 2.  Status post cholecystectomy and hysterectomy. 3.  Scattered colonic diverticulosis without findings of diverticulitis.   Sulci      XR CHEST STANDARD (2 VW)   Final Result      No acute pulmonary pathology or change from the prior study                          Assessment/Plan:    Active Hospital Problems    Diagnosis Date Noted    Chronic diarrhea [K52.9] 08/11/2018    Hypokalemia [E87.6] 08/11/2018    Hypomagnesemia [E83.42] 08/11/2018    Weight loss [R63.4] 08/11/2018    Vertigo [R42] 08/11/2018    Nausea with vomiting [R11.2] 08/10/2018    Essential hypertension [I10] 08/20/2015    Rheumatoid arthritis ([facility] [address] [M06.9] 05/04/2010    Obstructive sleep apnea syndrome [G47.33] 05/04/2010     PLAN:    Dizziness: We will continue symptomatic treatment. ENT consult requested and this currently pending    Abnormal UA: Not consistent with urinary tract infection. I will stop ceftriaxone     Nausea and vomiting: Continue symptomatic treatment. Tolerating diet better now. Hypertension: Slightly higher than normal, but not high enough to consider hypertensive emergency. We will monitor. DVT Prophylaxis: Lovenox  Diet: DIET GENERAL;  Dietary Nutrition Supplements: Clear Liquid Oral Supplement  Code Status: DNR-CCA    PT/OT Eval Status: Pending    Dispo - discharge will depend on therapeutic recommendations and ENT consult.     Venancio Brady MD

## 2018-08-13 VITALS
DIASTOLIC BLOOD PRESSURE: 85 MMHG | SYSTOLIC BLOOD PRESSURE: 153 MMHG | TEMPERATURE: 98.3 F | HEART RATE: 66 BPM | HEIGHT: 60 IN | RESPIRATION RATE: 16 BRPM | WEIGHT: 175.5 LBS | BODY MASS INDEX: 34.46 KG/M2 | OXYGEN SATURATION: 91 %

## 2018-08-13 PROBLEM — N39.0 UTI (URINARY TRACT INFECTION): Status: ACTIVE | Noted: 2018-08-13

## 2018-08-13 PROCEDURE — 6370000000 HC RX 637 (ALT 250 FOR IP): Performed by: INTERNAL MEDICINE

## 2018-08-13 PROCEDURE — 84999 UNLISTED CHEMISTRY PROCEDURE: CPT

## 2018-08-13 PROCEDURE — 82705 FATS/LIPIDS FECES QUAL: CPT

## 2018-08-13 PROCEDURE — 6360000002 HC RX W HCPCS: Performed by: PHYSICIAN ASSISTANT

## 2018-08-13 PROCEDURE — 6360000002 HC RX W HCPCS: Performed by: FAMILY MEDICINE

## 2018-08-13 PROCEDURE — 99231 SBSQ HOSP IP/OBS SF/LOW 25: CPT | Performed by: OTOLARYNGOLOGY

## 2018-08-13 PROCEDURE — 2580000003 HC RX 258: Performed by: FAMILY MEDICINE

## 2018-08-13 PROCEDURE — 94660 CPAP INITIATION&MGMT: CPT

## 2018-08-13 PROCEDURE — 6360000002 HC RX W HCPCS: Performed by: INTERNAL MEDICINE

## 2018-08-13 PROCEDURE — 94761 N-INVAS EAR/PLS OXIMETRY MLT: CPT

## 2018-08-13 PROCEDURE — 2580000003 HC RX 258: Performed by: INTERNAL MEDICINE

## 2018-08-13 RX ORDER — ONDANSETRON 4 MG/1
4 TABLET, FILM COATED ORAL EVERY 8 HOURS PRN
Qty: 20 TABLET | Refills: 0 | Status: ON HOLD | OUTPATIENT
Start: 2018-08-13 | End: 2019-03-25 | Stop reason: ALTCHOICE

## 2018-08-13 RX ORDER — CIPROFLOXACIN 500 MG/1
500 TABLET, FILM COATED ORAL EVERY 12 HOURS SCHEDULED
Status: DISCONTINUED | OUTPATIENT
Start: 2018-08-13 | End: 2018-08-13

## 2018-08-13 RX ORDER — CEFTRIAXONE 1 G/1
500 INJECTION, POWDER, FOR SOLUTION INTRAMUSCULAR; INTRAVENOUS ONCE
Status: COMPLETED | OUTPATIENT
Start: 2018-08-13 | End: 2018-08-13

## 2018-08-13 RX ORDER — DIPHENOXYLATE HYDROCHLORIDE AND ATROPINE SULFATE 2.5; .025 MG/1; MG/1
1 TABLET ORAL 4 TIMES DAILY PRN
Qty: 60 TABLET | Refills: 2 | Status: SHIPPED | OUTPATIENT
Start: 2018-08-13 | End: 2018-09-12

## 2018-08-13 RX ORDER — MECLIZINE HCL 12.5 MG/1
12.5 TABLET ORAL 3 TIMES DAILY PRN
Qty: 30 TABLET | Refills: 1 | Status: SHIPPED | OUTPATIENT
Start: 2018-08-13 | End: 2018-08-23

## 2018-08-13 RX ADMIN — POTASSIUM CHLORIDE 40 MEQ: 20 TABLET, EXTENDED RELEASE ORAL at 11:40

## 2018-08-13 RX ADMIN — GABAPENTIN 300 MG: 300 CAPSULE ORAL at 13:46

## 2018-08-13 RX ADMIN — LEFLUNOMIDE 20 MG: 20 TABLET ORAL at 11:12

## 2018-08-13 RX ADMIN — ESCITALOPRAM OXALATE 10 MG: 10 TABLET ORAL at 08:39

## 2018-08-13 RX ADMIN — FENOFIBRATE 160 MG: 160 TABLET ORAL at 08:39

## 2018-08-13 RX ADMIN — GABAPENTIN 300 MG: 300 CAPSULE ORAL at 08:40

## 2018-08-13 RX ADMIN — CEFTRIAXONE 1 G: 1 INJECTION, POWDER, FOR SOLUTION INTRAMUSCULAR; INTRAVENOUS at 11:39

## 2018-08-13 RX ADMIN — CHOLESTYRAMINE 4 G: 4 POWDER, FOR SUSPENSION ORAL at 08:41

## 2018-08-13 RX ADMIN — Medication 1 CAPSULE: at 08:39

## 2018-08-13 RX ADMIN — ENOXAPARIN SODIUM 40 MG: 40 INJECTION SUBCUTANEOUS at 08:39

## 2018-08-13 RX ADMIN — ONDANSETRON HYDROCHLORIDE 4 MG: 2 INJECTION, SOLUTION INTRAMUSCULAR; INTRAVENOUS at 10:06

## 2018-08-13 RX ADMIN — CEFTRIAXONE 500 MG: 1 INJECTION, POWDER, FOR SOLUTION INTRAMUSCULAR; INTRAVENOUS at 13:02

## 2018-08-13 RX ADMIN — METOPROLOL SUCCINATE 12.5 MG: 25 TABLET, EXTENDED RELEASE ORAL at 08:40

## 2018-08-13 RX ADMIN — Medication 10 ML: at 10:07

## 2018-08-13 ASSESSMENT — PAIN SCALES - GENERAL
PAINLEVEL_OUTOF10: 0
PAINLEVEL_OUTOF10: 5

## 2018-08-13 NOTE — CARE COORDINATION
2018  [facility])  Clinical Case Management Department    Patient: Tevin Damon  MRN: [id number] / : 1940  ACCT: Donnell Pascal [de-identified]     Emergency Contacts  Healthcare Agent Appointed: Healthcare power of   Healthcare Agent's Name: Eduardo Gomes (son)  Healthcare Agent's Phone Number:  (unknown at this time- pt will get number)    Admission Documentation  Attending Provider: Kalpesh Rodriguez MD  Admit date/time: 8/10/2018  3:34 PM  Status: Inpatient  Diagnosis: UTI (urinary tract infection)     Readmission within last 30 days:  yes     Living Situation  Discharge Planning  Living Arrangements: Alone  Support Systems: Friends/Neighbors, Family Members  Potential Assistance Needed: Home Care  Type of Home Care Services: [address]  Patient expects to be discharged to[de-identified] home  Expected Discharge Date: 18    Service Assessment:       Values / Beliefs  Do you have any ethnic, cultural, sacramental, or spiritual Spiritism needs you would like us to be aware of while you are in the hospital?: No    Advance Directives (For Healthcare)  Healthcare Directive: Yes, patient has an advance directive for healthcare treatment  Type of Healthcare Directive: Durable power of  for health care  Copy in Chart: No, copy requested from family  [facility] Agent's Name: Eduardo Gomes (son)  Healthcare Agent's Phone Number:  (unknown at this time- pt will get number)  If you are unable to speak for yourself, does your Healthcare Agent or Legal Spokesperson know your healthcare wishes?: Yes    Pharmacy:   Potential Assistance Purchasing Medications:  No  Does patient want to participate in local refill/meds to beds program?: No    Notification completed in HENS/PAS? No     Discharge disposition: Home   Active with Erum To from [facility] in to see patient and orders faxed  .       Discharge Event  Discharged with Documented Belongings: Yes  Departure Mode: Family member  Mobility at [employer]: Wheelchair  Discharged to: Private Residence    Ancillary: CM spoke with patient who plans to d/c later this afternoon . And Daughter is here visiting from [facility], will be able to assist with  Transporting and also with [address] care also for non skilled. She has a follow up appt with Dr Charlie Galarza  , new PCP as her previous MD  Dr Aimee Kovacs retired. [facility] and her family were provided with choice of provider; she and her family are in agreement with the discharge plan. Care Transitions patient:  Yes    Ruth Ann Lynn RN  [facility]  Case Management Department  Ph: [phone]

## 2018-08-13 NOTE — PROGRESS NOTES
[facility]  GI Progress Note        Clayton Combs is a 70 y.o. female patient. 1. Pyelonephritis    2. Diarrhea, unspecified type    3. Ileus ([address]        Admit Date: 8/10/2018    Subjective:       71 yo woman admitted for N/V and chronic diarrhea.  Today her symptoms are better, but still having some nausea       ROS:  Cardiovascular ROS: no chest pain or dyspnea on exertion  Gastrointestinal ROS: positive for - diarrhea, gas/bloating and nausea/vomiting  negative for - abdominal pain, change in bowel habits, constipation, melena or swallowing difficulty/pain  Respiratory ROS: no cough, shortness of breath, or wheezing    Scheduled Meds:   ciprofloxacin  500 mg Oral 2 times per day    metoprolol succinate  12.5 mg Oral Daily    cholestyramine light  4 g Oral Daily    b complex-C-E-zinc  1 tablet Oral Daily    escitalopram  10 mg Oral Daily    fenofibrate  160 mg Oral Daily    gabapentin  300 mg Oral 4x Daily    leflunomide  20 mg Oral Daily    montelukast  10 mg Oral Nightly    lactobacillus  1 capsule Oral Daily    traZODone  50 mg Oral Nightly    sodium chloride flush  10 mL Intravenous 2 times per day    enoxaparin  40 mg Subcutaneous Daily       Continuous Infusions:      PRN Meds:  potassium chloride **OR** potassium chloride **OR** potassium chloride, promethazine, meclizine, ondansetron, diphenoxylate-atropine, sodium chloride flush, magnesium hydroxide      Objective:       Patient Vitals for the past 24 hrs:   BP Temp Temp src Pulse Resp SpO2 Weight   08/13/18 0839 (!) 152/82 97.7 °F (36.5 °C) Oral 69 16 95 % -   08/13/18 0649 (!) 164/80 97.7 °F (36.5 °C) Oral 70 16 95 % -   08/13/18 0455 - - - - 12 - -   08/13/18 0005 - - - - 12 - -   08/12/18 2125 (!) 146/79 - - - - - -   08/12/18 2031 - 98.1 °F (36.7 °C) Oral 66 16 93 % -   08/12/18 1648 138/74 98 °F (36.7 °C) Oral 78 16 93 % -   08/12/18 1319 - - - - - - 175 lb 8 oz (79.6 kg)   08/12/18 1207 (!) 163/83 97.7 °F (36.5 or change from the prior study                        Assessment:       Principal Problem:    Vertigo  Active Problems:    Obstructive sleep apnea syndrome    Rheumatoid arthritis (HCC)    Essential hypertension    Nausea with vomiting    Chronic diarrhea    Hypokalemia    Hypomagnesemia    Weight loss  Resolved Problems:    * No resolved hospital problems. 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  Diff neg  Current CT shows distended large and sb distension     RA, on Humira and Leflunomide     Recommendations:       Continue probiotics and low FODMAP diet  Dietitian consulted  Continue cholestipol  Can substitute metamucil with Fibercon pills  Consider SIBO as potential dx    Audrey Gaines DO, PGY-3  8/13/2018  10:29 AM

## 2018-08-13 NOTE — PROGRESS NOTES
Discharge instructions and follow up  Appointments discussed. Pt. Sent with one paper prescription and all belongings. Left via private car to home with daughter.

## 2018-08-13 NOTE — DISCHARGE SUMMARY
[facility]    [facility] Demographics    Patient. Juan Cough  Date of Birth. 1940  MRN. [id number]     Primary care provider. [facility]  (Tel: [phone])    Admit date: 8/10/2018    Discharge date (blank if same as Note Date): Note Date: 8/13/2018     Reason for Hospitalization. Chief Complaint   Patient presents with    Emesis    Diarrhea           Principal Problem:    Dizziness, not true vertigo  Active Problems:    Nausea with vomiting    UTI (urinary tract infection) present on admission    Obstructive sleep apnea syndrome    Rheumatoid arthritis (HCC)    Essential hypertension    Chronic diarrhea    Hypokalemia    Hypomagnesemia    Weight loss         Problems and results from this hospitalization that need follow up. 1. Dizziness. Continue prn zofran and meclizine. F/U Dr. Vic Vázquez if dizziness returns. 2. Chronic diarrhea. F/U GI as per instructions      Hospital Course. Patient was admitted with dizziness, vomiting, and diarrhea. Noted to have UTI present on admission. She improved with IV hydration and zofran. Dr. Vic Vázquez saw her but the dizziness had resolved. She was seen by GI which is chronic. GI recommends probiotics, chlestipol. She is symptomatically improved and requesting discharge. She had UTI and received 3 doses Rocephin. Condition at discharge:  Stable    Consults. IP CONSULT TO HOSPITALIST  IP CONSULT TO OTOLARYNGOLOGY  IP CONSULT TO GI  IP CONSULT TO DIETITIAN    Physical examination on discharge day. BP (!) 153/85   Pulse 66   Temp 98.3 °F (36.8 °C) (Oral)   Resp 16   Ht 5' (1.524 m)   Wt 175 lb 8 oz (79.6 kg)   SpO2 91%   BMI 34.27 kg/m²   General appearance. Alert. Looks comfortable. HEENT. Moist mucus membranes. Cardiovascular. Regular rate and rhythm, normal S1, S2. No murmur. Respiratory.  Not using accessory CHOLECALCIFEROL        STOP taking these medications    ciprofloxacin 500 MG tablet  Commonly known as:  CIPRO     oxyCODONE-acetaminophen 5-325 MG per tablet  Commonly known as:  PERCOCET     pantoprazole 40 MG tablet  Commonly known as:  PROTONIX           Where to Get Your Medications      These medications were sent to [facility], [address] [place] [address], [address]    Phone:  [phone]   · meclizine 12.5 MG tablet  · ondansetron 4 MG tablet     You can get these medications from any pharmacy    Bring a paper prescription for each of these medications  · diphenoxylate-atropine 2.5-0.025 MG per tablet         Discharge recommendations given to patient. Follow Up. MARLENE Yoder   in 1 week   Dr. Josue Kawasaki 3 weeks if dizziness persists. GI in 2-3 weeks  Disposition. home  Activity. activity as tolerated  Diet: DIET GENERAL;  Dietary Nutrition Supplements: Clear Liquid Oral Supplement      Spent 31 minutes in discharge process.     Signed:  Helena Hodgkins, MD     8/13/2018 12:47 PM

## 2018-08-13 NOTE — CARE COORDINATION
Care Transitions Inpatient Review  Medication Review  Are you able to afford your medications?:  Yes  How often do you have difficulty taking your medications?:  I always take them as prescribed. Housing Review  Who do you live with?:  Alone  Are you an active caregiver in your home?:  No  Social Support  Do you have a [address], [facility]?:  Yes  Hiwot Parsons Name:  [facility] ( pt is also receiving private duty aide 2 times a week for 3 hrs from [employer]. Durable Medical Equipment  Patient DME:  Straight cane, Walker, Wheelchair, [address] chair, Other  Other Patient DME:  Elevated toilet and alert button. Functional Review  Ability to seek help/take action for Emergent/Urgent situations i.e. fire, crime, inclement weather or health crisis. :  Independent  Ability handle personal hygiene needs (bathing/dressing/grooming): Independent  Ability to manage medications: Independent  Ability to prepare food:  Needs Assistance  Ability to maintain home (clean home, laundry):  Dependent  Ability to drive and/or has transportation:  Dependent  Ability to do shopping:  Dependent  Hearing and Vision  Visual Impairment:  Reading glasses  Hearing Impairment:  None  Care Transitions Interventions     Care Transition nurse met with pt and dtr. Pt is < 30 days readmission. Pt lives alone and has private duty aide 3hrs 2 times a week from [employer] which her dtr arranged after previous recent hospital d/c. Pt is active with [facility]. Pt is currently in the process of changing physicians. Was seeing Dr Aimee Kovacs who retired and she has a New Patient appt with Dr Charlie Galarza tomorrow at 9 am. Dtr said if pt is still in hospital, she will reschedule. Pt is not eligible for Care Transitions r/t non [facility] PCP. Follow Up  Future Appointments  Date Time Provider Katie Arroyo   8/28/2018 2:00 PM Terrell Mcardle, MD [facility]  There are no preventive care reminders to display for this patient.     Joyce Guadarrama

## 2018-08-14 RX ORDER — ESCITALOPRAM OXALATE 10 MG/1
10 TABLET ORAL DAILY
Qty: 30 TABLET | Refills: 0 | Status: SHIPPED | OUTPATIENT
Start: 2018-08-14 | End: 2019-04-04 | Stop reason: CLARIF

## 2018-08-15 LAB
EKG ATRIAL RATE: 70 BPM
EKG DIAGNOSIS: NORMAL
EKG P AXIS: 17 DEGREES
EKG P-R INTERVAL: 154 MS
EKG Q-T INTERVAL: 442 MS
EKG QRS DURATION: 86 MS
EKG QTC CALCULATION (BAZETT): 477 MS
EKG R AXIS: 0 DEGREES
EKG T AXIS: 0 DEGREES
EKG VENTRICULAR RATE: 70 BPM

## 2018-08-16 LAB
FECAL NEUTRAL FAT: NORMAL
FECAL SPLIT FATS: NORMAL
OSMOLALITY STOOL: 295 MOSM/KG (ref 280–303)

## 2018-08-28 ENCOUNTER — OFFICE VISIT (OUTPATIENT)
Dept: PULMONOLOGY | Age: 78
End: 2018-08-28

## 2018-08-28 VITALS
SYSTOLIC BLOOD PRESSURE: 110 MMHG | RESPIRATION RATE: 16 BRPM | WEIGHT: 171 LBS | DIASTOLIC BLOOD PRESSURE: 86 MMHG | OXYGEN SATURATION: 96 % | HEIGHT: 60 IN | HEART RATE: 79 BPM | BODY MASS INDEX: 33.57 KG/M2

## 2018-08-28 DIAGNOSIS — J30.89 CHRONIC NONSEASONAL ALLERGIC RHINITIS DUE TO POLLEN: ICD-10-CM

## 2018-08-28 DIAGNOSIS — J84.9 ILD (INTERSTITIAL LUNG DISEASE) (HCC): Primary | ICD-10-CM

## 2018-08-28 DIAGNOSIS — Z92.21 HX OF METHOTREXATE THERAPY: ICD-10-CM

## 2018-08-28 DIAGNOSIS — M06.9 RHEUMATOID ARTHRITIS, INVOLVING UNSPECIFIED SITE, UNSPECIFIED RHEUMATOID FACTOR PRESENCE: ICD-10-CM

## 2018-08-28 PROCEDURE — G8399 PT W/DXA RESULTS DOCUMENT: HCPCS | Performed by: INTERNAL MEDICINE

## 2018-08-28 PROCEDURE — G8417 CALC BMI ABV UP PARAM F/U: HCPCS | Performed by: INTERNAL MEDICINE

## 2018-08-28 PROCEDURE — G8427 DOCREV CUR MEDS BY ELIG CLIN: HCPCS | Performed by: INTERNAL MEDICINE

## 2018-08-28 PROCEDURE — 1101F PT FALLS ASSESS-DOCD LE1/YR: CPT | Performed by: INTERNAL MEDICINE

## 2018-08-28 PROCEDURE — 1123F ACP DISCUSS/DSCN MKR DOCD: CPT | Performed by: INTERNAL MEDICINE

## 2018-08-28 PROCEDURE — 99214 OFFICE O/P EST MOD 30 MIN: CPT | Performed by: INTERNAL MEDICINE

## 2018-08-28 PROCEDURE — 1111F DSCHRG MED/CURRENT MED MERGE: CPT | Performed by: INTERNAL MEDICINE

## 2018-08-28 PROCEDURE — 4040F PNEUMOC VAC/ADMIN/RCVD: CPT | Performed by: INTERNAL MEDICINE

## 2018-08-28 PROCEDURE — 1036F TOBACCO NON-USER: CPT | Performed by: INTERNAL MEDICINE

## 2018-08-28 PROCEDURE — 1090F PRES/ABSN URINE INCON ASSESS: CPT | Performed by: INTERNAL MEDICINE

## 2018-08-28 NOTE — PROGRESS NOTES
pain  HEMATOLOGICAL: Negative for adenopathy  SKIN: Negative for clubbing, cyanosis, skin lesions  EXTREMITIES: Negative for weakness, decreased ROM  NEUROLOGICAL: Negative for unilateral weakness, speech or gait abnormalities    Objective:   PHYSICAL EXAM:        VITALS:    /86 (Site: Left Arm, Position: Sitting, Cuff Size: Large Adult)   Pulse 79   Resp 16   Ht 5' (1.524 m)   Wt 171 lb (77.6 kg)   SpO2 96%   Breastfeeding? No   BMI 33.40 kg/m²   On room air  CONSTITUTIONAL:  Awake, alert, cooperative, no apparent distress, and appears stated age  HEENT: No oropharyngeal exudate, PERRL, no cervical adenopathy, no tracheal deviation, thyroid size normal  LUNGS:  No increased work of breathing. Few crackles at bases. No wheezing  CARDIOVASCULAR:  normal S1 and S2 and no JVD  ABDOMEN:  Normal bowel sounds, non-distended and non-tender to palpation  EXT: No edema, no calf tenderness. Pulses are present bilaterally. NEUROLOGIC:  Mental Status Exam:  Level of Alertness:   awake  Orientation:   person, place, time. SKIN:  normal skin color, texture, turgor, no redness, warmth, or swelling     DATA:      Radiology Review:  Pertinent images / reports were reviewed as a part of this visit. CXR 8/10/2018  reveals the following:  Impression       No acute pulmonary pathology or change from the prior study     CT Chest 6/28/2017  Impression       Mild peripheral interstitial fibrosis in the lungs bilaterally,   nonspecific.       Interval improvement, with clearance of patchy mosaic densities   from the prior study.       No superimposed acute inflammatory changes, new infiltrates,   nodular or mass densities are seen. CT Chest 1/13/2017  Impression   1. Interstitial lung disease with mosaic pattern   2. Peripheral septal thickening, interstitial lung disease with   perivascular nodularity.    3. Differential diagnosis includes nonspecific interstitial   pneumonitis, hypersensitivity pneumonitis as well as autoimmune   etiologies such as rheumatoid and sarcoid. CT chest June 20, 2017  COMPARISON: 1/13/2017.       FINDINGS: The tracheobronchial tree is patent. Mild prominence of   the central main pulmonary arteries is seen, unchanged.       Dense right hilar and subcarinal, and scattered paratracheal   calcifications are seen, consistent with old granulomatous   disease. Peripheral subpleural interstitial fibrotic changes are   seen in the lungs bilaterally, most prominent in the right upper   lobe, left upper lobe and lingular segment, and lower lobes   bilaterally. There has been interval improvement, with significant   clearance of patchy areas of mosaic confluent since the prior   study.       No new infiltrates, dominant nodular or mass densities are seen. There is no pericardial or pleural effusion.       Bony structures are intact. Schmorl's node deformity of the   superior endplate of the B52 vertebra is noted.           Impression       Mild peripheral interstitial fibrosis in the lungs bilaterally,   nonspecific.       Interval improvement, with clearance of patchy mosaic densities   from the prior study.       No superimposed acute inflammatory changes, new infiltrates,   nodular or mass densities are seen. Last PFTs:  DATE OF STUDY: 02/08/2017     Spirometry data is acceptable and reproducible. Room air oxygen saturation is  96%. BMI is 39.5.     SPIROMETRY: FEV1/FVC ratio is 88%. FEV1 is 1.72, which is 93% of predicted. FVC is 1.94, which is 79% of predicted.     LUNG VOLUMES: Show total lung capacity of 4.1, which is 89% of predicted. Residual volume is 2.24, which is 104% of predicted.     DIFFUSION CAPACITY: Shows a DLCO of 14.53, which is 70% of predicted.     IMPRESSION:  1. Normal spirometry. 2. Normal lung volumes. 3. Mild decrease in diffusion capacity. Assessment:      Diagnosis Orders   1. ILD (interstitial lung disease) ([facility]     2.  Rheumatoid arthritis, involving unspecified site, unspecified rheumatoid factor presence ([facility] [address]     3. Hx of methotrexate therapy     4. Chronic nonseasonal allergic rhinitis due to pollen         Plan:       In regards to her interstitial lung disease seen on initial CT chest in January 2017 there is significant improvement on follow up June 2017 although some persistent interstital changes remain. The improvement is likely related to discontinuation of her methotrexate. She did have a chest x-ray August 10 which shows no gross ILD and is better when compared to chest x-ray in January 2017    At this point conservative care seems to be working best    RTC 1 year.   Patient knows to call sooner if she has any issues with dyspnea, cough, or wheezing

## 2018-09-02 ENCOUNTER — APPOINTMENT (OUTPATIENT)
Dept: GENERAL RADIOLOGY | Age: 78
DRG: 392 | End: 2018-09-02
Payer: MEDICARE

## 2018-09-02 ENCOUNTER — APPOINTMENT (OUTPATIENT)
Dept: CT IMAGING | Age: 78
DRG: 392 | End: 2018-09-02
Payer: MEDICARE

## 2018-09-02 ENCOUNTER — HOSPITAL ENCOUNTER (INPATIENT)
Age: 78
LOS: 4 days | Discharge: HOME HEALTH | DRG: 392 | End: 2018-09-06
Attending: EMERGENCY MEDICINE | Admitting: INTERNAL MEDICINE
Payer: MEDICARE

## 2018-09-02 DIAGNOSIS — K57.32 DIVERTICULITIS OF COLON: Primary | ICD-10-CM

## 2018-09-02 DIAGNOSIS — K57.92 ACUTE DIVERTICULITIS: ICD-10-CM

## 2018-09-02 LAB
ALBUMIN SERPL-MCNC: 3.5 G/DL (ref 3.4–5)
ALP BLD-CCNC: 132 U/L (ref 40–129)
ALT SERPL-CCNC: 35 U/L (ref 10–40)
ANION GAP SERPL CALCULATED.3IONS-SCNC: 19 MMOL/L (ref 3–16)
AST SERPL-CCNC: 49 U/L (ref 15–37)
BACTERIA: ABNORMAL /HPF
BASOPHILS ABSOLUTE: 0.1 K/UL (ref 0–0.2)
BASOPHILS RELATIVE PERCENT: 0.6 %
BILIRUB SERPL-MCNC: 1.1 MG/DL (ref 0–1)
BILIRUBIN DIRECT: 0.6 MG/DL (ref 0–0.3)
BILIRUBIN URINE: NEGATIVE MG/DL
BILIRUBIN, INDIRECT: 0.5 MG/DL (ref 0–1)
BLOOD, URINE: NEGATIVE
BUN BLDV-MCNC: 4 MG/DL (ref 7–20)
CALCIUM SERPL-MCNC: 10 MG/DL (ref 8.3–10.6)
CASTS: ABNORMAL /LPF
CHLORIDE BLD-SCNC: 97 MMOL/L (ref 99–110)
CLARITY: ABNORMAL
CO2: 18 MMOL/L (ref 21–32)
COLOR: ABNORMAL
CREAT SERPL-MCNC: <0.5 MG/DL (ref 0.6–1.2)
EOSINOPHILS ABSOLUTE: 0.1 K/UL (ref 0–0.6)
EOSINOPHILS RELATIVE PERCENT: 0.8 %
EPITHELIAL CELLS, UA: ABNORMAL /HPF
GFR AFRICAN AMERICAN: >60
GFR NON-AFRICAN AMERICAN: >60
GLUCOSE BLD-MCNC: 92 MG/DL (ref 70–99)
GLUCOSE URINE: NEGATIVE MG/DL
HCT VFR BLD CALC: 39.2 % (ref 36–48)
HEMOGLOBIN: 12.5 G/DL (ref 12–16)
KETONES, URINE: 15 MG/DL
LEUKOCYTE ESTERASE, URINE: NEGATIVE
LIPASE: 30 U/L (ref 13–60)
LYMPHOCYTES ABSOLUTE: 2.5 K/UL (ref 1–5.1)
LYMPHOCYTES RELATIVE PERCENT: 22.4 %
MCH RBC QN AUTO: 27.5 PG (ref 26–34)
MCHC RBC AUTO-ENTMCNC: 31.8 G/DL (ref 31–36)
MCV RBC AUTO: 86.5 FL (ref 80–100)
MICROSCOPIC EXAMINATION: YES
MONOCYTES ABSOLUTE: 0.9 K/UL (ref 0–1.3)
MONOCYTES RELATIVE PERCENT: 8.1 %
NEUTROPHILS ABSOLUTE: 7.7 K/UL (ref 1.7–7.7)
NEUTROPHILS RELATIVE PERCENT: 68.1 %
NITRITE, URINE: NEGATIVE
PDW BLD-RTO: 16.2 % (ref 12.4–15.4)
PH UA: >=9
PLATELET # BLD: 272 K/UL (ref 135–450)
PMV BLD AUTO: 9 FL (ref 5–10.5)
POTASSIUM SERPL-SCNC: 4.2 MMOL/L (ref 3.5–5.1)
PROTEIN UA: ABNORMAL MG/DL
RBC # BLD: 4.53 M/UL (ref 4–5.2)
RBC UA: ABNORMAL /HPF (ref 0–2)
SODIUM BLD-SCNC: 134 MMOL/L (ref 136–145)
SPECIFIC GRAVITY UA: 1.01
TOTAL PROTEIN: 7 G/DL (ref 6.4–8.2)
TROPONIN: <0.01 NG/ML
UROBILINOGEN, URINE: 0.2 E.U./DL
WBC # BLD: 11.3 K/UL (ref 4–11)
WBC UA: ABNORMAL /HPF (ref 0–5)

## 2018-09-02 PROCEDURE — 6370000000 HC RX 637 (ALT 250 FOR IP): Performed by: INTERNAL MEDICINE

## 2018-09-02 PROCEDURE — 83690 ASSAY OF LIPASE: CPT

## 2018-09-02 PROCEDURE — 94150 VITAL CAPACITY TEST: CPT

## 2018-09-02 PROCEDURE — 2580000003 HC RX 258: Performed by: EMERGENCY MEDICINE

## 2018-09-02 PROCEDURE — 74022 RADEX COMPL AQT ABD SERIES: CPT

## 2018-09-02 PROCEDURE — 2580000003 HC RX 258: Performed by: INTERNAL MEDICINE

## 2018-09-02 PROCEDURE — 2500000003 HC RX 250 WO HCPCS: Performed by: EMERGENCY MEDICINE

## 2018-09-02 PROCEDURE — 85025 COMPLETE CBC W/AUTO DIFF WBC: CPT

## 2018-09-02 PROCEDURE — 94664 DEMO&/EVAL PT USE INHALER: CPT

## 2018-09-02 PROCEDURE — 94760 N-INVAS EAR/PLS OXIMETRY 1: CPT

## 2018-09-02 PROCEDURE — 80076 HEPATIC FUNCTION PANEL: CPT

## 2018-09-02 PROCEDURE — 6360000004 HC RX CONTRAST MEDICATION: Performed by: EMERGENCY MEDICINE

## 2018-09-02 PROCEDURE — 81001 URINALYSIS AUTO W/SCOPE: CPT

## 2018-09-02 PROCEDURE — 99285 EMERGENCY DEPT VISIT HI MDM: CPT

## 2018-09-02 PROCEDURE — 84484 ASSAY OF TROPONIN QUANT: CPT

## 2018-09-02 PROCEDURE — 74177 CT ABD & PELVIS W/CONTRAST: CPT

## 2018-09-02 PROCEDURE — 96374 THER/PROPH/DIAG INJ IV PUSH: CPT

## 2018-09-02 PROCEDURE — 6360000002 HC RX W HCPCS: Performed by: INTERNAL MEDICINE

## 2018-09-02 PROCEDURE — 93005 ELECTROCARDIOGRAM TRACING: CPT | Performed by: EMERGENCY MEDICINE

## 2018-09-02 PROCEDURE — 6360000002 HC RX W HCPCS: Performed by: EMERGENCY MEDICINE

## 2018-09-02 PROCEDURE — 80048 BASIC METABOLIC PNL TOTAL CA: CPT

## 2018-09-02 PROCEDURE — 1200000000 HC SEMI PRIVATE

## 2018-09-02 RX ORDER — GABAPENTIN 300 MG/1
300 CAPSULE ORAL 4 TIMES DAILY
Status: DISCONTINUED | OUTPATIENT
Start: 2018-09-02 | End: 2018-09-06 | Stop reason: HOSPADM

## 2018-09-02 RX ORDER — ESCITALOPRAM OXALATE 10 MG/1
10 TABLET ORAL DAILY
Status: DISCONTINUED | OUTPATIENT
Start: 2018-09-02 | End: 2018-09-06 | Stop reason: HOSPADM

## 2018-09-02 RX ORDER — ONDANSETRON 2 MG/ML
4 INJECTION INTRAMUSCULAR; INTRAVENOUS ONCE
Status: COMPLETED | OUTPATIENT
Start: 2018-09-02 | End: 2018-09-02

## 2018-09-02 RX ORDER — TRAZODONE HYDROCHLORIDE 50 MG/1
50 TABLET ORAL NIGHTLY
Status: DISCONTINUED | OUTPATIENT
Start: 2018-09-02 | End: 2018-09-06 | Stop reason: HOSPADM

## 2018-09-02 RX ORDER — ONDANSETRON 2 MG/ML
4 INJECTION INTRAMUSCULAR; INTRAVENOUS EVERY 6 HOURS PRN
Status: DISCONTINUED | OUTPATIENT
Start: 2018-09-02 | End: 2018-09-06 | Stop reason: HOSPADM

## 2018-09-02 RX ORDER — SODIUM CHLORIDE, SODIUM LACTATE, POTASSIUM CHLORIDE, CALCIUM CHLORIDE 600; 310; 30; 20 MG/100ML; MG/100ML; MG/100ML; MG/100ML
1000 INJECTION, SOLUTION INTRAVENOUS CONTINUOUS
Status: DISCONTINUED | OUTPATIENT
Start: 2018-09-02 | End: 2018-09-02 | Stop reason: HOSPADM

## 2018-09-02 RX ORDER — CIPROFLOXACIN 2 MG/ML
400 INJECTION, SOLUTION INTRAVENOUS ONCE
Status: COMPLETED | OUTPATIENT
Start: 2018-09-02 | End: 2018-09-02

## 2018-09-02 RX ORDER — CIPROFLOXACIN 2 MG/ML
400 INJECTION, SOLUTION INTRAVENOUS EVERY 12 HOURS
Status: DISCONTINUED | OUTPATIENT
Start: 2018-09-03 | End: 2018-09-05

## 2018-09-02 RX ORDER — LEFLUNOMIDE 20 MG/1
20 TABLET ORAL DAILY
Status: DISCONTINUED | OUTPATIENT
Start: 2018-09-02 | End: 2018-09-02

## 2018-09-02 RX ORDER — SODIUM CHLORIDE 0.9 % (FLUSH) 0.9 %
10 SYRINGE (ML) INJECTION PRN
Status: DISCONTINUED | OUTPATIENT
Start: 2018-09-02 | End: 2018-09-06 | Stop reason: HOSPADM

## 2018-09-02 RX ORDER — CETIRIZINE HYDROCHLORIDE 10 MG/1
10 TABLET ORAL NIGHTLY
Status: DISCONTINUED | OUTPATIENT
Start: 2018-09-02 | End: 2018-09-06 | Stop reason: HOSPADM

## 2018-09-02 RX ORDER — OXYCODONE HYDROCHLORIDE AND ACETAMINOPHEN 5; 325 MG/1; MG/1
1 TABLET ORAL EVERY 4 HOURS PRN
Status: ON HOLD | COMMUNITY
End: 2018-09-06

## 2018-09-02 RX ORDER — SODIUM CHLORIDE 0.9 % (FLUSH) 0.9 %
10 SYRINGE (ML) INJECTION EVERY 12 HOURS SCHEDULED
Status: DISCONTINUED | OUTPATIENT
Start: 2018-09-02 | End: 2018-09-06 | Stop reason: HOSPADM

## 2018-09-02 RX ORDER — OXYCODONE HYDROCHLORIDE AND ACETAMINOPHEN 5; 325 MG/1; MG/1
1 TABLET ORAL EVERY 4 HOURS PRN
Status: DISCONTINUED | OUTPATIENT
Start: 2018-09-02 | End: 2018-09-06 | Stop reason: HOSPADM

## 2018-09-02 RX ORDER — ALBUTEROL SULFATE 2.5 MG/3ML
2.5 SOLUTION RESPIRATORY (INHALATION) EVERY 6 HOURS PRN
Status: DISCONTINUED | OUTPATIENT
Start: 2018-09-02 | End: 2018-09-06 | Stop reason: HOSPADM

## 2018-09-02 RX ORDER — FENOFIBRATE 160 MG/1
160 TABLET ORAL DAILY
Status: DISCONTINUED | OUTPATIENT
Start: 2018-09-02 | End: 2018-09-03

## 2018-09-02 RX ORDER — MONTELUKAST SODIUM 10 MG/1
10 TABLET ORAL NIGHTLY
Status: DISCONTINUED | OUTPATIENT
Start: 2018-09-02 | End: 2018-09-06 | Stop reason: HOSPADM

## 2018-09-02 RX ORDER — ONDANSETRON 4 MG/1
4 TABLET, FILM COATED ORAL EVERY 8 HOURS PRN
Status: DISCONTINUED | OUTPATIENT
Start: 2018-09-02 | End: 2018-09-06 | Stop reason: HOSPADM

## 2018-09-02 RX ORDER — METOPROLOL SUCCINATE 25 MG/1
12.5 TABLET, EXTENDED RELEASE ORAL DAILY
Status: DISCONTINUED | OUTPATIENT
Start: 2018-09-02 | End: 2018-09-06 | Stop reason: HOSPADM

## 2018-09-02 RX ORDER — FAMOTIDINE 20 MG/1
20 TABLET, FILM COATED ORAL 2 TIMES DAILY PRN
Status: DISCONTINUED | OUTPATIENT
Start: 2018-09-02 | End: 2018-09-06 | Stop reason: HOSPADM

## 2018-09-02 RX ADMIN — IOPAMIDOL 80 ML: 755 INJECTION, SOLUTION INTRAVENOUS at 13:30

## 2018-09-02 RX ADMIN — ENOXAPARIN SODIUM 40 MG: 40 INJECTION SUBCUTANEOUS at 20:37

## 2018-09-02 RX ADMIN — ONDANSETRON 4 MG: 2 INJECTION INTRAMUSCULAR; INTRAVENOUS at 20:49

## 2018-09-02 RX ADMIN — ESCITALOPRAM OXALATE 10 MG: 10 TABLET ORAL at 20:35

## 2018-09-02 RX ADMIN — GABAPENTIN 300 MG: 300 CAPSULE ORAL at 20:35

## 2018-09-02 RX ADMIN — METRONIDAZOLE 500 MG: 500 INJECTION, SOLUTION INTRAVENOUS at 16:07

## 2018-09-02 RX ADMIN — SODIUM CHLORIDE, POTASSIUM CHLORIDE, SODIUM LACTATE AND CALCIUM CHLORIDE 1000 ML: 600; 310; 30; 20 INJECTION, SOLUTION INTRAVENOUS at 14:59

## 2018-09-02 RX ADMIN — METOPROLOL SUCCINATE 12.5 MG: 25 TABLET, EXTENDED RELEASE ORAL at 20:36

## 2018-09-02 RX ADMIN — MONTELUKAST SODIUM 10 MG: 10 TABLET, FILM COATED ORAL at 20:36

## 2018-09-02 RX ADMIN — TRAZODONE HYDROCHLORIDE 50 MG: 50 TABLET ORAL at 20:41

## 2018-09-02 RX ADMIN — Medication 10 ML: at 20:53

## 2018-09-02 RX ADMIN — CHOLECALCIFEROL TAB 25 MCG (1000 UNIT) 1000 UNITS: 25 TAB at 20:35

## 2018-09-02 RX ADMIN — CETIRIZINE HYDROCHLORIDE 10 MG: 10 TABLET, FILM COATED ORAL at 20:41

## 2018-09-02 RX ADMIN — CIPROFLOXACIN 400 MG: 2 INJECTION, SOLUTION INTRAVENOUS at 14:59

## 2018-09-02 RX ADMIN — ONDANSETRON 4 MG: 2 INJECTION INTRAMUSCULAR; INTRAVENOUS at 12:10

## 2018-09-02 ASSESSMENT — PAIN DESCRIPTION - LOCATION: LOCATION: ABDOMEN

## 2018-09-02 ASSESSMENT — PAIN DESCRIPTION - PAIN TYPE: TYPE: ACUTE PAIN

## 2018-09-02 ASSESSMENT — PAIN SCALES - GENERAL: PAINLEVEL_OUTOF10: 0

## 2018-09-02 ASSESSMENT — PAIN DESCRIPTION - FREQUENCY: FREQUENCY: CONTINUOUS

## 2018-09-02 ASSESSMENT — PAIN DESCRIPTION - DESCRIPTORS: DESCRIPTORS: CONSTANT

## 2018-09-02 NOTE — ED NOTES
Pt request to sit on bedside commode for possible BM. Pt has call light in reach and states she will not get up without help.  Friend is standing outside room     Merleen Gitelman, RN  09/02/18 [id number]

## 2018-09-02 NOTE — H&P
([facility]     Rheumatoid arthritis ([facility] 5/4/2010    Sleep apnea 05/04/2010    uses BPAP       Medications:   Prior to Admission medications    Medication Sig Start Date End Date Taking? Authorizing Provider   oxyCODONE-acetaminophen (PERCOCET) 5-325 MG per tablet Take 1 tablet by mouth every 4 hours as needed for Pain. Timmy Elaine Historical Provider, MD   escitalopram (LEXAPRO) 10 MG tablet Take 1 tablet by mouth daily 8/14/18   ANA Perdomo CNP   diphenoxylate-atropine (DIPHENATOL) 2.5-0.025 MG per tablet Take 1 tablet by mouth 4 times daily as needed for Diarrhea for up to 30 days. . 8/13/18 9/12/18  Kalpesh Rodriguez MD   ondansetron (ZOFRAN) 4 MG tablet Take 1 tablet by mouth every 8 hours as needed for Nausea or Vomiting 8/13/18   Kalpesh Rodriguez MD   metoprolol succinate (TOPROL XL) 25 MG extended release tablet Take 12.5 mg by mouth daily    Historical Provider, MD   colestipol (COLESTID) 1 g tablet Take 1 g by mouth 3 times daily    Historical Provider, MD   sulindac (CLINORIL) 200 MG tablet Take 200 mg by mouth daily     Historical Provider, MD   albuterol sulfate HFA (VENTOLIN HFA) 108 (90 Base) MCG/ACT inhaler Inhale 2 puffs into the lungs every 6 hours as needed for Wheezing MAY USE ALTERNATIVE PER INSURANCE 3/26/18   ANA Pond CNP   montelukast (SINGULAIR) 10 MG tablet Take 1 tablet by mouth nightly 3/5/18   Jey Leung MD   fenofibrate (TRICOR) 145 MG tablet Take 1 tablet by mouth daily 3/5/18   Jey Leung MD   leflunomide (ARAVA) 20 MG tablet Take 20 mg by mouth daily  6/29/17   Historical Provider, MD   traZODone (DESYREL) 50 MG tablet Take 1 tablet by mouth nightly 4/13/17   Jey Leung MD   vitamin D (CHOLECALCIFEROL) 1000 UNITS TABS tablet Take 1,000 Units by mouth daily     Historical Provider, MD   gabapentin (NEURONTIN) 300 MG capsule Take 300 mg by mouth 4 times daily    Historical Provider, MD   Probiotic Product (PROBIOTIC FORMULA) CAPS Take 1 capsule by mouth daily. Historical Provider, MD   cetirizine (ZYRTEC) 10 MG tablet Take 10 mg by mouth nightly     Historical Provider, MD   B Complex Vitamins (B COMPLEX 1) TABS Take 1 tablet by mouth Daily. Historical Provider, MD   adalimumab (HUMIRA) 40 MG/0.8ML injection Inject 40 mg into the skin once a week Mondays 6/23/10   Historical Provider, MD       Allergies: Allergies   Allergen Reactions    Sulfa Antibiotics Hives    Lamotrigine Other (See Comments)     Blurred vision    Lomotil  [Diphenoxylate-Atropine] Other (See Comments)     Changes in vision    Sulfacetamide Sodium      Other reaction(s): Other (See Comments)       PSHx:   Past Surgical History:   Procedure Laterality Date    APPENDECTOMY      CARPAL TUNNEL RELEASE Bilateral     CHOLECYSTECTOMY      FOOT SURGERY Bilateral     metatarsal removal    HYSTERECTOMY, TOTAL ABDOMINAL      JOINT REPLACEMENT Right 04/25/2016    Dr. Hailey Pompa , shoulder    JOINT REPLACEMENT Right     OTHER SURGICAL HISTORY Right 02/20/2017    RIGHT TOTAL KNEE ARTHROPLASTY             SALPINGO-OOPHORECTOMY      SHOULDER ARTHROPLASTY Right     SHOULDER ARTHROSCOPY Left 11/15/2017    TONSILLECTOMY         Social Hx:   Social History     Social History    Marital status:      Spouse name: N/A    Number of children: N/A    Years of education: N/A     Occupational History    Not on file.      Social History Main Topics    Smoking status: Former Smoker     Packs/day: 1.00     Years: 14.00     Types: Cigarettes     Quit date: 1/1/1976    Smokeless tobacco: Never Used      Comment: quit age 29    Alcohol use 0.6 - 1.2 oz/week     1 - 2 Glasses of wine per week    Drug use: No    Sexual activity: No     Other Topics Concern    Not on file     Social History Narrative    No narrative on file       Family Hx:   Family History   Problem Relation Age of Onset    Cancer Mother     Heart Disease Father     High Blood Pressure Father     Diabetes Brother     High Blood Wt Readings from Last 3 Encounters:   08/28/18 171 lb (77.6 kg)   08/12/18 175 lb 8 oz (79.6 kg)   07/12/18 172 lb 9.9 oz (78.3 kg)     There is no height or weight on file to calculate BMI. No intake or output data in the 24 hours ending 09/02/18 1447    · Gen - Awake, alert, sitting in bed, no signs of distress, appears stated age and cooperative    · HEENT - NC/AT     · Eye - Normal external eyes, conjunctiva, lid, cornea, PERRLA, no nystagmus. · Ears - Normal TM's bilaterally. Normal auditory canals and external ears. Non-tender    · Nose - Normal external nose, mucus membranes and septum    · Pharynx - Dental Hygiene adequate. Moist buccal mucosa. Normal pharynx    · Neck / Thyroid - Supple, no masses, nodes, nodules or enlargement. No adenopathy, no carotid bruit, No JVD. · CVS - Regular rate, rhythm auscultated as regular. No  mumur or rub. No edema, No Carotid bruits    · Resp - No accessory muscle use. No crackles. No wheezing. No rhonchi    · GI - Well rounded contour. Non-distended, LLQ focally tender. No masses. No organmegaly. Normal bowel sounds    · Skin - No edema. Warm and dry. No nodule on exposed extremities. No skin breakdown, non-erythematous, non-tender. · Lymph - No cervical LAD. No supraclavicular LAD. · MSK - No cyanosis. No joint deformity. No clubbing. · Back and Trunk  Good range of motion while seated and standing  · Neuro - Cranial Nerves  II-XII grossly intact bilaterally  · Motor System  muscle bulk and tone symmetric with no signs of significant atrophy, not flaccid  · Upper Extremity  Strength 5/5 bilaterally biceps/triceps/forearm. Strength 5/5 wrist/hand bilaterally  · Lower Extremity  Strength 5/5 bilaterally thigh/hamstring. Strength 5/5 leg/feet bilaterally  · Cerebellar Coordination  rapidly alternating movements of hands, feet, and fingers intact. Point to point movements of finger to nose and heel to shin intact.  Gait and Romberg WNL  · Sensory System TRIG 284 (H) 03/12/2018     Lab Results   Component Value Date    LABA1C 5.4 03/12/2018     ABGs:  No results for input(s): PHART, YVK7PAH, PO2ART in the last 72 hours. INR:  Lab Results   Component Value Date    INR 1.1 05/07/2018    INR 0.97 02/20/2017    INR 1.05 02/09/2017     U/A:  Recent Labs      09/02/18   1431   COLORU  Not Entered   PHUR  >=9.0*   CLARITYU  Not Entered   SPECGRAV  1.015   LEUKOCYTESUR  Negative   UROBILINOGEN  0.2   BILIRUBINUR  Negative   BLOODU  Negative   GLUCOSEU  Negative      Imaging:   CT ABDOMEN PELVIS W IV CONTRAST Additional Contrast? None   Final Result      1. Distal colonic diverticulosis. Equivocal wall thickening of the sigmoid colon is present which could indicate diverticulitis with mild hazy suspected inflammatory density. 2. No loculated fluid collections or free air. 3. Prior cholecystectomy. Prior hysterectomy. 4. Atherosclerotic disease. XR Acute Abd Series Chest 1 VW   Final Result      1. Mild nonspecific bowel distention, not obstructive in appearance. Assessment/Plan:      Acute diverticulitis (9/2/2018)    Assessment: Mild diverticulitis by CT with leukocytosis, unable to tolerate PO well, immunocompromised with RA    Plan: Cipro and flagyl IV, start full liquid diet, zofran for nausea, prn oxycodone for pain. HLD    Assessment: Stable on statin    Plan: Cont     Spinal stenosis (1/13/2015)    Assessment: Back pain is worse now, likely from her GI complaints    Plan: Cont prn percocet     Rheumatoid arthritis of multiple sites with negative rheumatoid factor ([facility] [address] (5/23/2017)    Assessment: She has been well maintained, sees Dr. Ryanne Giraldo for rheum, due for humira tomorrow    Plan: Delay Humira for active infection while treating. If she has an RA flare, will call her primary rheumatologist and prednisone therapy     Immunosuppression ([facility] [address] (8/29/2016)    Assessment: As above, due to RA on humira    Plan:  Will hold humira, due

## 2018-09-02 NOTE — ED PROVIDER NOTES
Disposition     PATIENT REFERRED TO:  No follow-up provider specified.     DISCHARGE MEDICATIONS:  New Prescriptions    No medications on file       DISPOSITION Decision To Admit 09/02/2018 02:23:47 Maria Luisa Moore MD  09/02/18 9477

## 2018-09-02 NOTE — ED NOTES
Pt is unable to have BM.  She states she is only able to pass urine and gas     Adolfo Javed RN  09/02/18 1102

## 2018-09-02 NOTE — PROGRESS NOTES
Bilateral     metatarsal removal    HYSTERECTOMY, TOTAL ABDOMINAL      JOINT REPLACEMENT Right 04/25/2016    Dr. Lori Murray , shoulder    JOINT REPLACEMENT Right     OTHER SURGICAL HISTORY Right 02/20/2017    RIGHT TOTAL KNEE ARTHROPLASTY             SALPINGO-OOPHORECTOMY      SHOULDER ARTHROPLASTY Right     SHOULDER ARTHROSCOPY Left 11/15/2017    TONSILLECTOMY      TOTAL KNEE ARTHROPLASTY Right        Level of Consciousness: Alert, Oriented, and Cooperative = 0    Level of Activity: Mostly sedentary, minimal walking = 2    Respiratory Pattern: Regular Pattern; RR 8-20 = 0    Breath Sounds: Clear = 0    Sputum   ,  ,    Cough: Strong, spontaneous, non-productive = 0    Vital Signs   BP (!) 157/77   Pulse 63   Temp 98.6 °F (37 °C) (Oral)   Resp 18   SpO2 95%   SPO2 (COPD values may differ): Greater than or equal to 92% on room air = 0    Peak Flow (asthma only): not applicable = 0    RSI: 0-4 = See once and convert to home regimen or discontinue        Plan       Goals: medication delivery    Patient/caregiver was educated on the proper method of use for Respiratory Care Devices:  Yes      Level of patient/caregiver understanding able to:   [x] Verbalize understanding   [] Demonstrate understanding       [] Teach back        [] Needs reinforcement       []  No available caregiver               []  Other:     Response to education:  Excellent     Is patient being placed on Home Treatment Regimen? Yes     Does the patient have everything they need prior to discharge? Yes     Comments: Patient states that she rarely uses her MDI. She does use her home BIPAP unit. Plan of Care: Albuterol HHN Q6h PRN and BIPAP nightly on home settings. Electronically signed by Nilay Briseno RCP on 9/2/2018 at 6:25 PM    Respiratory Protocol Guidelines     1.  Assessment and treatment by Respiratory Therapy will be initiated for medication and therapeutic interventions upon initiation of aerosolized bronchodilator. 2. Discontinue if patient experiences worsened bronchospasm, or secretions have lessened to the point that the patient is able to clear them with a cough. Anti-inflammatory and Combination Medications:    1. If the patient lacks prior history of lung disease, is not using inhaled anti-inflammatory medication at home, and lacks wheezing by examination or by history for at least 24 hours, contact physician for possible discontinuation.

## 2018-09-03 PROCEDURE — 6360000002 HC RX W HCPCS: Performed by: INTERNAL MEDICINE

## 2018-09-03 PROCEDURE — 2500000003 HC RX 250 WO HCPCS: Performed by: INTERNAL MEDICINE

## 2018-09-03 PROCEDURE — 87449 NOS EACH ORGANISM AG IA: CPT

## 2018-09-03 PROCEDURE — 6370000000 HC RX 637 (ALT 250 FOR IP): Performed by: INTERNAL MEDICINE

## 2018-09-03 PROCEDURE — 87324 CLOSTRIDIUM AG IA: CPT

## 2018-09-03 PROCEDURE — 1200000000 HC SEMI PRIVATE

## 2018-09-03 PROCEDURE — 2580000003 HC RX 258: Performed by: INTERNAL MEDICINE

## 2018-09-03 RX ORDER — LOPERAMIDE HYDROCHLORIDE 2 MG/1
2 CAPSULE ORAL EVERY 4 HOURS PRN
Status: DISCONTINUED | OUTPATIENT
Start: 2018-09-03 | End: 2018-09-04

## 2018-09-03 RX ORDER — CHOLESTYRAMINE LIGHT 4 G/5.7G
4 POWDER, FOR SUSPENSION ORAL 2 TIMES DAILY PRN
Status: DISCONTINUED | OUTPATIENT
Start: 2018-09-03 | End: 2018-09-06 | Stop reason: HOSPADM

## 2018-09-03 RX ADMIN — CIPROFLOXACIN 400 MG: 2 INJECTION, SOLUTION INTRAVENOUS at 15:09

## 2018-09-03 RX ADMIN — ESCITALOPRAM OXALATE 10 MG: 10 TABLET ORAL at 09:15

## 2018-09-03 RX ADMIN — Medication 1 PACKET: at 09:21

## 2018-09-03 RX ADMIN — MONTELUKAST SODIUM 10 MG: 10 TABLET, FILM COATED ORAL at 21:11

## 2018-09-03 RX ADMIN — CETIRIZINE HYDROCHLORIDE 10 MG: 10 TABLET, FILM COATED ORAL at 21:11

## 2018-09-03 RX ADMIN — TRAZODONE HYDROCHLORIDE 50 MG: 50 TABLET ORAL at 21:11

## 2018-09-03 RX ADMIN — CIPROFLOXACIN 400 MG: 2 INJECTION, SOLUTION INTRAVENOUS at 02:10

## 2018-09-03 RX ADMIN — METOPROLOL SUCCINATE 12.5 MG: 25 TABLET, EXTENDED RELEASE ORAL at 09:15

## 2018-09-03 RX ADMIN — GABAPENTIN 300 MG: 300 CAPSULE ORAL at 16:35

## 2018-09-03 RX ADMIN — METRONIDAZOLE 500 MG: 500 INJECTION, SOLUTION INTRAVENOUS at 01:07

## 2018-09-03 RX ADMIN — ENOXAPARIN SODIUM 40 MG: 40 INJECTION SUBCUTANEOUS at 09:16

## 2018-09-03 RX ADMIN — METRONIDAZOLE 500 MG: 500 INJECTION, SOLUTION INTRAVENOUS at 16:36

## 2018-09-03 RX ADMIN — Medication 10 ML: at 09:22

## 2018-09-03 RX ADMIN — GABAPENTIN 300 MG: 300 CAPSULE ORAL at 09:16

## 2018-09-03 RX ADMIN — ONDANSETRON HYDROCHLORIDE 4 MG: 4 TABLET, FILM COATED ORAL at 16:31

## 2018-09-03 RX ADMIN — CHOLESTYRAMINE 4 G: 4 POWDER, FOR SUSPENSION ORAL at 14:02

## 2018-09-03 RX ADMIN — OXYCODONE HYDROCHLORIDE AND ACETAMINOPHEN 1 TABLET: 5; 325 TABLET ORAL at 21:11

## 2018-09-03 RX ADMIN — Medication 10 ML: at 21:14

## 2018-09-03 RX ADMIN — METRONIDAZOLE 500 MG: 500 INJECTION, SOLUTION INTRAVENOUS at 23:50

## 2018-09-03 RX ADMIN — CHOLECALCIFEROL TAB 25 MCG (1000 UNIT) 1000 UNITS: 25 TAB at 09:16

## 2018-09-03 RX ADMIN — GABAPENTIN 300 MG: 300 CAPSULE ORAL at 21:11

## 2018-09-03 RX ADMIN — METRONIDAZOLE 500 MG: 500 INJECTION, SOLUTION INTRAVENOUS at 09:18

## 2018-09-03 ASSESSMENT — PAIN SCALES - GENERAL: PAINLEVEL_OUTOF10: 7

## 2018-09-03 NOTE — PROGRESS NOTES
Internal Medicine Progress Note  9/3/2018 7:34 AM  Subjective:   Admit Date: 9/2/2018  PCP: Jovany Cabrales  Brief HPI: Haley Bal, 70 y.o. female w/ PMHx HTN, RA, HLD, MVR, GERD, depression who p/w abdominal pain progressively worsening over the past 3 days. Admitted for diverticulitis in immunosuppressed patient. Interval History: Had a few loose BM overnight and abdominal cramping which has improved slightly, less pain. Some slight nausea, no new joint pains. She would like to advance her diet today and is understanding that she should delay both her humira dosing and her trip to [facility] planned for 2 days from today.     ROS - Respiratory ROS: no cough, shortness of breath, or wheezing  Cardiovascular ROS: no chest pain or dyspnea on exertion  Genito-Urinary ROS: no dysuria, trouble voiding, or hematuria  Neurological ROS: no TIA or stroke symptoms    Diet: DIET FULL LIQUID;    Objective:   Vitals: BP (!) 144/81   Pulse 69   Temp 97.1 °F (36.2 °C) (Oral)   Resp 18   SpO2 96%   General appearance: alert, appears stated age and cooperative  Lungs: clear to auscultation bilaterally  Heart: regular rate and rhythm, S1, S2 normal, no murmur, click, rub or gallop  Abdomen: normal findings: bowel sounds normal and abnormal findings:  tenderness mild in the LLQ  Extremities: extremities normal, atraumatic, no cyanosis or edema  Neurologic: Mental status: Alert, oriented, thought content appropriate    Data:   Scheduled Meds:   cetirizine  10 mg Oral Nightly    escitalopram  10 mg Oral Daily    fenofibrate  160 mg Oral Daily    gabapentin  300 mg Oral 4x Daily    metoprolol succinate  12.5 mg Oral Daily    montelukast  10 mg Oral Nightly    traZODone  50 mg Oral Nightly    vitamin D  1,000 Units Oral Daily    sodium chloride flush  10 mL Intravenous 2 times per day    enoxaparin  40 mg Subcutaneous Daily    ciprofloxacin  400 mg Intravenous Q12H    metroNIDAZOLE  500 mg Intravenous Q8H   

## 2018-09-03 NOTE — PROGRESS NOTES
Pt Had one formed BM this morning and several loose stools after. CD precautions were implemented per protocol. Trying to obtain a non contaminated stool sample. Pt and family have been very involved in pt care. Pt has been calm and cooperative throughout the shift. Uses call light appropriately. Has a steady gait and tolerates ambulation well. Pt complained of nausea once throughout the shift and antiemetics were given (see mar). Pt found relief and was satisfied with antiemetics outcome.   Electronically signed by Claudia Amos RN on 9/3/2018 at 7:54 PM

## 2018-09-04 LAB
A/G RATIO: 1.1 (ref 1.1–2.2)
ALBUMIN SERPL-MCNC: 3.5 G/DL (ref 3.4–5)
ALP BLD-CCNC: 109 U/L (ref 40–129)
ALT SERPL-CCNC: 22 U/L (ref 10–40)
ANION GAP SERPL CALCULATED.3IONS-SCNC: 11 MMOL/L (ref 3–16)
AST SERPL-CCNC: 47 U/L (ref 15–37)
BASOPHILS ABSOLUTE: 0.1 K/UL (ref 0–0.2)
BASOPHILS RELATIVE PERCENT: 1.3 %
BILIRUB SERPL-MCNC: 1 MG/DL (ref 0–1)
BUN BLDV-MCNC: 3 MG/DL (ref 7–20)
C DIFFICILE TOXIN, EIA: NORMAL
C-REACTIVE PROTEIN: 16.3 MG/L (ref 0–5.1)
CALCIUM SERPL-MCNC: 9.2 MG/DL (ref 8.3–10.6)
CHLORIDE BLD-SCNC: 102 MMOL/L (ref 99–110)
CO2: 28 MMOL/L (ref 21–32)
CREAT SERPL-MCNC: <0.5 MG/DL (ref 0.6–1.2)
EOSINOPHILS ABSOLUTE: 0.3 K/UL (ref 0–0.6)
EOSINOPHILS RELATIVE PERCENT: 2.8 %
GFR AFRICAN AMERICAN: >60
GFR NON-AFRICAN AMERICAN: >60
GLOBULIN: 3.2 G/DL
GLUCOSE BLD-MCNC: 104 MG/DL (ref 70–99)
HCT VFR BLD CALC: 36.8 % (ref 36–48)
HEMOGLOBIN: 12 G/DL (ref 12–16)
LYMPHOCYTES ABSOLUTE: 2.7 K/UL (ref 1–5.1)
LYMPHOCYTES RELATIVE PERCENT: 25.7 %
MCH RBC QN AUTO: 28.4 PG (ref 26–34)
MCHC RBC AUTO-ENTMCNC: 32.7 G/DL (ref 31–36)
MCV RBC AUTO: 87 FL (ref 80–100)
MONOCYTES ABSOLUTE: 1 K/UL (ref 0–1.3)
MONOCYTES RELATIVE PERCENT: 9.8 %
NEUTROPHILS ABSOLUTE: 6.2 K/UL (ref 1.7–7.7)
NEUTROPHILS RELATIVE PERCENT: 60.4 %
PDW BLD-RTO: 16.6 % (ref 12.4–15.4)
PLATELET # BLD: 329 K/UL (ref 135–450)
PMV BLD AUTO: 9.6 FL (ref 5–10.5)
POTASSIUM REFLEX MAGNESIUM: 4.1 MMOL/L (ref 3.5–5.1)
RBC # BLD: 4.23 M/UL (ref 4–5.2)
SEDIMENTATION RATE, ERYTHROCYTE: 14 MM/HR (ref 0–30)
SODIUM BLD-SCNC: 141 MMOL/L (ref 136–145)
TOTAL PROTEIN: 6.7 G/DL (ref 6.4–8.2)
WBC # BLD: 10.3 K/UL (ref 4–11)

## 2018-09-04 PROCEDURE — 2580000003 HC RX 258: Performed by: INTERNAL MEDICINE

## 2018-09-04 PROCEDURE — 6370000000 HC RX 637 (ALT 250 FOR IP): Performed by: INTERNAL MEDICINE

## 2018-09-04 PROCEDURE — 6360000002 HC RX W HCPCS: Performed by: INTERNAL MEDICINE

## 2018-09-04 PROCEDURE — 94761 N-INVAS EAR/PLS OXIMETRY MLT: CPT

## 2018-09-04 PROCEDURE — 80053 COMPREHEN METABOLIC PANEL: CPT

## 2018-09-04 PROCEDURE — 85025 COMPLETE CBC W/AUTO DIFF WBC: CPT

## 2018-09-04 PROCEDURE — 86140 C-REACTIVE PROTEIN: CPT

## 2018-09-04 PROCEDURE — 85652 RBC SED RATE AUTOMATED: CPT

## 2018-09-04 PROCEDURE — 36415 COLL VENOUS BLD VENIPUNCTURE: CPT

## 2018-09-04 PROCEDURE — 2500000003 HC RX 250 WO HCPCS: Performed by: INTERNAL MEDICINE

## 2018-09-04 PROCEDURE — 1200000000 HC SEMI PRIVATE

## 2018-09-04 PROCEDURE — 94660 CPAP INITIATION&MGMT: CPT

## 2018-09-04 RX ORDER — METOPROLOL SUCCINATE 25 MG/1
12.5 TABLET, EXTENDED RELEASE ORAL DAILY
Qty: 30 TABLET | Refills: 0 | Status: ON HOLD | OUTPATIENT
Start: 2018-09-04 | End: 2019-03-25

## 2018-09-04 RX ORDER — SACCHAROMYCES BOULARDII 250 MG
250 CAPSULE ORAL 2 TIMES DAILY
Status: DISCONTINUED | OUTPATIENT
Start: 2018-09-04 | End: 2018-09-06 | Stop reason: HOSPADM

## 2018-09-04 RX ORDER — PROMETHAZINE HYDROCHLORIDE 25 MG/ML
12.5 INJECTION, SOLUTION INTRAMUSCULAR; INTRAVENOUS ONCE
Status: COMPLETED | OUTPATIENT
Start: 2018-09-04 | End: 2018-09-04

## 2018-09-04 RX ORDER — MONTELUKAST SODIUM 4 MG/1
1 TABLET, CHEWABLE ORAL 2 TIMES DAILY WITH MEALS
Status: DISCONTINUED | OUTPATIENT
Start: 2018-09-04 | End: 2018-09-04

## 2018-09-04 RX ORDER — ACETAMINOPHEN 325 MG/1
650 TABLET ORAL ONCE
Status: COMPLETED | OUTPATIENT
Start: 2018-09-04 | End: 2018-09-04

## 2018-09-04 RX ORDER — MONTELUKAST SODIUM 4 MG/1
3 TABLET, CHEWABLE ORAL 2 TIMES DAILY WITH MEALS
Status: DISCONTINUED | OUTPATIENT
Start: 2018-09-04 | End: 2018-09-06 | Stop reason: HOSPADM

## 2018-09-04 RX ORDER — SODIUM CHLORIDE, SODIUM LACTATE, POTASSIUM CHLORIDE, CALCIUM CHLORIDE 600; 310; 30; 20 MG/100ML; MG/100ML; MG/100ML; MG/100ML
INJECTION, SOLUTION INTRAVENOUS CONTINUOUS
Status: DISCONTINUED | OUTPATIENT
Start: 2018-09-04 | End: 2018-09-05

## 2018-09-04 RX ADMIN — MONTELUKAST SODIUM 3 G: 4 TABLET, CHEWABLE ORAL at 20:31

## 2018-09-04 RX ADMIN — METRONIDAZOLE 500 MG: 500 INJECTION, SOLUTION INTRAVENOUS at 15:55

## 2018-09-04 RX ADMIN — ESCITALOPRAM OXALATE 10 MG: 10 TABLET ORAL at 08:11

## 2018-09-04 RX ADMIN — CIPROFLOXACIN 400 MG: 2 INJECTION, SOLUTION INTRAVENOUS at 02:01

## 2018-09-04 RX ADMIN — CIPROFLOXACIN 400 MG: 2 INJECTION, SOLUTION INTRAVENOUS at 14:14

## 2018-09-04 RX ADMIN — Medication 10 ML: at 10:27

## 2018-09-04 RX ADMIN — METRONIDAZOLE 500 MG: 500 INJECTION, SOLUTION INTRAVENOUS at 08:12

## 2018-09-04 RX ADMIN — CHOLECALCIFEROL TAB 25 MCG (1000 UNIT) 1000 UNITS: 25 TAB at 08:11

## 2018-09-04 RX ADMIN — ONDANSETRON 4 MG: 2 INJECTION INTRAMUSCULAR; INTRAVENOUS at 19:42

## 2018-09-04 RX ADMIN — SODIUM CHLORIDE, POTASSIUM CHLORIDE, SODIUM LACTATE AND CALCIUM CHLORIDE: 600; 310; 30; 20 INJECTION, SOLUTION INTRAVENOUS at 11:50

## 2018-09-04 RX ADMIN — ONDANSETRON HYDROCHLORIDE 4 MG: 4 TABLET, FILM COATED ORAL at 15:54

## 2018-09-04 RX ADMIN — Medication 250 MG: at 14:29

## 2018-09-04 RX ADMIN — GABAPENTIN 300 MG: 300 CAPSULE ORAL at 08:11

## 2018-09-04 RX ADMIN — ACETAMINOPHEN 650 MG: 325 TABLET ORAL at 17:47

## 2018-09-04 RX ADMIN — ONDANSETRON HYDROCHLORIDE 4 MG: 4 TABLET, FILM COATED ORAL at 09:48

## 2018-09-04 RX ADMIN — MONTELUKAST SODIUM 3 G: 4 TABLET, CHEWABLE ORAL at 14:15

## 2018-09-04 RX ADMIN — OXYCODONE HYDROCHLORIDE AND ACETAMINOPHEN 1 TABLET: 5; 325 TABLET ORAL at 09:12

## 2018-09-04 RX ADMIN — ENOXAPARIN SODIUM 40 MG: 40 INJECTION SUBCUTANEOUS at 08:11

## 2018-09-04 RX ADMIN — PROMETHAZINE HYDROCHLORIDE 12.5 MG: 25 INJECTION INTRAMUSCULAR; INTRAVENOUS at 22:22

## 2018-09-04 RX ADMIN — METOPROLOL SUCCINATE 12.5 MG: 25 TABLET, EXTENDED RELEASE ORAL at 08:11

## 2018-09-04 RX ADMIN — GABAPENTIN 300 MG: 300 CAPSULE ORAL at 17:48

## 2018-09-04 ASSESSMENT — PAIN SCALES - GENERAL
PAINLEVEL_OUTOF10: 7
PAINLEVEL_OUTOF10: 3
PAINLEVEL_OUTOF10: 3
PAINLEVEL_OUTOF10: 5

## 2018-09-04 ASSESSMENT — PAIN DESCRIPTION - PAIN TYPE: TYPE: ACUTE PAIN

## 2018-09-04 ASSESSMENT — PAIN DESCRIPTION - LOCATION: LOCATION: BACK

## 2018-09-04 NOTE — PROGRESS NOTES
Nutrition Assessment    Type and Reason for Visit: Initial, Positive Nutrition Screen    Nutrition Recommendations:     1. General diet, gluten and dairy free  2. Add Ensure Clear  3. Monitor nutritional adequacy and clinical progress  4. Obtain current body weight    Malnutrition Assessment:  · Malnutrition Status: Insufficient data  · Context: Acute illness or injury    Nutrition Diagnosis:   · Problem: Inadequate oral intake  · Etiology: related to Acute injury/trauma     Signs and symptoms:  as evidenced by Diet history of poor intake    Nutrition Assessment:  · Subjective Assessment: Positive screen for wt loss and poor po. Patient with PMHx HTN, RA, HLD, MVR, GERD, depression who p/w abdominal pain progressively worsening over the past 3 days. Admitted for diverticulitis in immunosuppressed patient. Pt is on a general diet however has had minimal intake r/t nausea. Pt with multiple loos stools since admission. No CBW obtained. Pt receiving IV abx. Pt with dairy intolerance and hx of chronic diarrhea (recent admit ~3 weeks ago), will order Ensure Clear. · Nutrition-Focused Physical Findings:    · Wound Type: None  · Current Nutrition Therapies:  · Oral Diet Orders: General   · Oral Diet intake: Unable to assess  · Oral Nutrition Supplement (ONS) Orders: None  · Anthropometric Measures:  · Ht: 5' (152.4 cm) (Per care everywhere)   · Ideal Body Wt: 100 lb (45.4 kg), Comparative Standards (Estimated Nutrition Needs):    Estimated Intake vs Estimated Needs: Intake Less Than Needs    Nutrition Risk Level: Moderate    Nutrition Interventions:   Continue current diet, Start ONS  Continued Inpatient Monitoring    Nutrition Evaluation:   · Evaluation: Goals set   · Goals: Patient will tolerate and consume 50% or greater of all meals and supplements    · Monitoring: Meal Intake, Supplement Intake, Diet Tolerance, Pertinent Labs, Diarrhea    See Adult Nutrition Doc Flowsheet for more detail.      Electronically

## 2018-09-04 NOTE — PLAN OF CARE
Problem: Falls - Risk of:  Goal: Will remain free from falls  Will remain free from falls   Outcome: Ongoing  Pt has remained free of falls so far this shift. Pt has a steady gait when ambulating but has weakness in the lower extremities. Pt is resting comfortably with the bed alarm on and call light within reach. Problem: Pain:  Goal: Pain level will decrease  Pain level will decrease   Outcome: Ongoing  Patient has complained of headaches and pain in her back. The pain has been tolerable. Pt wants only tylenol. Perfect serve was sent for an order. Will continue to monitor.

## 2018-09-04 NOTE — CONSULTS
[address] [place] [address]  GI Consultation      Patient: Tasia Dubois  : 1940       Date:  2018    Subjective:       History of Present Illness  Patient is a 70 y.o.  female admitted with Acute diverticulitis [K57.92]  Acute diverticulitis [K57.92] who is seen in consult for diverticulitis, functional diarrhea    Well known to our service as she follows with me for functional diarrhea  She has egd/colonoscopy with biopsy in 2018; no suggestion of microscopic colitis or celiac on biopsy result. She was having some response to colestid which we increased to 3g twice daily on her last visit with immodium prn. She used to be home bound and she proudly shows me her pedicure which she obtained as proof that her quality of life is improving.     Began having acute abdominal pain and fever over the weekend and was sent to ER for further evaluation  CT suggests mild diverticulitis  She is tolerating clears and has had 3 BM today; she did have one overnight but this is not typical for her  No blood in the stool    She takes Humira for her a Rheumatoid Arthritis        Past Medical History:   Diagnosis Date    Allergic rhinitis 2010    Asthma 2014    Chicken pox     Depression     Diverticulosis of large intestine 2010    Essential hypertension 2015    Gastroesophageal reflux disease without esophagitis 2015    GERD (gastroesophageal reflux disease)     Hyperlipidemia     Hypertension     Interstitial lung disease ([address]     Mitral valve regurgitation 2011    Mixed hyperlipidemia 3/10/2016    Obesity     Osteoarthritis     Osteoporosis 2010    Polio     Prolonged emergence from general anesthesia     sats dropped    RA (rheumatoid arthritis) ([address]     Rheumatoid arthritis ([address] 2010    Sleep apnea 2010    uses BPAP      Past Surgical History:   Procedure Laterality Date    APPENDECTOMY      CARPAL TUNNEL RELEASE Bilateral     CHOLECYSTECTOMY      FOOT SURGERY Bilateral     metatarsal removal    HYSTERECTOMY, TOTAL ABDOMINAL      JOINT REPLACEMENT Right 04/25/2016    Dr. Scott Vang , shoulder    JOINT REPLACEMENT Right     OTHER SURGICAL HISTORY Right 02/20/2017    RIGHT TOTAL KNEE ARTHROPLASTY             SALPINGO-OOPHORECTOMY      SHOULDER ARTHROPLASTY Right     SHOULDER ARTHROSCOPY Left 11/15/2017    TONSILLECTOMY      TOTAL KNEE ARTHROPLASTY Right       Past Endoscopic History see above    Admission Meds  No current facility-administered medications on file prior to encounter. Current Outpatient Prescriptions on File Prior to Encounter   Medication Sig Dispense Refill    escitalopram (LEXAPRO) 10 MG tablet Take 1 tablet by mouth daily 30 tablet 0    diphenoxylate-atropine (DIPHENATOL) 2.5-0.025 MG per tablet Take 1 tablet by mouth 4 times daily as needed for Diarrhea for up to 30 days. . 60 tablet 2    ondansetron (ZOFRAN) 4 MG tablet Take 1 tablet by mouth every 8 hours as needed for Nausea or Vomiting 20 tablet 0    metoprolol succinate (TOPROL XL) 25 MG extended release tablet Take 12.5 mg by mouth daily      colestipol (COLESTID) 1 g tablet Take 1 g by mouth 3 times daily      sulindac (CLINORIL) 200 MG tablet Take 200 mg by mouth daily       montelukast (SINGULAIR) 10 MG tablet Take 1 tablet by mouth nightly 90 tablet 1    traZODone (DESYREL) 50 MG tablet Take 1 tablet by mouth nightly 90 tablet 1    vitamin D (CHOLECALCIFEROL) 1000 UNITS TABS tablet Take 1,000 Units by mouth daily       gabapentin (NEURONTIN) 300 MG capsule Take 300 mg by mouth 4 times daily      Probiotic Product (PROBIOTIC FORMULA) CAPS Take 1 capsule by mouth daily.       cetirizine (ZYRTEC) 10 MG tablet Take 10 mg by mouth nightly       adalimumab (HUMIRA) 40 MG/0.8ML injection Inject 40 mg into the skin once a week Mondays      albuterol sulfate HFA (VENTOLIN HFA) 108 (90 Base) MCG/ACT inhaler Inhale 2 puffs into the lungs every 6 hours as needed for Wheezing MAY USE ALTERNATIVE PER INSURANCE 1 Inhaler 3    fenofibrate (TRICOR) 145 MG tablet Take 1 tablet by mouth daily 90 tablet 1    leflunomide (ARAVA) 20 MG tablet Take 20 mg by mouth daily       B Complex Vitamins (B COMPLEX 1) TABS Take 1 tablet by mouth Daily. Allergies  Allergies   Allergen Reactions    Sulfa Antibiotics Hives    Lamotrigine Other (See Comments)     Blurred vision    Lomotil  [Diphenoxylate-Atropine] Other (See Comments)     Changes in vision    Sulfacetamide Sodium Hives     Other reaction(s): Other (See Comments)      Social   Social History   Substance Use Topics    Smoking status: Former Smoker     Packs/day: 1.00     Years: 14.00     Types: Cigarettes     Quit date: 1/1/1976    Smokeless tobacco: Never Used      Comment: quit age 29    Alcohol use 0.6 - 1.2 oz/week     1 - 2 Glasses of wine per week      Comment: social drinker        Family History   Problem Relation Age of Onset    Cancer Mother     Heart Disease Father     High Blood Pressure Father     Diabetes Brother     High Blood Pressure Brother     Depression Brother     Arthritis Sister     Asthma Sister     High Cholesterol Sister     Depression Sister     Psoriasis Sister     Depression Brother           Review of Systems  Pertinent items are noted in HPI.        Physical Exam    BP (!) 148/72   Pulse 62   Temp 98.7 °F (37.1 °C) (Oral)   Resp 16   Ht 5' (1.524 m) Comment: Per care everywhere  SpO2 92%   BMI 33.40 kg/m²   General appearance: alert, cooperative, no distress, appears stated age  Anicteric, No Jaundice  Head: Normocephalic, without obvious abnormality  Lungs: clear to auscultation bilaterally, Normal Effort  Heart: regular rate and rhythm, normal S1 and S2, no murmurs or rubs  Abdomen:Mild llq ttp, nobs  Extremities: atraumatic, no cyanosis or edema  Skin: warm and dry  Neuro: intact  AAOX3      Data Review:    Recent Labs      09/02/18   1115

## 2018-09-04 NOTE — PLAN OF CARE
Problem: Nutrition  Goal: Optimal nutrition therapy  Outcome: Ongoing  Nutrition Problem: Inadequate oral intake  Intervention: Food and/or Nutrient Delivery: Continue current diet, Start ONS  Nutritional Goals: Patient will tolerate and consume 50% or greater of all meals and supplements

## 2018-09-04 NOTE — PROGRESS NOTES
S1, S2 normal, no murmur, click, rub or gallop  Abdomen: soft, non-tender; bowel sounds normal; no masses,  no organomegaly and has some tenderness to deep palpation in ll1  Extremities: extremities normal, atraumatic, no cyanosis or edema  Pulses: 2+ and symmetric  Skin: Skin color, texture, turgor normal. No rashes or lesions    LABS:  Recent Labs      09/02/18   1115  09/04/18   0748   WBC  11.3*  10.3   HGB  12.5  12.0   HCT  39.2  36.8   PLT  272  329                                                                    Recent Labs      09/02/18   1115  09/04/18   0748   NA  134*  141   K  4.2  4.1   CL  97*  102   CO2  18*  28   BUN  4*  3*   CREATININE  <0.5*  <0.5*   GLUCOSE  92  104*     Recent Labs      09/02/18   1115  09/04/18   0748   AST  49*  47*   ALT  35  22   BILITOT  1.1*  1.0   ALKPHOS  132*  109     Recent Labs      09/02/18   1115   TROPONINI  <0.01     No results for input(s): BNP in the last 72 hours. No results for input(s): CHOL, HDL in the last 72 hours. Invalid input(s): LDLCALCU  No results for input(s): INR in the last 72 hours. Assessment & Plan:    Patient Active Problem List:      71 yo female with RA admitted for acute colitis, sigmoid colon   Doing ok today states pain is about the same it was better but this morning had nausea  Discussed gi consultation for possible sigmoidoscopy  I restarted iv fluids as she is nauseated and is having trouble taking in po  For ra hold humira today  Ordered sed rate and crp, she likely needs biopsy, she has had issues for 3 months now reviewed prior ct scan and biopsy results from July  Discussed case at length with patient and patients daughter  Greater than 35 minutes spent on case over half face to face       The patient and / or the family were informed of the results of any tests, a time was given to answer questions, a plan was proposed and they agreed with plan.   Disposition: continue inpatient stay   DNR-CCA      MD Dalia Meier

## 2018-09-05 LAB
A/G RATIO: 1.2 (ref 1.1–2.2)
ALBUMIN SERPL-MCNC: 3.1 G/DL (ref 3.4–5)
ALP BLD-CCNC: 97 U/L (ref 40–129)
ALT SERPL-CCNC: 17 U/L (ref 10–40)
ANION GAP SERPL CALCULATED.3IONS-SCNC: 9 MMOL/L (ref 3–16)
AST SERPL-CCNC: 24 U/L (ref 15–37)
BASOPHILS ABSOLUTE: 0.1 K/UL (ref 0–0.2)
BASOPHILS RELATIVE PERCENT: 2.1 %
BILIRUB SERPL-MCNC: 0.7 MG/DL (ref 0–1)
BUN BLDV-MCNC: <2 MG/DL (ref 7–20)
CALCIUM SERPL-MCNC: 8.8 MG/DL (ref 8.3–10.6)
CHLORIDE BLD-SCNC: 103 MMOL/L (ref 99–110)
CO2: 27 MMOL/L (ref 21–32)
CREAT SERPL-MCNC: <0.5 MG/DL (ref 0.6–1.2)
EOSINOPHILS ABSOLUTE: 0.3 K/UL (ref 0–0.6)
EOSINOPHILS RELATIVE PERCENT: 5.7 %
GFR AFRICAN AMERICAN: >60
GFR NON-AFRICAN AMERICAN: >60
GLOBULIN: 2.6 G/DL
GLUCOSE BLD-MCNC: 99 MG/DL (ref 70–99)
HCT VFR BLD CALC: 32.9 % (ref 36–48)
HEMOGLOBIN: 10.7 G/DL (ref 12–16)
LYMPHOCYTES ABSOLUTE: 1.7 K/UL (ref 1–5.1)
LYMPHOCYTES RELATIVE PERCENT: 34.9 %
MAGNESIUM: 1.1 MG/DL (ref 1.8–2.4)
MCH RBC QN AUTO: 28 PG (ref 26–34)
MCHC RBC AUTO-ENTMCNC: 32.6 G/DL (ref 31–36)
MCV RBC AUTO: 85.9 FL (ref 80–100)
MONOCYTES ABSOLUTE: 0.6 K/UL (ref 0–1.3)
MONOCYTES RELATIVE PERCENT: 12.4 %
NEUTROPHILS ABSOLUTE: 2.1 K/UL (ref 1.7–7.7)
NEUTROPHILS RELATIVE PERCENT: 44.9 %
PDW BLD-RTO: 16.1 % (ref 12.4–15.4)
PLATELET # BLD: 257 K/UL (ref 135–450)
PMV BLD AUTO: 8.5 FL (ref 5–10.5)
POTASSIUM REFLEX MAGNESIUM: 3.2 MMOL/L (ref 3.5–5.1)
RBC # BLD: 3.83 M/UL (ref 4–5.2)
SODIUM BLD-SCNC: 139 MMOL/L (ref 136–145)
TOTAL PROTEIN: 5.7 G/DL (ref 6.4–8.2)
WBC # BLD: 4.8 K/UL (ref 4–11)

## 2018-09-05 PROCEDURE — 2580000003 HC RX 258: Performed by: INTERNAL MEDICINE

## 2018-09-05 PROCEDURE — 1200000000 HC SEMI PRIVATE

## 2018-09-05 PROCEDURE — 36415 COLL VENOUS BLD VENIPUNCTURE: CPT

## 2018-09-05 PROCEDURE — 6360000002 HC RX W HCPCS: Performed by: INTERNAL MEDICINE

## 2018-09-05 PROCEDURE — 94660 CPAP INITIATION&MGMT: CPT

## 2018-09-05 PROCEDURE — 83735 ASSAY OF MAGNESIUM: CPT

## 2018-09-05 PROCEDURE — 6370000000 HC RX 637 (ALT 250 FOR IP): Performed by: INTERNAL MEDICINE

## 2018-09-05 PROCEDURE — 80053 COMPREHEN METABOLIC PANEL: CPT

## 2018-09-05 PROCEDURE — 2500000003 HC RX 250 WO HCPCS: Performed by: INTERNAL MEDICINE

## 2018-09-05 PROCEDURE — 85025 COMPLETE CBC W/AUTO DIFF WBC: CPT

## 2018-09-05 RX ORDER — DEXTROSE, SODIUM CHLORIDE, AND POTASSIUM CHLORIDE 5; .45; .15 G/100ML; G/100ML; G/100ML
INJECTION INTRAVENOUS CONTINUOUS
Status: DISCONTINUED | OUTPATIENT
Start: 2018-09-05 | End: 2018-09-06 | Stop reason: HOSPADM

## 2018-09-05 RX ORDER — MAGNESIUM SULFATE IN WATER 40 MG/ML
4 INJECTION, SOLUTION INTRAVENOUS ONCE
Status: COMPLETED | OUTPATIENT
Start: 2018-09-05 | End: 2018-09-05

## 2018-09-05 RX ADMIN — Medication 250 MG: at 20:01

## 2018-09-05 RX ADMIN — CHOLECALCIFEROL TAB 25 MCG (1000 UNIT) 1000 UNITS: 25 TAB at 09:41

## 2018-09-05 RX ADMIN — Medication 10 ML: at 09:39

## 2018-09-05 RX ADMIN — MONTELUKAST SODIUM 3 G: 4 TABLET, CHEWABLE ORAL at 20:01

## 2018-09-05 RX ADMIN — MONTELUKAST SODIUM 10 MG: 10 TABLET, FILM COATED ORAL at 20:01

## 2018-09-05 RX ADMIN — TRAZODONE HYDROCHLORIDE 50 MG: 50 TABLET ORAL at 20:01

## 2018-09-05 RX ADMIN — GABAPENTIN 300 MG: 300 CAPSULE ORAL at 09:39

## 2018-09-05 RX ADMIN — CETIRIZINE HYDROCHLORIDE 10 MG: 10 TABLET, FILM COATED ORAL at 20:01

## 2018-09-05 RX ADMIN — Medication 10 ML: at 20:02

## 2018-09-05 RX ADMIN — GABAPENTIN 300 MG: 300 CAPSULE ORAL at 12:41

## 2018-09-05 RX ADMIN — GABAPENTIN 300 MG: 300 CAPSULE ORAL at 20:01

## 2018-09-05 RX ADMIN — MAGNESIUM SULFATE IN WATER 4 G: 40 INJECTION, SOLUTION INTRAVENOUS at 14:17

## 2018-09-05 RX ADMIN — METOPROLOL SUCCINATE 12.5 MG: 25 TABLET, EXTENDED RELEASE ORAL at 09:40

## 2018-09-05 RX ADMIN — OXYCODONE HYDROCHLORIDE AND ACETAMINOPHEN 1 TABLET: 5; 325 TABLET ORAL at 19:32

## 2018-09-05 RX ADMIN — ENOXAPARIN SODIUM 40 MG: 40 INJECTION SUBCUTANEOUS at 09:39

## 2018-09-05 RX ADMIN — Medication 250 MG: at 09:40

## 2018-09-05 RX ADMIN — OXYCODONE HYDROCHLORIDE AND ACETAMINOPHEN 1 TABLET: 5; 325 TABLET ORAL at 09:40

## 2018-09-05 RX ADMIN — ONDANSETRON 4 MG: 2 INJECTION INTRAMUSCULAR; INTRAVENOUS at 14:30

## 2018-09-05 RX ADMIN — MONTELUKAST SODIUM 3 G: 4 TABLET, CHEWABLE ORAL at 14:30

## 2018-09-05 RX ADMIN — ESCITALOPRAM OXALATE 10 MG: 10 TABLET ORAL at 09:40

## 2018-09-05 RX ADMIN — POTASSIUM CHLORIDE, DEXTROSE MONOHYDRATE AND SODIUM CHLORIDE: 150; 5; 450 INJECTION, SOLUTION INTRAVENOUS at 14:17

## 2018-09-05 ASSESSMENT — PAIN SCALES - GENERAL
PAINLEVEL_OUTOF10: 7

## 2018-09-05 ASSESSMENT — PAIN DESCRIPTION - PAIN TYPE: TYPE: CHRONIC PAIN

## 2018-09-05 ASSESSMENT — PAIN DESCRIPTION - LOCATION: LOCATION: BACK

## 2018-09-05 ASSESSMENT — PAIN DESCRIPTION - FREQUENCY: FREQUENCY: CONTINUOUS

## 2018-09-05 ASSESSMENT — PAIN DESCRIPTION - ORIENTATION: ORIENTATION: LOWER;RIGHT

## 2018-09-05 ASSESSMENT — PAIN DESCRIPTION - DESCRIPTORS: DESCRIPTORS: DULL

## 2018-09-05 NOTE — CARE COORDINATION
[address] Transitions Interview     2018    Patient: Benjamin Hooks Patient : 1940   MRN: [id number]  Reason for Admission: Diverticulosis  RARS: Readmission Risk Score: 13       Spoke with: Benjamin Hooks and Yola    Readmission Risk  Patient Active Problem List   Diagnosis    Candidiasis of skin and nails    Dermatophytosis of nail    Depressive disorder, not elsewhere classified    Obstructive sleep apnea syndrome    Diverticulosis of large intestine    Allergic rhinitis    Osteoporosis    Rheumatoid arthritis ([address]    Diaphragmatic hernia    Mitral valve regurgitation    Granulomatous lung disease ([address]    Complete tear of left rotator cuff    Biceps tendinitis on right    Postmenopausal osteoporosis    Asthma    Arthritis of right acromioclavicular joint    Glenohumeral arthritis right    Essential hypertension    Gastroesophageal reflux disease without esophagitis    Mixed hyperlipidemia    Status post reverse total replacement of right shoulder    Chronic pain of right knee    Morbid obesity with BMI of 40.0-44.9, adult ([address]    Chronic nonseasonal allergic rhinitis due to pollen    Mild intermittent asthma without complication    Interstitial lung disease (HCC)    Chronic low back pain    Disorder resulting from impaired renal function    Spinal stenosis    Status post total right knee replacement using cement on 2017    Major depressive disorder with single episode, in partial remission (HCC)    Rheumatoid arthritis of multiple sites with negative rheumatoid factor (HCC)    Osteoarthritis of lumbar spine    Immunosuppression ([address]    S/P left rotator cuff repair    Primary osteoarthritis of left shoulder    Recurrent cold sores    Diarrhea    Nausea with vomiting    Chronic diarrhea    Hypokalemia    Hypomagnesemia    Weight loss    Vertigo    UTI (urinary tract infection)    Acute diverticulitis       Inpatient Provider Katie Arroyo   8/28/2019 11:40 AM Patricia Wolfe MD [facility] [place]  There are no preventive care reminders to display for this patient.     Lavaughn Schirmer, RN

## 2018-09-05 NOTE — PROGRESS NOTES
Progress Note  Admit Date: 9/2/2018       Patient seen and examined  Has had no more abodminal pain  States she had several loose bms which she is saying immediately follows antibiotic infusion  She is feeling very weak because of the diarrhea  Scheduled Medications:    magnesium sulfate  4 g Intravenous Once    colestipol  3 g Oral BID WC    saccharomyces boulardii  250 mg Oral BID    cetirizine  10 mg Oral Nightly    escitalopram  10 mg Oral Daily    gabapentin  300 mg Oral 4x Daily    metoprolol succinate  12.5 mg Oral Daily    montelukast  10 mg Oral Nightly    traZODone  50 mg Oral Nightly    vitamin D  1,000 Units Oral Daily    sodium chloride flush  10 mL Intravenous 2 times per day    enoxaparin  40 mg Subcutaneous Daily      PRN Medications: cholestyramine light, albuterol, ondansetron, oxyCODONE-acetaminophen, sodium chloride flush, magnesium hydroxide, ondansetron, famotidine  Diet: DIET GENERAL;    Continuous Infusions:   dextrose 5% and 0.45% NaCl with KCl 20 mEq 100 mL/hr at 09/05/18 1417       PHYSICAL EXAM:  BP (!) 153/77 Comment: nurse notified   Pulse 69   Temp 97.8 °F (36.6 °C) (Oral)   Resp 16   Ht 5' (1.524 m) Comment: Per care everywhere  SpO2 96%   BMI 33.40 kg/m²   No results for input(s): POCGLU in the last 72 hours. Intake/Output Summary (Last 24 hours) at 09/05/18 1440  Last data filed at 09/05/18 0931   Gross per 24 hour   Intake              240 ml   Output                0 ml   Net              240 ml       BP (!) 153/77 Comment: nurse notified   Pulse 69   Temp 97.8 °F (36.6 °C) (Oral)   Resp 16   Ht 5' (1.524 m) Comment: Per care everywhere  SpO2 96%   BMI 33.40 kg/m²   General appearance: alert, appears stated age and cooperative  Head: Normocephalic, without obvious abnormality, atraumatic  Eyes: conjunctivae/corneas clear. PERRL, EOM's intact. Fundi benign.   Neck: no adenopathy, no carotid bruit, no JVD, supple, symmetrical, trachea midline and thyroid not enlarged, symmetric, no tenderness/mass/nodules  Lungs: clear to auscultation bilaterally  Heart: regular rate and rhythm, S1, S2 normal, no murmur, click, rub or gallop  Abdomen: soft non distended non tender bowel sounds normal active non tender today  Extremities: extremities normal, atraumatic, no cyanosis or edema  Pulses: 2+ and symmetric  Skin: Skin color, texture, turgor normal. No rashes or lesions    LABS:  Recent Labs      09/04/18   0748  09/05/18   0615   WBC  10.3  4.8   HGB  12.0  10.7*   HCT  36.8  32.9*   PLT  329  257                                                                    Recent Labs      09/04/18   0748  09/05/18   0615   NA  141  139   K  4.1  3.2*   CL  102  103   CO2  28  27   BUN  3*  <2*   CREATININE  <0.5*  <0.5*   GLUCOSE  104*  99     Recent Labs      09/04/18   0748  09/05/18   0615   AST  47*  24   ALT  22  17   BILITOT  1.0  0.7   ALKPHOS  109  97     No results for input(s): TROPONINI in the last 72 hours. No results for input(s): BNP in the last 72 hours. No results for input(s): CHOL, HDL in the last 72 hours. Invalid input(s): LDLCALCU  No results for input(s): INR in the last 72 hours. Assessment & Plan:    Patient Active Problem List:      69 yo female with RA admitted for acute colitis, sigmoid colon   Doing ok today pain is mostly resolved   Her bigger issue is diarrhea and she has concern about the diarrhea causing more weakness and it immediately folows abx infusion. She does not want to be on antibiotics. Discussed risks and benefits with patient and daughter at bedside, will monitor off antibiotics for now.   I restarted iv fluids w potassium and give magnesium for hypokalemia and hypomagnesemia secondary to gi loss   For ra hold humira today  Ordered sed rate and crp, these are normal  Discussed case at length with patient and patients daughter  Greater than 35 minutes spent on case over half face to face       The patient and / or the family were

## 2018-09-05 NOTE — PROGRESS NOTES
Normocephalic, without obvious abnormality, atraumatic  Neck: supple, symmetrical, trachea midline and thyroid not enlarged, symmetric, no tenderness/mass/nodules  CVS:  RRR, Nl s1s2  Lungs CTA Bilaterally, normal effort  Abdomen:nttp, nobs  AAOx3, No asterixis or encephalopathy  Extremities: No edema. Recent Labs      09/04/18 0748  09/05/18 0615   WBC  10.3  4.8   HGB  12.0  10.7*   HCT  36.8  32.9*   MCV  87.0  85.9   PLT  329  257     Recent Labs      09/04/18 0748  09/05/18 0615   NA  141  139   K  4.1  3.2*   CL  102  103   CO2  28  27   BUN  3*  <2*   CREATININE  <0.5*  <0.5*     Recent Labs      09/04/18 0748  09/05/18 0615   AST  47*  24   ALT  22  17   BILITOT  1.0  0.7   ALKPHOS  109  97     No results for input(s): LIPASE, AMYLASE in the last 72 hours. Recent Labs      09/04/18 0748 09/05/18 0615   PROT  6.7  5.7*     No results for input(s): PTT in the last 72 hours. No results for input(s): OCCULTBLD in the last 72 hours. Assessment:       Principal Problem:    Acute diverticulitis  Active Problems:    Interstitial lung disease ([address]    Spinal stenosis    Rheumatoid arthritis of multiple sites with negative rheumatoid factor (HCC)    Immunosuppression (HCC)    Chronic diarrhea  Resolved Problems:    * No resolved hospital problems.  *      Uncomplicated diverticulitis  Functional diarrhea    Recommendations:       Based on clinical improvement of pain and leukocytosis, ok to stop antibiotics  Advance diet as tolerated  Continue probiotics and colestipol  Needs outpatient follow up which we will arrange  No need for repeat CT so soon- we will consider several weeks from now      Last 22 9/5/2018  12:01 PM

## 2018-09-06 VITALS
HEART RATE: 67 BPM | DIASTOLIC BLOOD PRESSURE: 83 MMHG | RESPIRATION RATE: 16 BRPM | HEIGHT: 60 IN | SYSTOLIC BLOOD PRESSURE: 154 MMHG | OXYGEN SATURATION: 96 % | BODY MASS INDEX: 33.4 KG/M2 | TEMPERATURE: 98.6 F

## 2018-09-06 LAB
A/G RATIO: 1.2 (ref 1.1–2.2)
ALBUMIN SERPL-MCNC: 2.9 G/DL (ref 3.4–5)
ALP BLD-CCNC: 87 U/L (ref 40–129)
ALT SERPL-CCNC: 16 U/L (ref 10–40)
ANION GAP SERPL CALCULATED.3IONS-SCNC: 9 MMOL/L (ref 3–16)
AST SERPL-CCNC: 27 U/L (ref 15–37)
BASOPHILS ABSOLUTE: 0.1 K/UL (ref 0–0.2)
BASOPHILS RELATIVE PERCENT: 1.2 %
BILIRUB SERPL-MCNC: 0.7 MG/DL (ref 0–1)
BUN BLDV-MCNC: <2 MG/DL (ref 7–20)
CALCIUM SERPL-MCNC: 8.3 MG/DL (ref 8.3–10.6)
CHLORIDE BLD-SCNC: 102 MMOL/L (ref 99–110)
CO2: 26 MMOL/L (ref 21–32)
CREAT SERPL-MCNC: <0.5 MG/DL (ref 0.6–1.2)
EOSINOPHILS ABSOLUTE: 0.3 K/UL (ref 0–0.6)
EOSINOPHILS RELATIVE PERCENT: 5.6 %
GFR AFRICAN AMERICAN: >60
GFR NON-AFRICAN AMERICAN: >60
GLOBULIN: 2.5 G/DL
GLUCOSE BLD-MCNC: 104 MG/DL (ref 70–99)
HCT VFR BLD CALC: 33.6 % (ref 36–48)
HEMOGLOBIN: 10.8 G/DL (ref 12–16)
LYMPHOCYTES ABSOLUTE: 2.1 K/UL (ref 1–5.1)
LYMPHOCYTES RELATIVE PERCENT: 40 %
MAGNESIUM: 1.8 MG/DL (ref 1.8–2.4)
MCH RBC QN AUTO: 27.9 PG (ref 26–34)
MCHC RBC AUTO-ENTMCNC: 32.2 G/DL (ref 31–36)
MCV RBC AUTO: 86.5 FL (ref 80–100)
MONOCYTES ABSOLUTE: 0.7 K/UL (ref 0–1.3)
MONOCYTES RELATIVE PERCENT: 13.2 %
NEUTROPHILS ABSOLUTE: 2.1 K/UL (ref 1.7–7.7)
NEUTROPHILS RELATIVE PERCENT: 40 %
PDW BLD-RTO: 16.1 % (ref 12.4–15.4)
PLATELET # BLD: 247 K/UL (ref 135–450)
PMV BLD AUTO: 9.2 FL (ref 5–10.5)
POTASSIUM REFLEX MAGNESIUM: 3.1 MMOL/L (ref 3.5–5.1)
RBC # BLD: 3.88 M/UL (ref 4–5.2)
SODIUM BLD-SCNC: 137 MMOL/L (ref 136–145)
TOTAL PROTEIN: 5.4 G/DL (ref 6.4–8.2)
WBC # BLD: 5.3 K/UL (ref 4–11)

## 2018-09-06 PROCEDURE — 80053 COMPREHEN METABOLIC PANEL: CPT

## 2018-09-06 PROCEDURE — 83735 ASSAY OF MAGNESIUM: CPT

## 2018-09-06 PROCEDURE — G8987 SELF CARE CURRENT STATUS: HCPCS

## 2018-09-06 PROCEDURE — 6370000000 HC RX 637 (ALT 250 FOR IP): Performed by: INTERNAL MEDICINE

## 2018-09-06 PROCEDURE — G8988 SELF CARE GOAL STATUS: HCPCS

## 2018-09-06 PROCEDURE — 97530 THERAPEUTIC ACTIVITIES: CPT

## 2018-09-06 PROCEDURE — G8978 MOBILITY CURRENT STATUS: HCPCS

## 2018-09-06 PROCEDURE — 97535 SELF CARE MNGMENT TRAINING: CPT

## 2018-09-06 PROCEDURE — 6360000002 HC RX W HCPCS: Performed by: INTERNAL MEDICINE

## 2018-09-06 PROCEDURE — 97161 PT EVAL LOW COMPLEX 20 MIN: CPT

## 2018-09-06 PROCEDURE — 97166 OT EVAL MOD COMPLEX 45 MIN: CPT

## 2018-09-06 PROCEDURE — G8979 MOBILITY GOAL STATUS: HCPCS

## 2018-09-06 PROCEDURE — 85025 COMPLETE CBC W/AUTO DIFF WBC: CPT

## 2018-09-06 PROCEDURE — 36415 COLL VENOUS BLD VENIPUNCTURE: CPT

## 2018-09-06 PROCEDURE — 97116 GAIT TRAINING THERAPY: CPT

## 2018-09-06 RX ORDER — OXYCODONE HYDROCHLORIDE AND ACETAMINOPHEN 5; 325 MG/1; MG/1
1 TABLET ORAL EVERY 4 HOURS PRN
Qty: 20 TABLET | Refills: 0 | Status: SHIPPED | OUTPATIENT
Start: 2018-09-06 | End: 2018-09-11

## 2018-09-06 RX ORDER — AMOXICILLIN AND CLAVULANATE POTASSIUM 875; 125 MG/1; MG/1
1 TABLET, FILM COATED ORAL 2 TIMES DAILY
Qty: 14 TABLET | Refills: 0 | Status: SHIPPED | OUTPATIENT
Start: 2018-09-06 | End: 2018-09-13

## 2018-09-06 RX ORDER — POTASSIUM CHLORIDE 20 MEQ/1
40 TABLET, EXTENDED RELEASE ORAL ONCE
Status: COMPLETED | OUTPATIENT
Start: 2018-09-06 | End: 2018-09-06

## 2018-09-06 RX ORDER — POTASSIUM CHLORIDE 20 MEQ/1
20 TABLET, EXTENDED RELEASE ORAL 2 TIMES DAILY
Qty: 60 TABLET | Refills: 1 | Status: ON HOLD | OUTPATIENT
Start: 2018-09-06 | End: 2019-03-25 | Stop reason: ALTCHOICE

## 2018-09-06 RX ADMIN — CHOLECALCIFEROL TAB 25 MCG (1000 UNIT) 1000 UNITS: 25 TAB at 09:22

## 2018-09-06 RX ADMIN — OXYCODONE HYDROCHLORIDE AND ACETAMINOPHEN 1 TABLET: 5; 325 TABLET ORAL at 09:22

## 2018-09-06 RX ADMIN — METOPROLOL SUCCINATE 12.5 MG: 25 TABLET, EXTENDED RELEASE ORAL at 09:23

## 2018-09-06 RX ADMIN — GABAPENTIN 300 MG: 300 CAPSULE ORAL at 09:23

## 2018-09-06 RX ADMIN — MONTELUKAST SODIUM 3 G: 4 TABLET, CHEWABLE ORAL at 12:42

## 2018-09-06 RX ADMIN — POTASSIUM CHLORIDE 40 MEQ: 20 TABLET, EXTENDED RELEASE ORAL at 09:22

## 2018-09-06 RX ADMIN — GABAPENTIN 300 MG: 300 CAPSULE ORAL at 14:52

## 2018-09-06 RX ADMIN — ESCITALOPRAM OXALATE 10 MG: 10 TABLET ORAL at 09:22

## 2018-09-06 RX ADMIN — Medication 250 MG: at 09:22

## 2018-09-06 RX ADMIN — ENOXAPARIN SODIUM 40 MG: 40 INJECTION SUBCUTANEOUS at 09:22

## 2018-09-06 ASSESSMENT — PAIN SCALES - GENERAL: PAINLEVEL_OUTOF10: 6

## 2018-09-06 NOTE — PROGRESS NOTES
[facility]  GI Progress Note        Danyel Redmond is a 70 y.o. female patient. 1. Diverticulitis of colon    2. Acute diverticulitis        Admit Date: 2018    Subjective:       Nausea gone  Tolerating diet  No abd pain        Scheduled Meds:   colestipol  3 g Oral BID WC    saccharomyces boulardii  250 mg Oral BID    cetirizine  10 mg Oral Nightly    escitalopram  10 mg Oral Daily    gabapentin  300 mg Oral 4x Daily    metoprolol succinate  12.5 mg Oral Daily    montelukast  10 mg Oral Nightly    traZODone  50 mg Oral Nightly    vitamin D  1,000 Units Oral Daily    sodium chloride flush  10 mL Intravenous 2 times per day    enoxaparin  40 mg Subcutaneous Daily       Continuous Infusions:   dextrose 5% and 0.45% NaCl with KCl 20 mEq 100 mL/hr at 18 1417       PRN Meds:  cholestyramine light, albuterol, ondansetron, oxyCODONE-acetaminophen, sodium chloride flush, magnesium hydroxide, ondansetron, famotidine      Objective:       Patient Vitals for the past 24 hrs:   BP Temp Temp src Pulse Resp SpO2   18 1214 (!) 154/83 98.6 °F (37 °C) Oral 67 16 96 %   18 0737 (!) 163/77 98.4 °F (36.9 °C) Oral 67 16 95 %   18 0310 (!) 154/78 97.6 °F (36.4 °C) Oral 70 16 95 %   18 2307 (!) 157/90 97.4 °F (36.3 °C) Oral 68 18 95 %   18 2000 (!) 149/82 98.7 °F (37.1 °C) Oral 75 16 93 %   18 1436 (!) 153/77 97.8 °F (36.6 °C) Oral 69 16 96 %       Exam:  VITALS:  BP (!) 154/83   Pulse 67   Temp 98.6 °F (37 °C) (Oral)   Resp 16   Ht 5' (1.524 m) Comment: Per care everywhere  SpO2 96%   BMI 33.40 kg/m²   TEMPERATURE:  Current - Temp: 98.6 °F (37 °C);  Max - Temp  Av.1 °F (36.7 °C)  Min: 97.4 °F (36.3 °C)  Max: 98.7 °F (37.1 °C)    NAD  General appearance: alert, appears stated age, cooperative and no distress  Head: Normocephalic, without obvious abnormality, atraumatic  Neck: supple, symmetrical, trachea midline and thyroid not enlarged, symmetric,

## 2018-09-06 NOTE — FLOWSHEET NOTE
09/06/18 1607   Encounter Summary   Services provided to: Patient   Referral/Consult From: Rounding   Continue Visiting (9/6, seven )   Complexity of Encounter Moderate   Length of Encounter 15 minutes   Routine   Type Initial   Assessment Calm; Approachable; Hopeful   Intervention Active listening;Explored feelings, thoughts, concerns;Nurtured hope   Outcome Comfort;Expressed gratitude; Refused/declined

## 2018-09-06 NOTE — CARE COORDINATION
Called Herlinda from [address] [facility] to inform her of admission. She will fax orders to her office for home care which should include a dietician and aide services.      Electronically signed by Andrés Gagnon RN on 9/6/2018 at 3:37 PM

## 2018-09-06 NOTE — PROGRESS NOTES
Occupational Therapy   Occupational Therapy Initial Assessment/Treatment  Date: 2018   Patient Name: Concepcion Valentino  MRN: [id number]     : 1940    Date of Service: 2018    Discharge Recommendations:    Concepcion Valentino scored a 20/24 on the AM-PAC ADL Inpatient form. Current research shows that an AM-PAC score of 18 or greater is typically associated with a discharge to the patient's home setting. Based on the patients AM-PAC score and their current ADL deficits, it is recommended that the patient have 2-3 sessions per week of Occupational Therapy at d/c to increase the patients independence. HOME HEALTH CARE: LEVEL 1 STANDARD    - Initial home health evaluation to occur within 24-48 hours, in patient home   - Therapy to evaluate with goal of regaining prior level of functioning   - Therapy to evaluate if patient has [address] needs for personal care    OT Equipment Recommendations  Equipment Needed: No      Treatment Diagnosis: Impaired ADL and functional mobility      Restrictions  Position Activity Restriction  Other position/activity restrictions: Up as tolerated    Subjective   General  Patient assessed for rehabilitation services?: Yes  Additional Pertinent Hx: Pt admitted 20 with abd pain. PMH includes: asthma, depression, diverticulosis, HTN, GERD, interstitial lung disease, mitral valve regurg, OA, RA, osteoporosis, polio, sleep apnea, appy, CTR, belle, bilateral foot surgery, hysterectomy, Rt TSR, Rt TKR  Family / Caregiver Present: No (daughters came in at end of tx)  Referring Practitioner: Dr. Vanessa Morgan  Diagnosis: Acute Diverticulitis  Subjective  Subjective: Pt in bed upon entry. Pt agreeable to OOB activity.   Pain Assessment  Patient Currently in Pain: Yes (6/10 back pain, RN aware)     Social/Functional History  Social/Functional History  Lives With: Alone  Type of Home: Apartment (2nd floor)  Home Layout: One level (laundry is in her apt)  Home Access: Level entry, Elevator  Bathroom Shower/Tub: Walk-in shower  Bathroom Toilet: Handicap height  Bathroom Equipment: Grab bars around toilet, Grab bars in shower, Shower chair, Hand-held shower, Toilet raiser  Home Equipment: Wheelchair-manual, Rolling walker, 4 wheeled walker, Lift chair, Reacher (adjustable bed)  ADL Assistance: Needs assistance (someone present when showering)  Homemaking Assistance: Needs assistance (cleaning service. Pt does minimal cooking.)  Ambulation Assistance: Independent (via 2 wheeled walker)  Transfer Assistance: Independent  Active : Yes (hasn't driven in a few months)  Leisure & Hobbies: Play Bridge  Additional Comments: Pt was getting home PT (3x/week) and OT (1x week).   Pt reports having several falls with the last one being in August.       Objective   Vision: Impaired  Vision Exceptions: Wears glasses at all times  Hearing: Within functional limits    Orientation  Overall Orientation Status: Within Functional Limits     Balance  Sitting Balance: Independent  Standing Balance: Stand by assistance (to supervision)  Standing Balance  Time: ~4 min  Activity: bathroom mobility/activity, walk in moe  Sit to stand: Stand by assistance  Stand to sit: Stand by assistance  Functional Mobility  Functional - Mobility Device: Rolling Walker  Activity: To/from bathroom  Assist Level: Stand by assistance  Toilet Transfers  Toilet - Technique: Ambulating  Equipment Used: Standard toilet (grab bar)  Toilet Transfer: Stand by assistance  ADL  Grooming: Stand by assistance  LE Dressing: Minimal assistance  Toileting: Stand by assistance  Tone RUE  RUE Tone: Normotonic  Tone LUE  LUE Tone: Normotonic  Coordination  Movements Are Fluid And Coordinated: Yes     Bed mobility  Supine to Sit: Modified independent (HOB elevated)  Scooting: Independent  Transfers  Stand Step Transfers: Stand by assistance  Sit to stand: Stand by assistance  Stand to sit: Stand by assistance  Vision - Basic Assessment  Prior Vision: Wears glasses all the time  Cognition  Overall Cognitive Status: WNL        Sensation  Overall Sensation Status: WFL (Denies numbness and tingling)        LUE AROM (degrees)  LUE AROM : Exceptions  L Shoulder Flexion 0-180: ~85  RUE AROM (degrees)  RUE AROM : Exceptions  R Shoulder Flexion 0-180: ~85  LUE Strength  Gross LUE Strength: WFL  RUE Strength  Gross RUE Strength: WFL     Hand Dominance  Hand Dominance: Right     If patient is discharged prior to next treatment, this note will serve as the discharge. Assessment   Performance deficits / Impairments: Decreased functional mobility ; Decreased ADL status; Decreased endurance  Assessment: Pt presents with a decline in functional independence. Pt is currently requiring some assist with ADL. Pt hopes to go home with resumed home PT and OT services. Treatment Diagnosis: Impaired ADL and functional mobility  Prognosis: Good  Decision Making: Medium Complexity  Patient Education: Role of OT:  Pt verbalized understanding  REQUIRES OT FOLLOW UP: Yes  Activity Tolerance  Activity Tolerance: Patient Tolerated treatment well  Safety Devices  Safety Devices in place: Yes  Type of devices: Left in chair;Call light within reach; Chair alarm in place;Nurse notified (daughters present. (nurse aware that alarm box was not available in room))         Plan   Plan  Times per week: 2-5  Times per day: Daily  Current Treatment Recommendations: Functional Mobility Training, Endurance Training, Self-Care / ADL     If patient is discharged prior to next treatment, this note will serve as the discharge. G-Code  OT G-codes  Functional Limitation: Self care  Self Care Current Status (): At least 20 percent but less than 40 percent impaired, limited or restricted  Self Care Goal Status ():  At least 1 percent but less than 20 percent impaired, limited or restricted    AM-PAC Score        AM-PAC Inpatient Daily Activity Raw Score: 20  AM-PAC Inpatient ADL T-Scale Score : 42.03  ADL Inpatient CMS 0-100% Score: 38.32  ADL Inpatient CMS G-Code Modifier : CJ    Goals:                  No goals met  Short term goals  Time Frame for Short term goals: Discharge  Short term goal 1: Transfer to/from toilet with modified independence  Short term goal 2: Stance with supervision x6 min while engaging in ADL/functional mobility  Short term goal 3: Perform lower body with supervision  Patient Goals   Patient goals : Go home. Be independent.        Therapy Time   Individual Concurrent Group Co-treatment   Time In 0810         Time Out 0851         Minutes 41         Timed Code Treatment Minutes: 32 Minutes    Total Treatment [address], OTR/L [id number]

## 2018-09-06 NOTE — PROGRESS NOTES
Physical Therapy    Facility/Department: [facility]  Initial Assessment/Treatment    NAME: Yosef Cano  : 1940  MRN: [id number]    Date of Service: 2018    Discharge Recommendations:  Yosef Cano scored a 19/24 on the AM-PAC short mobility form. Current research shows that an AM-PAC score of 18 or greater is typically associated with a discharge to the patient's home setting. Based on the patients AM-PAC score and their current functional mobility deficits, it is recommended that the patient have 2-3 sessions per week of Physical Therapy at d/c to increase the patients independence. S Level 1   PT Equipment Recommendations  Equipment Needed: No  Other: Patient owns a rolling walker    Patient Diagnosis(es): The encounter diagnosis was Diverticulitis of colon. has a past medical history of Allergic rhinitis; Asthma; Chicken pox; Depression; Diverticulosis of large intestine; Essential hypertension; Gastroesophageal reflux disease without esophagitis; GERD (gastroesophageal reflux disease); Hyperlipidemia; Hypertension; Interstitial lung disease ([address] Mitral valve regurgitation; Mixed hyperlipidemia; Obesity; Osteoarthritis; Osteoporosis; Polio; Prolonged emergence from general anesthesia; RA (rheumatoid arthritis) ([address] Rheumatoid arthritis ([address] and Sleep apnea. has a past surgical history that includes Appendectomy; Cholecystectomy; Salpingo-oophorectomy; Carpal tunnel release (Bilateral); Foot surgery (Bilateral); Total shoulder arthroplasty (Right); other surgical history (Right, 2017); Hysterectomy, total abdominal; Tonsillectomy; Shoulder arthroscopy (Left, 11/15/2017); joint replacement (Right, 2016); joint replacement (Right); and Total knee arthroplasty (Right).     Restrictions  Position Activity Restriction  Other position/activity restrictions: Up as tolerated  Vision/Hearing  Vision: Impaired  Vision Exceptions: Wears glasses at all times  Hearing: Within functional limits     Subjective  General  Chart Reviewed: Yes  Patient assessed for rehabilitation services?: Yes  Additional Pertinent Hx: 71 y/o female presents with abdominal pain. Admitted with diverticulitis. PMH includes diverticulosis, GERD, HLD, HTN, OA, RA, B foot surgery (metatarsal removal)  Family / Caregiver Present: No (Family arrived at end of session)  Referring Practitioner: Kate Jensen MD  Diagnosis: Diverticulitis  Follows Commands: Within Functional Limits  Subjective  Subjective: Patient supine in bed, agreeable to PT  Pain Screening  Patient Currently in Pain: Yes (6/10 back pain, RN aware)  Vital Signs  Patient Currently in Pain: Yes (6/10 back pain, RN aware)       Orientation  Orientation  Overall Orientation Status: Within Functional Limits    Social/Functional History  Social/Functional History  Lives With: Alone  Type of Home: Apartment (2nd floor)  Home Layout: One level (laundry is in her apt)  Home Access: Level entry, Elevator  Bathroom Shower/Tub: Walk-in shower  Bathroom Toilet: Handicap height  Bathroom Equipment: Grab bars around toilet, Grab bars in shower, Shower chair, Hand-held shower, Toilet raiser  Home Equipment: Wheelchair-manual, Rolling walker, 4 wheeled walker, Lift chair, Reacher (adjustable bed)  ADL Assistance: Needs assistance (someone present when showering)  Homemaking Assistance: Needs assistance (cleaning service. Pt does minimal cooking.)  Ambulation Assistance: Independent (via 2 wheeled walker)  Transfer Assistance: Independent  Active : Yes (hasn't driven in a few months)  Leisure & Hobbies: Play Bridge  Additional Comments: Pt was getting home PT (3x/week) and OT (1x week). Pt reports having several falls with the last one being in August.  Objective  Treatment included gait training, transfer training, and patient education.    AROM RLE (degrees)  RLE General AROM: Grossly WFL as evident by patient's functional

## 2018-09-07 NOTE — DISCHARGE SUMMARY
CREATININE  <0.5*   GLUCOSE  104*     No results for input(s): INR in the last 72 hours. Discharge Medications:   [place], [address] [facility] Medication Instructions [facility]:[id number]    Printed on:09/07/18 0528   Medication Information                      adalimumab (HUMIRA) 40 MG/0.8ML injection  Inject 40 mg into the skin once a week Mondays             albuterol sulfate HFA (VENTOLIN HFA) 108 (90 Base) MCG/ACT inhaler  Inhale 2 puffs into the lungs every 6 hours as needed for Wheezing MAY USE ALTERNATIVE PER INSURANCE             amoxicillin-clavulanate (AUGMENTIN) 875-125 MG per tablet  Take 1 tablet by mouth 2 times daily for 7 days             B Complex Vitamins (B COMPLEX 1) TABS  Take 1 tablet by mouth Daily. cetirizine (ZYRTEC) 10 MG tablet  Take 10 mg by mouth nightly              colestipol (COLESTID) 1 g tablet  Take 1 g by mouth 3 times daily             diphenoxylate-atropine (DIPHENATOL) 2.5-0.025 MG per tablet  Take 1 tablet by mouth 4 times daily as needed for Diarrhea for up to 30 days. Lovetta Blaze escitalopram (LEXAPRO) 10 MG tablet  Take 1 tablet by mouth daily             fenofibrate (TRICOR) 145 MG tablet  Take 1 tablet by mouth daily             gabapentin (NEURONTIN) 300 MG capsule  Take 300 mg by mouth 4 times daily             leflunomide (ARAVA) 20 MG tablet  Take 20 mg by mouth daily              metoprolol succinate (TOPROL XL) 25 MG extended release tablet  Take 0.5 tablets by mouth daily             montelukast (SINGULAIR) 10 MG tablet  Take 1 tablet by mouth nightly             ondansetron (ZOFRAN) 4 MG tablet  Take 1 tablet by mouth every 8 hours as needed for Nausea or Vomiting             oxyCODONE-acetaminophen (PERCOCET) 5-325 MG per tablet  Take 1 tablet by mouth every 4 hours as needed for Pain for up to 5 days. .             potassium chloride (KLOR-CON M) 20 MEQ extended release tablet  Take 1 tablet by mouth 2 times daily             Probiotic Product

## 2018-09-12 PROBLEM — N39.0 UTI (URINARY TRACT INFECTION): Status: RESOLVED | Noted: 2018-08-13 | Resolved: 2018-09-12

## 2018-09-14 LAB
EKG ATRIAL RATE: 75 BPM
EKG DIAGNOSIS: NORMAL
EKG P AXIS: 19 DEGREES
EKG P-R INTERVAL: 152 MS
EKG Q-T INTERVAL: 464 MS
EKG QRS DURATION: 80 MS
EKG QTC CALCULATION (BAZETT): 518 MS
EKG R AXIS: 9 DEGREES
EKG T AXIS: 19 DEGREES
EKG VENTRICULAR RATE: 75 BPM

## 2018-10-06 ENCOUNTER — APPOINTMENT (OUTPATIENT)
Dept: CT IMAGING | Age: 78
End: 2018-10-06
Payer: MEDICARE

## 2018-10-06 ENCOUNTER — HOSPITAL ENCOUNTER (EMERGENCY)
Age: 78
Discharge: HOME | End: 2018-10-06
Attending: EMERGENCY MEDICINE
Payer: MEDICARE

## 2018-10-06 VITALS
BODY MASS INDEX: 34.3 KG/M2 | DIASTOLIC BLOOD PRESSURE: 76 MMHG | WEIGHT: 174.7 LBS | TEMPERATURE: 97.8 F | HEIGHT: 60 IN | OXYGEN SATURATION: 96 % | HEART RATE: 78 BPM | RESPIRATION RATE: 15 BRPM | SYSTOLIC BLOOD PRESSURE: 158 MMHG

## 2018-10-06 DIAGNOSIS — S00.03XA CONTUSION OF SCALP, INITIAL ENCOUNTER: ICD-10-CM

## 2018-10-06 DIAGNOSIS — S20.222A CONTUSION OF LEFT SIDE OF BACK, INITIAL ENCOUNTER: ICD-10-CM

## 2018-10-06 DIAGNOSIS — H81.10 BENIGN PAROXYSMAL POSITIONAL VERTIGO, UNSPECIFIED LATERALITY: Primary | ICD-10-CM

## 2018-10-06 PROCEDURE — 70450 CT HEAD/BRAIN W/O DYE: CPT

## 2018-10-06 PROCEDURE — 72128 CT CHEST SPINE W/O DYE: CPT

## 2018-10-06 PROCEDURE — 6370000000 HC RX 637 (ALT 250 FOR IP): Performed by: EMERGENCY MEDICINE

## 2018-10-06 PROCEDURE — 99284 EMERGENCY DEPT VISIT MOD MDM: CPT

## 2018-10-06 PROCEDURE — 6370000000 HC RX 637 (ALT 250 FOR IP): Performed by: INTERNAL MEDICINE

## 2018-10-06 PROCEDURE — 93005 ELECTROCARDIOGRAM TRACING: CPT | Performed by: EMERGENCY MEDICINE

## 2018-10-06 PROCEDURE — 72131 CT LUMBAR SPINE W/O DYE: CPT

## 2018-10-06 RX ORDER — METHOCARBAMOL 500 MG/1
1500 TABLET, FILM COATED ORAL ONCE
Status: COMPLETED | OUTPATIENT
Start: 2018-10-06 | End: 2018-10-06

## 2018-10-06 RX ORDER — MECLIZINE HYDROCHLORIDE 25 MG/1
25 TABLET ORAL 3 TIMES DAILY PRN
Status: DISCONTINUED | OUTPATIENT
Start: 2018-10-06 | End: 2018-10-07 | Stop reason: HOSPADM

## 2018-10-06 RX ADMIN — METHOCARBAMOL 1500 MG: 500 TABLET ORAL at 20:45

## 2018-10-06 RX ADMIN — MECLIZINE HYDROCHLORIDE 25 MG: 25 TABLET ORAL at 20:45

## 2018-10-06 ASSESSMENT — ENCOUNTER SYMPTOMS
SHORTNESS OF BREATH: 0
VOMITING: 0

## 2018-10-06 ASSESSMENT — PAIN DESCRIPTION - ORIENTATION: ORIENTATION: MID;UPPER

## 2018-10-06 ASSESSMENT — PAIN DESCRIPTION - PAIN TYPE: TYPE: ACUTE PAIN

## 2018-10-06 ASSESSMENT — PAIN DESCRIPTION - LOCATION: LOCATION: BACK

## 2018-10-06 ASSESSMENT — PAIN SCALES - GENERAL: PAINLEVEL_OUTOF10: 6

## 2018-10-07 NOTE — ED NOTES
Patient prepared for and ready to be discharged. Dressed in clothes and given belongings. Patient discharged at this time in no acute distress after she   verbalized understanding of discharge instructions. Reviewed medications, and when to return to the ED with patient. Encouraged follow up with PCP  Patient walked to lobby, Family to take patient home.        Shukri Pena RN  10/06/18 6866

## 2018-10-07 NOTE — ED NOTES
Bed: A10-10  Expected date: 10/6/18  Expected time: 8:02 PM  Means of arrival: [facility]  Comments:     Pushpa Lizama RN  10/06/18 2003

## 2018-10-07 NOTE — ED PROVIDER NOTES
[address] ENCOUNTER          ATTENDING PHYSICIAN NOTE       Date of evaluation: 10/6/2018    Chief Complaint     Fall; Back Pain; and Dizziness      History of Present Illness     Sandoval Mckoen is a 70 y.o. female presenting via EMS after a reported mechanical fall. Patient reports she was walking tripped on a curb hitting her back. No head injury. No loss of consciousness. Patient reports pain to left mid upper back laterally not midline. Patient denies any weakness, numbness, tingling, headache, vomiting, chest pain, difficulty breathing, cough, abdominal pain. No reported anticoagulant use. Review of Systems     Review of Systems   Constitutional: Negative for fever. Respiratory: Negative for shortness of breath. Gastrointestinal: Negative for vomiting. Musculoskeletal: Negative for joint swelling and neck pain. Skin: Negative for wound. Neurological: Negative for syncope. Psychiatric/Behavioral: Negative for self-injury. All other systems reviewed and are negative. Past Medical, Surgical, Family, and Social History     She has a past medical history of Allergic rhinitis; Asthma; Chicken pox; Depression; Diverticulosis of large intestine; Essential hypertension; Gastroesophageal reflux disease without esophagitis; GERD (gastroesophageal reflux disease); Hyperlipidemia; Hypertension; Interstitial lung disease ([address] Mitral valve regurgitation; Mixed hyperlipidemia; Obesity; Osteoarthritis; Osteoporosis; Polio; Prolonged emergence from general anesthesia; RA (rheumatoid arthritis) ([address] Rheumatoid arthritis ([address] and Sleep apnea. She has a past surgical history that includes Appendectomy; Cholecystectomy; Salpingo-oophorectomy; Carpal tunnel release (Bilateral); Foot surgery (Bilateral); Total shoulder arthroplasty (Right); other surgical history (Right, 02/20/2017); Hysterectomy, total abdominal; Tonsillectomy;  Shoulder arthroscopy (Left, 11/15/2017); joint replacement (Right, 04/25/2016); joint replacement (Right); and Total knee arthroplasty (Right). Her family history includes Arthritis in her sister; Asthma in her sister; Cancer in her mother; Depression in her brother, brother, and sister; Diabetes in her brother; Heart Disease in her father; High Blood Pressure in her brother and father; High Cholesterol in her sister; Psoriasis in her sister. She reports that she quit smoking about 42 years ago. Her smoking use included Cigarettes. She has a 14.00 pack-year smoking history. She has never used smokeless tobacco. She reports that she drinks about 0.6 - 1.2 oz of alcohol per week . She reports that she does not use drugs. Medications     Previous Medications    ADALIMUMAB (HUMIRA) 40 MG/0.8ML INJECTION    Inject 40 mg into the skin once a week Mondays    ALBUTEROL SULFATE HFA (VENTOLIN HFA) 108 (90 BASE) MCG/ACT INHALER    Inhale 2 puffs into the lungs every 6 hours as needed for Wheezing MAY USE ALTERNATIVE PER INSURANCE    B COMPLEX VITAMINS (B COMPLEX 1) TABS    Take 1 tablet by mouth Daily.       CETIRIZINE (ZYRTEC) 10 MG TABLET    Take 10 mg by mouth nightly     COLESTIPOL (COLESTID) 1 G TABLET    Take 1 g by mouth 3 times daily    ESCITALOPRAM (LEXAPRO) 10 MG TABLET    Take 1 tablet by mouth daily    FENOFIBRATE (TRICOR) 145 MG TABLET    Take 1 tablet by mouth daily    GABAPENTIN (NEURONTIN) 300 MG CAPSULE    Take 300 mg by mouth 4 times daily    LEFLUNOMIDE (ARAVA) 20 MG TABLET    Take 20 mg by mouth daily     METOPROLOL SUCCINATE (TOPROL XL) 25 MG EXTENDED RELEASE TABLET    Take 0.5 tablets by mouth daily    MONTELUKAST (SINGULAIR) 10 MG TABLET    Take 1 tablet by mouth nightly    ONDANSETRON (ZOFRAN) 4 MG TABLET    Take 1 tablet by mouth every 8 hours as needed for Nausea or Vomiting    POTASSIUM CHLORIDE (KLOR-CON M) 20 MEQ EXTENDED RELEASE TABLET    Take 1 tablet by mouth 2 times daily    PROBIOTIC PRODUCT (PROBIOTIC FORMULA) CAPS

## 2018-10-12 LAB
EKG ATRIAL RATE: 72 BPM
EKG DIAGNOSIS: NORMAL
EKG P AXIS: 39 DEGREES
EKG P-R INTERVAL: 160 MS
EKG Q-T INTERVAL: 440 MS
EKG QRS DURATION: 88 MS
EKG QTC CALCULATION (BAZETT): 481 MS
EKG R AXIS: 23 DEGREES
EKG T AXIS: 15 DEGREES
EKG VENTRICULAR RATE: 72 BPM

## 2018-12-21 DIAGNOSIS — J40 BRONCHITIS: ICD-10-CM

## 2018-12-21 RX ORDER — ALBUTEROL SULFATE 90 UG/1
2 AEROSOL, METERED RESPIRATORY (INHALATION) EVERY 6 HOURS PRN
Qty: 1 INHALER | Refills: 11 | Status: SHIPPED | OUTPATIENT
Start: 2018-12-21

## 2019-03-25 ENCOUNTER — APPOINTMENT (OUTPATIENT)
Dept: GENERAL RADIOLOGY | Age: 79
DRG: 189 | End: 2019-03-25
Payer: MEDICARE

## 2019-03-25 ENCOUNTER — APPOINTMENT (OUTPATIENT)
Dept: CT IMAGING | Age: 79
DRG: 189 | End: 2019-03-25
Payer: MEDICARE

## 2019-03-25 ENCOUNTER — HOSPITAL ENCOUNTER (INPATIENT)
Age: 79
LOS: 3 days | Discharge: HOME | DRG: 189 | End: 2019-03-28
Attending: EMERGENCY MEDICINE | Admitting: FAMILY MEDICINE
Payer: MEDICARE

## 2019-03-25 DIAGNOSIS — R06.02 SHORTNESS OF BREATH: Primary | ICD-10-CM

## 2019-03-25 PROBLEM — R09.02 HYPOXIA: Status: ACTIVE | Noted: 2019-03-25

## 2019-03-25 LAB
ANION GAP SERPL CALCULATED.3IONS-SCNC: 10 MMOL/L (ref 3–16)
BASE EXCESS VENOUS: 4 (ref -3–3)
BASOPHILS ABSOLUTE: 0.1 K/UL (ref 0–0.2)
BASOPHILS RELATIVE PERCENT: 1 %
BUN BLDV-MCNC: 16 MG/DL (ref 7–20)
CALCIUM SERPL-MCNC: 9.5 MG/DL (ref 8.3–10.6)
CHLORIDE BLD-SCNC: 101 MMOL/L (ref 99–110)
CO2: 29 MMOL/L (ref 21–32)
CREAT SERPL-MCNC: 0.7 MG/DL (ref 0.6–1.2)
EKG ATRIAL RATE: 92 BPM
EKG DIAGNOSIS: NORMAL
EKG P AXIS: 40 DEGREES
EKG P-R INTERVAL: 152 MS
EKG Q-T INTERVAL: 362 MS
EKG QRS DURATION: 80 MS
EKG QTC CALCULATION (BAZETT): 447 MS
EKG R AXIS: 22 DEGREES
EKG T AXIS: 14 DEGREES
EKG VENTRICULAR RATE: 92 BPM
EOSINOPHILS ABSOLUTE: 0.3 K/UL (ref 0–0.6)
EOSINOPHILS RELATIVE PERCENT: 3.6 %
GFR AFRICAN AMERICAN: >60
GFR NON-AFRICAN AMERICAN: >60
GLUCOSE BLD-MCNC: 103 MG/DL (ref 70–99)
HCO3 VENOUS: 28.6 MMOL/L (ref 23–29)
HCT VFR BLD CALC: 41.8 % (ref 36–48)
HEMOGLOBIN: 13.5 G/DL (ref 12–16)
LYMPHOCYTES ABSOLUTE: 1.9 K/UL (ref 1–5.1)
LYMPHOCYTES RELATIVE PERCENT: 22 %
MCH RBC QN AUTO: 27.8 PG (ref 26–34)
MCHC RBC AUTO-ENTMCNC: 32.3 G/DL (ref 31–36)
MCV RBC AUTO: 85.9 FL (ref 80–100)
MONOCYTES ABSOLUTE: 0.9 K/UL (ref 0–1.3)
MONOCYTES RELATIVE PERCENT: 11.2 %
NEUTROPHILS ABSOLUTE: 5.2 K/UL (ref 1.7–7.7)
NEUTROPHILS RELATIVE PERCENT: 62.2 %
O2 SAT, VEN: 60 %
PCO2, VEN: 47.5 MM HG (ref 40–50)
PDW BLD-RTO: 16 % (ref 12.4–15.4)
PERFORMED ON: ABNORMAL
PERFORMED ON: NORMAL
PH VENOUS: 7.39 (ref 7.35–7.45)
PLATELET # BLD: 187 K/UL (ref 135–450)
PMV BLD AUTO: 8 FL (ref 5–10.5)
PO2, VEN: 30 MM HG
PO2, VEN: 32 MM HG
POC SAMPLE TYPE: ABNORMAL
POC SAMPLE TYPE: NORMAL
POTASSIUM REFLEX MAGNESIUM: 4.3 MMOL/L (ref 3.5–5.1)
PRO-BNP: 284 PG/ML (ref 0–449)
RBC # BLD: 4.87 M/UL (ref 4–5.2)
SODIUM BLD-SCNC: 140 MMOL/L (ref 136–145)
TCO2 CALC VENOUS: 30 MMOL/L
TROPONIN: <0.01 NG/ML
WBC # BLD: 8.4 K/UL (ref 4–11)

## 2019-03-25 PROCEDURE — 2700000000 HC OXYGEN THERAPY PER DAY

## 2019-03-25 PROCEDURE — 80048 BASIC METABOLIC PNL TOTAL CA: CPT

## 2019-03-25 PROCEDURE — 6370000000 HC RX 637 (ALT 250 FOR IP): Performed by: FAMILY MEDICINE

## 2019-03-25 PROCEDURE — 94664 DEMO&/EVAL PT USE INHALER: CPT

## 2019-03-25 PROCEDURE — 99223 1ST HOSP IP/OBS HIGH 75: CPT | Performed by: INTERNAL MEDICINE

## 2019-03-25 PROCEDURE — 6360000002 HC RX W HCPCS: Performed by: FAMILY MEDICINE

## 2019-03-25 PROCEDURE — 2580000003 HC RX 258: Performed by: INTERNAL MEDICINE

## 2019-03-25 PROCEDURE — 6360000004 HC RX CONTRAST MEDICATION: Performed by: EMERGENCY MEDICINE

## 2019-03-25 PROCEDURE — 99285 EMERGENCY DEPT VISIT HI MDM: CPT

## 2019-03-25 PROCEDURE — 71046 X-RAY EXAM CHEST 2 VIEWS: CPT

## 2019-03-25 PROCEDURE — 85025 COMPLETE CBC W/AUTO DIFF WBC: CPT

## 2019-03-25 PROCEDURE — 83880 ASSAY OF NATRIURETIC PEPTIDE: CPT

## 2019-03-25 PROCEDURE — 71260 CT THORAX DX C+: CPT

## 2019-03-25 PROCEDURE — 6370000000 HC RX 637 (ALT 250 FOR IP): Performed by: PHYSICIAN ASSISTANT

## 2019-03-25 PROCEDURE — 6360000002 HC RX W HCPCS: Performed by: INTERNAL MEDICINE

## 2019-03-25 PROCEDURE — 1200000000 HC SEMI PRIVATE

## 2019-03-25 PROCEDURE — 84484 ASSAY OF TROPONIN QUANT: CPT

## 2019-03-25 PROCEDURE — 94761 N-INVAS EAR/PLS OXIMETRY MLT: CPT

## 2019-03-25 PROCEDURE — 93005 ELECTROCARDIOGRAM TRACING: CPT | Performed by: PHYSICIAN ASSISTANT

## 2019-03-25 PROCEDURE — 87449 NOS EACH ORGANISM AG IA: CPT

## 2019-03-25 PROCEDURE — 2580000003 HC RX 258: Performed by: FAMILY MEDICINE

## 2019-03-25 PROCEDURE — 6360000002 HC RX W HCPCS: Performed by: PHYSICIAN ASSISTANT

## 2019-03-25 PROCEDURE — 87040 BLOOD CULTURE FOR BACTERIA: CPT

## 2019-03-25 PROCEDURE — 82803 BLOOD GASES ANY COMBINATION: CPT

## 2019-03-25 PROCEDURE — 36415 COLL VENOUS BLD VENIPUNCTURE: CPT

## 2019-03-25 PROCEDURE — 94799 UNLISTED PULMONARY SVC/PX: CPT

## 2019-03-25 PROCEDURE — 6370000000 HC RX 637 (ALT 250 FOR IP): Performed by: INTERNAL MEDICINE

## 2019-03-25 PROCEDURE — 94640 AIRWAY INHALATION TREATMENT: CPT

## 2019-03-25 RX ORDER — METHYLPREDNISOLONE SODIUM SUCCINATE 40 MG/ML
40 INJECTION, POWDER, LYOPHILIZED, FOR SOLUTION INTRAMUSCULAR; INTRAVENOUS DAILY
Status: DISCONTINUED | OUTPATIENT
Start: 2019-03-25 | End: 2019-03-25

## 2019-03-25 RX ORDER — PREDNISONE 1 MG/1
3 TABLET ORAL DAILY
Status: DISCONTINUED | OUTPATIENT
Start: 2019-03-26 | End: 2019-03-26

## 2019-03-25 RX ORDER — ESCITALOPRAM OXALATE 10 MG/1
10 TABLET ORAL DAILY
Status: DISCONTINUED | OUTPATIENT
Start: 2019-03-25 | End: 2019-03-28 | Stop reason: HOSPADM

## 2019-03-25 RX ORDER — OYSTER SHELL CALCIUM WITH VITAMIN D 500; 200 MG/1; [IU]/1
2 TABLET, FILM COATED ORAL
Status: DISCONTINUED | OUTPATIENT
Start: 2019-03-25 | End: 2019-03-28 | Stop reason: HOSPADM

## 2019-03-25 RX ORDER — GABAPENTIN 300 MG/1
300 CAPSULE ORAL 4 TIMES DAILY
Status: DISCONTINUED | OUTPATIENT
Start: 2019-03-25 | End: 2019-03-25

## 2019-03-25 RX ORDER — ONDANSETRON 2 MG/ML
4 INJECTION INTRAMUSCULAR; INTRAVENOUS EVERY 8 HOURS PRN
Status: DISCONTINUED | OUTPATIENT
Start: 2019-03-25 | End: 2019-03-28 | Stop reason: HOSPADM

## 2019-03-25 RX ORDER — CHOLESTYRAMINE LIGHT 4 G/5.7G
4 POWDER, FOR SUSPENSION ORAL DAILY
Status: DISCONTINUED | OUTPATIENT
Start: 2019-03-26 | End: 2019-03-26

## 2019-03-25 RX ORDER — LEFLUNOMIDE 20 MG/1
20 TABLET ORAL DAILY
Status: DISCONTINUED | OUTPATIENT
Start: 2019-03-25 | End: 2019-03-28 | Stop reason: HOSPADM

## 2019-03-25 RX ORDER — ALBUTEROL SULFATE 2.5 MG/3ML
2.5 SOLUTION RESPIRATORY (INHALATION) EVERY 4 HOURS PRN
Status: DISCONTINUED | OUTPATIENT
Start: 2019-03-25 | End: 2019-03-28 | Stop reason: HOSPADM

## 2019-03-25 RX ORDER — TRAZODONE HYDROCHLORIDE 50 MG/1
50 TABLET ORAL NIGHTLY
Status: DISCONTINUED | OUTPATIENT
Start: 2019-03-25 | End: 2019-03-28 | Stop reason: HOSPADM

## 2019-03-25 RX ORDER — SODIUM CHLORIDE 0.9 % (FLUSH) 0.9 %
10 SYRINGE (ML) INJECTION PRN
Status: DISCONTINUED | OUTPATIENT
Start: 2019-03-25 | End: 2019-03-28 | Stop reason: HOSPADM

## 2019-03-25 RX ORDER — FLUTICASONE PROPIONATE 50 MCG
1 SPRAY, SUSPENSION (ML) NASAL NIGHTLY
Status: DISCONTINUED | OUTPATIENT
Start: 2019-03-25 | End: 2019-03-28 | Stop reason: HOSPADM

## 2019-03-25 RX ORDER — MONTELUKAST SODIUM 10 MG/1
10 TABLET ORAL NIGHTLY
Status: DISCONTINUED | OUTPATIENT
Start: 2019-03-25 | End: 2019-03-28 | Stop reason: HOSPADM

## 2019-03-25 RX ORDER — PREDNISONE 1 MG/1
2 TABLET ORAL DAILY
Status: ON HOLD | COMMUNITY
End: 2019-04-17 | Stop reason: HOSPADM

## 2019-03-25 RX ORDER — IPRATROPIUM BROMIDE AND ALBUTEROL SULFATE 2.5; .5 MG/3ML; MG/3ML
1 SOLUTION RESPIRATORY (INHALATION) ONCE
Status: COMPLETED | OUTPATIENT
Start: 2019-03-25 | End: 2019-03-25

## 2019-03-25 RX ORDER — ONDANSETRON 4 MG/1
4 TABLET, FILM COATED ORAL EVERY 8 HOURS PRN
Status: DISCONTINUED | OUTPATIENT
Start: 2019-03-25 | End: 2019-03-25

## 2019-03-25 RX ORDER — FLUTICASONE PROPIONATE 50 MCG
1 SPRAY, SUSPENSION (ML) NASAL NIGHTLY
COMMUNITY

## 2019-03-25 RX ORDER — LOSARTAN POTASSIUM 25 MG/1
25 TABLET ORAL DAILY
Status: DISCONTINUED | OUTPATIENT
Start: 2019-03-26 | End: 2019-03-28 | Stop reason: HOSPADM

## 2019-03-25 RX ORDER — SACCHAROMYCES BOULARDII 250 MG
250 CAPSULE ORAL DAILY
Status: DISCONTINUED | OUTPATIENT
Start: 2019-03-25 | End: 2019-03-28 | Stop reason: HOSPADM

## 2019-03-25 RX ORDER — AMLODIPINE BESYLATE 10 MG/1
10 TABLET ORAL DAILY
Status: DISCONTINUED | OUTPATIENT
Start: 2019-03-26 | End: 2019-03-28 | Stop reason: HOSPADM

## 2019-03-25 RX ORDER — AMLODIPINE BESYLATE 10 MG/1
10 TABLET ORAL NIGHTLY
COMMUNITY
End: 2021-09-22

## 2019-03-25 RX ORDER — LOSARTAN POTASSIUM 25 MG/1
25 TABLET ORAL DAILY
Status: ON HOLD | COMMUNITY
End: 2019-03-28 | Stop reason: HOSPADM

## 2019-03-25 RX ORDER — FENOFIBRATE 160 MG/1
160 TABLET ORAL DAILY
Status: DISCONTINUED | OUTPATIENT
Start: 2019-03-25 | End: 2019-03-25

## 2019-03-25 RX ORDER — IPRATROPIUM BROMIDE AND ALBUTEROL SULFATE 2.5; .5 MG/3ML; MG/3ML
1 SOLUTION RESPIRATORY (INHALATION) 3 TIMES DAILY
Status: DISCONTINUED | OUTPATIENT
Start: 2019-03-25 | End: 2019-03-28 | Stop reason: HOSPADM

## 2019-03-25 RX ORDER — ACETAMINOPHEN 325 MG/1
650 TABLET ORAL EVERY 4 HOURS PRN
Status: DISCONTINUED | OUTPATIENT
Start: 2019-03-25 | End: 2019-03-28 | Stop reason: HOSPADM

## 2019-03-25 RX ORDER — METOPROLOL SUCCINATE 25 MG/1
12.5 TABLET, EXTENDED RELEASE ORAL DAILY
Status: DISCONTINUED | OUTPATIENT
Start: 2019-03-25 | End: 2019-03-25

## 2019-03-25 RX ORDER — OXYCODONE HYDROCHLORIDE AND ACETAMINOPHEN 5; 325 MG/1; MG/1
1 TABLET ORAL EVERY 8 HOURS
Status: DISCONTINUED | OUTPATIENT
Start: 2019-03-26 | End: 2019-03-28 | Stop reason: HOSPADM

## 2019-03-25 RX ORDER — CETIRIZINE HYDROCHLORIDE 10 MG/1
10 TABLET ORAL NIGHTLY
Status: DISCONTINUED | OUTPATIENT
Start: 2019-03-25 | End: 2019-03-28 | Stop reason: HOSPADM

## 2019-03-25 RX ORDER — IPRATROPIUM BROMIDE AND ALBUTEROL SULFATE 2.5; .5 MG/3ML; MG/3ML
1 SOLUTION RESPIRATORY (INHALATION)
Status: DISCONTINUED | OUTPATIENT
Start: 2019-03-25 | End: 2019-03-25

## 2019-03-25 RX ORDER — IBUPROFEN 400 MG/1
600 TABLET ORAL
Status: DISCONTINUED | OUTPATIENT
Start: 2019-03-25 | End: 2019-03-28 | Stop reason: HOSPADM

## 2019-03-25 RX ORDER — SODIUM CHLORIDE 0.9 % (FLUSH) 0.9 %
10 SYRINGE (ML) INJECTION EVERY 12 HOURS SCHEDULED
Status: DISCONTINUED | OUTPATIENT
Start: 2019-03-25 | End: 2019-03-28 | Stop reason: HOSPADM

## 2019-03-25 RX ORDER — GABAPENTIN 300 MG/1
600 CAPSULE ORAL 3 TIMES DAILY
Status: DISCONTINUED | OUTPATIENT
Start: 2019-03-25 | End: 2019-03-28 | Stop reason: HOSPADM

## 2019-03-25 RX ORDER — CHOLESTYRAMINE LIGHT 4 G/5.7G
4 POWDER, FOR SUSPENSION ORAL DAILY
Status: DISCONTINUED | OUTPATIENT
Start: 2019-03-25 | End: 2019-03-25

## 2019-03-25 RX ORDER — POTASSIUM CHLORIDE 20 MEQ/1
20 TABLET, EXTENDED RELEASE ORAL 2 TIMES DAILY
Status: DISCONTINUED | OUTPATIENT
Start: 2019-03-25 | End: 2019-03-25

## 2019-03-25 RX ORDER — OXYCODONE HYDROCHLORIDE AND ACETAMINOPHEN 5; 325 MG/1; MG/1
1 TABLET ORAL EVERY 6 HOURS PRN
COMMUNITY

## 2019-03-25 RX ADMIN — GABAPENTIN 600 MG: 300 CAPSULE ORAL at 20:28

## 2019-03-25 RX ADMIN — IOPAMIDOL 80 ML: 755 INJECTION, SOLUTION INTRAVENOUS at 17:00

## 2019-03-25 RX ADMIN — IPRATROPIUM BROMIDE AND ALBUTEROL SULFATE 1 AMPULE: .5; 3 SOLUTION RESPIRATORY (INHALATION) at 13:56

## 2019-03-25 RX ADMIN — MONTELUKAST SODIUM 10 MG: 10 TABLET, FILM COATED ORAL at 23:39

## 2019-03-25 RX ADMIN — ALBUTEROL SULFATE 2.5 MG: 2.5 SOLUTION RESPIRATORY (INHALATION) at 14:00

## 2019-03-25 RX ADMIN — OXYCODONE AND ACETAMINOPHEN 1 TABLET: 5; 325 TABLET ORAL at 23:39

## 2019-03-25 RX ADMIN — CEFTRIAXONE 1 G: 1 INJECTION, POWDER, FOR SOLUTION INTRAMUSCULAR; INTRAVENOUS at 18:19

## 2019-03-25 RX ADMIN — METHYLPREDNISOLONE SODIUM SUCCINATE 40 MG: 40 INJECTION, POWDER, FOR SOLUTION INTRAMUSCULAR; INTRAVENOUS at 18:49

## 2019-03-25 RX ADMIN — CALCIUM CARBONATE-VITAMIN D TAB 500 MG-200 UNIT 2 TABLET: 500-200 TAB at 22:00

## 2019-03-25 RX ADMIN — IPRATROPIUM BROMIDE AND ALBUTEROL SULFATE 1 AMPULE: .5; 3 SOLUTION RESPIRATORY (INHALATION) at 20:54

## 2019-03-25 RX ADMIN — FLUTICASONE PROPIONATE 1 SPRAY: 50 SPRAY, METERED NASAL at 22:00

## 2019-03-25 RX ADMIN — TRAZODONE HYDROCHLORIDE 50 MG: 50 TABLET ORAL at 20:28

## 2019-03-25 RX ADMIN — Medication 10 ML: at 20:07

## 2019-03-25 RX ADMIN — ENOXAPARIN SODIUM 40 MG: 40 INJECTION SUBCUTANEOUS at 18:49

## 2019-03-25 RX ADMIN — AZITHROMYCIN MONOHYDRATE 500 MG: 500 INJECTION, POWDER, LYOPHILIZED, FOR SOLUTION INTRAVENOUS at 20:07

## 2019-03-25 RX ADMIN — ACETAMINOPHEN 650 MG: 325 TABLET ORAL at 20:28

## 2019-03-25 RX ADMIN — CETIRIZINE HYDROCHLORIDE 10 MG: 10 TABLET, FILM COATED ORAL at 20:28

## 2019-03-25 RX ADMIN — Medication 10 ML: at 18:19

## 2019-03-25 ASSESSMENT — PAIN DESCRIPTION - PAIN TYPE: TYPE: CHRONIC PAIN

## 2019-03-25 ASSESSMENT — PAIN - FUNCTIONAL ASSESSMENT: PAIN_FUNCTIONAL_ASSESSMENT: ACTIVITIES ARE NOT PREVENTED

## 2019-03-25 ASSESSMENT — PAIN SCALES - GENERAL
PAINLEVEL_OUTOF10: 0
PAINLEVEL_OUTOF10: 0
PAINLEVEL_OUTOF10: 7
PAINLEVEL_OUTOF10: 0

## 2019-03-25 ASSESSMENT — PAIN DESCRIPTION - LOCATION: LOCATION: BACK

## 2019-03-25 ASSESSMENT — PAIN DESCRIPTION - PROGRESSION: CLINICAL_PROGRESSION: NOT CHANGED

## 2019-03-25 ASSESSMENT — ENCOUNTER SYMPTOMS
BACK PAIN: 0
VOMITING: 0
COUGH: 1
ABDOMINAL DISTENTION: 0
WHEEZING: 1
ABDOMINAL PAIN: 0
SHORTNESS OF BREATH: 1
EYE PAIN: 0
PHOTOPHOBIA: 0
DIARRHEA: 0
NAUSEA: 0
CHEST TIGHTNESS: 0

## 2019-03-25 ASSESSMENT — PAIN DESCRIPTION - FREQUENCY: FREQUENCY: INTERMITTENT

## 2019-03-25 ASSESSMENT — PAIN DESCRIPTION - DESCRIPTORS: DESCRIPTORS: ACHING

## 2019-03-25 ASSESSMENT — PAIN DESCRIPTION - ONSET: ONSET: ON-GOING

## 2019-03-25 ASSESSMENT — PAIN DESCRIPTION - ORIENTATION: ORIENTATION: LOWER

## 2019-03-25 NOTE — ED NOTES
Bed: B19-19  Expected date:   Expected time:   Means of arrival:   Comments:  anil Erazo RN  03/25/19 9632

## 2019-03-25 NOTE — CONSULTS
Pulmonary Consult    Patient's PCP: Kalie BURGOS  Referred by: Rayetta Moritz, MD for ILD    HISTORY OF PRESENT ILLNESS:    This is a  70 y.o. female with PMH of RA-ILD, asthma, DEWAYNE and others as listed below brought to the hospital via EMS due to hypoxia that was found at [address] with pulse ox of 78%. She was brought here on 4 liters of O2 with pulse ox of 94%    She usually see Dr. Erwin Mirza, she has been off methotrexate since Jan 2017 when ILD was appreciated on CT chest.  She was seen at his office on August 2018 and at that time she was doing better compared to being on MTX.  F/u CT in June 2017 show significant improvement that was thought to be due to discontinuation of MTX. At the time of evaluation she states that she had SOB on exertion for about a month. She saw her doctor a week ago and was prescribed ABX and prednisone, however her symptoms persisted, and it is associated with cough and wheezing. There is no fever or chills.       Past Medical / Surgical History:    Past Medical History:   Diagnosis Date    Allergic rhinitis 5/4/2010    Asthma 5/18/2014    Chicken pox     Depression     Diverticulosis of large intestine 5/4/2010    Essential hypertension 8/20/2015    Gastroesophageal reflux disease without esophagitis 8/20/2015    GERD (gastroesophageal reflux disease)     Hyperlipidemia     Hypertension     Interstitial lung disease ([address]     Mitral valve regurgitation 2/16/2011    Mixed hyperlipidemia 3/10/2016    Obesity     Osteoarthritis     Osteoporosis 5/4/2010    Polio     Prolonged emergence from general anesthesia     sats dropped    RA (rheumatoid arthritis) ([address]     Rheumatoid arthritis ([address] 5/4/2010    Sleep apnea 05/04/2010    uses BPAP     Past Surgical History:   Procedure Laterality Date    APPENDECTOMY      CARPAL TUNNEL RELEASE Bilateral     CHOLECYSTECTOMY      FOOT SURGERY Bilateral     metatarsal removal    HYSTERECTOMY, TOTAL ABDOMINAL      JOINT REPLACEMENT Right 04/25/2016    Dr. Jun Molina , shoulder    JOINT REPLACEMENT Right     OTHER SURGICAL HISTORY Right 02/20/2017    RIGHT TOTAL KNEE ARTHROPLASTY             SALPINGO-OOPHORECTOMY      SHOULDER ARTHROPLASTY Right     SHOULDER ARTHROSCOPY Left 11/15/2017    TONSILLECTOMY      TOTAL KNEE ARTHROPLASTY Right        Medications Prior to Admission:    No current facility-administered medications on file prior to encounter. Current Outpatient Medications on File Prior to Encounter   Medication Sig Dispense Refill    albuterol sulfate HFA (VENTOLIN HFA) 108 (90 Base) MCG/ACT inhaler Inhale 2 puffs into the lungs every 6 hours as needed for Wheezing or Shortness of Breath MAY USE ALTERNATIVE PER INSURANCE 1 Inhaler 11    potassium chloride (KLOR-CON M) 20 MEQ extended release tablet Take 1 tablet by mouth 2 times daily 60 tablet 1    metoprolol succinate (TOPROL XL) 25 MG extended release tablet Take 0.5 tablets by mouth daily 30 tablet 0    escitalopram (LEXAPRO) 10 MG tablet Take 1 tablet by mouth daily 30 tablet 0    ondansetron (ZOFRAN) 4 MG tablet Take 1 tablet by mouth every 8 hours as needed for Nausea or Vomiting 20 tablet 0    colestipol (COLESTID) 1 g tablet Take 1 g by mouth 3 times daily      sulindac (CLINORIL) 200 MG tablet Take 200 mg by mouth daily       montelukast (SINGULAIR) 10 MG tablet Take 1 tablet by mouth nightly 90 tablet 1    fenofibrate (TRICOR) 145 MG tablet Take 1 tablet by mouth daily 90 tablet 1    leflunomide (ARAVA) 20 MG tablet Take 20 mg by mouth daily       traZODone (DESYREL) 50 MG tablet Take 1 tablet by mouth nightly 90 tablet 1    vitamin D (CHOLECALCIFEROL) 1000 UNITS TABS tablet Take 1,000 Units by mouth daily       gabapentin (NEURONTIN) 300 MG capsule Take 300 mg by mouth 4 times daily      Probiotic Product (PROBIOTIC FORMULA) CAPS Take 1 capsule by mouth daily.       cetirizine (ZYRTEC) 10 MG tablet Take 10 mg by mouth nightly       B Complex Vitamins (B COMPLEX 1) TABS Take 1 tablet by mouth Daily.  adalimumab (HUMIRA) 40 MG/0.8ML injection Inject 40 mg into the skin once a week Mondays         Allergies:  Sulfa antibiotics; Lamotrigine; Lomotil  [diphenoxylate-atropine]; and Sulfacetamide sodium    Social History:   TOBACCO:   reports that she quit smoking about 43 years ago. Her smoking use included cigarettes. She has a 14.00 pack-year smoking history. She has never used smokeless tobacco.     ETOH:   reports that she drinks about 0.6 - 1.2 oz of alcohol per week. Family History:       Problem Relation Age of Onset    Cancer Mother     Heart Disease Father     High Blood Pressure Father     Diabetes Brother     High Blood Pressure Brother     Depression Brother     Arthritis Sister     Asthma Sister     High Cholesterol Sister     Depression Sister     Psoriasis Sister     Depression Brother          Review of Systems   Constitutional: Negative for chills, fatigue and fever. HENT: Negative for congestion. Respiratory: Positive for cough, shortness of breath and wheezing. Negative for chest tightness. Cardiovascular: Positive for leg swelling. Negative for chest pain and palpitations. Gastrointestinal: Negative for abdominal distention and abdominal pain. Neurological: Negative for weakness. Psychiatric/Behavioral: The patient is not nervous/anxious. All other systems reviewed and are negative. PHYSICAL EXAM:  BP (!) 162/68   Pulse 96   Resp 16   SpO2 96%     Physical Exam   Constitutional: She is oriented to person, place, and time. She appears well-developed and well-nourished. HENT:   Head: Normocephalic and atraumatic. Eyes: Pupils are equal, round, and reactive to light. EOM are normal.   Neck: Neck supple. No JVD present. No tracheal deviation present. Cardiovascular: Normal rate and regular rhythm. Exam reveals no gallop and no friction rub.    No murmur heard.  Pulmonary/Chest: Effort normal. No stridor. No respiratory distress. She has wheezes. She has rales. Abdominal: Soft. Bowel sounds are normal. She exhibits no distension. There is no tenderness. Musculoskeletal: She exhibits no edema or deformity. Neurological: She is alert and oriented to person, place, and time. Skin: Skin is warm and dry. Psychiatric: She has a normal mood and affect. Her behavior is normal.   Vitals reviewed. LABS:  Recent Labs     03/25/19  1329   WBC 8.4   HGB 13.5   HCT 41.8                                                                     Recent Labs     03/25/19  1329      K 4.3      CO2 29   BUN 16   CREATININE 0.7   GLUCOSE 103*     No results for input(s): AST, ALT, ALB, BILITOT, ALKPHOS in the last 72 hours. Recent Labs     03/25/19  1329   TROPONINI <0.01     No results for input(s): BNP in the last 72 hours. No results found for: PHART, RMI8VBU, PO2ART  No results for input(s): INR in the last 72 hours.   @LABRCNT(NITRITE,COLORU,PHUR,LABCAST,WBCUA,RBCUA,MUCUS,TRICHOMONAS,YEAST,BACTERIA,CLARITYU,SPECGRAV,LEUKOCYTESUR,UROBILINOGEN,BILIRUBINUR,BLOODU,GLUCOSEU,KETONESU,AMORPHOUS)@     DATA:  CXR   Impression: Bilateral interstitial reticular opacities of the bases may reflect edema or interstitial pneumonitis    EKG: sinus rhythm with PAC    Assessment &Plan:    Patient Active Problem List:     Candidiasis of skin and nails     Dermatophytosis of nail     Depressive disorder, not elsewhere classified     Obstructive sleep apnea syndrome     Diverticulosis of large intestine     Allergic rhinitis     Osteoporosis     Rheumatoid arthritis (HCC)     Diaphragmatic hernia     Mitral valve regurgitation     Granulomatous lung disease (HCC)     Complete tear of left rotator cuff     Biceps tendinitis on right     Postmenopausal osteoporosis     Asthma     Arthritis of right acromioclavicular joint     Glenohumeral arthritis right     Essential hypertension     Gastroesophageal reflux disease without esophagitis     Mixed hyperlipidemia     Status post reverse total replacement of right shoulder     Chronic pain of right knee     Morbid obesity with BMI of 40.0-44.9, adult (HCC)     Chronic nonseasonal allergic rhinitis due to pollen     Mild intermittent asthma without complication     Interstitial lung disease (HCC)     Chronic low back pain     Disorder resulting from impaired renal function     Spinal stenosis     Status post total right knee replacement using cement on 2/20/2017     Major depressive disorder with single episode, in partial remission (HCC)     Rheumatoid arthritis of multiple sites with negative rheumatoid factor (HCC)     Osteoarthritis of lumbar spine     Immunosuppression ([address]     S/P left rotator cuff repair     Primary osteoarthritis of left shoulder     Recurrent cold sores     Diarrhea     Nausea with vomiting     Chronic diarrhea     Hypokalemia     Hypomagnesemia     Weight loss     Vertigo     Acute diverticulitis     Hypoxia      Multifocal airspace disease. CT scan images compared with prior CT in 2017, there is new ground glass opacities that was not present before. In the setting of wheezing and increased cough this is probably infectious / multifocal pneumonia  RA- ILD  Ex smoker, smoked for 12 years. Use albuterol PRN  New leg edema. BNP is within normal    Start ceftriaxone and azithromycin  Breathing treatments  Echo to check estimated RVSP  Blood cultures, urine legionella and strep Ag. Sputum culture  Wean FiO2 as tolerated to keep sats > 90%  Lets avoid steroids for now to see if she respond to ABX since exacerbation of ILD is in the DDx    The patient and / or the family were informed of the results of any tests, a time was given to answer questions, a plan was proposed and they agreed with plan. Thank you Dr. Paul Morocho for the opportunity to be involved in this patients care.  If you have any questions or concerns please feel free to contact me  Prior

## 2019-03-25 NOTE — PROGRESS NOTES
RESPIRATORY THERAPY ASSESSMENT    Name:  [address] Record Number:  [id number]  Age: 70 y.o. Gender: female  : 1940  Today's Date:  3/25/2019  Room:  63/6317-01    Assessment     Is the patient being admitted for a COPD or Asthma exacerbation? No   (If yes the patient will be seen every 4 hours for the first 24 hours and then reassessed)    Patient Admission Diagnosis      Allergies  Allergies   Allergen Reactions    Sulfa Antibiotics Hives    Lamotrigine Other (See Comments)     Blurred vision    Lomotil  [Diphenoxylate-Atropine] Other (See Comments)     Changes in vision    Sulfacetamide Sodium Hives     Other reaction(s): Other (See Comments)       Minimum Predicted Vital Capacity:     680         Actual Vital Capacity:      1L              Pulmonary History:Asthma, ILD, quit smoking 43 yrs ago, DEWAYNE  Home Oxygen Therapy: none  Home Respiratory Therapy: Albuterol Q6H PRN, CPAP at nite   Current Respiratory Therapy:  HHN QID albuterol/atrovent  Treatment Type: HHN  Medications: Albuterol    Respiratory Severity Index(RSI)   Patients with orders for inhalation medications, oxygen, or any therapeutic treatment modality will be placed on Respiratory Protocol. They will be assessed with the first treatment and at least every 72 hours thereafter. The following severity scale will be used to determine frequency of treatment intervention. Smoking History: Pulmonary Disease or Smoking History, Greater than 15 pack year = 2    Social History  Social History     Tobacco Use    Smoking status: Former Smoker     Packs/day: 1.00     Years: 14.00     Pack years: 14.00     Types: Cigarettes     Last attempt to quit: 1976     Years since quittin.2    Smokeless tobacco: Never Used    Tobacco comment: quit age 29   Substance Use Topics    Alcohol use:  Yes     Alcohol/week: 0.6 - 1.2 oz     Types: 1 - 2 Glasses of wine per week     Comment: social drinker    Drug use: No       Recent Surgical History: None = 0  Past Surgical History  Past Surgical History:   Procedure Laterality Date    APPENDECTOMY      CARPAL TUNNEL RELEASE Bilateral     CHOLECYSTECTOMY      FOOT SURGERY Bilateral     metatarsal removal    HYSTERECTOMY, TOTAL ABDOMINAL      JOINT REPLACEMENT Right 04/25/2016    Dr. Luiz Ortega , shoulder    JOINT REPLACEMENT Right     OTHER SURGICAL HISTORY Right 02/20/2017    RIGHT TOTAL KNEE ARTHROPLASTY             SALPINGO-OOPHORECTOMY      SHOULDER ARTHROPLASTY Right     SHOULDER ARTHROSCOPY Left 11/15/2017    TONSILLECTOMY      TOTAL KNEE ARTHROPLASTY Right        Level of Consciousness: Alert, Oriented, and Cooperative = 0    Level of Activity: Walking with assistance = 1    Respiratory Pattern: Dyspnea with exertion;Irregular pattern;or RR less than 6 = 2    Breath Sounds: Diminshed bilaterally and/or crackles = 2    Sputum   ,  ,    Cough: Strong, spontaneous, non-productive = 0    Vital Signs   /73   Pulse 88   Temp 98.4 °F (36.9 °C) (Oral)   Resp 16   Ht 5' (1.524 m)   Wt 226 lb 13.7 oz (102.9 kg)   SpO2 96%   BMI 44.30 kg/m²   SPO2 (COPD values may differ): 88-89% on room air or great than 92% on FiO2 28- 35% = 2    Peak Flow (asthma only): not applicable = 0    RSI: 6-67 = TID (three times daily) and Q4hr PRN for dyspnea        Plan       Goals: medication delivery, mobilize retained secretions, volume expansion and improve oxygenation    Patient/caregiver was educated on the proper method of use for Respiratory Care Devices:  Yes      Level of patient/caregiver understanding able to:   ? Verbalize understanding   ? Demonstrate understanding       ? Teach back        ? Needs reinforcement       ? No available caregiver               ? Other:     Response to education:  Very Good     Is patient being placed on Home Treatment Regimen? No     Does the patient have everything they need prior to discharge?   NA     Comments: Patient appears in no distress at this time. Chart reviewed. Plan of Care: Continue HHN TID and Q4H PRN albuterol/atrovent    Electronically signed by Nino Dodd RCP on 3/25/2019 at 6:28 PM    Respiratory Protocol Guidelines     1. Assessment and treatment by Respiratory Therapy will be initiated for medication and therapeutic interventions upon initiation of aerosolized medication. 2. Physician will be contacted for respiratory rate (RR) greater than 35 breaths per minute. Therapy will be held for heart rate (HR) greater than 140 beats per minute, pending direction from physician. 3. Bronchodilators will be administered via Metered Dose Inhaler (MDI) with spacer when the following criteria are met:  a. Alert and cooperative     b. HR < 140 bpm  c. RR < 30 bpm                d. Can demonstrate a 2-3 second inspiratory hold  4. Bronchodilators will be administered via Hand Held Nebulizer [facility]) to patients when ANY of the following criteria are met  a. Incognizant or uncooperative          b. Patients treated with HHN at Home        c. Unable to demonstrate proper use of MDI with spacer     d. RR > 30 bpm   5. Bronchodilators will be delivered via Metered Dose Inhaler (MDI), HHN, Aerogen to intubated patients on mechanical ventilation. 6. Inhalation medication orders will be delivered and/or substituted as outlined below. Aerosolized Medications Ordering and Administration Guidelines:    1. All Medications will be ordered by a physician, and their frequency and/or modality will be adjusted as defined by the patients Respiratory Severity Index (RSI) score. 2. If the patient does not have documented COPD, consider discontinuing anticholinergics when RSI is less than 9.  3. If the bronchospasm worsens (increased RSI), then the bronchodilator frequency can be increased to a maximum of every 4 hours. If greater than every 4 hours is required, the physician will be contacted.   4. If the bronchospasm improves, the frequency of the bronchodilator can be decreased, based on the patient's RSI, but not less than home treatment regimen frequency. 5. Bronchodilator(s) will be discontinued if patient has a RSI less than 9 and has received no scheduled or as needed treatment for 72  Hrs. Patients Ordered on a Mucolytic Agent:    1. Must always be administered with a bronchodilator. 2. Discontinue if patient experiences worsened bronchospasm, or secretions have lessened to the point that the patient is able to clear them with a cough. Anti-inflammatory and Combination Medications:    1. If the patient lacks prior history of lung disease, is not using inhaled anti-inflammatory medication at home, and lacks wheezing by examination or by history for at least 24 hours, contact physician for possible discontinuation.

## 2019-03-25 NOTE — ED NOTES
Pt brought in via [facility] for low SPO2. Pt was at [address] [place] [address] and they called 911 for a low O2 sat of 78%. Per EMS pt has lung disease and has been to her doctor, just finishing 2 weeks of antibiotics for \"CHF or Pneumonia\". Pt has hx of congestion. Per EMS pt was 94% on RA.  Pt currently on NC @ 4lpm.      Karime Rutherford  03/25/19 1067

## 2019-03-25 NOTE — ED PROVIDER NOTES
[address] [facility] ENCOUNTER          PHYSICIAN ASSISTANT NOTE       Date of evaluation: 3/25/2019    Chief Complaint     Shortness of Breath (Sent 911 via [facility] for low o2 sat - in 70's per report)      History of Present Illness     Loan Moreira is a 70 y.o. female with a past medical history of interstitial lung disease, asthma, hypertension, rheumatoid arthritis who presents to the emergency department or shortness of breath. He states that she has had increasing shortness of breath over the last 3-4 weeks. She states she was seen by her primary care provider proximally 2 weeks ago and diagnosed with \"either pneumonia or CHF. \" She states she was given 10 days of antibiotics and prednisone. She states that her symptoms have continued to get worse. She states she is not short of breath at rest, only with exertion. She states that she is now only able to go a few steps before becoming short of breath. She does state that she has had a nonproductive cough for the last several weeks as well. She denies any hemoptysis or fevers. She denies any associated chest pain, headaches, lightheadedness, dizziness, or weakness. She was seen at the sleep clinic this morning where her oxygen saturation was noted to be in the 70s. Upon presentation to the emergency department the patient was placed on 4 L of oxygen and her oxygen saturations were in the mid to high 90s. Review of Systems     Review of Systems   Constitutional: Negative for chills and fever. HENT: Negative for congestion. Eyes: Negative for photophobia and pain. Respiratory: Positive for cough and shortness of breath. Cardiovascular: Negative for chest pain and palpitations. Gastrointestinal: Negative for abdominal pain, diarrhea, nausea and vomiting. Genitourinary: Negative for difficulty urinating and hematuria. Musculoskeletal: Negative for back pain and neck pain.    Neurological: Negative for dizziness and headaches. Psychiatric/Behavioral: Negative for suicidal ideas. Past Medical, Surgical, Family, and Social History     She has a past medical history of Allergic rhinitis, Asthma, Chicken pox, Depression, Diverticulosis of large intestine, Essential hypertension, Gastroesophageal reflux disease without esophagitis, GERD (gastroesophageal reflux disease), Hyperlipidemia, Hypertension, Interstitial lung disease ([address] Mitral valve regurgitation, Mixed hyperlipidemia, Obesity, Osteoarthritis, Osteoporosis, Polio, Prolonged emergence from general anesthesia, RA (rheumatoid arthritis) ([address] Rheumatoid arthritis ([address] and Sleep apnea. She has a past surgical history that includes Appendectomy; Cholecystectomy; Salpingo-oophorectomy; Carpal tunnel release (Bilateral); Foot surgery (Bilateral); Total shoulder arthroplasty (Right); other surgical history (Right, 02/20/2017); Hysterectomy, total abdominal; Tonsillectomy; Shoulder arthroscopy (Left, 11/15/2017); joint replacement (Right, 04/25/2016); joint replacement (Right); and Total knee arthroplasty (Right). Her family history includes Arthritis in her sister; Asthma in her sister; Cancer in her mother; Depression in her brother, brother, and sister; Diabetes in her brother; Heart Disease in her father; High Blood Pressure in her brother and father; High Cholesterol in her sister; Psoriasis in her sister. She reports that she quit smoking about 43 years ago. Her smoking use included cigarettes. She has a 14.00 pack-year smoking history. She has never used smokeless tobacco. She reports that she drinks about 0.6 - 1.2 oz of alcohol per week. She reports that she does not use drugs.     Medications     Previous Medications    ADALIMUMAB (HUMIRA) 40 MG/0.8ML INJECTION    Inject 40 mg into the skin once a week Mondays    ALBUTEROL SULFATE HFA (VENTOLIN HFA) 108 (90 BASE) MCG/ACT INHALER    Inhale 2 puffs into the lungs every 6 hours as needed for Wheezing or Shortness of Breath MAY USE ALTERNATIVE PER INSURANCE    B COMPLEX VITAMINS (B COMPLEX 1) TABS    Take 1 tablet by mouth Daily. CETIRIZINE (ZYRTEC) 10 MG TABLET    Take 10 mg by mouth nightly     COLESTIPOL (COLESTID) 1 G TABLET    Take 1 g by mouth 3 times daily    ESCITALOPRAM (LEXAPRO) 10 MG TABLET    Take 1 tablet by mouth daily    FENOFIBRATE (TRICOR) 145 MG TABLET    Take 1 tablet by mouth daily    GABAPENTIN (NEURONTIN) 300 MG CAPSULE    Take 300 mg by mouth 4 times daily    LEFLUNOMIDE (ARAVA) 20 MG TABLET    Take 20 mg by mouth daily     METOPROLOL SUCCINATE (TOPROL XL) 25 MG EXTENDED RELEASE TABLET    Take 0.5 tablets by mouth daily    MONTELUKAST (SINGULAIR) 10 MG TABLET    Take 1 tablet by mouth nightly    ONDANSETRON (ZOFRAN) 4 MG TABLET    Take 1 tablet by mouth every 8 hours as needed for Nausea or Vomiting    POTASSIUM CHLORIDE (KLOR-CON M) 20 MEQ EXTENDED RELEASE TABLET    Take 1 tablet by mouth 2 times daily    PROBIOTIC PRODUCT (PROBIOTIC FORMULA) CAPS    Take 1 capsule by mouth daily. SULINDAC (CLINORIL) 200 MG TABLET    Take 200 mg by mouth daily     TRAZODONE (DESYREL) 50 MG TABLET    Take 1 tablet by mouth nightly    VITAMIN D (CHOLECALCIFEROL) 1000 UNITS TABS TABLET    Take 1,000 Units by mouth daily        Allergies     She is allergic to sulfa antibiotics; lamotrigine; lomotil  [diphenoxylate-atropine]; and sulfacetamide sodium. Physical Exam     INITIAL VITALS: BP: (!) 160/98,  , Pulse: 94, Resp: 16, SpO2: 96 %  Physical Exam   Constitutional: She is oriented to person, place, and time. She appears well-developed and well-nourished. No distress. HENT:   Head: Normocephalic and atraumatic. Eyes: Pupils are equal, round, and reactive to light. EOM are normal.   Neck: Normal range of motion. Neck supple. Cardiovascular: Normal rate and regular rhythm. Exam reveals no gallop and no friction rub. No murmur heard.   Pulmonary/Chest: Effort normal. No respiratory distress. She has wheezes. Abdominal: Soft. There is no tenderness. Musculoskeletal: Normal range of motion. Neurological: She is alert and oriented to person, place, and time. Skin: Skin is warm and dry. Psychiatric: She has a normal mood and affect. Diagnostic Results     EKG   Interpreted in conjunction with emergency department physician Judith Turcios MD  Rhythm: normal sinus   Rate: normal  Axis: normal  Ectopy: premature atrial contraction  Conduction: normal  ST Segments: no acute change  T Waves: no acute change  Q Waves:none  Clinical Impression: no acute changes    RADIOLOGY:  XR CHEST STANDARD (2 VW)   Final Result   Impression: Bilateral interstitial reticular opacities of the bases may reflect edema or interstitial pneumonitis.       CT CHEST PULMONARY EMBOLISM W CONTRAST    (Results Pending)       LABS:   Results for orders placed or performed during the hospital encounter of 03/25/19   CBC Auto Differential   Result Value Ref Range    WBC 8.4 4.0 - 11.0 K/uL    RBC 4.87 4.00 - 5.20 M/uL    Hemoglobin 13.5 12.0 - 16.0 g/dL    Hematocrit 41.8 36.0 - 48.0 %    MCV 85.9 80.0 - 100.0 fL    MCH 27.8 26.0 - 34.0 pg    MCHC 32.3 31.0 - 36.0 g/dL    RDW 16.0 (H) 12.4 - 15.4 %    Platelets 641 912 - 775 K/uL    MPV 8.0 5.0 - 10.5 fL    Neutrophils % 62.2 %    Lymphocytes % 22.0 %    Monocytes % 11.2 %    Eosinophils % 3.6 %    Basophils % 1.0 %    Neutrophils # 5.2 1.7 - 7.7 K/uL    Lymphocytes # 1.9 1.0 - 5.1 K/uL    Monocytes # 0.9 0.0 - 1.3 K/uL    Eosinophils # 0.3 0.0 - 0.6 K/uL    Basophils # 0.1 0.0 - 0.2 K/uL   Basic Metabolic Panel w/ Reflex to MG   Result Value Ref Range    Sodium 140 136 - 145 mmol/L    Potassium reflex Magnesium 4.3 3.5 - 5.1 mmol/L    Chloride 101 99 - 110 mmol/L    CO2 29 21 - 32 mmol/L    Anion Gap 10 3 - 16    Glucose 103 (H) 70 - 99 mg/dL    BUN 16 7 - 20 mg/dL    CREATININE 0.7 0.6 - 1.2 mg/dL    GFR Non-African American >60 >60    GFR  >60 >60 Calcium 9.5 8.3 - 10.6 mg/dL   Troponin   Result Value Ref Range    Troponin <0.01 <0.01 ng/mL   Brain Natriuretic Peptide   Result Value Ref Range    Pro- 0 - 449 pg/mL   POCT Venous   Result Value Ref Range    pO2, Lex 30 Not Established mm Hg    Sample Type LEX     Performed on SEE BELOW    POCT Venous   Result Value Ref Range    pH, Lex 7.388 7.350 - 7.450    pCO2, Lex 47.5 40.0 - 50.0 mm Hg    pO2, Lex 32 Not Established mm Hg    HCO3, Venous 28.6 23.0 - 29.0 mmol/L    Base Excess, Lex 4 (H) -3 - 3    O2 Sat, Lex 60 Not Established %    TC02 (Calc), Lex 30 Not Established mmol/L    Sample Type LEX     Performed on SEE BELOW    EKG 12 Lead   Result Value Ref Range    Ventricular Rate 92 BPM    Atrial Rate 92 BPM    P-R Interval 152 ms    QRS Duration 80 ms    Q-T Interval 362 ms    QTc Calculation (Bazett) 447 ms    P Axis 40 degrees    R Axis 22 degrees    T Axis 14 degrees    Diagnosis       EKG performed in ER and to be interpreted by ER physician. Confirmed by MD, ER ([id number]),  Ildefonso Rivera (SHILPA)TANVIR ([id number]) on 3/25/2019 2:57:56 PM       RECENT VITALS:  BP: (!) 162/68,  , Pulse: 96, Resp: 16, SpO2: 96 %       ED Course     Nursing Notes, Past Medical Hx,Past Surgical Hx, Social Hx, Allergies, and Family Hx were reviewed. The patient was given the following medications:  Orders Placed This Encounter   Medications    ipratropium-albuterol (DUONEB) nebulizer solution 1 ampule    albuterol (PROVENTIL) nebulizer solution 2.5 mg    albuterol (PROVENTIL) nebulizer solution 2.5 mg    iopamidol (ISOVUE-370) 76 % injection 80 mL       CONSULTS:  [facility] / ASSESSMENT / Katie Mercy is a 70 y.o. female who presents to the emergency Department with shortness of breath. Patient was in the low 90s on room air presentation was placed on 4 L of oxygen. Made of her vital signs were stable throughout her stay.  Thorough history and physical exam was performed as detailed above. Patient presents emergency Department with shortness of breath. She states that for the last 3-4 weeks she has noticed worsening shortness of breath on exertion. She does have a history of interstitial lung disease for which she sees pulmonology. She states he was seen at her primary care clinic approximately 2 weeks ago and diagnosed with either bronchitis or pneumonia. She states she was given 10 days of antibiotics and steroids. She states that she finished the dosing 4 days ago and has not had significant improvement of symptoms. She states that she does not feel short of breath at rest, however with any exertion feel short of breath. She denies any associated chest pains, lightheadedness, dizziness, abdominal pain, nausea, or vomiting. She was seen at the sleep clinic this morning and her oxygen was in the 70s, therefore she was sent to the emergency department. EKG, VBG, CBC, BMP, BNP, troponin, chest x-ray were all obtained. EKG came back showing no signs of ST or T-wave changes. VBG came back with a pH of 7.388 and a PCO2 of 47.5. CBC came back without any signs of infection or anemia with a white blood cell count of 8.4 and hemoglobin of 13.5. BMP came back within normal limits. Troponin came back negative and BNP came back at 284. Chest x-ray showed bilateral interstitial reticular opacities at the bases which may reflect edema or interstitial pneumonitis. Patient did not have improvement of her breathing with DuoNeb and 2 albuterol treatments. Upon reassessment, the patient still required 2 L of oxygen to keep her oxygen saturations around 94-95. At this time, I believe the patient needs to be admitted to the hospital for further workup and pulmonology consult. I have low suspicion for ACS or heart failure causing the patient's symptoms at this time given negative workup in the emergency department and normal physical exam findings.  I did contact the

## 2019-03-25 NOTE — PROGRESS NOTES
4 Eyes Admission Assessment     I agree as the admission nurse that 2 RN's have performed a thorough Head to Toe Skin Assessment on the patient. ALL assessment sites listed below have been assessed on admission. Areas assessed by both nurses:   [x]   Head, Face, and Ears   [x]   Shoulders, Back, and Chest  [x]   Arms, Elbows, and Hands   [x]   Coccyx, Sacrum, and Ischum  [x]   Legs, Feet, and Heels        Does the Patient have Skin Breakdown?   No         Shaji Prevention initiated:  No   Wound Care Orders initiated:  No      WOC nurse consulted for Pressure Injury (Stage 3,4, Unstageable, DTI, NWPT, and Complex wounds):  No      Nurse 1 eSignature: Electronically signed by Yaneth Putnam RN on 3/25/19 at 6:07 PM    SHARE this note so that the co-signing nurse is able to place an eSignature    Nurse 2 eSignature: Electronically signed by Deondre Aguila RN on 3/25/19 at 6:19 PM

## 2019-03-25 NOTE — H&P
[facility]    3/25/2019 7:35 PM    Patient Information:  Tc Cleary is a 70 y.o. female [id number]  PCP:  Jeanne Lowe (Tel: [phone] )    Chief complaint:    Chief Complaint   Patient presents with    Shortness of Breath     Sent 911 via [address] [place] [address] Sleep Medicine for low o2 sat - in 70's per report        History of Present Illness:  Nicky Acosta is a 70 y.o. female who presents with hypoxia to the 70's and shortness of breath. She was being seen for follow up at her sleep doctor and was noted to be hypoxic. She has had a cough and increased shortness of breath on exertion. She has interstitial lung disease and has been weaning down steroids to 3 mg daily. Last week she was given a steroid burst for 5 days and an antibiotic. She still had dyspnea on exertion even while on that. She does not use oxygen at home. Mild swelling in her legs, no calf pain. No h/o DVT. She does not smoke. Nebs in ER did not help much. REVIEW OF SYSTEMS:   Constitutional: Negative for fever,chills or night sweats  ENT: Negative for rhinorrhea, epistaxis, hoarseness, sore throat. Respiratory: + for shortness of breath,wheezing  Cardiovascular: Negative for chest pain, palpitations   Gastrointestinal: Negative for nausea, vomiting, diarrhea  Genitourinary: Negative for polyuria, dysuria   Hematologic/Lymphatic: Negative for bleeding tendency, easy bruising  Musculoskeletal: Negative for myalgias and arthralgias  Neurologic: Negative for confusion,dysarthria. Skin: Negative for itching,rash  Psychiatric: Negative for depression,anxiety, agitation. Endocrine: Negative for polydipsia,polyuria,heat /cold intolerance.     Past Medical History:   has a past medical history of Allergic rhinitis, Asthma, Chicken pox, Depression, Diverticulosis of large intestine, Essential hypertension, Gastroesophageal reflux disease without esophagitis, GERD (gastroesophageal reflux disease), Hyperlipidemia, Hypertension, Interstitial lung disease ([facility] Mitral valve regurgitation, Mixed hyperlipidemia, Obesity, Osteoarthritis, Osteoporosis, Polio, Prolonged emergence from general anesthesia, RA (rheumatoid arthritis) ([facility] [facility] Rheumatoid arthritis ([facility] [address] and Sleep apnea. Past Surgical History:   has a past surgical history that includes Appendectomy; Cholecystectomy; Salpingo-oophorectomy; Carpal tunnel release (Bilateral); Foot surgery (Bilateral); Total shoulder arthroplasty (Right); other surgical history (Right, 02/20/2017); Hysterectomy, total abdominal; Tonsillectomy; Shoulder arthroscopy (Left, 11/15/2017); joint replacement (Right, 04/25/2016); joint replacement (Right); and Total knee arthroplasty (Right). Medications:  No current facility-administered medications on file prior to encounter. Current Outpatient Medications on File Prior to Encounter   Medication Sig Dispense Refill    oxyCODONE-acetaminophen (PERCOCET) 5-325 MG per tablet Take 1 tablet by mouth every 8 hours.  Indications: Backache      fluticasone (FLONASE) 50 MCG/ACT nasal spray 1 spray by Each Nare route nightly      amLODIPine (NORVASC) 10 MG tablet Take 10 mg by mouth daily      losartan (COZAAR) 25 MG tablet Take 25 mg by mouth daily      Calcium Carb-Cholecalciferol (CALCIUM-VITAMIN D) 500-200 MG-UNIT per tablet Take 2 tablets by mouth Daily with supper      predniSONE (DELTASONE) 1 MG tablet Take 3 mg by mouth daily      albuterol sulfate HFA (VENTOLIN HFA) 108 (90 Base) MCG/ACT inhaler Inhale 2 puffs into the lungs every 6 hours as needed for Wheezing or Shortness of Breath MAY USE ALTERNATIVE PER INSURANCE 1 Inhaler 11    escitalopram (LEXAPRO) 10 MG tablet Take 1 tablet by mouth daily 30 tablet 0    colestipol (COLESTID) 1 g tablet Take 1 g by mouth 3 times daily      sulindac (CLINORIL) 200 MG tablet Take 200 mg by mouth daily       montelukast (SINGULAIR) 10 MG tablet Take 1 tablet by mouth nightly 90 tablet 1    leflunomide (ARAVA) 20 MG tablet Take 20 mg by mouth daily       traZODone (DESYREL) 50 MG tablet Take 1 tablet by mouth nightly 90 tablet 1    gabapentin (NEURONTIN) 300 MG capsule Take 300 mg by mouth 3 times daily.  Probiotic Product (PROBIOTIC FORMULA) CAPS Take 1 capsule by mouth daily.  cetirizine (ZYRTEC) 10 MG tablet Take 10 mg by mouth nightly       adalimumab (HUMIRA) 40 MG/0.8ML injection Inject 40 mg into the skin once a week Mondays         Allergies: Allergies   Allergen Reactions    Sulfa Antibiotics Hives    Lamotrigine Other (See Comments)     Blurred vision    Lomotil  [Diphenoxylate-Atropine] Other (See Comments)     Changes in vision    Sulfacetamide Sodium Hives     Other reaction(s): Other (See Comments)        Social History:   reports that she quit smoking about 43 years ago. Her smoking use included cigarettes. She has a 14.00 pack-year smoking history. She has never used smokeless tobacco. She reports that she drinks about 0.6 - 1.2 oz of alcohol per week. She reports that she does not use drugs. Family History:  family history includes Arthritis in her sister; Asthma in her sister; Cancer in her mother; Depression in her brother, brother, and sister; Diabetes in her brother; Heart Disease in her father; High Blood Pressure in her brother and father; High Cholesterol in her sister; Psoriasis in her sister. Physical Exam:  /73   Pulse 88   Temp 98.4 °F (36.9 °C) (Oral)   Resp 16   Ht 5' (1.524 m)   Wt 226 lb 13.7 oz (102.9 kg)   SpO2 96%   BMI 44.30 kg/m²     General appearance:  Appears comfortable. Well nourished  Eyes: conjunctiva clear, sclera anicteric  ENT: Moist mucus membranes  Neck:  Supple, no masses  Cardiovascular: Regular rhythm, normal S1, S2. No murmur, gallop, rub.  Tr edema in lower extremities no cords  Respiratory: Crackles heard bilaterally, no wheeze, good inspiratory effort  Gastrointestinal: Abdomen soft, non-tender, not distended, normal bowel sounds  Musculoskeletal: strength symmetric  Neurology: awake and alert; no facial asymmetry, CN 2-12 appear intact  No speech or motor deficits  Psychiatry: Appropriate affect. Not agitated, cooperative  Skin: Warm, dry, no rash    Labs:  CBC:   Lab Results   Component Value Date    WBC 8.4 03/25/2019    RBC 4.87 03/25/2019    HGB 13.5 03/25/2019    HCT 41.8 03/25/2019    MCV 85.9 03/25/2019    MCH 27.8 03/25/2019    MCHC 32.3 03/25/2019    RDW 16.0 03/25/2019     03/25/2019    MPV 8.0 03/25/2019     BMP:    Lab Results   Component Value Date     03/25/2019    K 4.3 03/25/2019     03/25/2019    CO2 29 03/25/2019    BUN 16 03/25/2019    CREATININE 0.7 03/25/2019    CALCIUM 9.5 03/25/2019    GFRAA >60 03/25/2019    GFRAA 57 01/03/2013    LABGLOM >60 03/25/2019    LABGLOM 51 09/25/2012    LABGLOM 42 09/25/2012    GLUCOSE 103 03/25/2019    GLUCOSE 107 03/19/2012     Trop<0.01  Pro     Imaging:   CTA chest  No CT evidence of pulmonary embolism.       Patchy groundglass infiltrates in the lungs bilaterally, predominantly in the upper lobes, consistent with pneumonitis or pneumonia.       Evidence of mild underlying pulmonary interstitial fibrosis.       Evidence of old granulomatous disease. CXR  Impression: Bilateral interstitial reticular opacities of the bases may reflect edema or interstitial pneumonitis. EKG:  NSR S1Q3T3 pattern with nonspecific ST changes. Viewed by me. Assessment/Plan:   Acute respiratory failure with hypoxia  Interstitial lung disease with possible acute exacerbation  Possible pneumonia, unspecified bacteria    Obstructive sleep apnea syndrome    Rheumatoid arthritis ([facility] [address]    Pulmonary input appreciated. Continue oxygen, nebs. Add rocephin/zmax. Resume home dose of steroids instead of IV steroid burst.  Await cultures. Check ECHO    Admit as inpatient.  I anticipate hospitalization spanning more than two midnights for investigation and treatment of the above medically necessary diagnoses.       Nasra Bucio MD    3/25/2019 7:35 PM

## 2019-03-26 LAB
L. PNEUMOPHILA SEROGP 1 UR AG: NORMAL
STREP PNEUMONIAE ANTIGEN, URINE: NORMAL

## 2019-03-26 PROCEDURE — 6370000000 HC RX 637 (ALT 250 FOR IP): Performed by: FAMILY MEDICINE

## 2019-03-26 PROCEDURE — 2580000003 HC RX 258: Performed by: INTERNAL MEDICINE

## 2019-03-26 PROCEDURE — 2580000003 HC RX 258: Performed by: FAMILY MEDICINE

## 2019-03-26 PROCEDURE — 99233 SBSQ HOSP IP/OBS HIGH 50: CPT | Performed by: INTERNAL MEDICINE

## 2019-03-26 PROCEDURE — 94640 AIRWAY INHALATION TREATMENT: CPT

## 2019-03-26 PROCEDURE — 6360000002 HC RX W HCPCS: Performed by: INTERNAL MEDICINE

## 2019-03-26 PROCEDURE — 1200000000 HC SEMI PRIVATE

## 2019-03-26 PROCEDURE — 94761 N-INVAS EAR/PLS OXIMETRY MLT: CPT

## 2019-03-26 PROCEDURE — 6370000000 HC RX 637 (ALT 250 FOR IP): Performed by: INTERNAL MEDICINE

## 2019-03-26 PROCEDURE — 6360000002 HC RX W HCPCS: Performed by: FAMILY MEDICINE

## 2019-03-26 PROCEDURE — 2700000000 HC OXYGEN THERAPY PER DAY

## 2019-03-26 RX ORDER — CHOLESTYRAMINE LIGHT 4 G/5.7G
4 POWDER, FOR SUSPENSION ORAL DAILY
Status: DISCONTINUED | OUTPATIENT
Start: 2019-03-27 | End: 2019-03-28

## 2019-03-26 RX ORDER — MONTELUKAST SODIUM 4 MG/1
1 TABLET, CHEWABLE ORAL 3 TIMES DAILY
Status: DISCONTINUED | OUTPATIENT
Start: 2019-03-26 | End: 2019-03-26

## 2019-03-26 RX ORDER — PREDNISONE 1 MG/1
3 TABLET ORAL DAILY
Status: DISCONTINUED | OUTPATIENT
Start: 2019-03-26 | End: 2019-03-28 | Stop reason: HOSPADM

## 2019-03-26 RX ADMIN — IBUPROFEN 600 MG: 400 TABLET, FILM COATED ORAL at 08:04

## 2019-03-26 RX ADMIN — GABAPENTIN 600 MG: 300 CAPSULE ORAL at 14:53

## 2019-03-26 RX ADMIN — CALCIUM CARBONATE-VITAMIN D TAB 500 MG-200 UNIT 2 TABLET: 500-200 TAB at 17:21

## 2019-03-26 RX ADMIN — LEFLUNOMIDE 20 MG: 20 TABLET ORAL at 08:06

## 2019-03-26 RX ADMIN — IPRATROPIUM BROMIDE AND ALBUTEROL SULFATE 1 AMPULE: .5; 3 SOLUTION RESPIRATORY (INHALATION) at 09:09

## 2019-03-26 RX ADMIN — CEFTRIAXONE 1 G: 1 INJECTION, POWDER, FOR SOLUTION INTRAMUSCULAR; INTRAVENOUS at 17:24

## 2019-03-26 RX ADMIN — CETIRIZINE HYDROCHLORIDE 10 MG: 10 TABLET, FILM COATED ORAL at 20:57

## 2019-03-26 RX ADMIN — MONTELUKAST SODIUM 10 MG: 10 TABLET, FILM COATED ORAL at 20:57

## 2019-03-26 RX ADMIN — OXYCODONE AND ACETAMINOPHEN 1 TABLET: 5; 325 TABLET ORAL at 17:21

## 2019-03-26 RX ADMIN — IBUPROFEN 600 MG: 400 TABLET, FILM COATED ORAL at 11:52

## 2019-03-26 RX ADMIN — OXYCODONE AND ACETAMINOPHEN 1 TABLET: 5; 325 TABLET ORAL at 08:06

## 2019-03-26 RX ADMIN — GABAPENTIN 600 MG: 300 CAPSULE ORAL at 08:04

## 2019-03-26 RX ADMIN — Medication 250 MG: at 08:05

## 2019-03-26 RX ADMIN — ESCITALOPRAM OXALATE 10 MG: 10 TABLET, FILM COATED ORAL at 08:05

## 2019-03-26 RX ADMIN — FLUTICASONE PROPIONATE 1 SPRAY: 50 SPRAY, METERED NASAL at 20:59

## 2019-03-26 RX ADMIN — Medication 10 ML: at 08:10

## 2019-03-26 RX ADMIN — GABAPENTIN 600 MG: 300 CAPSULE ORAL at 20:57

## 2019-03-26 RX ADMIN — ENOXAPARIN SODIUM 40 MG: 40 INJECTION SUBCUTANEOUS at 08:09

## 2019-03-26 RX ADMIN — IPRATROPIUM BROMIDE AND ALBUTEROL SULFATE 1 AMPULE: .5; 3 SOLUTION RESPIRATORY (INHALATION) at 14:33

## 2019-03-26 RX ADMIN — LOSARTAN POTASSIUM 25 MG: 25 TABLET, FILM COATED ORAL at 08:06

## 2019-03-26 RX ADMIN — IBUPROFEN 600 MG: 400 TABLET, FILM COATED ORAL at 17:20

## 2019-03-26 RX ADMIN — TRAZODONE HYDROCHLORIDE 50 MG: 50 TABLET ORAL at 20:57

## 2019-03-26 RX ADMIN — IPRATROPIUM BROMIDE AND ALBUTEROL SULFATE 1 AMPULE: .5; 3 SOLUTION RESPIRATORY (INHALATION) at 20:05

## 2019-03-26 RX ADMIN — Medication 10 ML: at 20:58

## 2019-03-26 RX ADMIN — AZITHROMYCIN MONOHYDRATE 500 MG: 500 INJECTION, POWDER, LYOPHILIZED, FOR SOLUTION INTRAVENOUS at 18:18

## 2019-03-26 RX ADMIN — AMLODIPINE BESYLATE 10 MG: 10 TABLET ORAL at 08:05

## 2019-03-26 RX ADMIN — CHOLESTYRAMINE 4 G: 4 POWDER, FOR SUSPENSION ORAL at 11:52

## 2019-03-26 ASSESSMENT — PAIN SCALES - GENERAL
PAINLEVEL_OUTOF10: 0
PAINLEVEL_OUTOF10: 3
PAINLEVEL_OUTOF10: 0

## 2019-03-26 ASSESSMENT — PAIN DESCRIPTION - PROGRESSION
CLINICAL_PROGRESSION: NOT CHANGED
CLINICAL_PROGRESSION: NOT CHANGED

## 2019-03-26 ASSESSMENT — PAIN DESCRIPTION - FREQUENCY
FREQUENCY: INTERMITTENT
FREQUENCY: INTERMITTENT

## 2019-03-26 ASSESSMENT — PAIN DESCRIPTION - LOCATION
LOCATION: BACK
LOCATION: BACK

## 2019-03-26 ASSESSMENT — PAIN DESCRIPTION - PAIN TYPE
TYPE: CHRONIC PAIN
TYPE: CHRONIC PAIN

## 2019-03-26 ASSESSMENT — PAIN DESCRIPTION - ONSET
ONSET: ON-GOING
ONSET: ON-GOING

## 2019-03-26 ASSESSMENT — PAIN - FUNCTIONAL ASSESSMENT
PAIN_FUNCTIONAL_ASSESSMENT: ACTIVITIES ARE NOT PREVENTED
PAIN_FUNCTIONAL_ASSESSMENT: ACTIVITIES ARE NOT PREVENTED

## 2019-03-26 ASSESSMENT — PAIN DESCRIPTION - DESCRIPTORS
DESCRIPTORS: ACHING
DESCRIPTORS: ACHING

## 2019-03-26 ASSESSMENT — PAIN DESCRIPTION - ORIENTATION
ORIENTATION: LOWER
ORIENTATION: LOWER

## 2019-03-26 NOTE — PROGRESS NOTES
current rocephin/zithromax. Continue chronic PO prednisone 3 mg daily. Pulmonary input appreciated. Continue oxygen support.     Diet: DIET GENERAL;  Code:Full Code  DVT PPX Lovenox    Judy Jean-Baptiste MD   3/26/2019 12:40 PM

## 2019-03-26 NOTE — PLAN OF CARE
Problem: Falls - Risk of:  Goal: Will remain free from falls  Description  Will remain free from falls  3/26/2019 1117 by lEa Perry RN  Outcome: Ongoing    Pt is A&O x4. She calls appropriately for assistance. Cane in use to ambulate. Problem: Pain:  Goal: Control of chronic pain  Description  Control of chronic pain  Outcome: Ongoing  Pt.  Is using prn and scheduled medicine as well as repositioning to assist with pain control

## 2019-03-26 NOTE — PROGRESS NOTES
Pulmonary & Critical Care Medicine    Admit Date: 3/25/2019  PCP: Kulwinder Olivera    CC:  pneumonia  Events of Last 24 hours:   No major events over the past 24 hours. Denies SOB. On 2 liters of O2. She has cough which is non productive. No fever or chills. Vitals:  Tmax:  VITALS:  BP (!) 159/90   Pulse 86   Temp 98.4 °F (36.9 °C) (Oral)   Resp 16   Ht 5' (1.524 m)   Wt 226 lb 13.7 oz (102.9 kg)   SpO2 94%   BMI 44.30 kg/m²   24HR INTAKE/OUTPUT:      Intake/Output Summary (Last 24 hours) at 3/26/2019 1104  Last data filed at 3/26/2019 0650  Gross per 24 hour   Intake 720 ml   Output 1375 ml   Net -655 ml     CURRENT PULSE OXIMETRY:  SpO2: 94 %  24HR PULSE OXIMETRY RANGE:  SpO2  Av.9 %  Min: 92 %  Max: 99 %    EXAM:  General: No distress. Alert. Eyes: PERRL. No sclera icterus. No conjunctival injection. ENT: No discharge. Moist oral mucosa    Neck: Trachea midline. Normal thyroid. Resp: No accessory muscle use. Bilateral crackles. No wheezing   CV: Regular rate. Regular rhythm. No mumur or rub. No edema. GI: Non-tender. Non-distended. Skin: Warm and dry. No nodule on exposed extremities. No rash on exposed extremities. Neuro: Awake. Speech is clear.       Medications:    IV:        Scheduled Meds:   cetirizine  10 mg Oral Nightly    escitalopram  10 mg Oral Daily    leflunomide  20 mg Oral Daily    montelukast  10 mg Oral Nightly    saccharomyces boulardii  250 mg Oral Daily    ibuprofen  600 mg Oral TID WC    traZODone  50 mg Oral Nightly    sodium chloride flush  10 mL Intravenous 2 times per day    enoxaparin  40 mg Subcutaneous Daily    cefTRIAXone (ROCEPHIN) IV  1 g Intravenous Q24H    azithromycin  500 mg Intravenous Q24H    ipratropium-albuterol  1 ampule Inhalation TID    cholestyramine light  4 g Oral Daily    gabapentin  600 mg Oral TID    oxyCODONE-acetaminophen  1 tablet Oral Q8H    losartan  25 mg Oral Daily    fluticasone  1 spray Each Nare Nightly  calcium-vitamin D  2 tablet Oral Dinner    amLODIPine  10 mg Oral Daily    predniSONE  3 mg Oral Daily         Diet: DIET GENERAL;     Results:  CBC:   Recent Labs     03/25/19  1329   WBC 8.4   HGB 13.5   HCT 41.8   MCV 85.9        BMP:   Recent Labs     03/25/19  1329      K 4.3      CO2 29   BUN 16   CREATININE 0.7     LIVER PROFILE: No results for input(s): AST, ALT, LIPASE, BILIDIR, BILITOT, ALKPHOS in the last 72 hours. Invalid input(s): AMYLASE,  ALB  PT/INR: No results for input(s): PROTIME, INR in the last 72 hours. APTT: No results for input(s): APTT in the last 72 hours. UA:No results for input(s): NITRITE, COLORU, PHUR, LABCAST, WBCUA, RBCUA, MUCUS, TRICHOMONAS, YEAST, BACTERIA, CLARITYU, SPECGRAV, LEUKOCYTESUR, UROBILINOGEN, BILIRUBINUR, BLOODU, GLUCOSEU, AMORPHOUS in the last 72 hours. Invalid input(s): KETONESU      Assessment/Plan:  70 y.o. female with     Multifocal pneumonia  Hypoxia   RA - ILD  Ex smoker, smoked for 12 years. New leg edema. BNP is within normal    Wheezing is better. Continue ceftriaxone and azithromycin  F/u on cultures  Wean FiO2 as tolerated to keep sats > 90%  Keep off steroids     Anticipate a discharge in 1-2 days. She will need home O2 eval before discharge.       Laura Carrasquillo MD

## 2019-03-26 NOTE — CARE COORDINATION
[address] Transitions Interview     3/26/2019    Patient: Ismael Hassan Patient : 1940   MRN: [id number]   Reason for Admission: Hypoxia       RARS: Readmission Risk Score: 15         Spoke with: Ismael Hassan    Readmission Risk  Patient Active Problem List   Diagnosis    Candidiasis of skin and nails    Dermatophytosis of nail    Depressive disorder, not elsewhere classified    Obstructive sleep apnea syndrome    Diverticulosis of large intestine    Allergic rhinitis    Osteoporosis    Rheumatoid arthritis ([address]    Diaphragmatic hernia    Mitral valve regurgitation    Granulomatous lung disease ([address]    Complete tear of left rotator cuff    Biceps tendinitis on right    Postmenopausal osteoporosis    Asthma    Arthritis of right acromioclavicular joint    Glenohumeral arthritis right    Essential hypertension    Gastroesophageal reflux disease without esophagitis    Mixed hyperlipidemia    Status post reverse total replacement of right shoulder    Chronic pain of right knee    Morbid obesity with BMI of 40.0-44.9, adult (HCC)    Chronic nonseasonal allergic rhinitis due to pollen    Mild intermittent asthma without complication    Interstitial lung disease (HCC)    Chronic low back pain    Disorder resulting from impaired renal function    Spinal stenosis    Status post total right knee replacement using cement on 2017    Major depressive disorder with single episode, in partial remission (HCC)    Rheumatoid arthritis of multiple sites with negative rheumatoid factor (HCC)    Osteoarthritis of lumbar spine    Immunosuppression ([address]    S/P left rotator cuff repair    Primary osteoarthritis of left shoulder    Recurrent cold sores    Diarrhea    Nausea with vomiting    Chronic diarrhea    Hypokalemia    Hypomagnesemia    Weight loss    Vertigo    Acute diverticulitis    Hypoxia       Inpatient Assessment  Care Transitions Summary Care Transitions Inpatient Review  Medication Review  Are you able to afford your medications?:  Yes  How often do you have difficulty taking your medications?:  I always take them as prescribed. Housing Review  Who do you live with?:  Alone  Are you an active caregiver in your home?:  No  Social Support  Do you have a ?:  No  Do you have a [facility]?:  No  Durable Medical Equipment  Patient DME:  Straight cane, Walker, Other, Shower chair  Other Patient DME:  Elevated toilet and alert button, [address], [employer] [address] [employer] [address]  Functional Review  Ability to seek help/take action for Emergent/Urgent situations i.e. fire, crime, inclement weather or health crisis. :  Independent  Ability handle personal hygiene needs (bathing/dressing/grooming): Independent  Ability to manage medications: Independent  Ability to prepare food:  Independent  Ability to maintain home (clean home, laundry):  Needs Assistance  Ability to drive and/or has transportation:  Needs Assistance  Ability to do shopping:  Independent  Ability to manage finances: Independent  Is patient able to live independently?:  Yes  Hearing and Vision  Visual Impairment:  Reading glasses  Hearing Impairment:  None  Care Transitions Interventions  No Identified Needs       Summary  WINSTON spoke with patient this am for initial face to face interview. Patient states she lives in 1 level home alone, with elevator and no stairs. Patient states she uses a cane with ambulation when outside of home, reports managing her own medications, finances and shopping. Patient states she is able to perform all ADL's and IADL's independently, states she does get assistance with cleaning. Patient states she has been active with [facility], would like to resume services with them once discharged. WINSTON explained [facility] program and ongoing follow up [facility] calls once discharged, patient is agreeable at this time.     WINSTON also spoke with jose manuel HOLBROOK and Herlinda with [facility], she will follow patient as well, patient was getting SN, PT/OT. Follow Up  Future Appointments   Date Time Provider Katie Arroyo   8/28/2019 11:40 AM Dian Foster MD [facility]  There are no preventive care reminders to display for this patient.     Sravan Ball, RN

## 2019-03-26 NOTE — PLAN OF CARE
Problem: Falls - Risk of:  Goal: Will remain free from falls  Description  Will remain free from falls  Note:   Patient is a risk for falls. Bed locked in the lowest position. Bed alarm in use. Call light and personal belongings within reach. Instructed to call for help before getting up, patient verbalizes understanding. Door to room left open. Wearing non-skid socks. Fall sign posted. Will continue to monitor.

## 2019-03-27 LAB
LV EF: 63 %
LVEF MODALITY: NORMAL

## 2019-03-27 PROCEDURE — 2580000003 HC RX 258: Performed by: FAMILY MEDICINE

## 2019-03-27 PROCEDURE — 2580000003 HC RX 258: Performed by: INTERNAL MEDICINE

## 2019-03-27 PROCEDURE — 2700000000 HC OXYGEN THERAPY PER DAY

## 2019-03-27 PROCEDURE — 6370000000 HC RX 637 (ALT 250 FOR IP): Performed by: FAMILY MEDICINE

## 2019-03-27 PROCEDURE — 89220 SPUTUM SPECIMEN COLLECTION: CPT

## 2019-03-27 PROCEDURE — 94761 N-INVAS EAR/PLS OXIMETRY MLT: CPT

## 2019-03-27 PROCEDURE — 1200000000 HC SEMI PRIVATE

## 2019-03-27 PROCEDURE — 99233 SBSQ HOSP IP/OBS HIGH 50: CPT | Performed by: INTERNAL MEDICINE

## 2019-03-27 PROCEDURE — 94640 AIRWAY INHALATION TREATMENT: CPT

## 2019-03-27 PROCEDURE — 93306 TTE W/DOPPLER COMPLETE: CPT

## 2019-03-27 PROCEDURE — 94664 DEMO&/EVAL PT USE INHALER: CPT

## 2019-03-27 PROCEDURE — 6360000002 HC RX W HCPCS: Performed by: FAMILY MEDICINE

## 2019-03-27 PROCEDURE — 6360000002 HC RX W HCPCS: Performed by: INTERNAL MEDICINE

## 2019-03-27 PROCEDURE — 6370000000 HC RX 637 (ALT 250 FOR IP): Performed by: INTERNAL MEDICINE

## 2019-03-27 RX ADMIN — IBUPROFEN 600 MG: 400 TABLET, FILM COATED ORAL at 09:27

## 2019-03-27 RX ADMIN — GABAPENTIN 600 MG: 300 CAPSULE ORAL at 09:25

## 2019-03-27 RX ADMIN — GABAPENTIN 600 MG: 300 CAPSULE ORAL at 14:24

## 2019-03-27 RX ADMIN — AZITHROMYCIN MONOHYDRATE 500 MG: 500 INJECTION, POWDER, LYOPHILIZED, FOR SOLUTION INTRAVENOUS at 18:23

## 2019-03-27 RX ADMIN — MONTELUKAST SODIUM 10 MG: 10 TABLET, FILM COATED ORAL at 21:50

## 2019-03-27 RX ADMIN — Medication 10 ML: at 15:54

## 2019-03-27 RX ADMIN — OXYCODONE AND ACETAMINOPHEN 1 TABLET: 5; 325 TABLET ORAL at 00:53

## 2019-03-27 RX ADMIN — AMLODIPINE BESYLATE 10 MG: 10 TABLET ORAL at 09:25

## 2019-03-27 RX ADMIN — ENOXAPARIN SODIUM 40 MG: 40 INJECTION SUBCUTANEOUS at 09:24

## 2019-03-27 RX ADMIN — CALCIUM CARBONATE-VITAMIN D TAB 500 MG-200 UNIT 2 TABLET: 500-200 TAB at 17:13

## 2019-03-27 RX ADMIN — LOSARTAN POTASSIUM 25 MG: 25 TABLET, FILM COATED ORAL at 09:25

## 2019-03-27 RX ADMIN — PREDNISONE 3 MG: 1 TABLET ORAL at 12:03

## 2019-03-27 RX ADMIN — CHOLESTYRAMINE 4 G: 4 POWDER, FOR SUSPENSION ORAL at 12:33

## 2019-03-27 RX ADMIN — IPRATROPIUM BROMIDE AND ALBUTEROL SULFATE 1 AMPULE: .5; 3 SOLUTION RESPIRATORY (INHALATION) at 20:20

## 2019-03-27 RX ADMIN — GABAPENTIN 600 MG: 300 CAPSULE ORAL at 21:50

## 2019-03-27 RX ADMIN — LEFLUNOMIDE 20 MG: 20 TABLET ORAL at 09:30

## 2019-03-27 RX ADMIN — IPRATROPIUM BROMIDE AND ALBUTEROL SULFATE 1 AMPULE: .5; 3 SOLUTION RESPIRATORY (INHALATION) at 15:56

## 2019-03-27 RX ADMIN — TRAZODONE HYDROCHLORIDE 50 MG: 50 TABLET ORAL at 21:50

## 2019-03-27 RX ADMIN — FLUTICASONE PROPIONATE 1 SPRAY: 50 SPRAY, METERED NASAL at 21:50

## 2019-03-27 RX ADMIN — CEFTRIAXONE 1 G: 1 INJECTION, POWDER, FOR SOLUTION INTRAMUSCULAR; INTRAVENOUS at 17:14

## 2019-03-27 RX ADMIN — IBUPROFEN 600 MG: 400 TABLET, FILM COATED ORAL at 14:24

## 2019-03-27 RX ADMIN — IBUPROFEN 600 MG: 400 TABLET, FILM COATED ORAL at 17:13

## 2019-03-27 RX ADMIN — OXYCODONE AND ACETAMINOPHEN 1 TABLET: 5; 325 TABLET ORAL at 09:25

## 2019-03-27 RX ADMIN — Medication 250 MG: at 09:24

## 2019-03-27 RX ADMIN — ESCITALOPRAM OXALATE 10 MG: 10 TABLET, FILM COATED ORAL at 09:25

## 2019-03-27 RX ADMIN — CETIRIZINE HYDROCHLORIDE 10 MG: 10 TABLET, FILM COATED ORAL at 21:50

## 2019-03-27 RX ADMIN — OXYCODONE AND ACETAMINOPHEN 1 TABLET: 5; 325 TABLET ORAL at 15:54

## 2019-03-27 RX ADMIN — Medication 10 ML: at 21:51

## 2019-03-27 RX ADMIN — IPRATROPIUM BROMIDE AND ALBUTEROL SULFATE 1 AMPULE: .5; 3 SOLUTION RESPIRATORY (INHALATION) at 09:40

## 2019-03-27 ASSESSMENT — PAIN SCALES - GENERAL
PAINLEVEL_OUTOF10: 0
PAINLEVEL_OUTOF10: 7
PAINLEVEL_OUTOF10: 0
PAINLEVEL_OUTOF10: 0
PAINLEVEL_OUTOF10: 5
PAINLEVEL_OUTOF10: 0

## 2019-03-27 ASSESSMENT — PAIN DESCRIPTION - ONSET
ONSET: ON-GOING
ONSET: ON-GOING

## 2019-03-27 ASSESSMENT — PAIN DESCRIPTION - ORIENTATION
ORIENTATION: LOWER;RIGHT
ORIENTATION: LOWER;RIGHT

## 2019-03-27 ASSESSMENT — PAIN DESCRIPTION - PAIN TYPE
TYPE: CHRONIC PAIN
TYPE: CHRONIC PAIN

## 2019-03-27 ASSESSMENT — PAIN - FUNCTIONAL ASSESSMENT
PAIN_FUNCTIONAL_ASSESSMENT: ACTIVITIES ARE NOT PREVENTED
PAIN_FUNCTIONAL_ASSESSMENT: PREVENTS OR INTERFERES SOME ACTIVE ACTIVITIES AND ADLS

## 2019-03-27 ASSESSMENT — PAIN DESCRIPTION - PROGRESSION
CLINICAL_PROGRESSION: NOT CHANGED
CLINICAL_PROGRESSION: NOT CHANGED

## 2019-03-27 ASSESSMENT — PAIN DESCRIPTION - LOCATION
LOCATION: BACK
LOCATION: BACK

## 2019-03-27 ASSESSMENT — PAIN DESCRIPTION - DESCRIPTORS
DESCRIPTORS: DULL;SHARP
DESCRIPTORS: SHARP

## 2019-03-27 ASSESSMENT — PAIN DESCRIPTION - FREQUENCY
FREQUENCY: INTERMITTENT
FREQUENCY: INTERMITTENT

## 2019-03-27 NOTE — PROGRESS NOTES
[facility] [employer] [facility]    3/27/2019 10:39 AM        Name: Neyda John Admitted: 3/25/2019  Primary Care Provider: Aline Hu (Tel: [phone])      Subjective:      Patient with ILD admitted with hypoxia, pneumonia. Still short of breath and coughing up scant clear sputum. No fevers/chills. No chest pain. Still requiring oxygen, does not use at home. Does not feel any better yet. Reviewed interval ancillary notes    Objective:  BP (!) 153/88   Pulse 91   Temp 97.9 °F (36.6 °C) (Oral)   Resp 20   Ht 5' (1.524 m)   Wt 226 lb 13.7 oz (102.9 kg)   SpO2 92%   BMI 44.30 kg/m²     Intake/Output Summary (Last 24 hours) at 3/27/2019 1039  Last data filed at 3/27/2019 0536  Gross per 24 hour   Intake 360 ml   Output 1630 ml   Net -1270 ml      Wt Readings from Last 3 Encounters:   03/25/19 226 lb 13.7 oz (102.9 kg)   10/06/18 174 lb 11.2 oz (79.2 kg)   08/28/18 171 lb (77.6 kg)       General appearance:  Appears comfortable, no distress  Head:  Atraumatic normocephalic  Neck: supple  Cardiovascular: Regular rhythm, normal S1, S2. No murmur. No edema in lower extremities  Respiratory: Not using accessory muscles. Good inspiratory effort. Bilateral crackles. GI: Abdomen soft, no tenderness, not distended, normal bowel sounds  Musculoskeletal: strength symmetric  Neurology: awake and alert, cooperative      Labs and Tests:  CBC:   Recent Labs     03/25/19  1329   WBC 8.4   HGB 13.5        BMP:    Recent Labs     03/25/19  1329      K 4.3      CO2 29   BUN 16   CREATININE 0.7   GLUCOSE 103*     Hepatic: No results for input(s): AST, ALT, ALB, BILITOT, ALKPHOS in the last 72 hours.     Blood cultures negative to date    Assessment & Plan:   Pneumonia, possible gram + or gram negative bacteria  Acute respiratory failure with hypoxia    Obstructive sleep apnea syndrome    Rheumatoid arthritis [address]    Interstitial lung disease ([facility]       Continue current rocephin/zithromax. Continue chronic PO prednisone 3 mg daily. Pulmonary input appreciated. Waiting to see if improvement with antibiotics and holding off on IV steroids for now. Not much change clinically yet. Continue oxygen support.     Diet: DIET GENERAL;  Code:Full Code  DVT PPX Lovenox    Victoria Duron MD   3/27/2019 10:39 AM

## 2019-03-27 NOTE — FLOWSHEET NOTE
03/27/19 1027   Encounter Summary   Services provided to: Patient   Referral/Consult From: Yenny   Continue Visiting   (Seen 3/27/19, [place]. )   Complexity of Encounter Moderate   Length of Encounter 15 minutes   Routine   Type Initial   Assessment Approachable   Intervention Nurtured hope   Outcome Expressed gratitude   Sacraments   Communion Patient/Family wants communion

## 2019-03-27 NOTE — PROGRESS NOTES
Q8H    losartan  25 mg Oral Daily    fluticasone  1 spray Each Nare Nightly    calcium-vitamin D  2 tablet Oral Dinner    amLODIPine  10 mg Oral Daily         Diet: DIET GENERAL;     Results:  CBC:   Recent Labs     03/25/19  1329   WBC 8.4   HGB 13.5   HCT 41.8   MCV 85.9        BMP:   Recent Labs     03/25/19  1329      K 4.3      CO2 29   BUN 16   CREATININE 0.7     LIVER PROFILE: No results for input(s): AST, ALT, LIPASE, BILIDIR, BILITOT, ALKPHOS in the last 72 hours. Invalid input(s): AMYLASE,  ALB  PT/INR: No results for input(s): PROTIME, INR in the last 72 hours. APTT: No results for input(s): APTT in the last 72 hours. UA:No results for input(s): NITRITE, COLORU, PHUR, LABCAST, WBCUA, RBCUA, MUCUS, TRICHOMONAS, YEAST, BACTERIA, CLARITYU, SPECGRAV, LEUKOCYTESUR, UROBILINOGEN, BILIRUBINUR, BLOODU, GLUCOSEU, AMORPHOUS in the last 72 hours. Invalid input(s): Svetlana Hogan      Assessment/Plan:  70 y.o. female with     Multifocal pneumonia  Hypoxia, down to 2 liters. RA - ILD  Ex smoker, smoked for 12 years. New leg edema. BNP is within normal, echo was ordered to assess right ventricular function and estimated RVSP. Images reviewed. There is mitral stenosis with calcifications. Report is pending. Wheezing is better. Continue ceftriaxone and azithromycin  F/u on cultures and respiratory film array   Wean FiO2 as tolerated to keep sats > 90%  Keep off steroids     Discussed with Dr. Mariosl Clifton. Will see the response to ABX before planning for bronch or biopsy.     Assess home O2 needs     Juliet Brennan MD

## 2019-03-27 NOTE — FLOWSHEET NOTE
03/27/19 1223   Encounter Summary   Volunteer Visit   (Received anointing of the sick from Fr. Faviola Hall. )   Sacraments   Sacrament of Sick-Anointing Anointed

## 2019-03-27 NOTE — PLAN OF CARE
Problem: Falls - Risk of:  Goal: Will remain free from falls  Description  Will remain free from falls  3/27/2019 0755 by Ponce Christianson RN  Outcome: Ongoing  Note:   At high risk for falls per Lamar Barnwell scale. Oriented x 4. Ambulates to bathroom with steady gait. Needed items in reach. Clear path to bathroom. Will continue to monitor and plan of care. Problem: Pain:  Goal: Pain level will decrease  Description  Pain level will decrease  Outcome: Ongoing  Note:   Patient complains of pain at level 7/10. Patient describes pain as sharp to back  Patient requests pain medication. Patient medicated with percocet . Will continue to monitor. Problem: Breathing Pattern - Ineffective:  Goal: Ability to achieve and maintain a regular respiratory rate will improve  Description  Ability to achieve and maintain a regular respiratory rate will improve  Outcome: Ongoing  Note:   Pt dines SOB 02 2L per n/c.  Able to make needs known

## 2019-03-27 NOTE — PLAN OF CARE
Problem: Falls - Risk of:  Goal: Will remain free from falls  Description  Will remain free from falls  Outcome: Ongoing  Note:   At high risk for falls per Seleta Peel scale. Oriented x 4. Ambulates to bathroom with steady gait. Needed items in reach. Clear path to bathroom. Will continue to monitor and plan of care. Problem: Pain:  Goal: Control of chronic pain  Description  Control of chronic pain  Outcome: Ongoing  Note:   C/o chronic back pain and is on scheduled Percocet for pain. Given Percocet last night at 0053 and slept remainder of night. This AM denies pain. Will continue to monitor and plan of care.

## 2019-03-28 ENCOUNTER — TELEPHONE (OUTPATIENT)
Dept: PULMONOLOGY | Age: 79
End: 2019-03-28

## 2019-03-28 VITALS
HEART RATE: 76 BPM | SYSTOLIC BLOOD PRESSURE: 142 MMHG | RESPIRATION RATE: 16 BRPM | OXYGEN SATURATION: 94 % | TEMPERATURE: 97.8 F | DIASTOLIC BLOOD PRESSURE: 69 MMHG | WEIGHT: 226.85 LBS | BODY MASS INDEX: 44.54 KG/M2 | HEIGHT: 60 IN

## 2019-03-28 DIAGNOSIS — I50.32 CHRONIC DIASTOLIC CHF (CONGESTIVE HEART FAILURE) (HCC): Primary | ICD-10-CM

## 2019-03-28 PROCEDURE — 94761 N-INVAS EAR/PLS OXIMETRY MLT: CPT

## 2019-03-28 PROCEDURE — 6370000000 HC RX 637 (ALT 250 FOR IP): Performed by: INTERNAL MEDICINE

## 2019-03-28 PROCEDURE — 6370000000 HC RX 637 (ALT 250 FOR IP): Performed by: FAMILY MEDICINE

## 2019-03-28 PROCEDURE — 94640 AIRWAY INHALATION TREATMENT: CPT

## 2019-03-28 PROCEDURE — 94664 DEMO&/EVAL PT USE INHALER: CPT

## 2019-03-28 PROCEDURE — 94660 CPAP INITIATION&MGMT: CPT

## 2019-03-28 PROCEDURE — 99233 SBSQ HOSP IP/OBS HIGH 50: CPT | Performed by: INTERNAL MEDICINE

## 2019-03-28 PROCEDURE — 94680 O2 UPTK RST&XERS DIR SIMPLE: CPT

## 2019-03-28 PROCEDURE — 6360000002 HC RX W HCPCS: Performed by: FAMILY MEDICINE

## 2019-03-28 PROCEDURE — 2700000000 HC OXYGEN THERAPY PER DAY

## 2019-03-28 PROCEDURE — 2580000003 HC RX 258: Performed by: FAMILY MEDICINE

## 2019-03-28 RX ORDER — AZITHROMYCIN 500 MG/1
500 TABLET, FILM COATED ORAL DAILY
Qty: 2 TABLET | Refills: 0 | Status: SHIPPED | OUTPATIENT
Start: 2019-03-28 | End: 2019-03-30

## 2019-03-28 RX ORDER — FUROSEMIDE 20 MG/1
20 TABLET ORAL DAILY
Status: DISCONTINUED | OUTPATIENT
Start: 2019-03-28 | End: 2019-03-28 | Stop reason: HOSPADM

## 2019-03-28 RX ORDER — CHOLESTYRAMINE LIGHT 4 G/5.7G
4 POWDER, FOR SUSPENSION ORAL 2 TIMES DAILY
Status: DISCONTINUED | OUTPATIENT
Start: 2019-03-28 | End: 2019-03-28 | Stop reason: HOSPADM

## 2019-03-28 RX ORDER — FUROSEMIDE 20 MG/1
20 TABLET ORAL DAILY
Qty: 30 TABLET | Refills: 1 | Status: SHIPPED | OUTPATIENT
Start: 2019-03-28 | End: 2019-04-04 | Stop reason: SDUPTHER

## 2019-03-28 RX ADMIN — IPRATROPIUM BROMIDE AND ALBUTEROL SULFATE 1 AMPULE: .5; 3 SOLUTION RESPIRATORY (INHALATION) at 09:08

## 2019-03-28 RX ADMIN — LOSARTAN POTASSIUM 25 MG: 25 TABLET, FILM COATED ORAL at 08:45

## 2019-03-28 RX ADMIN — ENOXAPARIN SODIUM 40 MG: 40 INJECTION SUBCUTANEOUS at 08:45

## 2019-03-28 RX ADMIN — PREDNISONE 3 MG: 1 TABLET ORAL at 10:11

## 2019-03-28 RX ADMIN — ESCITALOPRAM OXALATE 10 MG: 10 TABLET, FILM COATED ORAL at 08:45

## 2019-03-28 RX ADMIN — CHOLESTYRAMINE 4 G: 4 POWDER, FOR SUSPENSION ORAL at 11:03

## 2019-03-28 RX ADMIN — IPRATROPIUM BROMIDE AND ALBUTEROL SULFATE 1 AMPULE: .5; 3 SOLUTION RESPIRATORY (INHALATION) at 14:50

## 2019-03-28 RX ADMIN — IBUPROFEN 600 MG: 400 TABLET, FILM COATED ORAL at 11:23

## 2019-03-28 RX ADMIN — OXYCODONE AND ACETAMINOPHEN 1 TABLET: 5; 325 TABLET ORAL at 08:46

## 2019-03-28 RX ADMIN — OXYCODONE AND ACETAMINOPHEN 1 TABLET: 5; 325 TABLET ORAL at 00:08

## 2019-03-28 RX ADMIN — Medication 250 MG: at 08:46

## 2019-03-28 RX ADMIN — LEFLUNOMIDE 20 MG: 20 TABLET ORAL at 10:11

## 2019-03-28 RX ADMIN — Medication 10 ML: at 09:00

## 2019-03-28 RX ADMIN — FUROSEMIDE 20 MG: 20 TABLET ORAL at 10:56

## 2019-03-28 RX ADMIN — GABAPENTIN 600 MG: 300 CAPSULE ORAL at 08:45

## 2019-03-28 RX ADMIN — GABAPENTIN 600 MG: 300 CAPSULE ORAL at 15:43

## 2019-03-28 RX ADMIN — AMLODIPINE BESYLATE 10 MG: 10 TABLET ORAL at 08:46

## 2019-03-28 ASSESSMENT — PAIN - FUNCTIONAL ASSESSMENT
PAIN_FUNCTIONAL_ASSESSMENT: ACTIVITIES ARE NOT PREVENTED

## 2019-03-28 ASSESSMENT — PAIN DESCRIPTION - FREQUENCY
FREQUENCY: INTERMITTENT

## 2019-03-28 ASSESSMENT — PAIN DESCRIPTION - PROGRESSION
CLINICAL_PROGRESSION: NOT CHANGED

## 2019-03-28 ASSESSMENT — PAIN SCALES - GENERAL
PAINLEVEL_OUTOF10: 7
PAINLEVEL_OUTOF10: 4
PAINLEVEL_OUTOF10: 7

## 2019-03-28 ASSESSMENT — PAIN DESCRIPTION - PAIN TYPE
TYPE: CHRONIC PAIN

## 2019-03-28 ASSESSMENT — PAIN DESCRIPTION - ORIENTATION
ORIENTATION: LOWER

## 2019-03-28 ASSESSMENT — PAIN DESCRIPTION - DESCRIPTORS
DESCRIPTORS: ACHING

## 2019-03-28 ASSESSMENT — PAIN DESCRIPTION - ONSET
ONSET: ON-GOING

## 2019-03-28 ASSESSMENT — PAIN DESCRIPTION - LOCATION
LOCATION: BACK

## 2019-03-28 NOTE — PROGRESS NOTES
[facility]    Respiratory Therapy     Home Oxygen Evaluation        Name: [address] Record Number: [id number]  Age: 70 y.o.   Gender:  female   : 1940  Today's date: 3/28/2019  Room: [address] [place] [address]      Assessment        BP (!) 142/69   Pulse 76   Temp 97.8 °F (36.6 °C) (Oral)   Resp 16   Ht 5' (1.524 m)   Wt 226 lb 13.7 oz (102.9 kg)   SpO2 93%   BMI 44.30 kg/m²     Patient Active Problem List   Diagnosis    Candidiasis of skin and nails    Dermatophytosis of nail    Depressive disorder, not elsewhere classified    Obstructive sleep apnea syndrome    Diverticulosis of large intestine    Allergic rhinitis    Osteoporosis    Rheumatoid arthritis (HCC)    Diaphragmatic hernia    Mitral valve regurgitation    Granulomatous lung disease (HCC)    Complete tear of left rotator cuff    Biceps tendinitis on right    Postmenopausal osteoporosis    Asthma    Arthritis of right acromioclavicular joint    Glenohumeral arthritis right    Essential hypertension    Gastroesophageal reflux disease without esophagitis    Mixed hyperlipidemia    Status post reverse total replacement of right shoulder    Chronic pain of right knee    Morbid obesity with BMI of 40.0-44.9, adult (HCC)    Chronic nonseasonal allergic rhinitis due to pollen    Mild intermittent asthma without complication    Interstitial lung disease (HCC)    Chronic low back pain    Disorder resulting from impaired renal function    Spinal stenosis    Status post total right knee replacement using cement on 2017    Major depressive disorder with single episode, in partial remission (HCC)    Rheumatoid arthritis of multiple sites with negative rheumatoid factor (HCC)    Osteoarthritis of lumbar spine    Immunosuppression ([address]    S/P left rotator cuff repair    Primary osteoarthritis of left shoulder    Recurrent cold sores    Diarrhea    Nausea with vomiting    Chronic diarrhea    Hypokalemia  Hypomagnesemia    Weight loss    Vertigo    Acute diverticulitis    Hypoxia    Chronic diastolic CHF (congestive heart failure) (HCC)       Social History:  Social History     Tobacco Use    Smoking status: Former Smoker     Packs/day: 1.00     Years: 14.00     Pack years: 14.00     Types: Cigarettes     Last attempt to quit: 1976     Years since quittin.2    Smokeless tobacco: Never Used    Tobacco comment: quit age 29   Substance Use Topics    Alcohol use: Yes     Alcohol/week: 0.6 - 1.2 oz     Types: 1 - 2 Glasses of wine per week     Comment: social drinker    Drug use: No       Patient Room Air saturation at rest 89  %  Patient Room Air saturation during ambulation 82 %    Oxygen saturations of 88% or less on RA qualifies patient for Home Oxygen    Patient resting on 0  lmp  with an oxygen saturation of  89 %     Patient ambulated on 2 lpm with an oxygen saturation of 87%    Patient ambulated on 3 lpm with an oxygen saturation of 92%        Qualifying patient for home oxygen with ambulation and continuous flow  @ 3 lpm.      In your clinical assessment does the Patient Require Portable Oxygen Tanks?     Yes               Patient/caregiver was educated on Home Oxygen process:  Yes      Level of patient/caregiver understanding able to:   [x] Verbalize understanding   [] Demonstrate understanding       [] Teach back        [] Needs reinforcement        []  No available caregiver               []  Other:     Response to education:  Very Good     Time Spent with Home O2 Set Up:  20  minutes     Demi Gray RCP on 3/28/2019 at 11:54 AM                 .

## 2019-03-28 NOTE — DISCHARGE INSTR - COC
Continuity of Care Form    Patient Name: Ernestina Blanton   :  1940  MRN:  [id number]    Admit date:  3/25/2019  Discharge date:  3/28/19    Code Status Order: Full Code   Advance Directives:   [facility] Documentation     Date/Time Healthcare Directive Type of Healthcare Directive Copy in [address] Agent's Name Healthcare Agent's Phone Number    [phone]  Yes, patient has an advance directive for healthcare treatment  Durable power of  for health care;Living will  No, copy requested from family  --  --  --          Admitting Physician:  Pako Martin MD  PCP: Arti Hines    Discharging Nurse: [facility] Unit/Room#: 1345/9442-39  Discharging Unit Phone Number: ***    Emergency Contact:   Extended Emergency Contact Information  Primary Emergency Contact: [address] [place] [address] Phone: [phone]  Relation: Child  Secondary Emergency Contact: [address] Phone: [phone]  Mobile Phone: [phone]  Relation: Child    Past Surgical History:  Past Surgical History:   Procedure Laterality Date    APPENDECTOMY      CARPAL TUNNEL RELEASE Bilateral     CHOLECYSTECTOMY      FOOT SURGERY Bilateral     metatarsal removal    HYSTERECTOMY, TOTAL ABDOMINAL      JOINT REPLACEMENT Right 2016    Dr. Agustin Layne , shoulder    JOINT REPLACEMENT Right     OTHER SURGICAL HISTORY Right 2017    RIGHT TOTAL KNEE ARTHROPLASTY             SALPINGO-OOPHORECTOMY      SHOULDER ARTHROPLASTY Right     SHOULDER ARTHROSCOPY Left 11/15/2017    TONSILLECTOMY      TOTAL KNEE ARTHROPLASTY Right        Immunization History:   Immunization History   Administered Date(s) Administered    Influenza Nasal 10/22/2008    Influenza Vaccine, unspecified formulation 2016, 2017    Influenza, High Dose (Fluzone 65 yrs and older) 10/08/2015, 2017    Pneumococcal 13-valent Conjugate ([id number]) 10/19/2015    Pneumococcal Polysaccharide (Rawtmgjpt64) 09/30/2009    Tdap (Boostrix, Adacel) 08/22/2014    Zoster Live (Zostavax) 09/30/2009    Zoster Subunit (Shingrix) 03/06/2018       Active Problems:  Patient Active Problem List   Diagnosis Code    Candidiasis of skin and nails B37.2    Dermatophytosis of nail B35.1    Depressive disorder, not elsewhere classified F32.9    Obstructive sleep apnea syndrome G47.33    Diverticulosis of large intestine K57.30    Allergic rhinitis J30.9    Osteoporosis M81.0    Rheumatoid arthritis ([facility]) M06.9    Diaphragmatic hernia K44.9    Mitral valve regurgitation I34.0    Granulomatous lung disease ([facility]) J84.10    Complete tear of left rotator cuff M75.122    Biceps tendinitis on right M75.21    Postmenopausal osteoporosis M81.0    Asthma J45.909    Arthritis of right acromioclavicular joint M19.011    Glenohumeral arthritis right M19.019    Essential hypertension I10    Gastroesophageal reflux disease without esophagitis K21.9    Mixed hyperlipidemia E78.2    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    Chronic pain of right knee M25.561, G89.29    Morbid obesity with BMI of 40.0-44.9, adult ([facility]) E66.01, Z68.41    Chronic nonseasonal allergic rhinitis due to pollen J30.89    Mild intermittent asthma without complication K97.11    Interstitial lung disease ([facility]) J84.9    Chronic low back pain M54.5, G89.29    Disorder resulting from impaired renal function N25.9    Spinal stenosis M48.00    Status post total right knee replacement using cement on 2/20/2017 Z96.651    Major depressive disorder with single episode, in partial remission ([facility]) F32.4    Rheumatoid arthritis of multiple sites with negative rheumatoid factor ([facility]) M06.09    Osteoarthritis of lumbar spine M47.816    Immunosuppression ([facility]) D89.9    S/P left rotator cuff repair Z98.890    Primary osteoarthritis of left shoulder M19.012    Recurrent cold sores B00.1    Diarrhea R19.7    Nausea with vomiting R11.2    Chronic diarrhea K52.9    Hypokalemia E87.6    Hypomagnesemia E83.42    Weight loss R63.4    Vertigo R42    Acute diverticulitis K57.92    Hypoxia R09.02    Chronic diastolic CHF (congestive heart failure) ([facility]) I50.32       Isolation/Infection:   Isolation          Droplet            Nurse Assessment:  Last Vital Signs: BP (!) 142/69   Pulse 76   Temp 97.8 °F (36.6 °C) (Oral)   Resp 16   Ht 5' (1.524 m)   Wt 226 lb 13.7 oz (102.9 kg)   SpO2 93%   BMI 44.30 kg/m²     Last documented pain score (0-10 scale): Pain Level: 7  Last Weight:   Wt Readings from Last 1 Encounters:   03/25/19 226 lb 13.7 oz (102.9 kg)     Mental Status:  oriented, alert, coherent, logical, thought processes intact and able to concentrate and follow conversation    IV Access:  - None    Nursing Mobility/ADLs:  Walking   Assisted  Transfer  Assisted  Bathing  Assisted  Dressing  Assisted  Toileting  Assisted  Feeding  Independent  Med Admin  Independent  Med Delivery   whole    Wound Care Documentation and Therapy:        Elimination:  Continence:   · Bowel: Yes  · Bladder: Yes  Urinary Catheter: None   Colostomy/Ileostomy/Ileal Conduit: No       Date of Last BM: ***    Intake/Output Summary (Last 24 hours) at 3/28/2019 1211  Last data filed at 3/28/2019 1017  Gross per 24 hour   Intake 1090 ml   Output 2550 ml   Net -1460 ml     I/O last 3 completed shifts: In: 840 [P.O.:840]  Out: 2750 [Urine:2750]    Safety Concerns:     None    Impairments/Disabilities:      None    Nutrition Therapy:  Current Nutrition Therapy:   - Oral Diet:  General    Routes of Feeding: Oral  Liquids:  Thin Liquids  Daily Fluid Restriction: no  Last Modified Barium Swallow with Video (Video Swallowing Test): not done    Treatments at the Time of Hospital Discharge:   Respiratory Treatments: ***  Oxygen Therapy:  is on oxygen at 3 L/min per nasal Labs or Other Treatments After Discharge: Home health PT OT    Physician Certification: I certify the above information and transfer of Jian Carlisle  is necessary for the continuing treatment of the diagnosis listed and that she requires Home Care for less 30 days.      Update Admission H&P: No change in H&P    PHYSICIAN SIGNATURE:  Electronically signed by Favian Yousif MD on 3/28/19 at 12:11 PM

## 2019-03-28 NOTE — TELEPHONE ENCOUNTER
Patient called from hospital. Being dc'd today with O2. Hospital set pt up w/Medical Services to deliver O2. Patient asked that we send order for smaller tanks so she can carry. She has Rheumatoid arthritis and the big tanks are too much for her to carry. Orders have been started.  Will get signature from Dr Ezequiel Pedersen

## 2019-03-28 NOTE — PLAN OF CARE
Problem: Falls - Risk of:  Goal: Absence of physical injury  Description  Absence of physical injury  Outcome: Ongoing  Note:   At medium risk for falls per Jon Argue scale. Oriented x 4. Ambulates to bathroom with steady gait. Needed items in reach. Clear path to bathroom. Will continue to monitor and plan of care. Problem: Pain:  Goal: Control of chronic pain  Description  Control of chronic pain  Outcome: Ongoing  Note:   C/o chronic back pain. On scheduled Percocet. Yuriyrancho Arelis asleep after dose of Percocet given at 2400. Will continue to monitor and plan of care. Problem: Breathing Pattern - Ineffective:  Goal: Ability to achieve and maintain a regular respiratory rate will improve  Description  Ability to achieve and maintain a regular respiratory rate will improve  Outcome: Ongoing  Note:   Resp easy. Denies SOB. Continues with bibasilar insp crackles. SpO2 93% on 2L O2. On home Bipap spO2 91-92% with 2 L O2 bled in. Will continue to monitor.

## 2019-03-28 NOTE — CARE COORDINATION
[address], [address]     Phone: [phone]   Fax  [phone]    LOC Home w/ [address] [facility]  . Mode of Transport private car        Sloane Savers and her family were provided with choice of provider; she and her family are in agreement with the discharge plan. Care Transitions patient:  Mary Regalado RN  [facility]  Case Management Department  Ph: [phone]

## 2019-03-28 NOTE — PROGRESS NOTES
Discharge instructions given to patient and patient verbalized understanding. Follow up appointment information included in DC instructions and patient verbalized understanding to follow up with Dr. Tabitha Gallegos. All education completed with patient on new medications and home oxygen. Patient DC with all belongings, patient DC to home and taken out via wheelchair.

## 2019-03-28 NOTE — CARE COORDINATION
[facility]    DC order noted, all docs needed have been faxed to [facility] for home care services.       Jose Raul Temple  Work mobile: [phone]  [facility] office: [phone]

## 2019-03-28 NOTE — DISCHARGE SUMMARY
grade II   diastolic dysfunction.   Mitral annular calcification is present. Mild mitral regurgitation is   present.   Aortic valve leaflets appear to open adequately.   The left atrium is dilated.   Estimated pulmonary artery systolic pressure is at 41 mmHg assuming a right   atrial pressure of 8 mmHg.     CTA chest  No CT evidence of pulmonary embolism.       Patchy groundglass infiltrates in the lungs bilaterally, predominantly in the upper lobes, consistent with pneumonitis or pneumonia.       Evidence of mild underlying pulmonary interstitial fibrosis.       Evidence of old granulomatous disease. Invasive procedures and treatments. 1. None     Hospital Course. Patient was sent to ER from clinic due to hypoxia into the 70's. 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 She did not have and fevers, chills, or leukocytosis. She was started on IV Rocephin and zithromax and given a dose of IV steroids. She made little improvement with these measures. She had an ECHO which showed normal EF and grade II diastolic dysfunction. She is not in acute CHF. Lasix was added to see if it helped symptomatically. Dr. Leta Paz does not think this is pneumonia. She is still requiring oxygen and this well be set up for home. Condition at discharge:  Stable    Consults. IP CONSULT TO PULMONOLOGY  IP CONSULT TO HOSPITALIST    Physical examination on discharge day. BP (!) 160/82   Pulse 75   Temp 97.9 °F (36.6 °C) (Oral)   Resp 16   Ht 5' (1.524 m)   Wt 226 lb 13.7 oz (102.9 kg)   SpO2 94%   BMI 44.30 kg/m²   General appearance. Alert. Looks comfortable. HEENT. Moist mucus membranes. Cardiovascular. Regular rate and rhythm, normal S1, S2. No murmur. Respiratory. Not using accessory muscles. Clear to auscultation bilaterally, no wheeze. Gastrointestinal. Abdomen soft, non-tender, not distended, normal bowel sounds  Neurology. Facial symmetry. No speech deficits.  Moving all extremities equally. Extremities. No edema in lower extremities. Skin. Warm, dry, normal turgor    Medication instructions provided to patient at discharge.      Medication List      START taking these medications    azithromycin 500 MG tablet  Commonly known as:  ZITHROMAX  Take 1 tablet by mouth daily for 2 days     furosemide 20 MG tablet  Commonly known as:  LASIX  Take 1 tablet by mouth daily        CONTINUE taking these medications    albuterol sulfate  (90 Base) MCG/ACT inhaler  Commonly known as:  VENTOLIN HFA  Inhale 2 puffs into the lungs every 6 hours as needed for Wheezing or Shortness of Breath MAY USE ALTERNATIVE PER INSURANCE     amLODIPine 10 MG tablet  Commonly known as:  NORVASC     calcium-vitamin D 500-200 MG-UNIT per tablet     cetirizine 10 MG tablet  Commonly known as:  ZYRTEC     colestipol 1 g tablet  Commonly known as:  COLESTID     escitalopram 10 MG tablet  Commonly known as:  LEXAPRO  Take 1 tablet by mouth daily     fluticasone 50 MCG/ACT nasal spray  Commonly known as:  FLONASE     gabapentin 600 MG tablet  Commonly known as:  NEURONTIN     HUMIRA 40 MG/0.8ML injection  Generic drug:  adalimumab     leflunomide 20 MG tablet  Commonly known as:  ARAVA     montelukast 10 MG tablet  Commonly known as:  SINGULAIR  Take 1 tablet by mouth nightly     oxyCODONE-acetaminophen 5-325 MG per tablet  Commonly known as:  PERCOCET     predniSONE 1 MG tablet  Commonly known as:  DELTASONE     PROBIOTIC FORMULA Caps     sulindac 200 MG tablet  Commonly known as:  CLINORIL     traZODone 50 MG tablet  Commonly known as:  DESYREL  Take 1 tablet by mouth nightly        STOP taking these medications    fenofibrate 145 MG tablet  Commonly known as:  TRICOR     losartan 25 MG tablet  Commonly known as:  COZAAR     ondansetron 4 MG tablet  Commonly known as:  ZOFRAN     potassium chloride 20 MEQ extended release tablet  Commonly known as:  KLOR-CON M           Where to Get Your Medications      You can get these medications from any pharmacy    Bring a paper prescription for each of these medications  · azithromycin 500 MG tablet  · furosemide 20 MG tablet         Discharge recommendations given to patient. Follow Up. Tray Solid   in 1 week   Dr. Camryn Marks 1-2 weeks  Disposition. home  Activity. activity as tolerated  Diet: DIET GENERAL;      Spent 31 minutes in discharge process. Patient was evaluated today for the diagnosis of Interstitial lung disease. I entered a DME order for home oxygen because the diagnosis and testing requires the patient to have supplemental oxygen. Condition will improve or be benefited by oxygen use. The patient is  able to perform good mobility in a home setting and therefore does require the use of a portable oxygen system. The need for this equipment was discussed with the patient and she understands and is in agreement.     Signed:  Marcos Noe MD     3/28/2019 10:56 AM

## 2019-03-28 NOTE — PROGRESS NOTES
losartan  25 mg Oral Daily    fluticasone  1 spray Each Nare Nightly    calcium-vitamin D  2 tablet Oral Dinner    amLODIPine  10 mg Oral Daily         Diet: DIET GENERAL;     Results:  CBC:   Recent Labs     03/25/19  1329   WBC 8.4   HGB 13.5   HCT 41.8   MCV 85.9        BMP:   Recent Labs     03/25/19  1329      K 4.3      CO2 29   BUN 16   CREATININE 0.7     LIVER PROFILE: No results for input(s): AST, ALT, LIPASE, BILIDIR, BILITOT, ALKPHOS in the last 72 hours. Invalid input(s): AMYLASE,  ALB  PT/INR: No results for input(s): PROTIME, INR in the last 72 hours. APTT: No results for input(s): APTT in the last 72 hours. UA:No results for input(s): NITRITE, COLORU, PHUR, LABCAST, WBCUA, RBCUA, MUCUS, TRICHOMONAS, YEAST, BACTERIA, CLARITYU, SPECGRAV, LEUKOCYTESUR, UROBILINOGEN, BILIRUBINUR, BLOODU, GLUCOSEU, AMORPHOUS in the last 72 hours. Invalid input(s): Kelly Matias      Assessment/Plan:  70 y.o. female with     Multifocal pneumonia  Hypoxia on two liters. No fever. RA - ILD  Ex smoker, smoked for 12 years.   Chronic diastolic CHF    Start lasix PO  Home O2 eval  OK to d/c home on lasix and azithromycin  BMP in one week  Discussed with Dr. Qamar Quinn MD

## 2019-03-30 LAB
BLOOD CULTURE, ROUTINE: NORMAL
CULTURE, BLOOD 2: NORMAL

## 2019-04-04 ENCOUNTER — OFFICE VISIT (OUTPATIENT)
Dept: PULMONOLOGY | Age: 79
End: 2019-04-04
Payer: MEDICARE

## 2019-04-04 ENCOUNTER — HOSPITAL ENCOUNTER (INPATIENT)
Age: 79
LOS: 13 days | Discharge: NURSING, REHAB OR LONG-TERM CARE | DRG: 871 | End: 2019-04-17
Attending: EMERGENCY MEDICINE | Admitting: INTERNAL MEDICINE
Payer: MEDICARE

## 2019-04-04 ENCOUNTER — APPOINTMENT (OUTPATIENT)
Dept: GENERAL RADIOLOGY | Age: 79
DRG: 871 | End: 2019-04-04
Payer: MEDICARE

## 2019-04-04 VITALS
SYSTOLIC BLOOD PRESSURE: 160 MMHG | HEIGHT: 60 IN | OXYGEN SATURATION: 93 % | DIASTOLIC BLOOD PRESSURE: 90 MMHG | WEIGHT: 227 LBS | BODY MASS INDEX: 44.57 KG/M2 | HEART RATE: 101 BPM

## 2019-04-04 DIAGNOSIS — M06.9 RHEUMATOID ARTHRITIS, INVOLVING UNSPECIFIED SITE, UNSPECIFIED RHEUMATOID FACTOR PRESENCE: ICD-10-CM

## 2019-04-04 DIAGNOSIS — J18.9 PNEUMONIA DUE TO ORGANISM: Primary | ICD-10-CM

## 2019-04-04 DIAGNOSIS — R93.89 ABNORMAL CT OF THE CHEST: ICD-10-CM

## 2019-04-04 DIAGNOSIS — M06.09 RHEUMATOID ARTHRITIS OF MULTIPLE SITES WITH NEGATIVE RHEUMATOID FACTOR (HCC): ICD-10-CM

## 2019-04-04 DIAGNOSIS — R06.09 DOE (DYSPNEA ON EXERTION): ICD-10-CM

## 2019-04-04 DIAGNOSIS — R05.9 COUGH: ICD-10-CM

## 2019-04-04 DIAGNOSIS — J84.9 ILD (INTERSTITIAL LUNG DISEASE) (HCC): Primary | ICD-10-CM

## 2019-04-04 LAB
ANION GAP SERPL CALCULATED.3IONS-SCNC: 11 MMOL/L (ref 3–16)
ANION GAP SERPL CALCULATED.3IONS-SCNC: 8 MMOL/L (ref 3–16)
ANION GAP SERPL CALCULATED.3IONS-SCNC: 9 MMOL/L (ref 3–16)
BANDED NEUTROPHILS RELATIVE PERCENT: 2 % (ref 0–7)
BASE EXCESS VENOUS: 9 (ref -3–3)
BASOPHILS ABSOLUTE: 0 K/UL (ref 0–0.2)
BASOPHILS RELATIVE PERCENT: 0 %
BILIRUBIN URINE: NEGATIVE
BLOOD, URINE: NEGATIVE
BUN BLDV-MCNC: 11 MG/DL (ref 7–20)
CALCIUM SERPL-MCNC: 8.8 MG/DL (ref 8.3–10.6)
CALCIUM SERPL-MCNC: 8.9 MG/DL (ref 8.3–10.6)
CALCIUM SERPL-MCNC: 9 MG/DL (ref 8.3–10.6)
CHLORIDE BLD-SCNC: 93 MMOL/L (ref 99–110)
CHLORIDE BLD-SCNC: 96 MMOL/L (ref 99–110)
CHLORIDE BLD-SCNC: 97 MMOL/L (ref 99–110)
CLARITY: CLEAR
CO2: 27 MMOL/L (ref 21–32)
CO2: 27 MMOL/L (ref 21–32)
CO2: 28 MMOL/L (ref 21–32)
COLOR: YELLOW
CREAT SERPL-MCNC: 0.6 MG/DL (ref 0.6–1.2)
CREAT SERPL-MCNC: 0.6 MG/DL (ref 0.6–1.2)
CREAT SERPL-MCNC: 0.7 MG/DL (ref 0.6–1.2)
EOSINOPHILS ABSOLUTE: 0 K/UL (ref 0–0.6)
EOSINOPHILS RELATIVE PERCENT: 0 %
EPITHELIAL CELLS, UA: NORMAL /HPF
GFR AFRICAN AMERICAN: >60
GFR NON-AFRICAN AMERICAN: >60
GLUCOSE BLD-MCNC: 108 MG/DL (ref 70–99)
GLUCOSE BLD-MCNC: 116 MG/DL (ref 70–99)
GLUCOSE BLD-MCNC: 131 MG/DL (ref 70–99)
GLUCOSE URINE: NEGATIVE MG/DL
HCO3 VENOUS: 31.9 MMOL/L (ref 23–29)
HCT VFR BLD CALC: 39.3 % (ref 36–48)
HEMOGLOBIN: 12.7 G/DL (ref 12–16)
INR BLD: 1.13 (ref 0.86–1.14)
KETONES, URINE: NEGATIVE MG/DL
LACTIC ACID, SEPSIS: 0.9 MMOL/L (ref 0.4–1.9)
LACTIC ACID, SEPSIS: 1.1 MMOL/L (ref 0.4–1.9)
LEUKOCYTE ESTERASE, URINE: ABNORMAL
LYMPHOCYTES ABSOLUTE: 2.8 K/UL (ref 1–5.1)
LYMPHOCYTES RELATIVE PERCENT: 24 %
MCH RBC QN AUTO: 27.9 PG (ref 26–34)
MCHC RBC AUTO-ENTMCNC: 32.3 G/DL (ref 31–36)
MCV RBC AUTO: 86.5 FL (ref 80–100)
MICROSCOPIC EXAMINATION: YES
MONOCYTES ABSOLUTE: 0.5 K/UL (ref 0–1.3)
MONOCYTES RELATIVE PERCENT: 4 %
NEUTROPHILS ABSOLUTE: 8.4 K/UL (ref 1.7–7.7)
NEUTROPHILS RELATIVE PERCENT: 70 %
NITRITE, URINE: NEGATIVE
O2 SAT, VEN: 93 %
PCO2, VEN: 38.1 MM HG (ref 40–50)
PDW BLD-RTO: 15.6 % (ref 12.4–15.4)
PERFORMED ON: ABNORMAL
PH UA: 7 (ref 5–8)
PH VENOUS: 7.53 (ref 7.35–7.45)
PLATELET # BLD: 206 K/UL (ref 135–450)
PLATELET SLIDE REVIEW: ADEQUATE
PMV BLD AUTO: 8.5 FL (ref 5–10.5)
PO2, VEN: 59 MM HG
POC SAMPLE TYPE: ABNORMAL
POTASSIUM REFLEX MAGNESIUM: 7.5 MMOL/L (ref 3.5–5.1)
POTASSIUM SERPL-SCNC: 3.8 MMOL/L (ref 3.5–5.1)
POTASSIUM SERPL-SCNC: 6 MMOL/L (ref 3.5–5.1)
PROTEIN UA: NEGATIVE MG/DL
PROTHROMBIN TIME: 12.9 SEC (ref 9.8–13)
RAPID INFLUENZA  B AGN: NEGATIVE
RAPID INFLUENZA A AGN: NEGATIVE
RBC # BLD: 4.54 M/UL (ref 4–5.2)
RBC UA: NORMAL /HPF (ref 0–2)
SLIDE REVIEW: ABNORMAL
SODIUM BLD-SCNC: 129 MMOL/L (ref 136–145)
SODIUM BLD-SCNC: 132 MMOL/L (ref 136–145)
SODIUM BLD-SCNC: 135 MMOL/L (ref 136–145)
SPECIFIC GRAVITY UA: <=1.005 (ref 1–1.03)
TCO2 CALC VENOUS: 33 MMOL/L
URINE TYPE: ABNORMAL
UROBILINOGEN, URINE: 0.2 E.U./DL
WBC # BLD: 11.7 K/UL (ref 4–11)
WBC UA: NORMAL /HPF (ref 0–5)

## 2019-04-04 PROCEDURE — 80048 BASIC METABOLIC PNL TOTAL CA: CPT

## 2019-04-04 PROCEDURE — 85610 PROTHROMBIN TIME: CPT

## 2019-04-04 PROCEDURE — 81001 URINALYSIS AUTO W/SCOPE: CPT

## 2019-04-04 PROCEDURE — 85025 COMPLETE CBC W/AUTO DIFF WBC: CPT

## 2019-04-04 PROCEDURE — 87040 BLOOD CULTURE FOR BACTERIA: CPT

## 2019-04-04 PROCEDURE — 6370000000 HC RX 637 (ALT 250 FOR IP): Performed by: PHYSICIAN ASSISTANT

## 2019-04-04 PROCEDURE — G8399 PT W/DXA RESULTS DOCUMENT: HCPCS | Performed by: INTERNAL MEDICINE

## 2019-04-04 PROCEDURE — 82803 BLOOD GASES ANY COMBINATION: CPT

## 2019-04-04 PROCEDURE — 1123F ACP DISCUSS/DSCN MKR DOCD: CPT | Performed by: INTERNAL MEDICINE

## 2019-04-04 PROCEDURE — 2580000003 HC RX 258: Performed by: PHYSICIAN ASSISTANT

## 2019-04-04 PROCEDURE — 36415 COLL VENOUS BLD VENIPUNCTURE: CPT

## 2019-04-04 PROCEDURE — 1036F TOBACCO NON-USER: CPT | Performed by: INTERNAL MEDICINE

## 2019-04-04 PROCEDURE — 96367 TX/PROPH/DG ADDL SEQ IV INF: CPT

## 2019-04-04 PROCEDURE — 1090F PRES/ABSN URINE INCON ASSESS: CPT | Performed by: INTERNAL MEDICINE

## 2019-04-04 PROCEDURE — 71045 X-RAY EXAM CHEST 1 VIEW: CPT

## 2019-04-04 PROCEDURE — 87086 URINE CULTURE/COLONY COUNT: CPT

## 2019-04-04 PROCEDURE — 96365 THER/PROPH/DIAG IV INF INIT: CPT

## 2019-04-04 PROCEDURE — 1200000000 HC SEMI PRIVATE

## 2019-04-04 PROCEDURE — 83605 ASSAY OF LACTIC ACID: CPT

## 2019-04-04 PROCEDURE — 96361 HYDRATE IV INFUSION ADD-ON: CPT

## 2019-04-04 PROCEDURE — G8417 CALC BMI ABV UP PARAM F/U: HCPCS | Performed by: INTERNAL MEDICINE

## 2019-04-04 PROCEDURE — 99215 OFFICE O/P EST HI 40 MIN: CPT | Performed by: INTERNAL MEDICINE

## 2019-04-04 PROCEDURE — 99285 EMERGENCY DEPT VISIT HI MDM: CPT

## 2019-04-04 PROCEDURE — 1111F DSCHRG MED/CURRENT MED MERGE: CPT | Performed by: INTERNAL MEDICINE

## 2019-04-04 PROCEDURE — G8427 DOCREV CUR MEDS BY ELIG CLIN: HCPCS | Performed by: INTERNAL MEDICINE

## 2019-04-04 PROCEDURE — 4040F PNEUMOC VAC/ADMIN/RCVD: CPT | Performed by: INTERNAL MEDICINE

## 2019-04-04 PROCEDURE — 87804 INFLUENZA ASSAY W/OPTIC: CPT

## 2019-04-04 PROCEDURE — 6360000002 HC RX W HCPCS: Performed by: PHYSICIAN ASSISTANT

## 2019-04-04 RX ORDER — LEVOFLOXACIN 5 MG/ML
750 INJECTION, SOLUTION INTRAVENOUS EVERY 24 HOURS
Status: CANCELLED | OUTPATIENT
Start: 2019-04-05

## 2019-04-04 RX ORDER — SODIUM CHLORIDE, SODIUM LACTATE, POTASSIUM CHLORIDE, AND CALCIUM CHLORIDE .6; .31; .03; .02 G/100ML; G/100ML; G/100ML; G/100ML
15 INJECTION, SOLUTION INTRAVENOUS ONCE
Status: COMPLETED | OUTPATIENT
Start: 2019-04-04 | End: 2019-04-04

## 2019-04-04 RX ORDER — FUROSEMIDE 40 MG/1
40 TABLET ORAL DAILY
Qty: 30 TABLET | Refills: 5 | Status: SHIPPED | OUTPATIENT
Start: 2019-04-04 | End: 2019-04-04 | Stop reason: CLARIF

## 2019-04-04 RX ORDER — METHYLPREDNISOLONE SODIUM SUCCINATE 125 MG/2ML
60 INJECTION, POWDER, LYOPHILIZED, FOR SOLUTION INTRAMUSCULAR; INTRAVENOUS DAILY
Status: CANCELLED | OUTPATIENT
Start: 2019-04-05

## 2019-04-04 RX ORDER — FUROSEMIDE 20 MG/1
20 TABLET ORAL DAILY
Status: ON HOLD | COMMUNITY
End: 2019-04-17 | Stop reason: SDUPTHER

## 2019-04-04 RX ORDER — PANTOPRAZOLE SODIUM 40 MG/1
40 TABLET, DELAYED RELEASE ORAL EVERY EVENING
COMMUNITY

## 2019-04-04 RX ORDER — MONTELUKAST SODIUM 10 MG/1
10 TABLET ORAL EVERY MORNING
COMMUNITY

## 2019-04-04 RX ORDER — MONTELUKAST SODIUM 10 MG/1
10 TABLET ORAL EVERY MORNING
Status: CANCELLED | OUTPATIENT
Start: 2019-04-05

## 2019-04-04 RX ORDER — TRAZODONE HYDROCHLORIDE 50 MG/1
50 TABLET ORAL EVERY MORNING
COMMUNITY

## 2019-04-04 RX ORDER — SACCHAROMYCES BOULARDII 250 MG
250 CAPSULE ORAL 2 TIMES DAILY
COMMUNITY

## 2019-04-04 RX ORDER — LOSARTAN POTASSIUM 25 MG/1
25 TABLET ORAL NIGHTLY
COMMUNITY

## 2019-04-04 RX ORDER — ACETAMINOPHEN 325 MG/1
650 TABLET ORAL ONCE
Status: COMPLETED | OUTPATIENT
Start: 2019-04-04 | End: 2019-04-04

## 2019-04-04 RX ORDER — ESCITALOPRAM OXALATE 10 MG/1
20 TABLET ORAL NIGHTLY
COMMUNITY

## 2019-04-04 RX ADMIN — VANCOMYCIN HYDROCHLORIDE 1500 MG: 10 INJECTION, POWDER, LYOPHILIZED, FOR SOLUTION INTRAVENOUS at 22:43

## 2019-04-04 RX ADMIN — SODIUM CHLORIDE, POTASSIUM CHLORIDE, SODIUM LACTATE AND CALCIUM CHLORIDE 1545 ML: 600; 310; 30; 20 INJECTION, SOLUTION INTRAVENOUS at 19:46

## 2019-04-04 RX ADMIN — ACETAMINOPHEN 650 MG: 325 TABLET ORAL at 19:46

## 2019-04-04 RX ADMIN — CEFEPIME HYDROCHLORIDE 2 G: 2 INJECTION, POWDER, FOR SOLUTION INTRAVENOUS at 20:31

## 2019-04-04 ASSESSMENT — ENCOUNTER SYMPTOMS
VOMITING: 0
NAUSEA: 0
EYE ITCHING: 0
SORE THROAT: 0
BACK PAIN: 0
WHEEZING: 0
COUGH: 1
ABDOMINAL DISTENTION: 0
DIARRHEA: 1
SINUS PRESSURE: 0
SHORTNESS OF BREATH: 1
RHINORRHEA: 0
EYE PAIN: 0
ABDOMINAL PAIN: 0
CHEST TIGHTNESS: 0
COLOR CHANGE: 0
EYE REDNESS: 0

## 2019-04-04 ASSESSMENT — PAIN SCALES - GENERAL: PAINLEVEL_OUTOF10: 0

## 2019-04-04 NOTE — PROGRESS NOTES
[facility]    Outpatient Follow Up Note    Subjective:   CHIEF COMPLAINT / HPI:     The patient is 70 y.o. female who presents for hospital follow-up. Diana Garcia states that she was brought to [address] [place] [address] emergency room via ambulance from her sleep physician's office for evaluation of hypoxemia. On room air, which is her baseline, her oxygen saturation was 78-84%. In the hospital she was treated for both pneumonia with Rocephin and azithromycin as well as diastolic CHF exacerbation with IV Lasix. She was discharged on March 28 home with 3 L nasal cannula. Today she states that she really feels no better than when she first presented to the hospital.  She continues to have a dry cough and dyspnea with her activities of daily living. When I dived into her recent history she states that her dyspnea on exertion have been slowly worsening for approximately 2-3 months with an associated dry cough. She does not mention any fevers, chills, night sweats, anorexia, malaise, myalgias, or hemoptysis. She did mention that prior to admission she has had some weight gain and increased in leg swelling. She did not previously carry a diagnosis of CHF and was not on Lasix. 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 She is seen by rheumatology at [address] [place] [address] and is on Humira and leflunomide. Last Visit 8/28/2018  Diana Garcia presents today for 3 month follow up of ILD NOS. She has been off MTX since Jan 2017 when ILD was appreciated on CT Chest.     Since last visit she did get her left shoulder replacement without issue. More recently she's had problems with a significant amount of diarrhea and even was admitted. There is some suspicion that this was from one of her RA medications, which has been recently stopped.   She is under the care of a gastroenterologist as well    In regards to her ILD, she continues to do better compared to beginning at 2017 when first evaluated her and she was on methotrexate. She denies any significant dyspnea on exertion, cough, or wheezing    Past Medical History:    Past Medical History:   Diagnosis Date    Allergic rhinitis 2010    Asthma 2014    Chicken pox     Chronic diastolic CHF (congestive heart failure) ([facility] [address] 3/28/2019    Depression     Diverticulosis of large intestine 2010    Essential hypertension 2015    Gastroesophageal reflux disease without esophagitis 2015    GERD (gastroesophageal reflux disease)     Hyperlipidemia     Hypertension     Interstitial lung disease ([facility] [address]     Mitral valve regurgitation 2011    Mixed hyperlipidemia 3/10/2016    Obesity     Osteoarthritis     Osteoporosis 2010    Polio     Prolonged emergence from general anesthesia     sats dropped    RA (rheumatoid arthritis) ([facility] [address]     Rheumatoid arthritis ([facility] [address] 2010    Sleep apnea 2010    uses BPAP       Social History:    Social History     Tobacco Use   Smoking Status Former Smoker    Packs/day: 1.00    Years: 14.00    Pack years: 14.00    Types: Cigarettes    Last attempt to quit: 1976    Years since quittin.2   Smokeless Tobacco Never Used   Tobacco Comment    quit age 29       Current Medications:  Current Outpatient Medications on File Prior to Visit   Medication Sig Dispense Refill    losartan (COZAAR) 25 MG tablet Take 25 mg by mouth daily      oxyCODONE-acetaminophen (PERCOCET) 5-325 MG per tablet Take 1 tablet by mouth every 8 hours.  Indications: Backache      fluticasone (FLONASE) 50 MCG/ACT nasal spray 1 spray by Each Nare route nightly      amLODIPine (NORVASC) 10 MG tablet Take by mouth daily       Calcium Carb-Cholecalciferol (CALCIUM-VITAMIN D) 500-200 MG-UNIT per tablet Take 2 tablets by mouth Daily with supper      predniSONE (DELTASONE) 1 MG tablet Take 3 mg by mouth daily      albuterol sulfate HFA tracheal deviation, thyroid size normal  LUNGS:  No increased work of breathing. Bilateral crackles more prominent lower lobes. No wheezing  CARDIOVASCULAR:  normal S1 and S2 and no JVD  ABDOMEN:  Normal bowel sounds, non-distended and non-tender to palpation  EXT: No edema, no calf tenderness. Pulses are present bilaterally. NEUROLOGIC:  Mental Status Exam:  Level of Alertness:   awake  Orientation:   person, place, time. SKIN:  normal skin color, texture, turgor, no redness, warmth, or swelling     DATA:      Radiology Review:  Pertinent images / reports were reviewed as a part of this visit. CT Chest 3/25/2019  FINDINGS: There is no CT evidence of pulmonary embolism seen. The thoracic aorta is normal in caliber, with no evidence of aneurysm or dissection.       The tracheobronchial tree is patent. There is some persistent mild peripheral interstitial pulmonary fibrosis, most prominently seen in the lung bases, similar to the prior exam.       Patchy groundglass infiltrates are seen in the upper lobes bilaterally, and less prominently in the lower lobes.       There is no hilar, mediastinal or axillary adenopathy. Dense right hilar and subcarinal calcifications, and scattered paratracheal calcifications are seen consistent with old granulomatous disease. There is no pericardial or pleural effusion.       Decreased overall attenuation noted in the liver, consistent with fatty infiltration. There is a small axial hiatal hernia.           Impression       No CT evidence of pulmonary embolism.       Patchy groundglass infiltrates in the lungs bilaterally, predominantly in the upper lobes, consistent with pneumonitis or pneumonia.       Evidence of mild underlying pulmonary interstitial fibrosis.       Evidence of old granulomatous disease. CXR 3/25/2019  Findings: AP and lateral views of the chest were obtained. The trachea is midline and the cardiac silhouette is stable in size and configuration.  Bilateral interstitial reticular opacities are noted of the lung bases. These are similar to prior studies. No new or confluent pulmonary infiltrate. No effusion or pneumothorax. The bony structures are intact.           Impression   Impression: Bilateral interstitial reticular opacities of the bases may reflect edema or interstitial pneumonitis. CXR 8/10/2018  reveals the following:  Impression       No acute pulmonary pathology or change from the prior study     CT Chest 6/28/2017  Impression       Mild peripheral interstitial fibrosis in the lungs bilaterally,   nonspecific.       Interval improvement, with clearance of patchy mosaic densities   from the prior study.       No superimposed acute inflammatory changes, new infiltrates,   nodular or mass densities are seen. CT Chest 1/13/2017  Impression   1. Interstitial lung disease with mosaic pattern   2. Peripheral septal thickening, interstitial lung disease with   perivascular nodularity. 3. Differential diagnosis includes nonspecific interstitial   pneumonitis, hypersensitivity pneumonitis as well as autoimmune   etiologies such as rheumatoid and sarcoid. CT chest June 20, 2017  COMPARISON: 1/13/2017.       FINDINGS: The tracheobronchial tree is patent. Mild prominence of   the central main pulmonary arteries is seen, unchanged.       Dense right hilar and subcarinal, and scattered paratracheal   calcifications are seen, consistent with old granulomatous   disease. Peripheral subpleural interstitial fibrotic changes are   seen in the lungs bilaterally, most prominent in the right upper   lobe, left upper lobe and lingular segment, and lower lobes   bilaterally. There has been interval improvement, with significant   clearance of patchy areas of mosaic confluent since the prior   study.       No new infiltrates, dominant nodular or mass densities are seen. There is no pericardial or pleural effusion.       Bony structures are intact.  Schmorl's node deformity of the   superior endplate of the U01 vertebra is noted.           Impression       Mild peripheral interstitial fibrosis in the lungs bilaterally,   nonspecific.       Interval improvement, with clearance of patchy mosaic densities   from the prior study.       No superimposed acute inflammatory changes, new infiltrates,   nodular or mass densities are seen. Last PFTs:  DATE OF STUDY: 02/08/2017     Spirometry data is acceptable and reproducible. Room air oxygen saturation is  96%. BMI is 39.5.     SPIROMETRY: FEV1/FVC ratio is 88%. FEV1 is 1.72, which is 93% of predicted. FVC is 1.94, which is 79% of predicted.     LUNG VOLUMES: Show total lung capacity of 4.1, which is 89% of predicted. Residual volume is 2.24, which is 104% of predicted.     DIFFUSION CAPACITY: Shows a DLCO of 14.53, which is 70% of predicted.     IMPRESSION:  1. Normal spirometry. 2. Normal lung volumes. 3. Mild decrease in diffusion capacity. ECHO 3/27/2019  Summary   Left ventricular cavity size is normal. There is concentric hypertrophy of   the septum. Overall left ventricular systolic function appears normal with   an estimated ejection fraction of 60-65%. No regional wall motion   abnormalities are noted. Diastolic filling parameters suggests grade II   diastolic dysfunction.   Mitral annular calcification is present. Mild mitral regurgitation is   present.   Aortic valve leaflets appear to open adequately.   The left atrium is dilated.   Estimated pulmonary artery systolic pressure is at 41 mmHg assuming a right   atrial pressure of 8 mmHg. Pro-    Assessment:      Diagnosis Orders   1. ILD (interstitial lung disease) (HCC)  MICHELLE    Rheumatoid Factor    Anti-Neutrophilic Cytoplasmic Antibody    IgE    Sedimentation rate, manual    XR CHEST STANDARD (2 VW)    Protime-INR   2. Cough     3. KHAN (dyspnea on exertion)     4. Abnormal CT of the chest         Plan:       Dory's case is quite complex. Given her lack of improvement I do not think that this was a bacterial pneumonia and I am also hesitant that diastolic CHF is the primary diagnosis. Other considerations include an atypical infection, like fungal or mycobacterial, given her Humira and flutamide. RA-ILD could also be in the differential.  She is on Singulair which can cause a Churg-Uriel type phenomenon. Hypersensitivity pneumonitis and non-connective tissue disease ILD could also be in the differential.    - I would like to increase her Lasix to 40 mg daily  - Repeat chest x-ray in 10 days  - Laboratory evaluation as above  - Stop Singulair  - Return to clinic in 10 days. If no improvement with increase in Lasix and laboratory evaluation nondiagnostic then I will proceed with bronchoscopy.     Time spent on case and axis of 40 minutes with greater than 50% on education    Vanita Kennedy MD

## 2019-04-04 NOTE — ED NOTES
Bed: A05-05  Expected date:   Expected time:   Means of arrival:   Comments:  Manjinder Bush RN  04/04/19 1911

## 2019-04-04 NOTE — ED PROVIDER NOTES
[facility]          PHYSICIAN ASSISTANT NOTE       Date of evaluation: 4/4/2019    Chief Complaint     Fever (Pt had recent hospital admission with a new supplemental o2 requirement since last week. Pt has diarrhea and fever today. Pt has nonproductive cough. Per family o2 tubing at home was kinked for unknown length of time); Shortness of Breath; Cough; and Altered Mental Status (Per family, pt was texting nonsense)      History of Present Illness     Vahe Santiago is a 70 y.o. female with a past medical history as noted below who presents to the Emergency Department with a complaint of dyspnea, fever and altered mental status. The patient presents to the emergency department with a family matter who helps provide history. The patient was recently admitted to the hospital for pneumonia versus diastolic heart failure. She was discharged 7 days ago on Lasix and azithromycin. It appears that she has completed the azithromycin regimen. She has had a new oxygen requirement since admission to the hospital of 2.5 L. Since her discharge, she has had continued cough and dyspnea. She was seen today by her pulmonologist.  This evening, the patient was sending nonsensical text to her family members, prompting them to come to the house to check on her. There, they found her oxygen tubing was kinked for an unknown length of time, she was incontinent of stool and felt very warm to the touch. EMS was activated and she was brought to the emergency department for evaluation. The patient reports that she feels \"out of it. \"  She is expressing malaise and myalgias. She's not reporting any significant increase in her work of breathing. Review of Systems     Review of Systems   Constitutional: Positive for chills, fatigue and fever. Negative for diaphoresis and unexpected weight change. HENT: Positive for congestion.  Negative for drooling, mouth sores, postnasal drip, rhinorrhea, sinus pressure and sore throat. Eyes: Negative for pain, redness and itching. Respiratory: Positive for cough and shortness of breath. Negative for chest tightness and wheezing. Cardiovascular: Negative for chest pain, palpitations and leg swelling. Gastrointestinal: Positive for diarrhea. Negative for abdominal distention, abdominal pain, nausea and vomiting. Incontinence of stool   Genitourinary: Negative for dysuria and hematuria. Musculoskeletal: Negative for arthralgias, back pain, gait problem, myalgias, neck pain and neck stiffness. Skin: Negative for color change, pallor and rash. Neurological: Positive for weakness. Negative for dizziness, syncope, light-headedness, numbness and headaches. Hematological: Does not bruise/bleed easily. Psychiatric/Behavioral: Positive for confusion. Negative for agitation, hallucinations, self-injury, sleep disturbance and suicidal ideas. The patient is not nervous/anxious. All other systems reviewed and are negative. Past Medical, Surgical, Family, and Social History     She has a past medical history of Allergic rhinitis, Asthma, Chicken pox, Chronic diastolic CHF (congestive heart failure) ([address] Depression, Diverticulosis of large intestine, Essential hypertension, Gastroesophageal reflux disease without esophagitis, GERD (gastroesophageal reflux disease), Hyperlipidemia, Hypertension, Interstitial lung disease ([address] Mitral valve regurgitation, Mixed hyperlipidemia, Obesity, Osteoarthritis, Osteoporosis, Polio, Prolonged emergence from general anesthesia, RA (rheumatoid arthritis) ([address] Rheumatoid arthritis ([address] and Sleep apnea. She has a past surgical history that includes Appendectomy; Cholecystectomy; Salpingo-oophorectomy; Carpal tunnel release (Bilateral); Foot surgery (Bilateral); Total shoulder arthroplasty (Right); other surgical history (Right, 02/20/2017); Hysterectomy, total abdominal; Tonsillectomy;  Shoulder sulfate HFA (VENTOLIN HFA) 108 (90 Base) MCG/ACT inhaler Inhale 2 puffs into the lungs every 6 hours as needed for Wheezing or Shortness of Breath MAY USE ALTERNATIVE PER INSURANCE  Qty: 1 Inhaler, Refills: 11    Associated Diagnoses: Bronchitis      colestipol (COLESTID) 1 g tablet Take 1 g by mouth daily Indications: Diarrhea       sulindac (CLINORIL) 200 MG tablet Take 200 mg by mouth nightly       leflunomide (ARAVA) 20 MG tablet Take 20 mg by mouth daily       gabapentin (NEURONTIN) 600 MG tablet Take 600 mg by mouth 3 times daily. cetirizine (ZYRTEC) 10 MG tablet Take 10 mg by mouth every morning       adalimumab (HUMIRA) 40 MG/0.8ML injection Inject 40 mg into the skin once a week Mondays             Allergies     She is allergic to sulfa antibiotics; lamotrigine; lomotil  [diphenoxylate-atropine]; and sulfacetamide sodium. Physical Exam     INITIAL VITALS: BP: 100/65, Temp: 102.6 °F (39.2 °C), Pulse: 108, Resp: 22, SpO2: 94 %  Physical Exam   Constitutional: She is oriented to person, place, and time. She appears well-developed and well-nourished. She appears ill. No distress. HENT:   Head: Normocephalic and atraumatic. Eyes: Conjunctivae and EOM are normal. No scleral icterus. Neck: Normal range of motion. Neck supple. No JVD present. Cardiovascular: Normal rate, regular rhythm and normal heart sounds. Pulmonary/Chest: Effort normal. No respiratory distress. She has wheezes. She has no rales. She exhibits no tenderness. Abdominal: Soft. She exhibits no distension. There is no tenderness. Musculoskeletal: Normal range of motion. She exhibits no edema or tenderness. Neurological: She is alert and oriented to person, place, and time. Skin: Skin is warm and dry. No rash noted. No pallor. Psychiatric: She has a normal mood and affect. Her behavior is normal.   Nursing note and vitals reviewed.       Diagnostic Results         RADIOLOGY:  XR CHEST PORTABLE   Final Result      Mild bilateral interstitial and airspace opacities, not significantly changed compared to prior, representing edema or infection. LABS:   Results for orders placed or performed during the hospital encounter of 04/04/19   Urine Culture   Result Value Ref Range    Urine Culture, Routine No growth at 18-36 hours    Culture blood #1   Result Value Ref Range    Blood Culture, Routine No growth after 5 days of incubation. Culture blood #2   Result Value Ref Range    Culture, Blood 2 No growth after 5 days of incubation.     Rapid Flu Swab   Result Value Ref Range    Rapid Influenza A Ag Negative Negative    Rapid Influenza B Ag Negative Negative   CBC auto differential   Result Value Ref Range    WBC 11.7 (H) 4.0 - 11.0 K/uL    RBC 4.54 4.00 - 5.20 M/uL    Hemoglobin 12.7 12.0 - 16.0 g/dL    Hematocrit 39.3 36.0 - 48.0 %    MCV 86.5 80.0 - 100.0 fL    MCH 27.9 26.0 - 34.0 pg    MCHC 32.3 31.0 - 36.0 g/dL    RDW 15.6 (H) 12.4 - 15.4 %    Platelets 222 802 - 550 K/uL    MPV 8.5 5.0 - 10.5 fL    PLATELET SLIDE REVIEW Adequate     SLIDE REVIEW see below     Neutrophils % 70.0 %    Lymphocytes % 24.0 %    Monocytes % 4.0 %    Eosinophils % 0.0 %    Basophils % 0.0 %    Neutrophils # 8.4 (H) 1.7 - 7.7 K/uL    Lymphocytes # 2.8 1.0 - 5.1 K/uL    Monocytes # 0.5 0.0 - 1.3 K/uL    Eosinophils # 0.0 0.0 - 0.6 K/uL    Basophils # 0.0 0.0 - 0.2 K/uL    Bands Relative 2 0 - 7 %   Basic Metabolic Panel w/ Reflex to MG   Result Value Ref Range    Sodium 129 (L) 136 - 145 mmol/L    Potassium reflex Magnesium 7.5 (HH) 3.5 - 5.1 mmol/L    Chloride 93 (L) 99 - 110 mmol/L    CO2 27 21 - 32 mmol/L    Anion Gap 9 3 - 16    Glucose 108 (H) 70 - 99 mg/dL    BUN 11 7 - 20 mg/dL    CREATININE 0.6 0.6 - 1.2 mg/dL    GFR Non-African American >60 >60    GFR African American >60 >60    Calcium 8.9 8.3 - 10.6 mg/dL   Urinalysis   Result Value Ref Range    Color, UA Yellow Straw/Yellow    Clarity, UA Clear Clear    Glucose, Ur Negative Negative mg/dL    Bilirubin Urine Negative Negative    Ketones, Urine Negative Negative mg/dL    Specific Gravity, UA <=1.005 1.005 - 1.030    Blood, Urine Negative Negative    pH, UA 7.0 5.0 - 8.0    Protein, UA Negative Negative mg/dL    Urobilinogen, Urine 0.2 <2.0 E.U./dL    Nitrite, Urine Negative Negative    Leukocyte Esterase, Urine SMALL (A) Negative    Microscopic Examination YES     Urine Type Voided    Lactate, Sepsis   Result Value Ref Range    Lactic Acid, Sepsis 0.9 0.4 - 1.9 mmol/L   Lactate, Sepsis   Result Value Ref Range    Lactic Acid, Sepsis 1.1 0.4 - 1.9 mmol/L   Protime-INR   Result Value Ref Range    Protime 12.9 9.8 - 13.0 sec    INR 1.13 0.86 - 2.45   Basic Metabolic Panel   Result Value Ref Range    Sodium 132 (L) 136 - 145 mmol/L    Potassium 6.0 (HH) 3.5 - 5.1 mmol/L    Chloride 96 (L) 99 - 110 mmol/L    CO2 28 21 - 32 mmol/L    Anion Gap 8 3 - 16    Glucose 131 (H) 70 - 99 mg/dL    BUN 11 7 - 20 mg/dL    CREATININE 0.6 0.6 - 1.2 mg/dL    GFR Non-African American >60 >60    GFR African American >60 >60    Calcium 8.8 8.3 - 10.6 mg/dL   Basic Metabolic Panel   Result Value Ref Range    Sodium 135 (L) 136 - 145 mmol/L    Potassium 3.8 3.5 - 5.1 mmol/L    Chloride 97 (L) 99 - 110 mmol/L    CO2 27 21 - 32 mmol/L    Anion Gap 11 3 - 16    Glucose 116 (H) 70 - 99 mg/dL    BUN 11 7 - 20 mg/dL    CREATININE 0.7 0.6 - 1.2 mg/dL    GFR Non-African American >60 >60    GFR African American >60 >60    Calcium 9.0 8.3 - 10.6 mg/dL   Microscopic Urinalysis   Result Value Ref Range    WBC, UA 0-2 0 - 5 /HPF    RBC, UA 0-2 0 - 2 /HPF    Epi Cells 0-2 /HPF   Blood gas, arterial   Result Value Ref Range    pH, Arterial 7.406 7.350 - 7.450    pCO2, Arterial 50.2 (H) 35.0 - 45.0 mmHg    pO2, Arterial 77.5 75.0 - 108.0 mmHg    HCO3, Arterial 31 (H) 21 - 29 mmol/L    Base Excess, Arterial 5.6 (H) -3.0 - 3.0 mmol/L    Hemoglobin, Art, Extended 12.70 g/dL    O2 Sat, Arterial 96 93 - 100 %    Carboxyhgb, Arterial 2.3 (H) 0.0 - 1.5 %    Methemoglobin, Arterial 0.5 0.0 - 1.4 %    TCO2, Arterial 33 mmol/L   POCT Venous   Result Value Ref Range    pH, Lex 7.530 (H) 7.350 - 7.450    pCO2, Lex 38.1 (L) 40.0 - 50.0 mm Hg    pO2, Lex 59 Not Established mm Hg    HCO3, Venous 31.9 (H) 23.0 - 29.0 mmol/L    Base Excess, Lex 9 (H) -3 - 3    O2 Sat, Lex 93 Not Established %    TC02 (Calc), Lex 33 Not Established mmol/L    Sample Type LEX     Performed on SEE BELOW        ED BEDSIDE ULTRASOUND:  N/A    RECENT VITALS:  BP: (!) 147/60, Temp: 99.1 °F (37.3 °C), Pulse: 84, Resp: 14, SpO2: 92 %     Procedures     N/A    ED Course     Nursing Notes, Past Medical Hx,Past Surgical Hx, Social Hx, Allergies, and Family Hx were reviewed. The patient was given the following medications:  Orders Placed This Encounter   Medications    lactated ringers bolus    acetaminophen (TYLENOL) tablet 650 mg    cefepime (MAXIPIME) 2 g IVPB minibag    vancomycin (VANCOCIN) 1,500 mg in dextrose 5 % 250 mL IVPB    albuterol (PROVENTIL) nebulizer solution 2.5 mg       CONSULTS:  PHARMACY TO DOSE VANCOMYCIN  IP CONSULT TO HOSPITALIST  IP CONSULT TO Aleksandra 30 / ASSESSMENT / Sandra Yifan is admitted to the Emergency Department for evaluation of her chief complaint as described in the history of present illness. Complete history and physical was performed by me and my attending. Nursing notes, past medical history, surgical history, family history and social history were reviewed and addressed in the HPI. Shelby Chawla is a 70 y.o. female who presents to the emergency department with a complaint of fever and dyspnea. The patient had an acute onset of fever of 102F this evening prompting her return to the ED. The patient was admitted over a week ago for a new onset dyspnea and new oxygen requirement. She was diagnosed with pneumonia vs. Heart failure and discharged home on oxygen.  She had no prior history of lung disease. She was seen by the pulmonologist today in follow-up and still demonstrated an oxygen requirement and no improvement in her respiratory status. He recommended a rheumatologic follow-up as well as a repeat chest x-ray in 10 days to investigate an atypical pneumonia. Tonight, she developed fevers and chills and was found to have the earlier mentioned temperature at home. On ED presentation, she looks ill and has an increase in work of breathing. Se continues to have an oxygen requirement, now 4 lpm over the 2 lpm she had been on over the last 2 weeks. Chest x-ray is relatively unchanged from her prior investigations and still concerning for pneumonia. She now has a leukocytosis on CBC. Her VBG reveal respiratory alkalosis. Her lactate was normal. Blood cultures were drawn, but she doesn't appear septic at this time. Her BMP appears unremarkable. Urinalysis reveals no evidence of infection. She appears to continue to demonstrate pneumonia, despite having completed a course of azithromycin. We will start Vancomycin and cefepime and will admit to medicine for further evaluation and management. I discussed this plan at length the patient who verbalizes understanding and is in agreement. The patient was seen and evaluated by myself and the attending physician, Jim Monaco MD, who agrees with my assessment, treatment and plan. Clinical Impression     1.  Pneumonia due to organism        Disposition     PATIENT REFERRED TO:  Steve Nelson  [facility] Emergency Department  [address] [facility] [address], [place]          [address] [facility]  [address] [facility]  [phone]            DISCHARGE MEDICATIONS:  Current Discharge Medication List          DISPOSITION Admitted 04/04/2019 11:58:56

## 2019-04-05 ENCOUNTER — APPOINTMENT (OUTPATIENT)
Dept: CT IMAGING | Age: 79
DRG: 871 | End: 2019-04-05
Payer: MEDICARE

## 2019-04-05 LAB
ANION GAP SERPL CALCULATED.3IONS-SCNC: 12 MMOL/L (ref 3–16)
APPEARANCE BAL (LAVAGE): ABNORMAL
APPEARANCE BAL (LAVAGE): ABNORMAL
BASE EXCESS ARTERIAL: 5.6 MMOL/L (ref -3–3)
BASOPHILS ABSOLUTE: 0.1 K/UL (ref 0–0.2)
BASOPHILS RELATIVE PERCENT: 1.1 %
BUN BLDV-MCNC: 8 MG/DL (ref 7–20)
CALCIUM SERPL-MCNC: 8.9 MG/DL (ref 8.3–10.6)
CARBOXYHEMOGLOBIN ARTERIAL: 2.3 % (ref 0–1.5)
CHLORIDE BLD-SCNC: 99 MMOL/L (ref 99–110)
CLOT EVALUATION BAL: ABNORMAL
CLOT EVALUATION BAL: ABNORMAL
CO2: 28 MMOL/L (ref 21–32)
COLOR LAVAGE: ABNORMAL
COLOR LAVAGE: ABNORMAL
CREAT SERPL-MCNC: 0.6 MG/DL (ref 0.6–1.2)
EKG ATRIAL RATE: 102 BPM
EKG DIAGNOSIS: NORMAL
EKG P AXIS: 54 DEGREES
EKG P-R INTERVAL: 150 MS
EKG Q-T INTERVAL: 354 MS
EKG QRS DURATION: 80 MS
EKG QTC CALCULATION (BAZETT): 461 MS
EKG R AXIS: 53 DEGREES
EKG T AXIS: 56 DEGREES
EKG VENTRICULAR RATE: 102 BPM
EOSIN: 1 %
EOSIN: 1 %
EOSINOPHILS ABSOLUTE: 0.3 K/UL (ref 0–0.6)
EOSINOPHILS RELATIVE PERCENT: 2.9 %
GFR AFRICAN AMERICAN: >60
GFR NON-AFRICAN AMERICAN: >60
GLUCOSE BLD-MCNC: 137 MG/DL (ref 70–99)
HCO3 ARTERIAL: 31 MMOL/L (ref 21–29)
HCT VFR BLD CALC: 37.3 % (ref 36–48)
HEMOGLOBIN, ART, EXTENDED: 12.7 G/DL
HEMOGLOBIN: 12.1 G/DL (ref 12–16)
LACTIC ACID: 0.9 MMOL/L (ref 0.4–2)
LYMPHOCYTES ABSOLUTE: 1.9 K/UL (ref 1–5.1)
LYMPHOCYTES RELATIVE PERCENT: 19.1 %
LYMPHOCYTES, BAL: 2 % (ref 5–10)
LYMPHOCYTES, BAL: 6 % (ref 5–10)
MACROPHAGES, BAL: 12 % (ref 90–95)
MACROPHAGES, BAL: 9 % (ref 90–95)
MAGNESIUM: 1.4 MG/DL (ref 1.8–2.4)
MCH RBC QN AUTO: 28.4 PG (ref 26–34)
MCHC RBC AUTO-ENTMCNC: 32.6 G/DL (ref 31–36)
MCV RBC AUTO: 87.3 FL (ref 80–100)
METHEMOGLOBIN ARTERIAL: 0.5 % (ref 0–1.4)
MONOCYTES ABSOLUTE: 1 K/UL (ref 0–1.3)
MONOCYTES RELATIVE PERCENT: 10.1 %
MONOCYTES, BAL: 71 %
MONOCYTES, BAL: 82 %
NEUTROPHILS ABSOLUTE: 6.8 K/UL (ref 1.7–7.7)
NEUTROPHILS RELATIVE PERCENT: 66.8 %
NUMBER OF CELLS COUNTED BAL (LAVAGE): 200
NUMBER OF CELLS COUNTED BAL (LAVAGE): 200
O2 SAT, ARTERIAL: 96 % (ref 93–100)
PCO2 ARTERIAL: 50.2 MMHG (ref 35–45)
PDW BLD-RTO: 15.7 % (ref 12.4–15.4)
PH ARTERIAL: 7.41 (ref 7.35–7.45)
PLATELET # BLD: 176 K/UL (ref 135–450)
PMV BLD AUTO: 8.3 FL (ref 5–10.5)
PO2 ARTERIAL: 77.5 MMHG (ref 75–108)
POTASSIUM REFLEX MAGNESIUM: 3.1 MMOL/L (ref 3.5–5.1)
RBC # BLD: 4.27 M/UL (ref 4–5.2)
RBC, BAL: ABNORMAL /CUMM
RBC, BAL: ABNORMAL /CUMM
REPORT: NORMAL
RESPIRATORY PANEL PCR: NORMAL
SEGMENTED NEUTROPHILS, BAL: 10 % (ref 5–10)
SEGMENTED NEUTROPHILS, BAL: 6 % (ref 5–10)
SODIUM BLD-SCNC: 139 MMOL/L (ref 136–145)
TCO2 ARTERIAL: 33 MMOL/L
TROPONIN: <0.01 NG/ML
WBC # BLD: 10.1 K/UL (ref 4–11)
WBC/EPI CELLS BAL: 662 /CUMM
WBC/EPI CELLS BAL: 683 /CUMM

## 2019-04-05 PROCEDURE — 97116 GAIT TRAINING THERAPY: CPT

## 2019-04-05 PROCEDURE — 83735 ASSAY OF MAGNESIUM: CPT

## 2019-04-05 PROCEDURE — 87651 STREP A DNA AMP PROBE: CPT

## 2019-04-05 PROCEDURE — 99223 1ST HOSP IP/OBS HIGH 75: CPT | Performed by: INTERNAL MEDICINE

## 2019-04-05 PROCEDURE — 6360000002 HC RX W HCPCS: Performed by: STUDENT IN AN ORGANIZED HEALTH CARE EDUCATION/TRAINING PROGRAM

## 2019-04-05 PROCEDURE — 99153 MOD SED SAME PHYS/QHP EA: CPT | Performed by: INTERNAL MEDICINE

## 2019-04-05 PROCEDURE — 87486 CHLMYD PNEUM DNA AMP PROBE: CPT

## 2019-04-05 PROCEDURE — 84484 ASSAY OF TROPONIN QUANT: CPT

## 2019-04-05 PROCEDURE — 87205 SMEAR GRAM STAIN: CPT

## 2019-04-05 PROCEDURE — 87254 VIRUS INOCULATION SHELL VIA: CPT

## 2019-04-05 PROCEDURE — 87581 M.PNEUMON DNA AMP PROBE: CPT

## 2019-04-05 PROCEDURE — 6370000000 HC RX 637 (ALT 250 FOR IP): Performed by: STUDENT IN AN ORGANIZED HEALTH CARE EDUCATION/TRAINING PROGRAM

## 2019-04-05 PROCEDURE — 87015 SPECIMEN INFECT AGNT CONCNTJ: CPT

## 2019-04-05 PROCEDURE — 87541 LEGION PNEUMO DNA AMP PROB: CPT

## 2019-04-05 PROCEDURE — 71250 CT THORAX DX C-: CPT

## 2019-04-05 PROCEDURE — 87253 VIRUS INOCULATE TISSUE ADDL: CPT

## 2019-04-05 PROCEDURE — 94660 CPAP INITIATION&MGMT: CPT

## 2019-04-05 PROCEDURE — 97166 OT EVAL MOD COMPLEX 45 MIN: CPT

## 2019-04-05 PROCEDURE — 87305 ASPERGILLUS AG IA: CPT

## 2019-04-05 PROCEDURE — 2709999900 HC NON-CHARGEABLE SUPPLY: Performed by: INTERNAL MEDICINE

## 2019-04-05 PROCEDURE — 83605 ASSAY OF LACTIC ACID: CPT

## 2019-04-05 PROCEDURE — 99152 MOD SED SAME PHYS/QHP 5/>YRS: CPT | Performed by: INTERNAL MEDICINE

## 2019-04-05 PROCEDURE — 82803 BLOOD GASES ANY COMBINATION: CPT

## 2019-04-05 PROCEDURE — 3609010800 HC BRONCHOSCOPY ALVEOLAR LAVAGE: Performed by: INTERNAL MEDICINE

## 2019-04-05 PROCEDURE — 87798 DETECT AGENT NOS DNA AMP: CPT

## 2019-04-05 PROCEDURE — 88305 TISSUE EXAM BY PATHOLOGIST: CPT

## 2019-04-05 PROCEDURE — 0B9C8ZX DRAINAGE OF RIGHT UPPER LUNG LOBE, VIA NATURAL OR ARTIFICIAL OPENING ENDOSCOPIC, DIAGNOSTIC: ICD-10-PCS | Performed by: INTERNAL MEDICINE

## 2019-04-05 PROCEDURE — 87102 FUNGUS ISOLATION CULTURE: CPT

## 2019-04-05 PROCEDURE — 7100000011 HC PHASE II RECOVERY - ADDTL 15 MIN: Performed by: INTERNAL MEDICINE

## 2019-04-05 PROCEDURE — 94640 AIRWAY INHALATION TREATMENT: CPT

## 2019-04-05 PROCEDURE — 36600 WITHDRAWAL OF ARTERIAL BLOOD: CPT

## 2019-04-05 PROCEDURE — 94761 N-INVAS EAR/PLS OXIMETRY MLT: CPT

## 2019-04-05 PROCEDURE — 31624 DX BRONCHOSCOPE/LAVAGE: CPT | Performed by: INTERNAL MEDICINE

## 2019-04-05 PROCEDURE — 89051 BODY FLUID CELL COUNT: CPT

## 2019-04-05 PROCEDURE — 6360000002 HC RX W HCPCS: Performed by: INTERNAL MEDICINE

## 2019-04-05 PROCEDURE — 2580000003 HC RX 258: Performed by: STUDENT IN AN ORGANIZED HEALTH CARE EDUCATION/TRAINING PROGRAM

## 2019-04-05 PROCEDURE — 2060000000 HC ICU INTERMEDIATE R&B

## 2019-04-05 PROCEDURE — 3609011500 HC BRONCHOSCOPY DIAGNOSTIC OR CELL WASH ONLY: Performed by: INTERNAL MEDICINE

## 2019-04-05 PROCEDURE — 87081 CULTURE SCREEN ONLY: CPT

## 2019-04-05 PROCEDURE — 80048 BASIC METABOLIC PNL TOTAL CA: CPT

## 2019-04-05 PROCEDURE — 97530 THERAPEUTIC ACTIVITIES: CPT

## 2019-04-05 PROCEDURE — 94664 DEMO&/EVAL PT USE INHALER: CPT

## 2019-04-05 PROCEDURE — 93010 ELECTROCARDIOGRAM REPORT: CPT | Performed by: INTERNAL MEDICINE

## 2019-04-05 PROCEDURE — 87385 HISTOPLASMA CAPSUL AG IA: CPT

## 2019-04-05 PROCEDURE — 2700000000 HC OXYGEN THERAPY PER DAY

## 2019-04-05 PROCEDURE — 87641 MR-STAPH DNA AMP PROBE: CPT

## 2019-04-05 PROCEDURE — 87252 VIRUS INOCULATION TISSUE: CPT

## 2019-04-05 PROCEDURE — 85025 COMPLETE CBC W/AUTO DIFF WBC: CPT

## 2019-04-05 PROCEDURE — 87633 RESP VIRUS 12-25 TARGETS: CPT

## 2019-04-05 PROCEDURE — 87206 SMEAR FLUORESCENT/ACID STAI: CPT

## 2019-04-05 PROCEDURE — 7100000010 HC PHASE II RECOVERY - FIRST 15 MIN: Performed by: INTERNAL MEDICINE

## 2019-04-05 PROCEDURE — 97535 SELF CARE MNGMENT TRAINING: CPT

## 2019-04-05 PROCEDURE — 36415 COLL VENOUS BLD VENIPUNCTURE: CPT

## 2019-04-05 PROCEDURE — 87070 CULTURE OTHR SPECIMN AEROBIC: CPT

## 2019-04-05 PROCEDURE — 0B9G8ZX DRAINAGE OF LEFT UPPER LUNG LOBE, VIA NATURAL OR ARTIFICIAL OPENING ENDOSCOPIC, DIAGNOSTIC: ICD-10-PCS | Performed by: INTERNAL MEDICINE

## 2019-04-05 PROCEDURE — 87449 NOS EACH ORGANISM AG IA: CPT

## 2019-04-05 PROCEDURE — 1200000000 HC SEMI PRIVATE

## 2019-04-05 PROCEDURE — 93005 ELECTROCARDIOGRAM TRACING: CPT | Performed by: INTERNAL MEDICINE

## 2019-04-05 PROCEDURE — 87116 MYCOBACTERIA CULTURE: CPT

## 2019-04-05 PROCEDURE — 88112 CYTOPATH CELL ENHANCE TECH: CPT

## 2019-04-05 PROCEDURE — 97161 PT EVAL LOW COMPLEX 20 MIN: CPT

## 2019-04-05 RX ORDER — FLUTICASONE PROPIONATE 50 MCG
1 SPRAY, SUSPENSION (ML) NASAL NIGHTLY
Status: DISCONTINUED | OUTPATIENT
Start: 2019-04-05 | End: 2019-04-17 | Stop reason: HOSPADM

## 2019-04-05 RX ORDER — SODIUM CHLORIDE FOR INHALATION 3 %
15 VIAL, NEBULIZER (ML) INHALATION PRN
Status: DISCONTINUED | OUTPATIENT
Start: 2019-04-05 | End: 2019-04-17 | Stop reason: HOSPADM

## 2019-04-05 RX ORDER — FUROSEMIDE 40 MG/1
40 TABLET ORAL DAILY
Status: DISCONTINUED | OUTPATIENT
Start: 2019-04-05 | End: 2019-04-17 | Stop reason: HOSPADM

## 2019-04-05 RX ORDER — GABAPENTIN 600 MG/1
600 TABLET ORAL 3 TIMES DAILY
Status: DISCONTINUED | OUTPATIENT
Start: 2019-04-05 | End: 2019-04-17 | Stop reason: HOSPADM

## 2019-04-05 RX ORDER — FUROSEMIDE 40 MG/1
20 TABLET ORAL DAILY
Status: DISCONTINUED | OUTPATIENT
Start: 2019-04-05 | End: 2019-04-05

## 2019-04-05 RX ORDER — ALBUTEROL SULFATE 2.5 MG/3ML
2.5 SOLUTION RESPIRATORY (INHALATION)
Status: DISCONTINUED | OUTPATIENT
Start: 2019-04-05 | End: 2019-04-14

## 2019-04-05 RX ORDER — POTASSIUM CHLORIDE 7.45 MG/ML
10 INJECTION INTRAVENOUS
Status: DISPENSED | OUTPATIENT
Start: 2019-04-05 | End: 2019-04-05

## 2019-04-05 RX ORDER — LOSARTAN POTASSIUM 25 MG/1
25 TABLET ORAL DAILY
Status: DISCONTINUED | OUTPATIENT
Start: 2019-04-05 | End: 2019-04-17 | Stop reason: HOSPADM

## 2019-04-05 RX ORDER — CHOLESTYRAMINE LIGHT 4 G/5.7G
4 POWDER, FOR SUSPENSION ORAL DAILY
Status: DISCONTINUED | OUTPATIENT
Start: 2019-04-05 | End: 2019-04-17 | Stop reason: HOSPADM

## 2019-04-05 RX ORDER — AMLODIPINE BESYLATE 10 MG/1
10 TABLET ORAL DAILY
Status: DISCONTINUED | OUTPATIENT
Start: 2019-04-05 | End: 2019-04-05

## 2019-04-05 RX ORDER — TRAZODONE HYDROCHLORIDE 50 MG/1
50 TABLET ORAL EVERY MORNING
Status: DISCONTINUED | OUTPATIENT
Start: 2019-04-05 | End: 2019-04-05

## 2019-04-05 RX ORDER — MIDAZOLAM HYDROCHLORIDE 1 MG/ML
INJECTION INTRAMUSCULAR; INTRAVENOUS PRN
Status: DISCONTINUED | OUTPATIENT
Start: 2019-04-05 | End: 2019-04-05 | Stop reason: ALTCHOICE

## 2019-04-05 RX ORDER — HYDRALAZINE HYDROCHLORIDE 25 MG/1
25 TABLET, FILM COATED ORAL EVERY 8 HOURS SCHEDULED
Status: DISCONTINUED | OUTPATIENT
Start: 2019-04-05 | End: 2019-04-17 | Stop reason: HOSPADM

## 2019-04-05 RX ORDER — CETIRIZINE HYDROCHLORIDE 10 MG/1
10 TABLET ORAL EVERY MORNING
Status: DISCONTINUED | OUTPATIENT
Start: 2019-04-05 | End: 2019-04-17 | Stop reason: HOSPADM

## 2019-04-05 RX ORDER — POTASSIUM CHLORIDE 1.5 G/1.77G
40 POWDER, FOR SOLUTION ORAL ONCE
Status: COMPLETED | OUTPATIENT
Start: 2019-04-05 | End: 2019-04-05

## 2019-04-05 RX ORDER — MAGNESIUM SULFATE IN WATER 40 MG/ML
2 INJECTION, SOLUTION INTRAVENOUS ONCE
Status: COMPLETED | OUTPATIENT
Start: 2019-04-05 | End: 2019-04-05

## 2019-04-05 RX ORDER — ACETAMINOPHEN 325 MG/1
650 TABLET ORAL EVERY 4 HOURS PRN
Status: DISCONTINUED | OUTPATIENT
Start: 2019-04-05 | End: 2019-04-17 | Stop reason: HOSPADM

## 2019-04-05 RX ORDER — ESCITALOPRAM OXALATE 10 MG/1
10 TABLET ORAL DAILY
Status: DISCONTINUED | OUTPATIENT
Start: 2019-04-05 | End: 2019-04-17 | Stop reason: HOSPADM

## 2019-04-05 RX ORDER — CALCIUM CARBONATE 200(500)MG
500 TABLET,CHEWABLE ORAL 3 TIMES DAILY PRN
Status: DISCONTINUED | OUTPATIENT
Start: 2019-04-05 | End: 2019-04-17 | Stop reason: HOSPADM

## 2019-04-05 RX ORDER — POTASSIUM CHLORIDE 20 MEQ/1
40 TABLET, EXTENDED RELEASE ORAL ONCE
Status: COMPLETED | OUTPATIENT
Start: 2019-04-05 | End: 2019-04-05

## 2019-04-05 RX ORDER — PANTOPRAZOLE SODIUM 40 MG/1
40 TABLET, DELAYED RELEASE ORAL EVERY EVENING
Status: DISCONTINUED | OUTPATIENT
Start: 2019-04-05 | End: 2019-04-10

## 2019-04-05 RX ORDER — TRAZODONE HYDROCHLORIDE 50 MG/1
50 TABLET ORAL NIGHTLY
Status: DISCONTINUED | OUTPATIENT
Start: 2019-04-05 | End: 2019-04-17 | Stop reason: HOSPADM

## 2019-04-05 RX ORDER — OXYCODONE HYDROCHLORIDE AND ACETAMINOPHEN 5; 325 MG/1; MG/1
1 TABLET ORAL EVERY 8 HOURS
Status: DISCONTINUED | OUTPATIENT
Start: 2019-04-05 | End: 2019-04-17 | Stop reason: HOSPADM

## 2019-04-05 RX ORDER — POTASSIUM CHLORIDE 20 MEQ/1
40 TABLET, EXTENDED RELEASE ORAL ONCE
Status: DISCONTINUED | OUTPATIENT
Start: 2019-04-05 | End: 2019-04-05

## 2019-04-05 RX ORDER — PREDNISONE 1 MG/1
2 TABLET ORAL DAILY
Status: DISCONTINUED | OUTPATIENT
Start: 2019-04-05 | End: 2019-04-10

## 2019-04-05 RX ORDER — SODIUM CHLORIDE 0.9 % (FLUSH) 0.9 %
10 SYRINGE (ML) INJECTION EVERY 12 HOURS SCHEDULED
Status: DISCONTINUED | OUTPATIENT
Start: 2019-04-05 | End: 2019-04-17 | Stop reason: HOSPADM

## 2019-04-05 RX ORDER — ONDANSETRON 2 MG/ML
4 INJECTION INTRAMUSCULAR; INTRAVENOUS EVERY 6 HOURS PRN
Status: DISCONTINUED | OUTPATIENT
Start: 2019-04-05 | End: 2019-04-17 | Stop reason: HOSPADM

## 2019-04-05 RX ORDER — SODIUM CHLORIDE 0.9 % (FLUSH) 0.9 %
10 SYRINGE (ML) INJECTION PRN
Status: DISCONTINUED | OUTPATIENT
Start: 2019-04-05 | End: 2019-04-17 | Stop reason: HOSPADM

## 2019-04-05 RX ORDER — ALBUTEROL SULFATE 2.5 MG/3ML
2.5 SOLUTION RESPIRATORY (INHALATION) EVERY 4 HOURS PRN
Status: DISCONTINUED | OUTPATIENT
Start: 2019-04-05 | End: 2019-04-17 | Stop reason: HOSPADM

## 2019-04-05 RX ORDER — FENTANYL CITRATE 50 UG/ML
INJECTION, SOLUTION INTRAMUSCULAR; INTRAVENOUS PRN
Status: DISCONTINUED | OUTPATIENT
Start: 2019-04-05 | End: 2019-04-05 | Stop reason: ALTCHOICE

## 2019-04-05 RX ADMIN — Medication 10 ML: at 20:34

## 2019-04-05 RX ADMIN — ALBUTEROL SULFATE 2.5 MG: 2.5 SOLUTION RESPIRATORY (INHALATION) at 17:05

## 2019-04-05 RX ADMIN — OXYCODONE HYDROCHLORIDE AND ACETAMINOPHEN 1 TABLET: 5; 325 TABLET ORAL at 18:16

## 2019-04-05 RX ADMIN — POTASSIUM CHLORIDE 40 MEQ: 1.5 POWDER, FOR SOLUTION ORAL at 06:45

## 2019-04-05 RX ADMIN — PREDNISONE 2 MG: 1 TABLET ORAL at 18:16

## 2019-04-05 RX ADMIN — CEFEPIME HYDROCHLORIDE 2 G: 2 INJECTION, POWDER, FOR SOLUTION INTRAVENOUS at 13:05

## 2019-04-05 RX ADMIN — HYDRALAZINE HYDROCHLORIDE 25 MG: 25 TABLET, FILM COATED ORAL at 20:32

## 2019-04-05 RX ADMIN — ALBUTEROL SULFATE 2.5 MG: 2.5 SOLUTION RESPIRATORY (INHALATION) at 21:33

## 2019-04-05 RX ADMIN — FUROSEMIDE 40 MG: 40 TABLET ORAL at 18:17

## 2019-04-05 RX ADMIN — VANCOMYCIN HYDROCHLORIDE 1250 MG: 10 INJECTION, POWDER, LYOPHILIZED, FOR SOLUTION INTRAVENOUS at 21:36

## 2019-04-05 RX ADMIN — ACETAMINOPHEN 650 MG: 325 TABLET ORAL at 17:44

## 2019-04-05 RX ADMIN — FLUTICASONE PROPIONATE 1 SPRAY: 50 SPRAY, METERED NASAL at 21:34

## 2019-04-05 RX ADMIN — ALBUTEROL SULFATE 2.5 MG: 2.5 SOLUTION RESPIRATORY (INHALATION) at 07:50

## 2019-04-05 RX ADMIN — OXYCODONE HYDROCHLORIDE AND ACETAMINOPHEN 1 TABLET: 5; 325 TABLET ORAL at 10:45

## 2019-04-05 RX ADMIN — CEFEPIME HYDROCHLORIDE 2 G: 2 INJECTION, POWDER, FOR SOLUTION INTRAVENOUS at 20:33

## 2019-04-05 RX ADMIN — GABAPENTIN 600 MG: 600 TABLET, FILM COATED ORAL at 20:32

## 2019-04-05 RX ADMIN — MAGNESIUM SULFATE HEPTAHYDRATE 2 G: 40 INJECTION, SOLUTION INTRAVENOUS at 06:45

## 2019-04-05 RX ADMIN — ENOXAPARIN SODIUM 40 MG: 40 INJECTION SUBCUTANEOUS at 10:32

## 2019-04-05 RX ADMIN — PANTOPRAZOLE SODIUM 40 MG: 40 TABLET, DELAYED RELEASE ORAL at 18:17

## 2019-04-05 RX ADMIN — CEFEPIME HYDROCHLORIDE 2 G: 2 INJECTION, POWDER, FOR SOLUTION INTRAVENOUS at 04:59

## 2019-04-05 RX ADMIN — TRAZODONE HYDROCHLORIDE 50 MG: 50 TABLET ORAL at 21:35

## 2019-04-05 RX ADMIN — LOSARTAN POTASSIUM 25 MG: 25 TABLET, FILM COATED ORAL at 10:33

## 2019-04-05 RX ADMIN — VANCOMYCIN HYDROCHLORIDE 1250 MG: 10 INJECTION, POWDER, LYOPHILIZED, FOR SOLUTION INTRAVENOUS at 10:57

## 2019-04-05 RX ADMIN — HYDRALAZINE HYDROCHLORIDE 25 MG: 25 TABLET, FILM COATED ORAL at 18:16

## 2019-04-05 RX ADMIN — GABAPENTIN 600 MG: 600 TABLET, FILM COATED ORAL at 10:33

## 2019-04-05 RX ADMIN — ESCITALOPRAM OXALATE 10 MG: 10 TABLET ORAL at 10:33

## 2019-04-05 RX ADMIN — POTASSIUM CHLORIDE 10 MEQ: 10 INJECTION, SOLUTION INTRAVENOUS at 13:05

## 2019-04-05 RX ADMIN — OXYCODONE HYDROCHLORIDE AND ACETAMINOPHEN 1 TABLET: 5; 325 TABLET ORAL at 02:31

## 2019-04-05 RX ADMIN — ONDANSETRON 4 MG: 2 INJECTION INTRAMUSCULAR; INTRAVENOUS at 08:45

## 2019-04-05 RX ADMIN — ACETAMINOPHEN 650 MG: 325 TABLET ORAL at 06:45

## 2019-04-05 RX ADMIN — POTASSIUM CHLORIDE 40 MEQ: 20 TABLET, EXTENDED RELEASE ORAL at 18:52

## 2019-04-05 RX ADMIN — ALBUTEROL SULFATE 2.5 MG: 2.5 SOLUTION RESPIRATORY (INHALATION) at 11:28

## 2019-04-05 RX ADMIN — Medication 10 ML: at 11:05

## 2019-04-05 RX ADMIN — AZITHROMYCIN DIHYDRATE 500 MG: 500 INJECTION, POWDER, LYOPHILIZED, FOR SOLUTION INTRAVENOUS at 03:10

## 2019-04-05 RX ADMIN — GABAPENTIN 600 MG: 600 TABLET, FILM COATED ORAL at 18:17

## 2019-04-05 ASSESSMENT — PAIN - FUNCTIONAL ASSESSMENT
PAIN_FUNCTIONAL_ASSESSMENT: 0-10
PAIN_FUNCTIONAL_ASSESSMENT: ACTIVITIES ARE NOT PREVENTED
PAIN_FUNCTIONAL_ASSESSMENT: PREVENTS OR INTERFERES SOME ACTIVE ACTIVITIES AND ADLS
PAIN_FUNCTIONAL_ASSESSMENT: 0-10
PAIN_FUNCTIONAL_ASSESSMENT: 0-10

## 2019-04-05 ASSESSMENT — PAIN DESCRIPTION - LOCATION
LOCATION: HEAD
LOCATION: BACK

## 2019-04-05 ASSESSMENT — PAIN DESCRIPTION - ORIENTATION
ORIENTATION: LOWER
ORIENTATION: LOWER
ORIENTATION: MID;LOWER;UPPER
ORIENTATION: LOWER
ORIENTATION: ANTERIOR

## 2019-04-05 ASSESSMENT — PAIN DESCRIPTION - PAIN TYPE
TYPE: CHRONIC PAIN
TYPE: ACUTE PAIN
TYPE: CHRONIC PAIN;ACUTE PAIN

## 2019-04-05 ASSESSMENT — PAIN DESCRIPTION - PROGRESSION
CLINICAL_PROGRESSION: NOT CHANGED
CLINICAL_PROGRESSION: GRADUALLY WORSENING
CLINICAL_PROGRESSION: NOT CHANGED

## 2019-04-05 ASSESSMENT — PAIN SCALES - GENERAL
PAINLEVEL_OUTOF10: 0
PAINLEVEL_OUTOF10: 0
PAINLEVEL_OUTOF10: 4
PAINLEVEL_OUTOF10: 8
PAINLEVEL_OUTOF10: 4
PAINLEVEL_OUTOF10: 0
PAINLEVEL_OUTOF10: 8
PAINLEVEL_OUTOF10: 9
PAINLEVEL_OUTOF10: 3
PAINLEVEL_OUTOF10: 8

## 2019-04-05 ASSESSMENT — PAIN DESCRIPTION - FREQUENCY
FREQUENCY: CONTINUOUS
FREQUENCY: INTERMITTENT

## 2019-04-05 ASSESSMENT — PAIN DESCRIPTION - DESCRIPTORS
DESCRIPTORS: ACHING
DESCRIPTORS: ACHING
DESCRIPTORS: HEADACHE
DESCRIPTORS: DULL;STABBING
DESCRIPTORS: STABBING

## 2019-04-05 ASSESSMENT — PAIN DESCRIPTION - ONSET
ONSET: GRADUAL
ONSET: ON-GOING

## 2019-04-05 ASSESSMENT — ENCOUNTER SYMPTOMS
CHEST TIGHTNESS: 1
SHORTNESS OF BREATH: 1
DIARRHEA: 1
COUGH: 1
WHEEZING: 1

## 2019-04-05 ASSESSMENT — PULMONARY FUNCTION TESTS
PIF_VALUE: 0
PIF_VALUE: 0

## 2019-04-05 NOTE — PROGRESS NOTES
PTD Vancomycin per Francisca Graff in ED    Indication: (per Anthony Reed note) Dory's case is quite complex. Given her lack of improvement I do not think that this was a bacterial pneumonia and I am also hesitant that diastolic CHF is the primary diagnosis. Other considerations include an atypical infection, like fungal or mycobacterial, given her Humira and flutamide. RA-ILD could also be in the differential.  She is on Singulair which can cause a Churg-Uriel type phenomenon. Hypersensitivity pneumonitis and non-connective tissue disease ILD could also be in the differential.    Ht: 152 cm  Wt: 103 kg  Scr 0.6 mg/dL    Vancomycin 15mg/kg (1500 mg) x1 dose ordered. Please consult pharmacy for continued dosing upon admission. Thanks!   Gui Sood

## 2019-04-05 NOTE — PROGRESS NOTES
PIP joints. Skin: Skin color, texture, turgor normal.  No rashes or lesions. Neurologic:  Neurovascularly intact without any focal sensory/motor deficits. Cranial nerves: II-XII intact, grosslynon-focal.  Psychiatric:  AOx3, thought content appropriate, normal insight  Capillary Refill: Brisk,< 3 seconds   Peripheral Pulses: +2 palpable, equal bilaterally         Labs:   Recent Labs     04/04/19 1940 04/05/19  0431   WBC 11.7* 10.1   HGB 12.7 12.1   HCT 39.3 37.3    176     Recent Labs     04/04/19 2051 04/04/19 2134 04/05/19 0431   * 135* 139   K 6.0* 3.8 3.1*   CL 96* 97* 99   CO2 28 27 28   BUN 11 11 8   CREATININE 0.6 0.7 0.6   CALCIUM 8.8 9.0 8.9     No results for input(s): AST, ALT, BILIDIR, BILITOT, ALKPHOS in the last 72 hours. Recent Labs     04/04/19 1940   INR 1.13     No results for input(s): Sofi Dark in the last 72 hours. Urinalysis:      Lab Results   Component Value Date    NITRU Negative 04/04/2019    WBCUA 0-2 04/04/2019    BACTERIA 1+ 09/02/2018    RBCUA 0-2 04/04/2019    BLOODU Negative 04/04/2019    SPECGRAV <=1.005 04/04/2019    GLUCOSEU Negative 04/04/2019       Radiology:  CT CHEST WO CONTRAST   Final Result       The central airways are patent. Multifocal ill-defined patchy upper zone and left lower lobe predominant    peripheral nonspecific airspace opacities now seen since 2017 and may be    inflammatory, infectious. Clinically correlate. Background of chronic interstitial changes again noted. XR CHEST PORTABLE   Final Result      Mild bilateral interstitial and airspace opacities, not significantly changed compared to prior, representing edema or infection.                  Assessment/Plan:  Tylor Quiroz is a 70 y.o. female with a past medical history of interstitial lung disease (on chronic steroids), rheumatoid arthritis (on Humira and leflunamide), HFpEF (grade II diastolic dysfunction), hypertension and depression who presented to [facility] emergency department on 4/5/2019 after experiencing confusion and shortness of breath at home. HCAP -In the ED, the patient was febrile (Tmax 102.6) and tachypneic (RR 22). CBC was significant for a mild leukocytosis of 11.2. Chest x-ray was remarkable for mild bilateral interstitial and airspace opacities suggestive of edema or infection.   -Vancomycin 1250 mg BID  -Cefepime 2 grams q8h   -Azithromycin 500 mg daily   -Blood cultures x 2   -Respiratory pathogen panel   -CT chest without contrast- showed multifocal ill-defined patchy upper zone and left lower lobe predominant peripheral nonspecific airspace opacities now seen since 2017 and maybe inflammatory, infectious.   -Supplemental oxygen as needed- currently on 4L  -Albuterol nebulizers   -Consult pulmonology; appreciate recommendations      HFpEF -Last echocardiogram (3/25/2019) showing grade II diastolic dysfunction. Patient does not appear to be volume overloaded at this time. JVD appreciated on physical exam.   -Furosemide 40 mg daily   -Mobitz type 2 heart block lasting 30 secs at 3 am  -Cardiology consulted; recommendations appreciated. Interstitial Lung Disease -Patient has a long history of ILD and is followed by pulmonology (Dr. Hanna Cutler) outpatient.  Diffuse wheezes appreciated on physical exam.   -Consult pulmonology; appreciate recommendations      Rheumatoid arthritis   -Holding home immunosuppressants in the setting of suspected PNA      Hypertension   -Continue home losartan 25 mg nighty   -Continue home amlodipine 10 mg nightly      Depression   -Continue home escitalopram daily         DVT Prophylaxis: Lovenox   Diet: General diet  Code Status: Limited (no x 4)      PT/OT Eval Status: Consulted      Dispo - General medicine/surgery floors           Josie Santiago    I will discuss the patient with the senior resident and MD Josie Singletary MD/DO   Internal Medicine Resident PGY-***  Pager: [phone]

## 2019-04-05 NOTE — ED NOTES
Home Med List is complete. Source of medications in list is interview with patient and her family member, review of provided Rx bottles and pill box review.     Patient states she only remembers taking her breakfast meds today.      Please note: Attempted to adjust patient's medication frequencies to match her pill box times (am, lunch, dinner, pm)  1. Added to pt's med list: mirabegron 25mg ER, pantoprazole 40 mg QD  2.  Changes to pt's med list:   -Patient is taking florastor BID   -Patient states she is only taking 2mg of prednisone today   -Patient was told today to increase to furosemide 40 mg, but she has been taking 20 mg QD    Rima Cain  PharmD Candidate 2019 4/4/2019 11:05 PM

## 2019-04-05 NOTE — PROGRESS NOTES
Pt admitted to room 4457. Oriented to room. Call light within reach. High fall precautions in place. VSS. SR/ST on telemetry. Will continue to monitor.

## 2019-04-05 NOTE — PROGRESS NOTES
RESPIRATORY THERAPY ASSESSMENT    Name:  [address] Record Number:  [id number]  Age: 70 y.o. Gender: female  : 1940  Today's Date:  2019  Room:  [facility]2640-36    Assessment     Is the patient being admitted for a COPD or Asthma exacerbation? No   (If yes the patient will be seen every 4 hours for the first 24 hours and then reassessed)    Patient Admission Diagnosis      Allergies  Allergies   Allergen Reactions    Sulfa Antibiotics Hives    Lamotrigine Other (See Comments)     Blurred vision    Lomotil  [Diphenoxylate-Atropine] Other (See Comments)     Changes in vision    Sulfacetamide Sodium Hives     Other reaction(s): Other (See Comments)       Minimum Predicted Vital Capacity:     748          Actual Vital Capacity:      1000              Pulmonary History:interstitial lung disease  Home Oxygen Therapy:  3 liters/min via nasal cannula  Home Respiratory Therapy:Albuterol/Ipratropium Bromide HHN   Current Respiratory Therapy:  hhn duoneb q4wa          Respiratory Severity Index(RSI)   Patients with orders for inhalation medications, oxygen, or any therapeutic treatment modality will be placed on Respiratory Protocol. They will be assessed with the first treatment and at least every 72 hours thereafter. The following severity scale will be used to determine frequency of treatment intervention. Smoking History: Pulmonary Disease or Smoking History, Greater than 15 pack year = 2    Social History  Social History     Tobacco Use    Smoking status: Former Smoker     Packs/day: 1.00     Years: 14.00     Pack years: 14.00     Types: Cigarettes     Last attempt to quit: 1976     Years since quittin.2    Smokeless tobacco: Never Used    Tobacco comment: quit age 29   Substance Use Topics    Alcohol use:  Yes     Alcohol/week: 0.6 - 1.2 oz     Types: 1 - 2 Glasses of wine per week     Comment: social drinker    Drug use: No       Recent Surgical History: None = 0  Past Surgical History  Past Surgical History:   Procedure Laterality Date    APPENDECTOMY      CARPAL TUNNEL RELEASE Bilateral     CHOLECYSTECTOMY      FOOT SURGERY Bilateral     metatarsal removal    HYSTERECTOMY, TOTAL ABDOMINAL      JOINT REPLACEMENT Right 04/25/2016    Dr. Brown Self , shoulder    JOINT REPLACEMENT Right     OTHER SURGICAL HISTORY Right 02/20/2017    RIGHT TOTAL KNEE ARTHROPLASTY             SALPINGO-OOPHORECTOMY      SHOULDER ARTHROPLASTY Right     SHOULDER ARTHROSCOPY Left 11/15/2017    TONSILLECTOMY      TOTAL KNEE ARTHROPLASTY Right        Level of Consciousness: Alert, Oriented, and Cooperative = 0    Level of Activity: Walking with assistance = 1    Respiratory Pattern: Regular Pattern; RR 8-20 = 0    Breath Sounds: Absent bilaterally and/or with wheezes = 3    Sputum   ,  ,    Cough: Strong, spontaneous, non-productive = 0    Vital Signs   BP (!) 152/64   Pulse 97   Temp 99.6 °F (37.6 °C) (Oral)   Resp 20   Ht 4' 11.84\" (1.52 m)   Wt 224 lb 9.6 oz (101.9 kg)   SpO2 93%   BMI 44.09 kg/m²   SPO2 (COPD values may differ): 88-89% on room air or greater than 92% on FiO2 28- 35% = 2    Peak Flow (asthma only): not applicable = 0    RSI: 7-8 = BID and Q4HPRN (every four hours as needed) for dyspnea        Plan       Goals: medication delivery and improve oxygenation    Patient/caregiver was educated on the proper method of use for Respiratory Care Devices:  Yes      Level of patient/caregiver understanding able to:   ? Verbalize understanding   ? Demonstrate understanding       ? Teach back        ? Needs reinforcement       ? No available caregiver               ? Other:     Response to education:  Excellent     Is patient being placed on Home Treatment Regimen? Yes     Does the patient have everything they need prior to discharge?   Yes     Comments: admits with hcap    Plan of Care: hhn duoneb q4wa    Electronically signed by Juan Velazquez RCP on 4/5/2019 at 2:24     Respiratory Protocol Guidelines     1. Assessment and treatment by Respiratory Therapy will be initiated for medication and therapeutic interventions upon initiation of aerosolized medication. 2. Physician will be contacted for respiratory rate (RR) greater than 35 breaths per minute. Therapy will be held for heart rate (HR) greater than 140 beats per minute, pending direction from physician. 3. Bronchodilators will be administered via Metered Dose Inhaler (MDI) with spacer when the following criteria are met:  a. Alert and cooperative     b. HR < 140 bpm  c. RR < 30 bpm                d. Can demonstrate a 2-3 second inspiratory hold  4. Bronchodilators will be administered via Hand Held Nebulizer [facility]) to patients when ANY of the following criteria are met  a. Incognizant or uncooperative          b. Patients treated with HHN at Home        c. Unable to demonstrate proper use of MDI with spacer     d. RR > 30 bpm   5. Bronchodilators will be delivered via Metered Dose Inhaler (MDI), HHN, Aerogen to intubated patients on mechanical ventilation. 6. Inhalation medication orders will be delivered and/or substituted as outlined below. Aerosolized Medications Ordering and Administration Guidelines:    1. All Medications will be ordered by a physician, and their frequency and/or modality will be adjusted as defined by the patients Respiratory Severity Index (RSI) score. 2. If the patient does not have documented COPD, consider discontinuing anticholinergics when RSI is less than 9.  3. If the bronchospasm worsens (increased RSI), then the bronchodilator frequency can be increased to a maximum of every 4 hours. If greater than every 4 hours is required, the physician will be contacted. 4. If the bronchospasm improves, the frequency of the bronchodilator can be decreased, based on the patient's RSI, but not less than home treatment regimen frequency.   5. Bronchodilator(s) will be discontinued if patient has a

## 2019-04-05 NOTE — PLAN OF CARE
No falls this shift, bed in lowest position, bed alarm on, non skid socks on, call light within reach, hourly checks, safety maintained, will continue to monitor.

## 2019-04-05 NOTE — PROGRESS NOTES
Fentanyl flagged as a reactions: Other (see comments). This writer telephoned in house pharmacist Alla Mc to clarify possible reaction concern with listed allergies. Pharmacist reviewed with this writer and okay to administer Fentanyl. Will notify recovery to monitor for any changes in vision since blurred vision was side effect of potential cross sensitivity.

## 2019-04-05 NOTE — PROGRESS NOTES
as pt increases activity levels during admit. Currently, would benefit from continued inpt rehabilitation at d/c but expect enough improvement that pt may be able to return home with initial 24 hr spvn and home therapies. Treatment Diagnosis: Decreased activity tolerance, impaired ADLs and mobility  Decision Making: Medium Complexity  Patient Education: Role of OT, d/c planning, activity promotion - verb understanding  REQUIRES OT FOLLOW UP: Yes  Activity Tolerance  Activity Tolerance: Patient Tolerated treatment well  Activity Tolerance: Pt on 4L O2. SOB at rest, barely able to speak in full sentences in one breath. No significant increase in SOB with activity. spO2 94% upon return to bed after walking. Educated on gradually increasing activity levels with assist during admit - verb understanding  Safety Devices  Safety Devices in place: Yes  Type of devices: Call light within reach;Nurse notified; Bed alarm in place; Left in bed         Treatment Diagnosis: Decreased activity tolerance, impaired ADLs and mobility      Restrictions  Position Activity Restriction  Other position/activity restrictions: up with assist    Subjective   General  Chart Reviewed: Yes  Patient assessed for rehabilitation services?: Yes  Additional Pertinent Hx: 70 y.o. F admitted 4/4 with AMS, SOB. +PNA, sepsis. Bronchoscopy 4/5. PMHx includes asthma, CHF, Depression, Mitral valve regurg, Polio, RA, sleep apnea, cholecystectomy, hysterectomy, joint replacements  Family / Caregiver Present: Yes(daughter)  Referring Practitioner: Alice Pendleton  Diagnosis: PNA  Subjective  Subjective: Pt in bed on entry. Pleasant and cooperative. Not feeling well due to breathing and back pain. After, reports it felt good to move around with therapy.    Pain Assessment  Pain Assessment: 0-10(chronic back pain, RN aware)     Social/Functional History  Social/Functional History  Lives With: Alone  Type of Home: Apartment(senior building )  Home Layout: One level  Home Access: Level entry  Bathroom Shower/Tub: Walk-in shower  Bathroom Toilet: Handicap height  Bathroom Equipment: Grab bars in shower, Shower chair, Grab bars around toilet  Home Equipment: [employer], [employer], Rolling walker, Oxygen  ADL Assistance: [address]: Independent(does own laundry and cooking; has cleaning lady)  Ambulation Assistance: Independent(cane occasionally inside)  Transfer Assistance: Independent  Active : Blayne Wade to grocery, uses motorized scooter in stores)  Additional Comments: Last fall was in October. Has home health since last admit - nurse 3x/week + PT/OT. Recently started home OT, but apparently tubing was kinked contributing to admission. Objective  Treatment included: functional transfer training, ADL, pt education    Vision: Within Functional Limits  Hearing: Within functional limits    Orientation  Overall Orientation Status: Within Functional Limits     Balance  Sitting Balance: Supervision  Standing Balance: Contact guard assistance  Standing Balance  Time: 4 min + 2 min   Activity: Functional mobility to/from bathroom, stance; with rolling walker  Sit to stand: Contact guard assistance  Stand to sit: Contact guard assistance  Comment: CGA for gait, transfers, stance with rolling walker; slow, effortful  Toilet Transfers  Toilet - Technique: Ambulating  Equipment Used: Standard toilet  Toilet Transfer: Contact guard assistance  Toilet Transfers Comments: with grab bar  ADL  Feeding: NPO  Grooming: Modified independent ;Setup  LE Dressing:  Moderate assistance(brief)  Toileting: Minimal assistance(via toilet; external catheter in use due to urgency/frequency; encouraged to ambulate to bathroom with assist as tolerated)        Bed mobility  Supine to Sit: Minimal assistance(HOB raised, rail used, effortful)  Sit to Supine: Minimal assistance(for LEs)  Scooting: Contact guard assistance  Transfers  Sit to stand: Contact guard assistance  Stand to sit: Contact guard assistance     Cognition  Overall Cognitive Status: [facility]  Cognition Comment: pt and daughter report cognition back to normal        Plan  If pt discharges prior to next tx, this note will serve as d/c summary. Continue per POC if pt does not d/c.     Plan  Times per week: 2-5x  Times per day: Daily    AM-PAC Score  AM-PAC Inpatient Daily Activity Raw Score: 19  AM-PAC Inpatient ADL T-Scale Score : 40.22  ADL Inpatient CMS 0-100% Score: 42.8  ADL Inpatient CMS G-Code Modifier : CK    Goals  Short term goals  Time Frame for Short term goals: by D/C  Short term goal 1:  Increase standing/mobility tolerance to 8 min - Not met  Short term goal 2: Complete toileting with SBA - Not met  Short term goal 3: Ivone Ancelmo pants/underwear with SBA - Not met  Short term goal 4: Transfer to/from toilet and chairs with SBA - Not met  Patient Goals   Patient goals : recover, return home       Therapy Time   Individual Concurrent Group Co-treatment   Time In 1406         Time Out 1445         Minutes 39          Timed Code Tx Min: 24  Total Tx Time: [facility], OT

## 2019-04-05 NOTE — CONSULTS
removal    HYSTERECTOMY, TOTAL ABDOMINAL      JOINT REPLACEMENT Right 04/25/2016    Dr. Haig Gitelman , shoulder    JOINT REPLACEMENT Right     OTHER SURGICAL HISTORY Right 02/20/2017    RIGHT TOTAL KNEE ARTHROPLASTY             SALPINGO-OOPHORECTOMY      SHOULDER ARTHROPLASTY Right     SHOULDER ARTHROSCOPY Left 11/15/2017    TONSILLECTOMY      TOTAL KNEE ARTHROPLASTY Right        Allergies   Allergen Reactions    Sulfa Antibiotics Hives    Lamotrigine Other (See Comments)     Blurred vision    Lomotil  [Diphenoxylate-Atropine] Other (See Comments)     Changes in vision    Sulfacetamide Sodium Hives     Other reaction(s): Other (See Comments)       Social History:  Reviewed. reports that she quit smoking about 43 years ago. Her smoking use included cigarettes. She has a 14.00 pack-year smoking history. She has never used smokeless tobacco. She reports that she drinks about 0.6 - 1.2 oz of alcohol per week. She reports that she does not use drugs. Family History:  Reviewed. family history includes Arthritis in her sister; Asthma in her sister; Cancer in her mother; Depression in her brother, brother, and sister; Diabetes in her brother; Heart Disease in her father; High Blood Pressure in her brother and father; High Cholesterol in her sister; Psoriasis in her sister. No premature CAD. Review of System:  All other systems reviewed except for that noted above.  Pertinent negatives and positives are:     Objective      · General: positive for fever, chills   · Ophthalmic ROS: negative for - eye pain or loss of vision  · ENT ROS: negative for - headaches, sore throat   · Respiratory: positive for - cough, sputum and SOB  · Cardiovascular: Reviewed in HPI  · Gastrointestinal: negative for - abdominal pain, diarrhea, N/V  · Hematology: negative for - bleeding, blood clots, bruising or jaundice  · Genito-Urinary:  negative for - Dysuria or incontinence  · Musculoskeletal: negative for - Joint swelling, muscle pain  · Neurological: negative for - confusion, dizziness, headaches   · Psychiatric: No anxiety, no depression. · Dermatological: negative for - rash    Physical Examination:  Vitals:    19 1224   BP: 136/68   Pulse: 100   Resp: 18   Temp: 99.5 °F (37.5 °C)   SpO2: 92%        Intake/Output Summary (Last 24 hours) at 2019 1235  Last data filed at 2019 0830  Gross per 24 hour   Intake 1990 ml   Output 1200 ml   Net 790 ml     In: 1990 [P.O.:195]  Out: 1200    Wt Readings from Last 3 Encounters:   19 224 lb 9.6 oz (101.9 kg)   19 227 lb (103 kg)   19 226 lb 13.7 oz (102.9 kg)     Temp  Av.1 °F (37.8 °C)  Min: 99.1 °F (37.3 °C)  Max: 102.6 °F (39.2 °C)  Pulse  Av.6  Min: 88  Max: 108  BP  Min: 100/65  Max: 152/64  SpO2  Av.1 %  Min: 92 %  Max: 96 %    · Telemetry: Sinus rhythm, Paroxysmal AV block  · Constitutional: Alert. Oriented to person, place, and time. No distress. · Head: Normocephalic and atraumatic. · Mouth/Throat: Lips appear moist. Oropharynx is clear and moist.  · Eyes: Conjunctivae normal. EOM are normal.   · Neck: Neck supple. No lymphadenopathy. No rigidity. No JVD present. · Cardiovascular: Normal rate, regular rhythm. Normal S1&S2. Carotid pulse 2+ bilaterally. · Pulmonary/Chest: Bilateral respiratory sounds present. Occ respiratory accessory muscle use. Occ wheezes   · Abdominal: Soft. Normal bowel sounds present. No distension, No tenderness. No splenomegaly. No hernia. · Musculoskeletal: No tenderness. No edema    · Lymphadenopathy: Has no cervical adenopathy. · Neurological: Alert and oriented. Cranial nerve II-XII grossly intact, No gross deficit to touch. · Skin: Skin is warm and dry. No rash, lesions, ulcerations noted. · Psychiatric: No anxiety nor agitation. Labs:  Reviewed.    Recent Labs     194 19  0431   * 135* 139   K 6.0* 3.8 3.1*   CL 96* 97* 99   CO2 28 27 28   BUN 11 11 8 CREATININE 0.6 0.7 0.6     Recent Labs     04/04/19  1940 04/05/19  0431   WBC 11.7* 10.1   HGB 12.7 12.1   HCT 39.3 37.3   MCV 86.5 87.3    176     Lab Results   Component Value Date    CKTOTAL 70 01/03/2013    CKMB 0.47 01/03/2013    CKMBINDEX 0.7 01/03/2013    TROPONINI <0.01 04/05/2019     No results found for: BNP  Lab Results   Component Value Date    PROTIME 12.9 04/04/2019    PROTIME 12.2 05/07/2018    PROTIME 11.0 02/20/2017    INR 1.13 04/04/2019    INR 1.1 05/07/2018    INR 0.97 02/20/2017     Lab Results   Component Value Date    CHOL 188 03/12/2018    HDL 40 03/12/2018    HDL 61 07/30/2010    TRIG 284 03/12/2018       Diagnostic and imaging results reviewed. I independently reviewed the ECG and telemetry.      Scheduled Meds:   cetirizine  10 mg Oral QAM    escitalopram  10 mg Oral Daily    cholestyramine light  4 g Oral Daily    fluticasone  1 spray Each Nare Nightly    gabapentin  600 mg Oral TID    losartan  25 mg Oral Daily    Mirabegron ER  1 tablet Oral QPM    oxyCODONE-acetaminophen  1 tablet Oral Q8H    pantoprazole  40 mg Oral QPM    sodium chloride flush  10 mL Intravenous 2 times per day    enoxaparin  40 mg Subcutaneous Daily    albuterol  2.5 mg Nebulization Q4H WA    azithromycin  500 mg Intravenous Q24H    cefepime  2 g Intravenous Q8H    furosemide  40 mg Oral Daily    predniSONE  2 mg Oral Daily    traZODone  50 mg Oral Nightly    vancomycin  1,250 mg Intravenous Q12H    potassium chloride  10 mEq Intravenous Q1H     Continuous Infusions:  PRN Meds:.albuterol, sodium chloride flush, magnesium hydroxide, ondansetron, acetaminophen, calcium carbonate     Assessment:   Patient Active Problem List    Diagnosis Date Noted    PNA (pneumonia) 04/04/2019    Chronic diastolic CHF (congestive heart failure) ([facility] [address] 03/28/2019    Hypoxia 03/25/2019    Acute diverticulitis 09/02/2018    Chronic diarrhea 08/11/2018    Hypokalemia 08/11/2018    Hypomagnesemia 01/03/2008      Active Hospital Problems    Diagnosis Date Noted    PNA (pneumonia) [J18.9] 04/04/2019     Recommendation (s):  1. HCAP  2. DEWAYNE on CPAP  3. Paroxysmal AV block - asymptomatic, nocturnal, in the setting of not using CPAP  4. Recommended to use CPAP consistently during sleep  5. No indication of Pacemaker,as of now. 6. If she continues to have high grade AV block in spite of using CPAP, then she would need a Pacemaker (after her resp status improved)     Thank you for allowing me to participate in the care of Ismael Hassan . If you have any questions/comments, please do not hesitate to contact us.       Guilherme Waite MD   Cardiac Electrophysiology  [facility]

## 2019-04-05 NOTE — H&P
Internal Medicine PGY-1 Resident History & Physical      PCP: Chico Olivier    Date of Admission: 4/4/2019    Date ofService: Pt seen/examined on 4/4/2019 and Admitted to Inpatient with expected LOS greater than two midnights due to medical therapy. Chief Complaint:  Confusion; shortness of breath; fevers       History Of Present Illness: Guillermo Hebert is a 70 y.o. female with a past medical history of interstitial lung disease (on chronic steroids), rheumatoid arthritis (on Humira and leflunamide), HFpEF (grade II diastolic dysfunction), hypertension and depression who presented to [facility] emergency department on 4/5/2019 after experiencing confusion and shortness of breath at home. According to EMS, the patient was texting \"jibberish\" to her grandson. When he went to check on the patient, he found her to be confused with labored breathing (EMS noted that her home oxygen hose was kinked when they arrived). Of note, the patient was recently discharged from [place] [address] after being treated for acute hypoxic respiratory failure of unknown origin (pneumonia had initially been suspected but was ruled out). In the ED, the patient was febrile (Tmax 102.6), tachycardic () and tachypneic (RR 22). She was initially confused but had an improvement in mental status after being placed on 3 L of high flow oxygen. CBC was obtained and was significant for mild leukocytosis with a WBC count of 11.2. BMP and lactic acid were drawn and were within normal limits. CXR was done and was significant for mild bilateral interstitial and airspace opacities suggestive of edema or infection. Rapid influenza screen was done and was negative. The patient was given a lactated ringers bolus and was started on vancomycin and cefepime. She is admitted to internal medicine for further management of suspected hospital-acquired pneumonia.      Past Medical History:          Diagnosis Date    Allergic rhinitis 5/4/2010    Asthma TB24 Take 1 tablet by mouth every evening   Yes Historical Provider, MD   pantoprazole (PROTONIX) 40 MG tablet Take 40 mg by mouth every evening   Yes Historical Provider, MD   furosemide (LASIX) 20 MG tablet Take 20 mg by mouth daily   Yes Historical Provider, MD   oxyCODONE-acetaminophen (PERCOCET) 5-325 MG per tablet Take 1 tablet by mouth every 8 hours. Indications: Backache   Yes Historical Provider, MD   fluticasone (FLONASE) 50 MCG/ACT nasal spray 1 spray by Each Nare route nightly   Yes Historical Provider, MD   amLODIPine (NORVASC) 10 MG tablet Take 10 mg by mouth nightly    Yes Historical Provider, MD   Calcium Carb-Cholecalciferol (CALCIUM-VITAMIN D) 500-200 MG-UNIT per tablet Take 2 tablets by mouth Daily with lunch    Yes Historical Provider, MD   predniSONE (DELTASONE) 1 MG tablet Take 2 mg by mouth daily    Yes Historical Provider, MD   albuterol sulfate HFA (VENTOLIN HFA) 108 (90 Base) MCG/ACT inhaler Inhale 2 puffs into the lungs every 6 hours as needed for Wheezing or Shortness of Breath MAY USE ALTERNATIVE PER INSURANCE 12/21/18  Yes Aldo Faulkner MD   colestipol (COLESTID) 1 g tablet Take 1 g by mouth daily Indications: Diarrhea    Yes Historical Provider, MD   sulindac (CLINORIL) 200 MG tablet Take 200 mg by mouth nightly    Yes Historical Provider, MD   leflunomide (ARAVA) 20 MG tablet Take 20 mg by mouth daily  6/29/17  Yes Historical Provider, MD   gabapentin (NEURONTIN) 600 MG tablet Take 600 mg by mouth 3 times daily. Yes Historical Provider, MD   cetirizine (ZYRTEC) 10 MG tablet Take 10 mg by mouth every morning    Yes Historical Provider, MD   adalimumab (HUMIRA) 40 MG/0.8ML injection Inject 40 mg into the skin once a week Mondays 6/23/10  Yes Historical Provider, MD       Allergies: Sulfa antibiotics; Lamotrigine; Lomotil  [diphenoxylate-atropine]; and Sulfacetamide sodium    Social History:      The patient currently lives alone in a private residence.      TOBACCO:   reports that she quit smoking about 43 years ago. Her smoking use included cigarettes. She has a 14.00 pack-year smoking history. She has never used smokeless tobacco.  ETOH:   reports that she drinks about 0.6 - 1.2 oz of alcohol per week. Family History:      Reviewed in detail and negative for DM, CAD, Cancer, CVA. Positive as follows:        Problem Relation Age of Onset   [facility] Cancer Mother     Heart Disease Father     High Blood Pressure Father     Diabetes Brother     High Blood Pressure Brother     Depression Brother     Arthritis Sister     Asthma Sister     High Cholesterol Sister     Depression Sister     Psoriasis Sister     Depression Brother        REVIEW OF SYSTEMS:   Pertinent positives as noted in theHPI. All other systems reviewed and negative. ROS: Review of Systems   Constitutional: Positive for activity change, chills, fatigue and fever. HENT: Negative. Respiratory: Positive for cough, chest tightness, shortness of breath and wheezing. Cardiovascular: Negative. Gastrointestinal: Positive for diarrhea. Genitourinary: Negative. Musculoskeletal: Negative. Skin: Negative. Neurological: Negative. Hematological: Negative. Psychiatric/Behavioral: Positive for confusion. PHYSICAL EXAM PERFORMED:    BP (!) 135/56   Pulse 98   Temp 101.3 °F (38.5 °C) (Oral)   Resp 22   Ht 4' 11.84\" (1.52 m)   Wt 227 lb 1.2 oz (103 kg)   SpO2 95%   BMI 44.58 kg/m²     General appearance:  Patient has mildly labored breathing and appears short of breath. HEENT:  Normal cephalic, atraumatic without obviousdeformity. Pupils equal, round, and reactive to light. Extra ocular muscles intact. Conjunctivae/corneas clear. Neck: Supple, with full range of motion. No jugular venous distention. Trachea midline. Respiratory: Increased respiratory effort. Diffuse wheezes present in all lung fields bilaterally. Cardiovascular: Tachycardia present. Jugular venous distension present.    Abdomen: (grade II diastolic dysfunction), hypertension and depression who presented to [facility] emergency department on 4/5/2019 after experiencing confusion and shortness of breath at home. According to EMS, the patient was texting \"jibberish\" to her grandson. When he went to check on the patient, he found her to be confused with labored breathing (EMS noted that her home oxygen hose was kinked when they arrived). Of note, the patient was recently discharged from [facility] after being treated for acute hypoxic respiratory failure of unknown origin (pneumonia had initially been suspected but was ruled out). In the ED, the patient was febrile (Tmax 102.6), tachycardic () and tachypneic (RR 22). She was initially confused but had an improvement in mental status after being placed on 3 L of high flow oxygen. CBC was obtained and was significant for mild leukocytosis with a WBC count of 11.2. BMP and lactic acid were drawn and were within normal limits. CXR was done and was significant for mild bilateral interstitial and airspace opacities suggestive of edema or infection. Rapid influenza screen was done and was negative. The patient was given a lactated ringers bolus and was started on vancomycin and cefepime. She is admitted to internal medicine for further management of suspected hospital-acquired pneumonia. HCAP -In the ED, the patient was febrile (Tmax 102.6) and tachypneic (RR 22). CBC was significant for a mild leukocytosis of 11.2. Chest x-ray was remarkable for mild bilateral interstitial and airspace opacities suggestive of edema or infection.   -Vancomycin; pharmacy to dose   -Cefepime 2 grams q8h   -Azithromycin 500 mg daily   -Blood cultures x 2   -Respiratory pathogen panel   -CT chest without contrast   -Supplemental oxygen as needed   -Albuterol nebulizers   -Consult pulmonology; appreciate recommendations     HFpEF -Last echocardiogram (3/25/2019) showing grade II diastolic dysfunction.  Patient

## 2019-04-05 NOTE — PROGRESS NOTES
Pt received breathing treatment. Resp 24-28 with sp02 95% on 6 L NC. Pt being transported back to room.

## 2019-04-05 NOTE — PROGRESS NOTES
Internal Medicine PGY-1 Resident Progress Note        PCP: Kaela Gonzales    Date of Admission: 4/4/2019    Chief Complaint: Confusion; shortness of breath; fevers     Hospital Course:     Cammy Vasquez is a 70 y.o. female with a past medical history of interstitial lung disease (on chronic steroids), rheumatoid arthritis (on Humira and leflunamide), HFpEF (grade II diastolic dysfunction), hypertension and depression who presented to [facility] with confusion and shortness of breath at home, recently discharged on 3/28 on 3L O2 while being treated for PNA. She was admitted with sepsis 2/2 HCAP started on broad-spectrum abx and plan for bronchoscopy with pulmonologist.     Overnight patient had run of Mobitz II HB with PVCs for about 30s, HR sustained in 90s-100s. Subjective:     AAOx3, fatigued, she was complaining of nausea, heartburn, SOB and KHAN. Requesting tums for heartburn.      Medications:  Reviewed    Infusion Medications   Scheduled Medications    cetirizine  10 mg Oral QAM    escitalopram  10 mg Oral Daily    cholestyramine light  4 g Oral Daily    fluticasone  1 spray Each Nare Nightly    gabapentin  600 mg Oral TID    losartan  25 mg Oral Daily    Mirabegron ER  1 tablet Oral QPM    oxyCODONE-acetaminophen  1 tablet Oral Q8H    pantoprazole  40 mg Oral QPM    sodium chloride flush  10 mL Intravenous 2 times per day    enoxaparin  40 mg Subcutaneous Daily    albuterol  2.5 mg Nebulization Q4H WA    azithromycin  500 mg Intravenous Q24H    cefepime  2 g Intravenous Q8H    furosemide  40 mg Oral Daily    predniSONE  2 mg Oral Daily    traZODone  50 mg Oral Nightly    vancomycin  1,250 mg Intravenous Q12H    potassium chloride  10 mEq Intravenous Q1H     PRN Meds: albuterol, sodium chloride flush, magnesium hydroxide, ondansetron, acetaminophen, calcium carbonate      Intake/Output Summary (Last 24 hours) at 4/5/2019 1347  Last data filed at 4/5/2019 0830  Gross per 24 hour   Intake 1990 ml   Output 1200 ml   Net 790 ml       Physical Exam Performed:    /68   Pulse 100   Temp 99.5 °F (37.5 °C) (Oral)   Resp 18   Ht 4' 11.84\" (1.52 m)   Wt 224 lb 9.6 oz (101.9 kg)   SpO2 92%   BMI 44.09 kg/m²       General appearance:  Patient has mildly labored breathing and appears short of breath. Pt was somnolent   HEENT:  Normal cephalic, atraumatic without obviousdeformity. Pupils equal, round, and reactive to light. Extra ocular muscles intact. Conjunctivae/corneas clear. Neck: Supple, with full range of motion. No jugular venous distention. Trachea midline. Respiratory: Labored breathing. Diffuse inspiratory and expiratory wheezes present in all lung fields bilaterally. Velcro like crackles present in bases bilaterally,  Cardiovascular: Tachycardia, regular rhythm. Jugular venous distension present. Trace pitting edema  Abdomen: Soft, non-tender,non-distended with normal bowel sounds. Musculoskeletal:  No clubbing or cyanosis present. BL ulnar deviation, selin notes, swan neck defomities  Skin: Skin color, texture, turgor normal.  No rashes or lesions. Neurologic:  Neurovascularly intact without any focal sensory/motor deficits. Cranial nerves: II-XII intact, grosslynon-focal.  Psychiatric:  AOx3, thought content appropriate, normal insight  Capillary Refill: Brisk,< 3 seconds   Peripheral Pulses: +2 palpable, equal bilaterally       Labs:   Recent Labs     04/04/19 1940 04/05/19  0431   WBC 11.7* 10.1   HGB 12.7 12.1   HCT 39.3 37.3    176     Recent Labs     04/04/19 2051 04/04/19  2134 04/05/19  0431   * 135* 139   K 6.0* 3.8 3.1*   CL 96* 97* 99   CO2 28 27 28   BUN 11 11 8   CREATININE 0.6 0.7 0.6   CALCIUM 8.8 9.0 8.9     No results for input(s): AST, ALT, BILIDIR, BILITOT, ALKPHOS in the last 72 hours.   Recent Labs     04/04/19 1940   INR 1.13     Recent Labs     04/05/19  0934   TROPONINI <0.01       Urinalysis:      Lab Results   Component Value Date    NITRU exam.   -Furosemide 40 mg daily     Interstitial Lung Disease -Patient has a long history of ILD (suspect 2/2 RA and/or MTX-induced)   -followed by pulmonology (Dr. Mann Caldera) outpatient  -prednisone 2 mg PO qD     Rheumatoid arthritis   -Holding home immunosuppressants in the setting of suspected PNA   - percocet for chronic back/joint pain      Hypertension   -Continue home losartan 25 mg nighty   -holding amlodipine in setting of heart block   -if patient's BP not controlled, add hydralazine 25 mg PO TID     Depression   -lexapro 10 mg PO qD  -trazodone 50 mg PO qHs     DVT Prophylaxis: Lovenox   Diet: General diet  Code Status: Limited (no x 4)      PT/OT Eval Status: Consulted      Dispo - GMF    Daniel Vera MD    I will discuss the patient with the senior resident and MD Daniel Roberson MD  Internal Medicine Resident PGY-1

## 2019-04-05 NOTE — PROGRESS NOTES
Physical Therapy    Facility/Department: [place] [address] PCU  Initial Assessment and treatment    NAME: Shefali Tariq  : 1940  MRN: [id number]    Date of Service: 2019    Discharge Recommendations:    Shefali Tariq scored a 18/24 on the AM-PAC short mobility form. Current research shows that an AM-PAC score of 18 or greater is typically associated with a discharge to the patient's home setting. Based on the patients AM-PAC score and their current functional mobility deficits, it is recommended that the patient have 2-3 sessions per week of Physical Therapy at d/c to increase the patients independence. HOME HEALTH CARE: LEVEL 1 STANDARD    - Initial home health evaluation to occur within 24-48 hours, in patient home   - Therapy to evaluate with goal of regaining prior level of functioning   - Therapy to evaluate if patient has [address] needs for personal care    PT Equipment Recommendations  Equipment Needed: Yes  Mobility Devices: Pinkey Lapine: Rollator (4 Wheeled)  Other: patient interested in obtaining 4WW due to decreased activity tolerance    Assessment   Body structures, Functions, Activity limitations: Decreased functional mobility ; Decreased balance;Decreased endurance; Increased Pain;Decreased strength  Assessment: Patient tolerated session well with mobility being limited due to decreased activity tolerance. Patient able to transfer to EOB and ambulate into bathroom and back to edge of bed sitting. Patient needs increased time to complete all tasks as movements are effortful in addition to short rest breaks needed with position changes. Patient currently functioning below baseline level. Recommend continued skilled PT upon discharge. Will continue to follow while in acute care setting to maximize independence with mobility.    Treatment Diagnosis: impaired mobility associated with PNA  Prognosis: Good  Decision Making: Low Complexity  Patient Education: Educated on role of PT, safety with mobility, use of call light, d/c recommendations; patient verbalized understanding. REQUIRES PT FOLLOW UP: Yes  Activity Tolerance  Activity Tolerance: Patient Tolerated treatment well;Patient limited by endurance       Patient Diagnosis(es): The encounter diagnosis was Pneumonia due to organism. has a past medical history of Allergic rhinitis, Asthma, Chicken pox, Chronic diastolic CHF (congestive heart failure) ([address] Depression, Diverticulosis of large intestine, Essential hypertension, Gastroesophageal reflux disease without esophagitis, GERD (gastroesophageal reflux disease), Hyperlipidemia, Hypertension, Interstitial lung disease ([address] Mitral valve regurgitation, Mixed hyperlipidemia, Obesity, Osteoarthritis, Osteoporosis, Polio, Prolonged emergence from general anesthesia, RA (rheumatoid arthritis) ([address] Rheumatoid arthritis ([address] and Sleep apnea. has a past surgical history that includes Appendectomy; Cholecystectomy; Salpingo-oophorectomy; Carpal tunnel release (Bilateral); Foot surgery (Bilateral); Total shoulder arthroplasty (Right); other surgical history (Right, 02/20/2017); Hysterectomy, total abdominal; Tonsillectomy; Shoulder arthroscopy (Left, 11/15/2017); joint replacement (Right, 04/25/2016); joint replacement (Right); and Total knee arthroplasty (Right). Restrictions  Position Activity Restriction  Other position/activity restrictions: up with assist  Vision/Hearing  Vision: Within Functional Limits  Hearing: Within functional limits     Subjective  General  Chart Reviewed: Yes  Patient assessed for rehabilitation services?: Yes  Additional Pertinent Hx: Patient is a 69 y/o female admitted 4/4 with confusion and shortness of breath. CT chest (+) for Multifocal ill-defined patchy upper zone and left lower lobe predominant peripheral nonspecific airspace opacities now seen since 2017 and may be inflammatory or infectious.   PMH significant for asthma, CHF, HTN, GERD, HLD, interstitial lung disease, obesity, polio, RA, sleep apnea, B foot surgery, right shoulder replacement, right TKA, left shoulder arthroscopy. Family / Caregiver Present: Yes(daughter)  Referring Practitioner: Teofilo Moore MD  Referral Date : 04/05/19  Diagnosis: PNA  Follows Commands: Within Functional Limits  General Comment  Comments: Patient supine in bed upon arrival.  Subjective  Subjective: Patient agreeable to PT evaluation, requests to ambulate into bathroom. Pain Screening  Patient Currently in Pain: Denies  Vital Signs  Patient Currently in Pain: Denies       Orientation  Orientation  Overall Orientation Status: Within Functional Limits  Social/Functional History  Social/Functional History  Lives With: Alone  Type of Home: Apartment(senior building )  Home Layout: One level  Home Access: Level entry  Bathroom Shower/Tub: Walk-in shower  Bathroom Toilet: Handicap height  Bathroom Equipment: Grab bars in shower, Shower chair, Grab bars around toilet  Home Equipment: Flor Sauger, [employer], Rolling walker, Oxygen  ADL Assistance: [address] [place] [address] Avenue: Independent(does own laundry and cooking; has cleaning lady)  Ambulation Assistance: Independent(cane occasionally inside)  Transfer Assistance: Independent  Active : Megan Hews to grocery, uses motorized scooter in stores)  Additional Comments: Last fall was in October. Has home health since last admit - nurse 3x/week + PT/OT. Recently started home OT, but apparently tubing was kinked contributing to admission.   Cognition   Cognition  Overall Cognitive Status: WFL  Cognition Comment: pt and daughter report cognition back to normal    Objective          AROM RLE (degrees)  RLE AROM: WFL  AROM LLE (degrees)  LLE AROM : WFL  Strength RLE  Strength RLE: WFL  Strength LLE  Strength LLE: WFL        Bed mobility  Supine to Sit: Minimal assistance(head of bed elevated, use of bedrail, effortful)  Sit to Supine: Minimal assistance(assist with LEs, head of bed flat)  Scooting: Contact guard assistance  Transfers  Sit to Stand: Contact guard assistance(from edge of bed and from toilet )  Stand to sit: Contact guard assistance(to toilet and to edge of bed)  Ambulation  Ambulation?: Yes  Ambulation 1  Surface: level tile  Device: Rolling Walker  Other Apparatus: O2(4L)  Assistance: Contact guard assistance  Quality of Gait: decreased miguel angel, decreased step length bilaterally, gait effortful though steady with no loss of balance noted  Distance: 10' into bathroom, 10' back to edge of bed sitting  Comments: O2 saturation 94% following mobility  Stairs/Curb  Stairs?: No     Balance  Sitting - Static: Good  Standing - Static: Fair(CGA with UE support)  Standing - Dynamic: Fair(CGA to ambulate with FWW)        Plan   Plan  Times per week: 2-5  Current Treatment Recommendations: Strengthening, Transfer Training, Endurance Training, Patient/Caregiver Education & Training, Balance Training, Gait Training, Functional Mobility Training, Safety Education & Training  Safety Devices  Type of devices: Bed alarm in place, Call light within reach, Nurse notified, Left in bed      OutComes Score                                                  AM-PAC Score  AM-PAC Inpatient Mobility Raw Score : 18  AM-PAC Inpatient T-Scale Score : 43.63  Mobility Inpatient CMS 0-100% Score: 46.58  Mobility Inpatient CMS G-Code Modifier : CK          Goals  Short term goals  Time Frame for Short term goals: discharge  Short term goal 1: patient will perform bed mobility with supervision  Short term goal 2: patient will perform transfers sit<>stand with supervision  Short term goal 3: patient will ambulate 76' with FWW and supervision  Patient Goals   Patient goals : to go home       Therapy Time   Individual Concurrent Group Co-treatment   Time In 1407         Time Out 1445         Minutes 38         Timed Code Treatment Minutes: 23 Minutes    Timed Code Treatment Minutes:  23 Minutes    Total Treatment Minutes:    23 minutes treatment + 15 minutes evaluation = 38 total treatment minutes    If patient is discharged from the hospital prior to next treatment session, this note will serve as the discharge summary.      Quintin RUIZ [facility].

## 2019-04-05 NOTE — CARE COORDINATION
[address] Transitions Interview     2019    Patient: Neyda Barrios Patient : 1940   MRN: [id number]   Reason for Admission: PNA      RARS: Readmission Risk Score: 25         Spoke with: Neyda Barrios    Readmission Risk  Patient Active Problem List   Diagnosis    Candidiasis of skin and nails    Dermatophytosis of nail    Depressive disorder, not elsewhere classified    Obstructive sleep apnea syndrome    Diverticulosis of large intestine    Allergic rhinitis    Osteoporosis    Rheumatoid arthritis ([address]    Diaphragmatic hernia    Mitral valve regurgitation    Granulomatous lung disease (HCC)    Complete tear of left rotator cuff    Biceps tendinitis on right    Postmenopausal osteoporosis    Asthma    Arthritis of right acromioclavicular joint    Glenohumeral arthritis right    Essential hypertension    Gastroesophageal reflux disease without esophagitis    Mixed hyperlipidemia    Status post reverse total replacement of right shoulder    Chronic pain of right knee    Morbid obesity with BMI of 40.0-44.9, adult (HCC)    Chronic nonseasonal allergic rhinitis due to pollen    Mild intermittent asthma without complication    Interstitial lung disease (HCC)    Chronic low back pain    Disorder resulting from impaired renal function    Spinal stenosis    Status post total right knee replacement using cement on 2017    Major depressive disorder with single episode, in partial remission (HCC)    Rheumatoid arthritis of multiple sites with negative rheumatoid factor (HCC)    Osteoarthritis of lumbar spine    Immunosuppression ([address]    S/P left rotator cuff repair    Primary osteoarthritis of left shoulder    Recurrent cold sores    Diarrhea    Nausea with vomiting    Chronic diarrhea    Hypokalemia    Hypomagnesemia    Weight loss    Vertigo    Acute diverticulitis    Hypoxia    Chronic diastolic CHF (congestive heart failure) ([address]    PNA (pneumonia)       Inpatient Assessment  Care Transitions Summary    Care Transitions Inpatient Review  Medication Review  Are you able to afford your medications?:  Yes  How often do you have difficulty taking your medications?:  I always take them as prescribed. Housing Review  Who do you live with?:  Alone  Are you an active caregiver in your home?:  No  Social Support  Do you have a ?:  No  Do you have a [address], [facility]?:  No  Durable Medical Equipment  Patient DME:  Straight cane, Walker, Other, Shower chair  Other Patient DME:  Elevated toilet and alert button, [facility], [employer] 1  Functional Review  Ability to seek help/take action for Emergent/Urgent situations i.e. fire, crime, inclement weather or health crisis. :  Independent  Ability handle personal hygiene needs (bathing/dressing/grooming): Independent  Ability to manage medications: Independent  Ability to prepare food:  Independent  Ability to maintain home (clean home, laundry):  Needs Assistance  Ability to drive and/or has transportation:  Needs Assistance  Ability to do shopping:  Independent  Ability to manage finances: Independent  Is patient able to live independently?:  Yes  Hearing and Vision  Visual Impairment:  Reading glasses  Hearing Impairment:  None  Care Transitions Interventions  No Identified Needs       Summary  CTC spoke with patient this afternoon for initial face to face interview. Patient is very familiar to CTC team, is from home alone, was active with [facility] prior to admission to hospital.  Patient was not followed with 2 weeks of CTC follow up calls, as she is no longer with a [facility]) PCP. CTC did speak with floor , as patient would like to resume services with [facility] once discharged.     Follow Up  Future Appointments   Date Time Provider Katie Arroyo   4/17/2019  2:40 PM Georgian Angelucci, MD [facility] P/CC [facility]   8/28/2019 11:40 AM Georgian Angelucci, MD [facility] Maintenance  There are no preventive care reminders to display for this patient.     Padmini Juarez RN

## 2019-04-06 PROBLEM — I45.9 HB (HEART BLOCK): Status: ACTIVE | Noted: 2019-04-06

## 2019-04-06 LAB
ANION GAP SERPL CALCULATED.3IONS-SCNC: 9 MMOL/L (ref 3–16)
BASOPHILS ABSOLUTE: 0.1 K/UL (ref 0–0.2)
BASOPHILS RELATIVE PERCENT: 0.6 %
BUN BLDV-MCNC: 11 MG/DL (ref 7–20)
CALCIUM SERPL-MCNC: 8.7 MG/DL (ref 8.3–10.6)
CHLORIDE BLD-SCNC: 100 MMOL/L (ref 99–110)
CO2: 26 MMOL/L (ref 21–32)
CREAT SERPL-MCNC: 0.8 MG/DL (ref 0.6–1.2)
EOSINOPHILS ABSOLUTE: 0.4 K/UL (ref 0–0.6)
EOSINOPHILS RELATIVE PERCENT: 3.2 %
GFR AFRICAN AMERICAN: >60
GFR NON-AFRICAN AMERICAN: >60
GLUCOSE BLD-MCNC: 101 MG/DL (ref 70–99)
HCT VFR BLD CALC: 38.3 % (ref 36–48)
HEMOGLOBIN: 12 G/DL (ref 12–16)
L. PNEUMOPHILA SEROGP 1 UR AG: NORMAL
LYMPHOCYTES ABSOLUTE: 1.8 K/UL (ref 1–5.1)
LYMPHOCYTES RELATIVE PERCENT: 15.9 %
MCH RBC QN AUTO: 28.1 PG (ref 26–34)
MCHC RBC AUTO-ENTMCNC: 31.3 G/DL (ref 31–36)
MCV RBC AUTO: 89.8 FL (ref 80–100)
MONOCYTES ABSOLUTE: 1 K/UL (ref 0–1.3)
MONOCYTES RELATIVE PERCENT: 9.4 %
NEUTROPHILS ABSOLUTE: 7.9 K/UL (ref 1.7–7.7)
NEUTROPHILS RELATIVE PERCENT: 70.9 %
PDW BLD-RTO: 15.9 % (ref 12.4–15.4)
PLATELET # BLD: 173 K/UL (ref 135–450)
PMV BLD AUTO: 8 FL (ref 5–10.5)
POTASSIUM REFLEX MAGNESIUM: 5 MMOL/L (ref 3.5–5.1)
RBC # BLD: 4.27 M/UL (ref 4–5.2)
SODIUM BLD-SCNC: 135 MMOL/L (ref 136–145)
STREP PNEUMONIAE ANTIGEN, URINE: NORMAL
URINE CULTURE, ROUTINE: NORMAL
VANCOMYCIN TROUGH: 16.6 UG/ML (ref 10–20)
WBC # BLD: 11.1 K/UL (ref 4–11)

## 2019-04-06 PROCEDURE — 1200000000 HC SEMI PRIVATE

## 2019-04-06 PROCEDURE — 6360000002 HC RX W HCPCS: Performed by: STUDENT IN AN ORGANIZED HEALTH CARE EDUCATION/TRAINING PROGRAM

## 2019-04-06 PROCEDURE — 85025 COMPLETE CBC W/AUTO DIFF WBC: CPT

## 2019-04-06 PROCEDURE — 2700000000 HC OXYGEN THERAPY PER DAY

## 2019-04-06 PROCEDURE — 36415 COLL VENOUS BLD VENIPUNCTURE: CPT

## 2019-04-06 PROCEDURE — 94640 AIRWAY INHALATION TREATMENT: CPT

## 2019-04-06 PROCEDURE — 2060000000 HC ICU INTERMEDIATE R&B

## 2019-04-06 PROCEDURE — 6370000000 HC RX 637 (ALT 250 FOR IP): Performed by: STUDENT IN AN ORGANIZED HEALTH CARE EDUCATION/TRAINING PROGRAM

## 2019-04-06 PROCEDURE — 94660 CPAP INITIATION&MGMT: CPT

## 2019-04-06 PROCEDURE — 99233 SBSQ HOSP IP/OBS HIGH 50: CPT | Performed by: INTERNAL MEDICINE

## 2019-04-06 PROCEDURE — 80202 ASSAY OF VANCOMYCIN: CPT

## 2019-04-06 PROCEDURE — 2580000003 HC RX 258: Performed by: STUDENT IN AN ORGANIZED HEALTH CARE EDUCATION/TRAINING PROGRAM

## 2019-04-06 PROCEDURE — 94761 N-INVAS EAR/PLS OXIMETRY MLT: CPT

## 2019-04-06 PROCEDURE — 80048 BASIC METABOLIC PNL TOTAL CA: CPT

## 2019-04-06 RX ADMIN — ALBUTEROL SULFATE 2.5 MG: 2.5 SOLUTION RESPIRATORY (INHALATION) at 11:39

## 2019-04-06 RX ADMIN — TRAZODONE HYDROCHLORIDE 50 MG: 50 TABLET ORAL at 21:03

## 2019-04-06 RX ADMIN — Medication 10 ML: at 21:04

## 2019-04-06 RX ADMIN — OXYCODONE HYDROCHLORIDE AND ACETAMINOPHEN 1 TABLET: 5; 325 TABLET ORAL at 15:57

## 2019-04-06 RX ADMIN — ALBUTEROL SULFATE 2.5 MG: 2.5 SOLUTION RESPIRATORY (INHALATION) at 20:37

## 2019-04-06 RX ADMIN — VANCOMYCIN HYDROCHLORIDE 1250 MG: 10 INJECTION, POWDER, LYOPHILIZED, FOR SOLUTION INTRAVENOUS at 22:36

## 2019-04-06 RX ADMIN — OXYCODONE HYDROCHLORIDE AND ACETAMINOPHEN 1 TABLET: 5; 325 TABLET ORAL at 08:24

## 2019-04-06 RX ADMIN — GABAPENTIN 600 MG: 600 TABLET, FILM COATED ORAL at 08:23

## 2019-04-06 RX ADMIN — GABAPENTIN 600 MG: 600 TABLET, FILM COATED ORAL at 15:56

## 2019-04-06 RX ADMIN — ESCITALOPRAM OXALATE 10 MG: 10 TABLET ORAL at 08:24

## 2019-04-06 RX ADMIN — OXYCODONE HYDROCHLORIDE AND ACETAMINOPHEN 1 TABLET: 5; 325 TABLET ORAL at 23:44

## 2019-04-06 RX ADMIN — ALBUTEROL SULFATE 2.5 MG: 2.5 SOLUTION RESPIRATORY (INHALATION) at 07:47

## 2019-04-06 RX ADMIN — HYDRALAZINE HYDROCHLORIDE 25 MG: 25 TABLET, FILM COATED ORAL at 15:57

## 2019-04-06 RX ADMIN — CETIRIZINE HYDROCHLORIDE 10 MG: 10 TABLET, FILM COATED ORAL at 08:24

## 2019-04-06 RX ADMIN — LOSARTAN POTASSIUM 25 MG: 25 TABLET, FILM COATED ORAL at 08:24

## 2019-04-06 RX ADMIN — VANCOMYCIN HYDROCHLORIDE 1250 MG: 10 INJECTION, POWDER, LYOPHILIZED, FOR SOLUTION INTRAVENOUS at 10:20

## 2019-04-06 RX ADMIN — PREDNISONE 2 MG: 1 TABLET ORAL at 10:12

## 2019-04-06 RX ADMIN — PANTOPRAZOLE SODIUM 40 MG: 40 TABLET, DELAYED RELEASE ORAL at 17:46

## 2019-04-06 RX ADMIN — GABAPENTIN 600 MG: 600 TABLET, FILM COATED ORAL at 21:03

## 2019-04-06 RX ADMIN — CEFEPIME HYDROCHLORIDE 2 G: 2 INJECTION, POWDER, FOR SOLUTION INTRAVENOUS at 04:30

## 2019-04-06 RX ADMIN — ENOXAPARIN SODIUM 40 MG: 40 INJECTION SUBCUTANEOUS at 08:24

## 2019-04-06 RX ADMIN — FUROSEMIDE 40 MG: 40 TABLET ORAL at 08:23

## 2019-04-06 RX ADMIN — CEFEPIME HYDROCHLORIDE 2 G: 2 INJECTION, POWDER, FOR SOLUTION INTRAVENOUS at 21:04

## 2019-04-06 RX ADMIN — ACETAMINOPHEN 650 MG: 325 TABLET ORAL at 17:45

## 2019-04-06 RX ADMIN — OXYCODONE HYDROCHLORIDE AND ACETAMINOPHEN 1 TABLET: 5; 325 TABLET ORAL at 00:26

## 2019-04-06 RX ADMIN — ALBUTEROL SULFATE 2.5 MG: 2.5 SOLUTION RESPIRATORY (INHALATION) at 15:45

## 2019-04-06 RX ADMIN — AZITHROMYCIN DIHYDRATE 500 MG: 500 INJECTION, POWDER, LYOPHILIZED, FOR SOLUTION INTRAVENOUS at 00:26

## 2019-04-06 RX ADMIN — Medication 10 ML: at 08:25

## 2019-04-06 RX ADMIN — CHOLESTYRAMINE 4 G: 4 POWDER, FOR SUSPENSION ORAL at 08:24

## 2019-04-06 RX ADMIN — FLUTICASONE PROPIONATE 1 SPRAY: 50 SPRAY, METERED NASAL at 21:03

## 2019-04-06 RX ADMIN — HYDRALAZINE HYDROCHLORIDE 25 MG: 25 TABLET, FILM COATED ORAL at 23:44

## 2019-04-06 RX ADMIN — CEFEPIME HYDROCHLORIDE 2 G: 2 INJECTION, POWDER, FOR SOLUTION INTRAVENOUS at 14:00

## 2019-04-06 ASSESSMENT — PAIN SCALES - GENERAL
PAINLEVEL_OUTOF10: 0
PAINLEVEL_OUTOF10: 8
PAINLEVEL_OUTOF10: 6
PAINLEVEL_OUTOF10: 10
PAINLEVEL_OUTOF10: 0
PAINLEVEL_OUTOF10: 3
PAINLEVEL_OUTOF10: 3
PAINLEVEL_OUTOF10: 0

## 2019-04-06 ASSESSMENT — PAIN DESCRIPTION - PAIN TYPE
TYPE: CHRONIC PAIN

## 2019-04-06 ASSESSMENT — PAIN DESCRIPTION - PROGRESSION
CLINICAL_PROGRESSION: GRADUALLY WORSENING

## 2019-04-06 ASSESSMENT — PAIN DESCRIPTION - ONSET
ONSET: ON-GOING

## 2019-04-06 ASSESSMENT — PAIN - FUNCTIONAL ASSESSMENT
PAIN_FUNCTIONAL_ASSESSMENT: PREVENTS OR INTERFERES SOME ACTIVE ACTIVITIES AND ADLS
PAIN_FUNCTIONAL_ASSESSMENT: ACTIVITIES ARE NOT PREVENTED

## 2019-04-06 ASSESSMENT — PAIN DESCRIPTION - DESCRIPTORS
DESCRIPTORS: ACHING

## 2019-04-06 ASSESSMENT — PAIN DESCRIPTION - FREQUENCY
FREQUENCY: CONTINUOUS

## 2019-04-06 ASSESSMENT — PAIN DESCRIPTION - LOCATION
LOCATION: BACK

## 2019-04-06 ASSESSMENT — PAIN DESCRIPTION - ORIENTATION
ORIENTATION: LOWER

## 2019-04-06 NOTE — PROGRESS NOTES
Clinical Pharmacy Consult Note    Admit date: 4/4/2019    Interval Update:   Underwent bronchoscopy yesterday. Mucus plugs removed and sent for cultures and antigens. Episode of wheezing and tachypnea treated with albuterol. Complaints of lower back pain treated with Percocet. Subjective/Objective:  71 yo presenting with AMS and SOB at nursing home was found to have O2 tubing kinked by EMS. EMS fixed the tubing and patient became A&Ox3 on 3L O2 in ED. Patient was found to be febrile (102.6 in ED) and admitted due to  suspicion for HAP. Of note, patient was discharged 1 week prior to arrival on azithromycin for SOB symptoms. PMH significant for ILD, RA, HFpEF, HTN, and depression. Pharmacy is consulted to dose Vancomycin per     Pertinent Medications:  Vancomycin 1,250mg q12h, Day 2   Cefepime 2 gm q12h, Day 2  Azithromycin 500mg IV q24h, Day 2     PERTINENT LABS:  Recent Labs     04/05/19 0431 04/06/19  0431    135*   K 3.1* 5.0   CL 99 100   CO2 28 26   BUN 8 11   CREATININE 0.6 0.8       CrCl: Estimated to be 33.8mL/min - 56mL/min   (using IBW of 45.5kg and Scr 1 - 0.6)    Recent Labs     04/05/19 0431 04/06/19 0431   WBC 10.1 11.1*   HGB 12.1 12.0   HCT 37.3 38.3   MCV 87.3 89.8    173       Micro:  Date Site Micro Susceptibility   4/4 Blood (x2) NGTD(Prelim)     4/4 urine sent    4/4 Rapid flu (A&B) negative    4/5 Respiratory panel None detected    4/5 BAL-AFB     4/5 BAL 1+WBC  NOS    4/5 Diatherix No result    4/5 HSV No result    4/5 Aspergillus  Galactamanan No result    4/5 Viral No result    4/5 Legionella No result                    Assessment/Plan:  1. Suspected HAP :  vancomycin + cefepime + azithromycin (Day 2)    Vancomycin  · Current dose 1.25 gm q12h  · Trough ordered for 4/6 @ 1000 = 16.6, within therapeutic range. · Will continue same dose and schedule for now. · Renal function will be monitored closely and dosing will be adjusted as appropriate.     Please call with any questions.     Nat Fu., PharmD, [place]  Phone: [phone]  4/6/2019 12:03 PM

## 2019-04-06 NOTE — PROGRESS NOTES
Pharmacy was notified that pt's daughter states that she has Mirabegron however the medication is not in the bottle. Pharmacist stated they will come up to verify the medication prior to the medication being given. Patient and family are aware.

## 2019-04-06 NOTE — PLAN OF CARE
Pt resting in bed, purewick cath in place. Call light and bedside items within reach. Bed alarm on. Family at bedside. Some mild confusion/loopyness post bronch, calm, relaxed. On 6 L O2 bleed in through BiPap. Will update as needed.

## 2019-04-06 NOTE — PROGRESS NOTES
[facility] Daily Progress Note      Admit Date:  4/4/2019    Subjective:  Ms. Carley Harden was seen and examined. F/U HB/PNA.   No complaints this am.  No sob/chest pain    Objective:   BP (!) 109/48   Pulse 97   Temp 99 °F (37.2 °C) (Oral)   Resp 18   Ht 4' 11.84\" (1.52 m)   Wt 228 lb 3.2 oz (103.5 kg)   SpO2 90%   BMI 44.80 kg/m²       Intake/Output Summary (Last 24 hours) at 4/6/2019 0742  Last data filed at 4/6/2019 0430  Gross per 24 hour   Intake 1020 ml   Output 1600 ml   Net -580 ml       TELEMETRY: Sinus     Physical Exam:  General:  Awake, alert, NAD  Skin:  Warm and dry  Neck:  JVP difficult to assess  Chest:  Decreased BS, respiration normal  Cardiovascular:  RRR S1S2  Abdomen:  Soft nontender  Extremities:  no edema    Medications:    cetirizine  10 mg Oral QAM    escitalopram  10 mg Oral Daily    cholestyramine light  4 g Oral Daily    fluticasone  1 spray Each Nare Nightly    gabapentin  600 mg Oral TID    losartan  25 mg Oral Daily    Mirabegron ER  1 tablet Oral QPM    oxyCODONE-acetaminophen  1 tablet Oral Q8H    pantoprazole  40 mg Oral QPM    sodium chloride flush  10 mL Intravenous 2 times per day    enoxaparin  40 mg Subcutaneous Daily    albuterol  2.5 mg Nebulization Q4H WA    azithromycin  500 mg Intravenous Q24H    cefepime  2 g Intravenous Q8H    furosemide  40 mg Oral Daily    predniSONE  2 mg Oral Daily    traZODone  50 mg Oral Nightly    vancomycin  1,250 mg Intravenous Q12H    hydrALAZINE  25 mg Oral 3 times per day       albuterol, sodium chloride flush, magnesium hydroxide, ondansetron, acetaminophen, calcium carbonate, sodium chloride (Inhalant)    Lab Data:  CBC:   Recent Labs     04/04/19  1940 04/05/19 0431 04/06/19 0431   WBC 11.7* 10.1 11.1*   HGB 12.7 12.1 12.0   HCT 39.3 37.3 38.3   MCV 86.5 87.3 89.8    176 173     BMP:   Recent Labs     04/04/19  2134 04/05/19 0431 04/06/19 0431   * 139 135*   K 3.8 3.1* 5.0   CL 97* 99 100   CO2 27 28 26   BUN 11 8 11   CREATININE 0.7 0.6 0.8     LIVER PROFILE: No results for input(s): AST, ALT, LIPASE, BILIDIR, BILITOT, ALKPHOS in the last 72 hours. Invalid input(s): AMYLASE,  ALB  PT/INR:   Recent Labs     04/04/19 1940   PROTIME 12.9   INR 1.13     APTT: No results for input(s): APTT in the last 72 hours. BNP:  No results for input(s): BNP in the last 72 hours.   IMAGING:     Assessment:  Patient Active Problem List    Diagnosis Date Noted    HB (heart block) 04/06/2019    Pneumonia due to organism 04/04/2019    Chronic diastolic CHF (congestive heart failure) ([facility] 03/28/2019    Hypoxia 03/25/2019    Acute diverticulitis 09/02/2018    Chronic diarrhea 08/11/2018    Hypokalemia 08/11/2018    Hypomagnesemia 08/11/2018    Weight loss 08/11/2018    Vertigo 08/11/2018    Nausea with vomiting 08/10/2018    Diarrhea 07/12/2018    Recurrent cold sores 04/24/2018    Primary osteoarthritis of left shoulder 04/23/2018    S/P left rotator cuff repair 11/21/2017    Osteoarthritis of lumbar spine 09/07/2017    Rheumatoid arthritis of multiple sites with negative rheumatoid factor ([facility] 05/23/2017    Major depressive disorder with single episode, in partial remission ([facility] 04/13/2017    Status post total right knee replacement using cement on 2/20/2017 02/20/2017    Interstitial lung disease ([address] 01/10/2017    Chronic nonseasonal allergic rhinitis due to pollen 01/09/2017    Mild intermittent asthma without complication 57/29/5527    Chronic pain of right knee 12/12/2016    Morbid obesity with BMI of 40.0-44.9, adult ([address] 12/12/2016    Immunosuppression ([facility] 08/29/2016    Status post reverse total replacement of right shoulder 05/06/2016    Mixed hyperlipidemia 03/10/2016    Essential hypertension 08/20/2015    Gastroesophageal reflux disease without esophagitis 08/20/2015    Glenohumeral arthritis right 04/10/2015    Arthritis of right acromioclavicular joint 02/13/2015    Chronic low back pain 01/13/2015    Spinal stenosis 01/13/2015    Asthma 05/18/2014    Postmenopausal osteoporosis 02/13/2014    Complete tear of left rotator cuff 02/10/2014    Biceps tendinitis on right 02/10/2014    Granulomatous lung disease ([facility] 08/14/2012    Mitral valve regurgitation 02/16/2011    Candidiasis of skin and nails 05/04/2010    Dermatophytosis of nail 05/04/2010    Depressive disorder, not elsewhere classified 05/04/2010    Obstructive sleep apnea syndrome 05/04/2010    Diverticulosis of large intestine 05/04/2010    Allergic rhinitis 05/04/2010    Osteoporosis 05/04/2010    Rheumatoid arthritis ([facility] 05/04/2010    Diaphragmatic hernia 05/04/2010    Disorder resulting from impaired renal function 01/03/2008       Plan:  1. Wore CPAP last PM. No significant bradycardia/HB. Continue CPAP . Continue  ATBX for PNA. Monitor.     Core Measures:  · Discharge instructions:   · LVEF documented:   · ACEI for LV dysfunction:   · Smoking Cessation:    Ami Fisher MD 4/6/2019 7:42 AM

## 2019-04-06 NOTE — PROGRESS NOTES
Pulmonary Followup Note    CC: acute on chronic hypoxia, pneumonia, fever  Subjective:  Bronchoscopy yesterday with lavage of bilateral upper lobes, still with mostly dry cough and intermittent fevers when her percocet wears off. ROS:  Denies headache, nausea or chest pain. 24HR INTAKE/OUTPUT:      Intake/Output Summary (Last 24 hours) at 2019 1444  Last data filed at 2019 0930  Gross per 24 hour   Intake 1140 ml   Output 1550 ml   Net -410 ml        cetirizine  10 mg Oral QAM    escitalopram  10 mg Oral Daily    cholestyramine light  4 g Oral Daily    fluticasone  1 spray Each Nare Nightly    gabapentin  600 mg Oral TID    losartan  25 mg Oral Daily    Mirabegron ER  1 tablet Oral QPM    oxyCODONE-acetaminophen  1 tablet Oral Q8H    pantoprazole  40 mg Oral QPM    sodium chloride flush  10 mL Intravenous 2 times per day    enoxaparin  40 mg Subcutaneous Daily    albuterol  2.5 mg Nebulization Q4H WA    azithromycin  500 mg Intravenous Q24H    cefepime  2 g Intravenous Q8H    furosemide  40 mg Oral Daily    predniSONE  2 mg Oral Daily    traZODone  50 mg Oral Nightly    vancomycin  1,250 mg Intravenous Q12H    hydrALAZINE  25 mg Oral 3 times per day           PHYSICAL EXAMINATION:  BP (!) 122/104   Pulse 112   Temp 99.3 °F (37.4 °C) (Oral)   Resp 18   Ht 4' 11.84\" (1.52 m)   Wt 228 lb 3.2 oz (103.5 kg)   SpO2 (!) 89%   BMI 44.80 kg/m²   CURRENT PULSE OXIMETRY:  SpO2: (!) 89 %  24HR PULSE OXIMETRY RANGE:  SpO2  Av %  Min: 86 %  Max: 98 % on 5L      Gen: mil distress. Speaking in full sentences without accessory muscle use. Warm to touch  HEENT: PERRL, EOMI, OP nl  Lung:Diffuse crackles at the bases and mid lung zones. CV: RRR without M/R/R  Abd: +BS, soft, NT/ND  Ext: No edema.     DATA  CBC:   Recent Labs     19  19419  0431 19  0431   WBC 11.7* 10.1 11.1*   HGB 12.7 12.1 12.0   HCT 39.3 37.3 38.3   MCV 86.5 87.3 89.8    176 173     BMP:   Recent Labs     04/04/19  2134 04/05/19  0431 04/06/19  0431   * 139 135*   K 3.8 3.1* 5.0   CL 97* 99 100   CO2 27 28 26   BUN 11 8 11   CREATININE 0.7 0.6 0.8     Recent Labs     04/05/19  0010   PHART 7.406   IMD3SWL 50.2*   PO2ART 77.5     LIVER PROFILE: No results for input(s): AST, ALT, LIPASE, BILIDIR, BILITOT, ALKPHOS in the last 72 hours. Invalid input(s): AMYLASE,  ALB    CXR REVIEWED BY ME AND SHOWED:  CT CHEST WO CONTRAST   Final Result       The central airways are patent. Multifocal ill-defined patchy upper zone and left lower lobe predominant    peripheral nonspecific airspace opacities now seen since 2017 and may be    inflammatory, infectious. Clinically correlate. Background of chronic interstitial changes again noted. XR CHEST PORTABLE   Final Result      Mild bilateral interstitial and airspace opacities, not significantly changed compared to prior, representing edema or infection. ASSESSMENT/PLAN:  This is a 70 y.o. female with ILD and acute on chronic hypoxemic respiratory failure with presumed pneumonia. Bronchoscopy yesterday with lavage of bilateral upper lobes. Awaiting results. Continues to be tachycardic and febrile. Infectious differential is broad given her immune modulation 2/2 RA. Strep and legionella antigens are negative. Cultures and diatherix are pending. Continue current management while awaiting results  Family at bedside and updated.       Nu Farias MD

## 2019-04-06 NOTE — PROGRESS NOTES
38.3    176 173       BMP:   Recent Labs     04/04/19  2134 04/05/19  0431 04/06/19  0431   * 139 135*   K 3.8 3.1* 5.0   CL 97* 99 100   CO2 27 28 26   BUN 11 8 11   CREATININE 0.7 0.6 0.8   GLUCOSE 116* 137* 101*     LFT's: No results for input(s): AST, ALT, ALB, BILITOT, ALKPHOS in the last 72 hours. Troponin:   Recent Labs     04/05/19  0934   TROPONINI <0.01     BNP: No results for input(s): BNP in the last 72 hours. ABGs:   Recent Labs     04/05/19  0010   PHART 7.406   [id number] 50.2*   PO2ART 77.5     INR:   Recent Labs     04/04/19 1940   INR 1.13     Lipids: No results for input(s): CHOL, HDL in the last 72 hours. Invalid input(s): LDLCALCU    U/A:  Recent Labs     04/04/19 1941   COLORU Yellow   PHUR 7.0   WBCUA 0-2   RBCUA 0-2   CLARITYU Clear   SPECGRAV <=1.005   LEUKOCYTESUR SMALL*   UROBILINOGEN 0.2   BILIRUBINUR Negative   BLOODU Negative   GLUCOSEU Negative        Micro: bronch cx pending, Bcx NGTD    ASSESSMENT AND PLAN:   Fercho Cisneros a 70 y. o. female with a past medical history of interstitial lung disease (on chronic steroids), rheumatoid arthritis (on Humira and leflunamide), HFpEF admitted for SOB and confusion, suspect HCAP as cause.      Sepsis 2/2 health-care associated pneumonia  On admission Tmax 102.6, RR 22, CT chest: multifocal patchy upper and LL airspace opacities, suspect infectious  -Pharmacy to dose vanc  -Cefepime 2 grams q8h   -Azithromycin 500 mg daily   -Blood cultures x 2 NGTD  -Respiratory pathogen panel negative, urinary antigens pending  -supplemental O2 - currently 4-5L, goal SpO2 >88%  -Albuterol nebulizers   -possibly fungal pneumonia given patient is on immunocompromising agents  -bronch done 4/5 by pulm, f/u BAL cultures      2nd degree heart block - Mobitz type two  -CPAP nightly for DEWAYNE  - Paroxysmal AV block - asymptomatic, nocturnal, in the setting of not using CPAP  - no recommendation for pacemaker as of now, unless block persists despite respiration improvement  - cardiology following     HFpEF -Last echocardiogram (3/25/2019) showing grade II diastolic dysfunction. Patient does not appear to be volume overloaded at this time.  JVD appreciated on physical exam on admission.   -Furosemide 40 mg daily      Interstitial Lung Disease -Patient has a long history of ILD (suspect 2/2 RA and/or MTX-induced)   -followed by pulmonology (Dr. Pawan Green) outpatient  -cont prednisone 2 mg PO qD     Rheumatoid arthritis   -Holding home immunosuppressants in the setting of suspected PNA   - percocet for chronic back/joint pain      Hypertension   -Continue home losartan 25 mg nighty   -holding amlodipine in setting of heart block   -if patient's BP not controlled, add hydralazine 25 mg PO TID     Depression   -lexapro 10 mg PO qD  -trazodone 50 mg PO qHs     DVT Prophylaxis: Lovenox   Diet: General diet  Code Status: Limited (no x 4)      PT/OT Eval Status: Consulted      Dispo - [facility]      Will discuss with attending physician,  Smita Morocho MD   -----------------------------  Dirk Whitten M.D. PGY-2, 12:19 PM

## 2019-04-06 NOTE — OP NOTE
[address]               [address]                                OPERATIVE REPORT    PATIENT NAME: Kelly Short                    :        1940  MED REC NO:   [id number]                          ROOM:       4457  ACCOUNT NO:   [de-identified]                           ADMIT DATE: 2019  PROVIDER:     Benigno Cockayne, MD      DATE OF PROCEDURE:  2019    OPERATING SURGEON:  Benigno Cockayne, MD    PREOPERATIVE DIAGNOSIS:  Diffuse pulmonary infiltrates and fever,  suspect opportunistic pneumonia in an immunocompromised host.    POSTOPERATIVE DIAGNOSIS:  Diffuse pulmonary infiltrates and fever,  suspect opportunistic pneumonia in an immunocompromised host.    PROCEDURE PERFORMED:  Bronchoscopy with bronchoalveolar lavage from the  left upper lobe and right upper lobe. ANESTHESIA:  Topical application of lidocaine 2% to the nasal mucosa,  larynx and lower respiratory tract airways. SEDATION:  Versed 2 mg and fentanyl 100 mcg IV push. PROCEDURE IN DETAIL:  The patient self identified during the timeout  process. Conscious sedation was then administered. I was present at  the onset of administration of sedation at 53-69-10-18, and monitored the  patient on one-to-one basis until conclusion of the procedure at 1601,  total of 17 minutes. CLASSIFICATION:  ASA score II, Mallampati score I. PROCEDURE IN DETAIL:  The oxygen was supplied with a high-flow mask. The fiberoptic bronchoscope was passed via the right transnasal  approach. There were increased mucoid secretions in the hypopharynx,  suctioned readily. There was moderate inflammation of mucosa in the  hypopharynx and larynx. Vocal cords appeared normal and moved  symmetrically. The trachea and main nahomy were normal in appearance  other than acute inflammation. Endobronchial anatomy was normal to the  subsegmental level bilaterally.   There were no mucosal lesions. The  bronchial mucosa was moderately inflamed throughout most proximal  airways. There were increased mucoid secretions, cleared easily with  suctioning. The fiberoptic bronchoscope was first wedged into the anterior segment  of the right upper lobe. The scope could not be passed into a  subsegment, wedged only into the anterior segment. Bronchoalveolar  lavage was performed with warm sterile saline, 3 aliquots of 60 mL. The  amount of fluid returned was approximately 40% of that instilled. Fluid  was reddish orange in color. The bronchoscope was then wedged into the anterior segment of the left  upper lobe. As on the right, the bronchoscope could not be passed  beyond the orifice of the anterior segment. Bronchoalveolar lavage was  again performed with 3 aliquots of 60 mL of warm sterile saline. Fluid  returned was similar to that seen on the right. There were some  additional mucus plugs seen in early portion of the lavage. The lavage  material will be sent for multiple microbiologic stains and cultures,  including PCR panel and cytology. Secretions were cleared from the airways as bronchoscope was withdrawn. The patient tolerated the procedure well and suffered no apparent  complications. She was returned to recovery area in good condition. Shalonda Linda MD    D: 04/05/2019 16:17:00       T: 04/05/2019 16:18:59     [id number]  Job#: [id number]     Doc#: [id number]    CC:   Yadira Walker

## 2019-04-07 LAB
ANION GAP SERPL CALCULATED.3IONS-SCNC: 10 MMOL/L (ref 3–16)
BASOPHILS ABSOLUTE: 0 K/UL (ref 0–0.2)
BASOPHILS RELATIVE PERCENT: 0.4 %
BUN BLDV-MCNC: 11 MG/DL (ref 7–20)
CALCIUM SERPL-MCNC: 8.8 MG/DL (ref 8.3–10.6)
CHLORIDE BLD-SCNC: 100 MMOL/L (ref 99–110)
CO2: 27 MMOL/L (ref 21–32)
CREAT SERPL-MCNC: 0.7 MG/DL (ref 0.6–1.2)
CULTURE, RESPIRATORY: NORMAL
CULTURE, RESPIRATORY: NORMAL
EOSINOPHILS ABSOLUTE: 0.4 K/UL (ref 0–0.6)
EOSINOPHILS RELATIVE PERCENT: 4.4 %
GFR AFRICAN AMERICAN: >60
GFR NON-AFRICAN AMERICAN: >60
GLUCOSE BLD-MCNC: 111 MG/DL (ref 70–99)
GRAM STAIN RESULT: NORMAL
GRAM STAIN RESULT: NORMAL
HCT VFR BLD CALC: 36.7 % (ref 36–48)
HEMOGLOBIN: 12.1 G/DL (ref 12–16)
HISTOPLASMA ANTIGEN URINE INTERP: NOT DETECTED
HISTOPLASMA ANTIGEN URINE: NOT DETECTED NG/ML
LYMPHOCYTES ABSOLUTE: 1.5 K/UL (ref 1–5.1)
LYMPHOCYTES RELATIVE PERCENT: 18.1 %
MCH RBC QN AUTO: 28.8 PG (ref 26–34)
MCHC RBC AUTO-ENTMCNC: 33 G/DL (ref 31–36)
MCV RBC AUTO: 87.3 FL (ref 80–100)
MONOCYTES ABSOLUTE: 0.9 K/UL (ref 0–1.3)
MONOCYTES RELATIVE PERCENT: 10.8 %
NEUTROPHILS ABSOLUTE: 5.5 K/UL (ref 1.7–7.7)
NEUTROPHILS RELATIVE PERCENT: 66.3 %
PDW BLD-RTO: 15.2 % (ref 12.4–15.4)
PLATELET # BLD: 195 K/UL (ref 135–450)
PMV BLD AUTO: 8.2 FL (ref 5–10.5)
POTASSIUM REFLEX MAGNESIUM: 4.1 MMOL/L (ref 3.5–5.1)
PROCALCITONIN: 0.27 NG/ML (ref 0–0.15)
RBC # BLD: 4.2 M/UL (ref 4–5.2)
SODIUM BLD-SCNC: 137 MMOL/L (ref 136–145)
WBC # BLD: 8.3 K/UL (ref 4–11)

## 2019-04-07 PROCEDURE — 6360000002 HC RX W HCPCS: Performed by: STUDENT IN AN ORGANIZED HEALTH CARE EDUCATION/TRAINING PROGRAM

## 2019-04-07 PROCEDURE — 87449 NOS EACH ORGANISM AG IA: CPT

## 2019-04-07 PROCEDURE — 99233 SBSQ HOSP IP/OBS HIGH 50: CPT | Performed by: INTERNAL MEDICINE

## 2019-04-07 PROCEDURE — 36415 COLL VENOUS BLD VENIPUNCTURE: CPT

## 2019-04-07 PROCEDURE — 94660 CPAP INITIATION&MGMT: CPT

## 2019-04-07 PROCEDURE — 6370000000 HC RX 637 (ALT 250 FOR IP): Performed by: STUDENT IN AN ORGANIZED HEALTH CARE EDUCATION/TRAINING PROGRAM

## 2019-04-07 PROCEDURE — 84145 PROCALCITONIN (PCT): CPT

## 2019-04-07 PROCEDURE — 87305 ASPERGILLUS AG IA: CPT

## 2019-04-07 PROCEDURE — 2060000000 HC ICU INTERMEDIATE R&B

## 2019-04-07 PROCEDURE — 80048 BASIC METABOLIC PNL TOTAL CA: CPT

## 2019-04-07 PROCEDURE — 2700000000 HC OXYGEN THERAPY PER DAY

## 2019-04-07 PROCEDURE — 94640 AIRWAY INHALATION TREATMENT: CPT

## 2019-04-07 PROCEDURE — 2580000003 HC RX 258: Performed by: STUDENT IN AN ORGANIZED HEALTH CARE EDUCATION/TRAINING PROGRAM

## 2019-04-07 PROCEDURE — 94761 N-INVAS EAR/PLS OXIMETRY MLT: CPT

## 2019-04-07 PROCEDURE — 85025 COMPLETE CBC W/AUTO DIFF WBC: CPT

## 2019-04-07 RX ADMIN — ACETAMINOPHEN 650 MG: 325 TABLET ORAL at 10:11

## 2019-04-07 RX ADMIN — OXYCODONE HYDROCHLORIDE AND ACETAMINOPHEN 1 TABLET: 5; 325 TABLET ORAL at 23:16

## 2019-04-07 RX ADMIN — PANTOPRAZOLE SODIUM 40 MG: 40 TABLET, DELAYED RELEASE ORAL at 18:27

## 2019-04-07 RX ADMIN — ALBUTEROL SULFATE 2.5 MG: 2.5 SOLUTION RESPIRATORY (INHALATION) at 21:48

## 2019-04-07 RX ADMIN — CEFEPIME HYDROCHLORIDE 2 G: 2 INJECTION, POWDER, FOR SOLUTION INTRAVENOUS at 04:56

## 2019-04-07 RX ADMIN — ALBUTEROL SULFATE 2.5 MG: 2.5 SOLUTION RESPIRATORY (INHALATION) at 15:41

## 2019-04-07 RX ADMIN — ENOXAPARIN SODIUM 40 MG: 40 INJECTION SUBCUTANEOUS at 08:14

## 2019-04-07 RX ADMIN — CETIRIZINE HYDROCHLORIDE 10 MG: 10 TABLET, FILM COATED ORAL at 08:14

## 2019-04-07 RX ADMIN — VANCOMYCIN HYDROCHLORIDE 1250 MG: 10 INJECTION, POWDER, LYOPHILIZED, FOR SOLUTION INTRAVENOUS at 23:16

## 2019-04-07 RX ADMIN — GABAPENTIN 600 MG: 600 TABLET, FILM COATED ORAL at 08:13

## 2019-04-07 RX ADMIN — ESCITALOPRAM OXALATE 10 MG: 10 TABLET ORAL at 08:13

## 2019-04-07 RX ADMIN — ALBUTEROL SULFATE 2.5 MG: 2.5 SOLUTION RESPIRATORY (INHALATION) at 11:10

## 2019-04-07 RX ADMIN — LOSARTAN POTASSIUM 25 MG: 25 TABLET, FILM COATED ORAL at 08:14

## 2019-04-07 RX ADMIN — HYDRALAZINE HYDROCHLORIDE 25 MG: 25 TABLET, FILM COATED ORAL at 23:15

## 2019-04-07 RX ADMIN — OXYCODONE HYDROCHLORIDE AND ACETAMINOPHEN 1 TABLET: 5; 325 TABLET ORAL at 16:39

## 2019-04-07 RX ADMIN — HYDRALAZINE HYDROCHLORIDE 25 MG: 25 TABLET, FILM COATED ORAL at 13:25

## 2019-04-07 RX ADMIN — CEFEPIME HYDROCHLORIDE 2 G: 2 INJECTION, POWDER, FOR SOLUTION INTRAVENOUS at 20:14

## 2019-04-07 RX ADMIN — ALBUTEROL SULFATE 2.5 MG: 2.5 SOLUTION RESPIRATORY (INHALATION) at 07:39

## 2019-04-07 RX ADMIN — GABAPENTIN 600 MG: 600 TABLET, FILM COATED ORAL at 20:14

## 2019-04-07 RX ADMIN — GABAPENTIN 600 MG: 600 TABLET, FILM COATED ORAL at 13:25

## 2019-04-07 RX ADMIN — OXYCODONE HYDROCHLORIDE AND ACETAMINOPHEN 1 TABLET: 5; 325 TABLET ORAL at 08:14

## 2019-04-07 RX ADMIN — Medication 10 ML: at 08:14

## 2019-04-07 RX ADMIN — AZITHROMYCIN DIHYDRATE 500 MG: 500 INJECTION, POWDER, LYOPHILIZED, FOR SOLUTION INTRAVENOUS at 01:04

## 2019-04-07 RX ADMIN — TRAZODONE HYDROCHLORIDE 50 MG: 50 TABLET ORAL at 23:15

## 2019-04-07 RX ADMIN — CEFEPIME HYDROCHLORIDE 2 G: 2 INJECTION, POWDER, FOR SOLUTION INTRAVENOUS at 12:22

## 2019-04-07 RX ADMIN — Medication 10 ML: at 23:16

## 2019-04-07 RX ADMIN — CHOLESTYRAMINE 4 G: 4 POWDER, FOR SUSPENSION ORAL at 08:13

## 2019-04-07 RX ADMIN — PREDNISONE 2 MG: 1 TABLET ORAL at 09:15

## 2019-04-07 RX ADMIN — VANCOMYCIN HYDROCHLORIDE 1250 MG: 10 INJECTION, POWDER, LYOPHILIZED, FOR SOLUTION INTRAVENOUS at 10:18

## 2019-04-07 RX ADMIN — ACETAMINOPHEN 650 MG: 325 TABLET ORAL at 20:15

## 2019-04-07 RX ADMIN — FUROSEMIDE 40 MG: 40 TABLET ORAL at 08:15

## 2019-04-07 ASSESSMENT — PAIN DESCRIPTION - DESCRIPTORS: DESCRIPTORS: ACHING

## 2019-04-07 ASSESSMENT — PAIN DESCRIPTION - PAIN TYPE: TYPE: CHRONIC PAIN

## 2019-04-07 ASSESSMENT — PAIN SCALES - GENERAL
PAINLEVEL_OUTOF10: 0
PAINLEVEL_OUTOF10: 3
PAINLEVEL_OUTOF10: 8
PAINLEVEL_OUTOF10: 0
PAINLEVEL_OUTOF10: 6
PAINLEVEL_OUTOF10: 0
PAINLEVEL_OUTOF10: 8
PAINLEVEL_OUTOF10: 8
PAINLEVEL_OUTOF10: 0

## 2019-04-07 ASSESSMENT — PAIN DESCRIPTION - ONSET: ONSET: ON-GOING

## 2019-04-07 ASSESSMENT — PAIN DESCRIPTION - ORIENTATION: ORIENTATION: LOWER

## 2019-04-07 ASSESSMENT — PAIN DESCRIPTION - FREQUENCY: FREQUENCY: CONTINUOUS

## 2019-04-07 ASSESSMENT — PAIN DESCRIPTION - LOCATION: LOCATION: BACK

## 2019-04-07 ASSESSMENT — PAIN DESCRIPTION - PROGRESSION: CLINICAL_PROGRESSION: GRADUALLY WORSENING

## 2019-04-07 ASSESSMENT — PAIN - FUNCTIONAL ASSESSMENT: PAIN_FUNCTIONAL_ASSESSMENT: PREVENTS OR INTERFERES SOME ACTIVE ACTIVITIES AND ADLS

## 2019-04-07 NOTE — PROGRESS NOTES
Progress Note  PGY-2    Hospital Day: 4                                                           Code:Limited  Admit Date: 4/4/2019                                             PCP: Pasquale Barrios                                SUBJECTIVE:     Chief Complaint   Patient presents with    Fever     Pt had recent hospital admission with a new supplemental o2 requirement since last week. Pt has diarrhea and fever today. Pt has nonproductive cough. Per family o2 tubing at home was kinked for unknown length of time    Shortness of Breath    Cough    Altered Mental Status     Per family, pt was texting nonsense       Interval Hx:     No acute events overnight. Febrile again overnight, up to 101.7, on vanc, cefepime, azithro. Bronch preliminary results w no fungal elements, no growth on resp cx    This morning, feeling better than yesterday. Denies SOB at rest, n/v/d, costipation. Poor appetite. Had BM yesterday. Discussed w family. MEDICATIONS:   Scheduled Meds:   cetirizine  10 mg Oral QAM    escitalopram  10 mg Oral Daily    cholestyramine light  4 g Oral Daily    fluticasone  1 spray Each Nare Nightly    gabapentin  600 mg Oral TID    losartan  25 mg Oral Daily    Mirabegron ER  1 tablet Oral QPM    oxyCODONE-acetaminophen  1 tablet Oral Q8H    pantoprazole  40 mg Oral QPM    sodium chloride flush  10 mL Intravenous 2 times per day    enoxaparin  40 mg Subcutaneous Daily    albuterol  2.5 mg Nebulization Q4H WA    azithromycin  500 mg Intravenous Q24H    cefepime  2 g Intravenous Q8H    furosemide  40 mg Oral Daily    predniSONE  2 mg Oral Daily    traZODone  50 mg Oral Nightly    vancomycin  1,250 mg Intravenous Q12H    hydrALAZINE  25 mg Oral 3 times per day      Continuous Infusions:  PRN Meds:albuterol, sodium chloride flush, magnesium hydroxide, ondansetron, acetaminophen, calcium carbonate, sodium chloride (Inhalant)    Allergies:    Allergies   Allergen Reactions    Sulfa Antibiotics Hives    Lamotrigine Other (See Comments)     Blurred vision    Lomotil  [Diphenoxylate-Atropine] Other (See Comments)     Changes in vision    Sulfacetamide Sodium Hives     Other reaction(s): Other (See Comments)       PHYSICAL EXAM:       Vitals: BP (!) 144/69   Pulse 112   Temp 100 °F (37.8 °C) (Oral)   Resp 18   Ht 4' 11.84\" (1.52 m)   Wt 222 lb 6.4 oz (100.9 kg)   SpO2 96%   BMI 43.66 kg/m²     I/O:      Intake/Output Summary (Last 24 hours) at 4/7/2019 1046  Last data filed at 4/7/2019 0808  Gross per 24 hour   Intake 860 ml   Output 950 ml   Net -90 ml     I/O this shift:  In: 240 [P.O.:240]  Out: -   I/O last 3 completed shifts: In: 740 [P.O.:240; IV Piggyback:500]  Out: 1400 [Urine:1400]    Physical Examination:   General appearance:  Patient alert, NAD, up in chair  HEENT:  Normal cephalic, atraumatic without obviousdeformity. Pupils equal, round, and reactive to light.  Extra ocular muscles intact. Conjunctivae/corneas clear. Neck: Supple, with full range of motion. No jugular venous distention. Trachea midline. Respiratory: Diffuse inspiratory and expiratory wheezes present in all lung fields bilaterally. Crackles present in bases bilaterally. Breathing UNlabored. On 5 L O2 NC  Cardiovascular: Tachycardia, regular rhythm. No pitting edema  Abdomen: Soft, non-tender,non-distended with normal bowel sounds. Musculoskeletal:  No clubbing or cyanosis present. BL ulnar deviation, selin notes, swan neck defomities  Skin: Skin color, texture, turgor normal.  No rashes or lesions. No redness or erythema  Neurologic:  Neurovascularly intact without any focal sensory/motor deficits.  Cranial nerves: II-XII intact, grosslynon-focal.  Psychiatric:  AOx3, thought content appropriate, normal insight  Capillary Refill: Brisk,< 3 seconds   Peripheral Pulses: +2 palpable, equal bilaterally   · Lines: PIV    DATA:       Labs:  CBC:   Recent Labs     04/05/19  0431 04/06/19  0431 04/07/19  0532 WBC 10.1 11.1* 8.3   HGB 12.1 12.0 12.1   HCT 37.3 38.3 36.7    173 195       BMP:   Recent Labs     04/05/19  0431 04/06/19  0431 04/07/19  0532    135* 137   K 3.1* 5.0 4.1   CL 99 100 100   CO2 28 26 27   BUN 8 11 11   CREATININE 0.6 0.8 0.7   GLUCOSE 137* 101* 111*     LFT's: No results for input(s): AST, ALT, ALB, BILITOT, ALKPHOS in the last 72 hours. Troponin:   Recent Labs     04/05/19  0934   TROPONINI <0.01     BNP: No results for input(s): BNP in the last 72 hours. ABGs:   Recent Labs     04/05/19  0010   PHART 7.406   FJE9MUS 50.2*   PO2ART 77.5     INR:   Recent Labs     04/04/19 1940   INR 1.13     Lipids: No results for input(s): CHOL, HDL in the last 72 hours. Invalid input(s): LDLCALCU    U/A:  Recent Labs     04/04/19 1941   COLORU Yellow   PHUR 7.0   WBCUA 0-2   RBCUA 0-2   CLARITYU Clear   SPECGRAV <=1.005   LEUKOCYTESUR SMALL*   UROBILINOGEN 0.2   BILIRUBINUR Negative   BLOODU Negative   GLUCOSEU Negative        Micro: bronch cx pending, Bcx NGTD    ASSESSMENT AND PLAN:   Iman Amin a 70 y. o. female with a past medical history of interstitial lung disease (on chronic steroids), rheumatoid arthritis (on Humira and leflunamide), HFpEF admitted for SOB and confusion, suspect HCAP as cause.      Fever and hypoxia 2/2 health-care associated pneumonia  On admission Tmax 102.6, RR 22, CT chest: multifocal patchy upper and LL airspace opacities, suspect infectious  -Pharmacy to dose vanc  -Cefepime 2 grams q8h   -Azithromycin 500 mg daily   -Blood cultures x 2 NGTD  -Respiratory pathogen panel negative, urinary antigens pending  -supplemental O2 - currently 5L, goal SpO2 >88%  -Albuterol nebulizers (likely cause of sinus tach)  -possibly fungal pneumonia given patient is on immunocompromising agents  -bronch done 4/5 by pulm, f/u BAL cultures, no fungal components, AFB smear neg  - remains febrile despite abx, but O2 req stable, possibly 2/2 pulm inflammation seen on bronch, will discuss possibility of fungal infection w pulm  - will consider CTPA if hypoxia does not improve, low suspicion for PE at this point given history    Sepsis 2/2 PNA, POA - resolved     2nd degree heart block - Mobitz type two  - heart block seems to be resolved, has been in NSR, often sinus tach  - CPAP nightly for DEWAYNE  - Paroxysmal AV block - asymptomatic, nocturnal, in the setting of not using CPAP  - no recommendation for pacemaker as of now, unless block persists despite respiration improvement  - cardiology following     HFpEF -Last echocardiogram (3/25/2019) showing grade II diastolic dysfunction. Patient does not appear to be volume overloaded at this time.  JVD appreciated on physical exam on admission.   -Furosemide 40 mg daily      Interstitial Lung Disease -Patient has a long history of ILD (suspect 2/2 RA and/or MTX-induced)   -followed by pulmonology (Dr. Vicki Gr) outpatient  -cont prednisone 2 mg PO qD     Rheumatoid arthritis   -Holding home immunosuppressants in the setting of suspected PNA   - percocet for chronic back/joint pain      Hypertension   -Continue home losartan 25 mg nighty   -holding amlodipine in setting of heart block   -if patient's BP not controlled, add hydralazine 25 mg PO TID     Depression   -lexapro 10 mg PO qD  -trazodone 50 mg PO qHs     DVT Prophylaxis: Lovenox   Diet: General diet  Code Status: Limited (no x 4)      PT/OT Eval Status: Consulted, following     Dispo - [facility]      Will discuss with attending physician,  Chantal Lyn MD   -----------------------------  Nohemy King M.D. PGY-2, 10:46 AM

## 2019-04-07 NOTE — PROGRESS NOTES
BUN 8 11 11   CREATININE 0.6 0.8 0.7     LIVER PROFILE: No results for input(s): AST, ALT, LIPASE, BILIDIR, BILITOT, ALKPHOS in the last 72 hours. Invalid input(s): AMYLASE,  ALB  PT/INR:   Recent Labs     04/04/19  1940   PROTIME 12.9   INR 1.13     APTT: No results for input(s): APTT in the last 72 hours. BNP:  No results for input(s): BNP in the last 72 hours.   IMAGING:     Assessment:  Patient Active Problem List    Diagnosis Date Noted    HB (heart block) 04/06/2019    Pneumonia due to organism 04/04/2019    Chronic diastolic CHF (congestive heart failure) ([address] 03/28/2019    Hypoxia 03/25/2019    Acute diverticulitis 09/02/2018    Chronic diarrhea 08/11/2018    Hypokalemia 08/11/2018    Hypomagnesemia 08/11/2018    Weight loss 08/11/2018    Vertigo 08/11/2018    Nausea with vomiting 08/10/2018    Diarrhea 07/12/2018    Recurrent cold sores 04/24/2018    Primary osteoarthritis of left shoulder 04/23/2018    S/P left rotator cuff repair 11/21/2017    Osteoarthritis of lumbar spine 09/07/2017    Rheumatoid arthritis of multiple sites with negative rheumatoid factor ([address] 05/23/2017    Major depressive disorder with single episode, in partial remission ([address] 04/13/2017    Status post total right knee replacement using cement on 2/20/2017 02/20/2017    Interstitial lung disease ([address] 01/10/2017    Chronic nonseasonal allergic rhinitis due to pollen 01/09/2017    Mild intermittent asthma without complication 93/49/1109    Chronic pain of right knee 12/12/2016    Morbid obesity with BMI of 40.0-44.9, adult ([address] 12/12/2016    Immunosuppression ([facility] [address] 08/29/2016    Status post reverse total replacement of right shoulder 05/06/2016    Mixed hyperlipidemia 03/10/2016    Essential hypertension 08/20/2015    Gastroesophageal reflux disease without esophagitis 08/20/2015    Glenohumeral arthritis right 04/10/2015    Arthritis of right acromioclavicular joint 02/13/2015    Chronic low back pain 01/13/2015    Spinal stenosis 01/13/2015    Asthma 05/18/2014    Postmenopausal osteoporosis 02/13/2014    Complete tear of left rotator cuff 02/10/2014    Biceps tendinitis on right 02/10/2014    Granulomatous lung disease ([facility] [address] 08/14/2012    Mitral valve regurgitation 02/16/2011    Candidiasis of skin and nails 05/04/2010    Dermatophytosis of nail 05/04/2010    Depressive disorder, not elsewhere classified 05/04/2010    Obstructive sleep apnea syndrome 05/04/2010    Diverticulosis of large intestine 05/04/2010    Allergic rhinitis 05/04/2010    Osteoporosis 05/04/2010    Rheumatoid arthritis ([facility] [address] 05/04/2010    Diaphragmatic hernia 05/04/2010    Disorder resulting from impaired renal function 01/03/2008       Plan:  1. No significant bradycardia/HB. Continue CPAP at night. Continue  ATBX for PNA. Monitor.     Core Measures:  · Discharge instructions:   · LVEF documented:   · ACEI for LV dysfunction:   · Smoking Cessation:    Chung Chakraborty MD 4/7/2019 7:43 AM

## 2019-04-07 NOTE — PLAN OF CARE
Problem: Pain:  Goal: Pain level will decrease  Description  Pain level will decrease  4/7/2019 0829 by Yana Heaton RN  Note:   Patient is complaining of having aching lower back pain rated # 8/10. Repositioned pt for comfort. Medicated pt with one percocet as ordered. Call light is within pt's reach. Will monitor pt's level of comfort. Patient is agreeable to call with needs.    4/7/2019 0225 by Jessee Rangel RN  Outcome: Ongoing

## 2019-04-07 NOTE — PLAN OF CARE
Pt resting in bed. On 5 L O2, using PAP at night. Bed alarm on, high risk precautions in use. Call light and bedside items within reach. Purewick in place. Febrile overnight. Current tmax is 100.7, acetaminophen given per orders. ABX infusing. SpO2 88-94 on Pap. Will update as needed.

## 2019-04-07 NOTE — PROGRESS NOTES
36.7   MCV 87.3 89.8 87.3    173 195     BMP:   Recent Labs     04/05/19  0431 04/06/19  0431 04/07/19  0532    135* 137   K 3.1* 5.0 4.1   CL 99 100 100   CO2 28 26 27   BUN 8 11 11   CREATININE 0.6 0.8 0.7     Recent Labs     04/05/19  0010   PHART 7.406   HZG3CBN 50.2*   PO2ART 77.5     LIVER PROFILE: No results for input(s): AST, ALT, LIPASE, BILIDIR, BILITOT, ALKPHOS in the last 72 hours. Invalid input(s): AMYLASE,  ALB    CXR REVIEWED BY ME AND SHOWED:  CT CHEST WO CONTRAST   Final Result       The central airways are patent. Multifocal ill-defined patchy upper zone and left lower lobe predominant    peripheral nonspecific airspace opacities now seen since 2017 and may be    inflammatory, infectious. Clinically correlate. Background of chronic interstitial changes again noted. XR CHEST PORTABLE   Final Result      Mild bilateral interstitial and airspace opacities, not significantly changed compared to prior, representing edema or infection. ASSESSMENT/PLAN:  This is a 70 y.o. female with ILD and acute on chronic hypoxemic respiratory failure with presumed pneumonia. Bronchoscopy Friday with lavage of bilateral upper lobes. Awaiting results. Gram stains and fungal stains negative. Continues to be tachycardic and febrile. Infectious differential is broad given her immune modulation 2/2 RA. Strep and legionella antigens are negative. Checking urine histo, 1, 3 beta d glucan and gallactomanan. Check procal  Cultures and diatherix from bronch are pending. Continue current management while awaiting results  Family at bedside and updated.       Vince Mirza MD

## 2019-04-08 ENCOUNTER — APPOINTMENT (OUTPATIENT)
Dept: CT IMAGING | Age: 79
DRG: 871 | End: 2019-04-08
Payer: MEDICARE

## 2019-04-08 PROBLEM — J18.9 MULTIFOCAL PNEUMONIA: Status: ACTIVE | Noted: 2019-04-08

## 2019-04-08 LAB
ANION GAP SERPL CALCULATED.3IONS-SCNC: 16 MMOL/L (ref 3–16)
BASOPHILS ABSOLUTE: 0.1 K/UL (ref 0–0.2)
BASOPHILS RELATIVE PERCENT: 1.5 %
BUN BLDV-MCNC: 10 MG/DL (ref 7–20)
C-REACTIVE PROTEIN: 253.1 MG/L (ref 0–5.1)
CALCIUM SERPL-MCNC: 9.4 MG/DL (ref 8.3–10.6)
CHLORIDE BLD-SCNC: 98 MMOL/L (ref 99–110)
CO2: 24 MMOL/L (ref 21–32)
CREAT SERPL-MCNC: 0.6 MG/DL (ref 0.6–1.2)
EOSINOPHILS ABSOLUTE: 0.4 K/UL (ref 0–0.6)
EOSINOPHILS RELATIVE PERCENT: 4.6 %
GFR AFRICAN AMERICAN: >60
GFR NON-AFRICAN AMERICAN: >60
GLUCOSE BLD-MCNC: 92 MG/DL (ref 70–99)
HCT VFR BLD CALC: 41.2 % (ref 36–48)
HEMOGLOBIN: 13.4 G/DL (ref 12–16)
LYMPHOCYTES ABSOLUTE: 2.2 K/UL (ref 1–5.1)
LYMPHOCYTES RELATIVE PERCENT: 23.9 %
MAGNESIUM: 1.8 MG/DL (ref 1.8–2.4)
MCH RBC QN AUTO: 28.3 PG (ref 26–34)
MCHC RBC AUTO-ENTMCNC: 32.6 G/DL (ref 31–36)
MCV RBC AUTO: 86.8 FL (ref 80–100)
MONOCYTES ABSOLUTE: 1.3 K/UL (ref 0–1.3)
MONOCYTES RELATIVE PERCENT: 13.6 %
NEUTROPHILS ABSOLUTE: 5.2 K/UL (ref 1.7–7.7)
NEUTROPHILS RELATIVE PERCENT: 56.4 %
PDW BLD-RTO: 15.7 % (ref 12.4–15.4)
PLATELET # BLD: 218 K/UL (ref 135–450)
PMV BLD AUTO: 8.2 FL (ref 5–10.5)
POTASSIUM REFLEX MAGNESIUM: 4.7 MMOL/L (ref 3.5–5.1)
RBC # BLD: 4.74 M/UL (ref 4–5.2)
SEDIMENTATION RATE, ERYTHROCYTE: 87 MM/HR (ref 0–30)
SODIUM BLD-SCNC: 138 MMOL/L (ref 136–145)
VANCOMYCIN TROUGH: 15.7 UG/ML (ref 10–20)
WBC # BLD: 9.3 K/UL (ref 4–11)

## 2019-04-08 PROCEDURE — 94761 N-INVAS EAR/PLS OXIMETRY MLT: CPT

## 2019-04-08 PROCEDURE — 80202 ASSAY OF VANCOMYCIN: CPT

## 2019-04-08 PROCEDURE — 97535 SELF CARE MNGMENT TRAINING: CPT

## 2019-04-08 PROCEDURE — 2580000003 HC RX 258: Performed by: STUDENT IN AN ORGANIZED HEALTH CARE EDUCATION/TRAINING PROGRAM

## 2019-04-08 PROCEDURE — 6370000000 HC RX 637 (ALT 250 FOR IP): Performed by: STUDENT IN AN ORGANIZED HEALTH CARE EDUCATION/TRAINING PROGRAM

## 2019-04-08 PROCEDURE — 6360000002 HC RX W HCPCS: Performed by: STUDENT IN AN ORGANIZED HEALTH CARE EDUCATION/TRAINING PROGRAM

## 2019-04-08 PROCEDURE — 86738 MYCOPLASMA ANTIBODY: CPT

## 2019-04-08 PROCEDURE — 36415 COLL VENOUS BLD VENIPUNCTURE: CPT

## 2019-04-08 PROCEDURE — 94640 AIRWAY INHALATION TREATMENT: CPT

## 2019-04-08 PROCEDURE — 94660 CPAP INITIATION&MGMT: CPT

## 2019-04-08 PROCEDURE — 6360000004 HC RX CONTRAST MEDICATION: Performed by: STUDENT IN AN ORGANIZED HEALTH CARE EDUCATION/TRAINING PROGRAM

## 2019-04-08 PROCEDURE — 83735 ASSAY OF MAGNESIUM: CPT

## 2019-04-08 PROCEDURE — 85652 RBC SED RATE AUTOMATED: CPT

## 2019-04-08 PROCEDURE — 94664 DEMO&/EVAL PT USE INHALER: CPT

## 2019-04-08 PROCEDURE — 80048 BASIC METABOLIC PNL TOTAL CA: CPT

## 2019-04-08 PROCEDURE — 99233 SBSQ HOSP IP/OBS HIGH 50: CPT | Performed by: INTERNAL MEDICINE

## 2019-04-08 PROCEDURE — 94667 MNPJ CHEST WALL 1ST: CPT

## 2019-04-08 PROCEDURE — 99233 SBSQ HOSP IP/OBS HIGH 50: CPT | Performed by: NURSE PRACTITIONER

## 2019-04-08 PROCEDURE — 71275 CT ANGIOGRAPHY CHEST: CPT

## 2019-04-08 PROCEDURE — 2060000000 HC ICU INTERMEDIATE R&B

## 2019-04-08 PROCEDURE — 85025 COMPLETE CBC W/AUTO DIFF WBC: CPT

## 2019-04-08 PROCEDURE — 86140 C-REACTIVE PROTEIN: CPT

## 2019-04-08 PROCEDURE — 99223 1ST HOSP IP/OBS HIGH 75: CPT | Performed by: INTERNAL MEDICINE

## 2019-04-08 PROCEDURE — 2700000000 HC OXYGEN THERAPY PER DAY

## 2019-04-08 RX ADMIN — CEFEPIME HYDROCHLORIDE 2 G: 2 INJECTION, POWDER, FOR SOLUTION INTRAVENOUS at 05:13

## 2019-04-08 RX ADMIN — Medication 10 ML: at 20:57

## 2019-04-08 RX ADMIN — ONDANSETRON 4 MG: 2 INJECTION INTRAMUSCULAR; INTRAVENOUS at 17:29

## 2019-04-08 RX ADMIN — ALBUTEROL SULFATE 2.5 MG: 2.5 SOLUTION RESPIRATORY (INHALATION) at 22:09

## 2019-04-08 RX ADMIN — FLUTICASONE PROPIONATE 1 SPRAY: 50 SPRAY, METERED NASAL at 20:59

## 2019-04-08 RX ADMIN — AZITHROMYCIN DIHYDRATE 500 MG: 500 INJECTION, POWDER, LYOPHILIZED, FOR SOLUTION INTRAVENOUS at 01:23

## 2019-04-08 RX ADMIN — OXYCODONE HYDROCHLORIDE AND ACETAMINOPHEN 1 TABLET: 5; 325 TABLET ORAL at 07:52

## 2019-04-08 RX ADMIN — CETIRIZINE HYDROCHLORIDE 10 MG: 10 TABLET, FILM COATED ORAL at 07:53

## 2019-04-08 RX ADMIN — TRAZODONE HYDROCHLORIDE 50 MG: 50 TABLET ORAL at 20:57

## 2019-04-08 RX ADMIN — HYDRALAZINE HYDROCHLORIDE 25 MG: 25 TABLET, FILM COATED ORAL at 05:13

## 2019-04-08 RX ADMIN — OXYCODONE HYDROCHLORIDE AND ACETAMINOPHEN 1 TABLET: 5; 325 TABLET ORAL at 17:29

## 2019-04-08 RX ADMIN — ALBUTEROL SULFATE 2.5 MG: 2.5 SOLUTION RESPIRATORY (INHALATION) at 09:04

## 2019-04-08 RX ADMIN — VANCOMYCIN HYDROCHLORIDE 1250 MG: 10 INJECTION, POWDER, LYOPHILIZED, FOR SOLUTION INTRAVENOUS at 22:24

## 2019-04-08 RX ADMIN — ESCITALOPRAM OXALATE 10 MG: 10 TABLET ORAL at 07:54

## 2019-04-08 RX ADMIN — FUROSEMIDE 40 MG: 40 TABLET ORAL at 07:53

## 2019-04-08 RX ADMIN — PREDNISONE 2 MG: 1 TABLET ORAL at 10:57

## 2019-04-08 RX ADMIN — ACETAMINOPHEN 650 MG: 325 TABLET ORAL at 13:48

## 2019-04-08 RX ADMIN — GABAPENTIN 600 MG: 600 TABLET, FILM COATED ORAL at 07:52

## 2019-04-08 RX ADMIN — VANCOMYCIN HYDROCHLORIDE 1250 MG: 10 INJECTION, POWDER, LYOPHILIZED, FOR SOLUTION INTRAVENOUS at 10:57

## 2019-04-08 RX ADMIN — CHOLESTYRAMINE 4 G: 4 POWDER, FOR SUSPENSION ORAL at 08:00

## 2019-04-08 RX ADMIN — HYDRALAZINE HYDROCHLORIDE 25 MG: 25 TABLET, FILM COATED ORAL at 22:24

## 2019-04-08 RX ADMIN — GABAPENTIN 600 MG: 600 TABLET, FILM COATED ORAL at 14:06

## 2019-04-08 RX ADMIN — GABAPENTIN 600 MG: 600 TABLET, FILM COATED ORAL at 20:57

## 2019-04-08 RX ADMIN — ENOXAPARIN SODIUM 40 MG: 40 INJECTION SUBCUTANEOUS at 07:53

## 2019-04-08 RX ADMIN — IOPAMIDOL 80 ML: 755 INJECTION, SOLUTION INTRAVENOUS at 17:18

## 2019-04-08 RX ADMIN — CEFEPIME HYDROCHLORIDE 2 G: 2 INJECTION, POWDER, FOR SOLUTION INTRAVENOUS at 12:39

## 2019-04-08 RX ADMIN — PANTOPRAZOLE SODIUM 40 MG: 40 TABLET, DELAYED RELEASE ORAL at 17:30

## 2019-04-08 RX ADMIN — MUPIROCIN: 20 OINTMENT TOPICAL at 17:30

## 2019-04-08 RX ADMIN — CEFEPIME HYDROCHLORIDE 2 G: 2 INJECTION, POWDER, FOR SOLUTION INTRAVENOUS at 20:57

## 2019-04-08 RX ADMIN — ALBUTEROL SULFATE 2.5 MG: 2.5 SOLUTION RESPIRATORY (INHALATION) at 13:04

## 2019-04-08 RX ADMIN — ACETAMINOPHEN 650 MG: 325 TABLET ORAL at 05:13

## 2019-04-08 RX ADMIN — Medication 10 ML: at 07:53

## 2019-04-08 RX ADMIN — MUPIROCIN: 20 OINTMENT TOPICAL at 20:59

## 2019-04-08 RX ADMIN — HYDRALAZINE HYDROCHLORIDE 25 MG: 25 TABLET, FILM COATED ORAL at 14:30

## 2019-04-08 ASSESSMENT — PAIN SCALES - GENERAL
PAINLEVEL_OUTOF10: 0
PAINLEVEL_OUTOF10: 8
PAINLEVEL_OUTOF10: 6
PAINLEVEL_OUTOF10: 8
PAINLEVEL_OUTOF10: 4
PAINLEVEL_OUTOF10: 0
PAINLEVEL_OUTOF10: 5
PAINLEVEL_OUTOF10: 0
PAINLEVEL_OUTOF10: 0
PAINLEVEL_OUTOF10: 8
PAINLEVEL_OUTOF10: 0

## 2019-04-08 ASSESSMENT — PAIN DESCRIPTION - LOCATION
LOCATION: BACK
LOCATION: BACK

## 2019-04-08 ASSESSMENT — PAIN DESCRIPTION - ONSET
ONSET: ON-GOING
ONSET: ON-GOING

## 2019-04-08 ASSESSMENT — PAIN DESCRIPTION - FREQUENCY
FREQUENCY: CONTINUOUS
FREQUENCY: CONTINUOUS

## 2019-04-08 ASSESSMENT — PAIN DESCRIPTION - DESCRIPTORS
DESCRIPTORS: ACHING
DESCRIPTORS: ACHING

## 2019-04-08 ASSESSMENT — PAIN DESCRIPTION - PAIN TYPE
TYPE: CHRONIC PAIN
TYPE: CHRONIC PAIN

## 2019-04-08 ASSESSMENT — PAIN DESCRIPTION - PROGRESSION
CLINICAL_PROGRESSION: GRADUALLY WORSENING
CLINICAL_PROGRESSION: GRADUALLY IMPROVING

## 2019-04-08 ASSESSMENT — PAIN - FUNCTIONAL ASSESSMENT: PAIN_FUNCTIONAL_ASSESSMENT: PREVENTS OR INTERFERES SOME ACTIVE ACTIVITIES AND ADLS

## 2019-04-08 ASSESSMENT — PAIN DESCRIPTION - ORIENTATION: ORIENTATION: LOWER

## 2019-04-08 NOTE — CONSULTS
Infectious Diseases Inpatient Consult Note    RESIDENT NOTE - reviewed / edited, attending note at bottom    Reason for Consult:   Fever  Requesting Physician:   Dr. Kev Smalls   Primary Care Physician:  Johnny Riggs  History Obtained From:   Pt, EPIC    Admit Date: 4/4/2019  Hospital Day: 5    CHIEF COMPLAINT:       Chief Complaint   Patient presents with    Fever     Pt had recent hospital admission with a new supplemental o2 requirement since last week. Pt has diarrhea and fever today. Pt has nonproductive cough. Per family o2 tubing at home was kinked for unknown length of time    Shortness of Breath    Cough    Altered Mental Status     Per family, pt was texting nonsense       HISTORY OF PRESENT ILLNESS:      Mrs. Kimmie Dubose is a 70year old female with PMx significant for ILD ( on chronic steroids), rheumatoid arthritis ( on Humira and leflunamide),  HFpEFOSA, depression, GERD, HLD, HTN who presented to [facility] secondary to confusion and shortness of breath. Per EMS report, patient's oxygen cord was kinked       Of note, patient had been treated for acute hypoxic repspiratory failure likely secondary to pneumonia with 5 day course of burst steroids, rocephen/azythromycin for pneumonia between  3/25-3/28. On presentation, patient was febrile 102.6, tachypneic RR22, tachycardic  WBC 11.2. Her mentation improved after being placed on a high flow nasal canula. Chest X-ray showed mild bilateral mild bilateral interstitial and airspace opacities suggestive of edema or infection. Patient was started on azythromycin, cefepime and vancomycin.          Past Medical History:    Past Medical History:   Diagnosis Date    Allergic rhinitis 5/4/2010    Asthma 5/18/2014    Chicken pox     Chronic diastolic CHF (congestive heart failure) ([facility] [address] 3/28/2019    Depression     Diverticulosis of large intestine 5/4/2010    Essential hypertension 8/20/2015    Gastroesophageal reflux disease without mg Oral Daily    predniSONE  2 mg Oral Daily    traZODone  50 mg Oral Nightly    vancomycin  1,250 mg Intravenous Q12H    hydrALAZINE  25 mg Oral 3 times per day       Allergies:  Sulfa antibiotics; Lamotrigine; Lomotil  [diphenoxylate-atropine]; and Sulfacetamide sodium    Social History:    TOBACCO:    Former smoker, smoked for 14 years, one pack per day  ETOH:    0.6-1.2 oz per week  DRUGS:   denies  MARITAL STATUS:     OCCUPATION:       Family History:   No immunodeficiency    REVIEW OF SYSTEMS:    No fever / chills / sweats. No weight loss. No visual change, eye pain, eye discharge. No oral lesion, sore throat, dysphagia. Denies cough / sputum. Denies chest pain, palpitations. Denies n / v / abd pain. No diarrhea. Denies dysuria or change in urinary function. Denies joint swelling or pain. No myalgia, arthralgia.   Denies skin changes, itching  Denies focal weakness, sensory change or other neurologic symptom    Denies new / worse depression, psychiatric symptoms  Denies any Endocrine or Hematologic symptoms    PHYSICAL EXAM:      Vitals:    BP (!) 131/49   Pulse 103   Temp 99.2 °F (37.3 °C) (Oral)   Resp 20   Ht 4' 11.84\" (1.52 m)   Wt 220 lb 3.8 oz (99.9 kg)   SpO2 96%   BMI 43.24 kg/m²     GENERAL:    HEENT: Membranes moist, no oral lesion  NECK:  Supple  LUNGS: Clear b/l, no rales, no dullness  CARDIAC: RRR, no murmur appreciated  ABD:  + BS, soft / NT  EXT:  No rash, no edema, no lesions  NEURO: No focal neurologic findings  PSYCH: Orientation, sensorium, mood normal      DATA:    Lab Results   Component Value Date    WBC 9.3 04/08/2019    HGB 13.4 04/08/2019    HCT 41.2 04/08/2019    MCV 86.8 04/08/2019     04/08/2019     Lab Results   Component Value Date    CREATININE 0.6 04/08/2019    BUN 10 04/08/2019     04/08/2019    K 4.7 04/08/2019    CL 98 (L) 04/08/2019    CO2 24 04/08/2019       Hepatic Function Panel:   Lab Results   Component Value Date    ALKPHOS 87 09/06/2018    ALT 16 09/06/2018    AST 27 09/06/2018    PROT 5.4 09/06/2018    PROT 6.8 06/19/2012    BILITOT 0.7 09/06/2018    BILIDIR 0.6 09/02/2018    IBILI 0.5 09/02/2018    LABALBU 2.9 09/06/2018       Micro:  4/4 Blood culture NGTD  4/5 Rapid influenza negative   4/5 Histoplasma antigen in urine not detected, Strep, Legionella - negative   4/6 AFB- not detected  4/6 respiratory panel PCR- negative       4/6 CMV, HSV,  viral respiratory culture, blood culture   - pending     Imaging:   CT chest without contrast  Impression       The central airways are patent.         Multifocal ill-defined patchy upper zone and left lower lobe predominant    peripheral nonspecific airspace opacities now seen since 2017 and may be    inflammatory, infectious.  Clinically correlate.       Background of chronic interstitial changes again noted.      4/8 CTPA with contrast pending  MRI spine pending     IMPRESSION:      Patient Active Problem List   Diagnosis    Candidiasis of skin and nails    Dermatophytosis of nail    Depressive disorder, not elsewhere classified    Obstructive sleep apnea syndrome    Diverticulosis of large intestine    Allergic rhinitis    Osteoporosis    Rheumatoid arthritis ([facility] [facility]    Diaphragmatic hernia    Mitral valve regurgitation    Granulomatous lung disease (HCC)    Complete tear of left rotator cuff    Biceps tendinitis on right    Postmenopausal osteoporosis    Asthma    Arthritis of right acromioclavicular joint    Glenohumeral arthritis right    Essential hypertension    Gastroesophageal reflux disease without esophagitis    Mixed hyperlipidemia    Status post reverse total replacement of right shoulder    Chronic pain of right knee    Morbid obesity with BMI of 40.0-44.9, adult (HCC)    Chronic nonseasonal allergic rhinitis due to pollen    Mild intermittent asthma without complication    Interstitial lung disease (HCC)    Chronic low back pain    Disorder resulting from impaired renal function    Spinal stenosis    Status post total right knee replacement using cement on 2/20/2017    Major depressive disorder with single episode, in partial remission (HCC)    Rheumatoid arthritis of multiple sites with negative rheumatoid factor (HCC)    Osteoarthritis of lumbar spine    Immunosuppression ([facility] [address]    S/P left rotator cuff repair    Primary osteoarthritis of left shoulder    Recurrent cold sores    Diarrhea    Nausea with vomiting    Chronic diarrhea    Hypokalemia    Hypomagnesemia    Weight loss    Vertigo    Acute diverticulitis    Hypoxia    Chronic diastolic CHF (congestive heart failure) ([facility] [address]    Pneumonia due to organism    HB (heart block)     Earl porras 70 y. o. female with a past medical history of interstitial lung disease (on chronic steroids), rheumatoid arthritis (on Humira and leflunamide), HFpEF admitted for SOB and confusion, suspect HCAP as cause. Bronchoscopy with BAL on 4/5/2019- bronchial inflammation and increased mucoid secretions    4/7 Tm 101.6  4/7 Tm 100.6     RECOMMENDATIONS:  -continue Azythromycin D4, Cefepime D5, Vanc 4  -f/u1, 3 beta d glucan and gallactomanan, mycoplasma   -review CT images with radiology  -f/u MRI spine, CTPA    Discussed with Dr. Jesika Bonner MD PGY-1    Addendum to Resident Consult note:  Pt seen, examined and evaluated. I have reviewed the current history, physical findings, labs and assessment and plan and agree with note as documented by resident (Dr. Yoel Gonzalez). 71 yo woman with hx interstitial lung (on chronic steroid, pred 2.5 mg /d), RA (has received humira, leflunamide, CHF, DEWAYNE  Hx mult admissions, on last admission 3/25, discharged on oxygen    Admit 4/4 - fever 102.6, WBC 11.2.     Started on azithro, cefepime, vanco  Seen by Pul, had bronch / BAL on 4/5 - inflamed airways, 'mucoid' secretions    CT with 'groundglass opacities'  W/u - non-diagnostic (see above)    Today 4/8 - Tm 100.1. On 6L  F/u CT with no PE, 'extensive bilateral groundglass opacities are increased'    IMP/  Immunocompromised  ILD / RA  Pul densities worse on f/u CT today  Fever - unclear source.       REC/  Consider d/c antibiotic  Will f/u on pending tests  Will review CT with Radiologist  Lumbar MRI ordered  Will confer with Pul    Discussed with pt, family (son, daughter)  Maira Adam MD

## 2019-04-08 NOTE — ED PROVIDER NOTES
ED Attending Attestation Note     Date of evaluation: 4/4/2019    This patient was seen by the advance practice provider. I have seen and examined the patient, agree with the workup, evaluation, management and diagnosis. The care plan has been discussed. I have reviewed the ECG and concur with the RONNIE's interpretation. Admitted for sepsis from respiratory origin.  IVF and IV abx initiated in the ED      Kelly Anne MD  04/08/19 9580

## 2019-04-08 NOTE — PROGRESS NOTES
RESPIRATORY THERAPY ASSESSMENT    Name:  [address] Record Number:  [id number]  Age: 70 y.o. Gender: female  : 1940  Today's Date:  2019  Room:  5378/1349-73    Assessment     Is the patient being admitted for a COPD or Asthma exacerbation? No   (If yes the patient will be seen every 4 hours for the first 24 hours and then reassessed)    Patient Admission Diagnosis      Allergies  Allergies   Allergen Reactions    Sulfa Antibiotics Hives    Lamotrigine Other (See Comments)     Blurred vision    Lomotil  [Diphenoxylate-Atropine] Other (See Comments)     Changes in vision    Sulfacetamide Sodium Hives     Other reaction(s): Other (See Comments)       Minimum Predicted Vital Capacity:     748          Actual Vital Capacity:      1000              Pulmonary History:interstitial lung disease  Home Oxygen Therapy:  3 liters/min via nasal cannula  Home Respiratory Therapy:Albuterol/Ipratropium Bromide HHN   Current Respiratory Therapy:  hhn duoneb q4wa  Treatment Type: HHN  Medications: Albuterol    Respiratory Severity Index(RSI)   Patients with orders for inhalation medications, oxygen, or any therapeutic treatment modality will be placed on Respiratory Protocol. They will be assessed with the first treatment and at least every 72 hours thereafter. The following severity scale will be used to determine frequency of treatment intervention. Smoking History: Pulmonary Disease or Smoking History, Greater than 15 pack year = 2    Social History  Social History     Tobacco Use    Smoking status: Former Smoker     Packs/day: 1.00     Years: 14.00     Pack years: 14.00     Types: Cigarettes     Last attempt to quit: 1976     Years since quittin.2    Smokeless tobacco: Never Used    Tobacco comment: quit age 29   Substance Use Topics    Alcohol use: Yes     Alcohol/week: 0.6 - 1.2 oz     Types: 1 - 2 Glasses of wine per week     Comment: social drinker    Drug use:  No Recent Surgical History: None = 0  Past Surgical History  Past Surgical History:   Procedure Laterality Date    APPENDECTOMY      BRONCHOSCOPY N/A 4/5/2019    BRONCHOSCOPY ALVEOLAR LAVAGE- RIGHT/ LEFT UPPER LOBE performed by Nadja Olvera MD at [address] [place] [address]  4/5/2019    BRONCHOSCOPY DIAGNOSTIC OR CELL [address] ONLY performed by Nadja Olvera MD at [address] [place] [address] Bilateral     CHOLECYSTECTOMY      FOOT SURGERY Bilateral     metatarsal removal    HYSTERECTOMY, TOTAL ABDOMINAL      JOINT REPLACEMENT Right 04/25/2016    Dr. Richard Singleton , shoulder    JOINT REPLACEMENT Right     OTHER SURGICAL HISTORY Right 02/20/2017    RIGHT TOTAL KNEE ARTHROPLASTY             SALPINGO-OOPHORECTOMY      SHOULDER ARTHROPLASTY Right     SHOULDER ARTHROSCOPY Left 11/15/2017    TONSILLECTOMY      TOTAL KNEE ARTHROPLASTY Right        Level of Consciousness: Alert, Oriented, and Cooperative = 0    Level of Activity: Walking with assistance = 1    Respiratory Pattern: Regular Pattern; RR 8-20 = 0    Breath Sounds: Absent bilaterally and/or with wheezes = 3    Sputum   ,  , Sputum How Obtained: Spontaneous cough  Cough: Strong, spontaneous, non-productive = 0    Vital Signs   /62   Pulse 101   Temp 99.5 °F (37.5 °C) (Oral)   Resp 22   Ht 4' 11.84\" (1.52 m)   Wt 222 lb 6.4 oz (100.9 kg)   SpO2 93%   BMI 43.66 kg/m²   SPO2 (COPD values may differ): 88-89% on room air or greater than 92% on FiO2 28- 35% = 2    Peak Flow (asthma only): not applicable = 0    RSI: 7-8 = BID and Q4HPRN (every four hours as needed) for dyspnea        Plan       Goals: medication delivery and improve oxygenation    Patient/caregiver was educated on the proper method of use for Respiratory Care Devices:  Yes      Level of patient/caregiver understanding able to:   ? Verbalize understanding   ? Demonstrate understanding       ? Teach back        ? Needs reinforcement       ?   No available caregiver               ? Other:     Response to education:  Excellent     Is patient being placed on Home Treatment Regimen? Yes     Does the patient have everything they need prior to discharge? Yes     Comments: admits with hcap    Plan of Care: hhn duoneb q4wa    Electronically signed by Chetan Whitfield RCP on 4/8/2019 at 4:22 AM    Respiratory Protocol Guidelines     1. Assessment and treatment by Respiratory Therapy will be initiated for medication and therapeutic interventions upon initiation of aerosolized medication. 2. Physician will be contacted for respiratory rate (RR) greater than 35 breaths per minute. Therapy will be held for heart rate (HR) greater than 140 beats per minute, pending direction from physician. 3. Bronchodilators will be administered via Metered Dose Inhaler (MDI) with spacer when the following criteria are met:  a. Alert and cooperative     b. HR < 140 bpm  c. RR < 30 bpm                d. Can demonstrate a 2-3 second inspiratory hold  4. Bronchodilators will be administered via Hand Held Nebulizer [facility]) to patients when ANY of the following criteria are met  a. Incognizant or uncooperative          b. Patients treated with HHN at Home        c. Unable to demonstrate proper use of MDI with spacer     d. RR > 30 bpm   5. Bronchodilators will be delivered via Metered Dose Inhaler (MDI), HHN, Aerogen to intubated patients on mechanical ventilation. 6. Inhalation medication orders will be delivered and/or substituted as outlined below. Aerosolized Medications Ordering and Administration Guidelines:    1. All Medications will be ordered by a physician, and their frequency and/or modality will be adjusted as defined by the patients Respiratory Severity Index (RSI) score.   2. If the patient does not have documented COPD, consider discontinuing anticholinergics when RSI is less than 9.  3. If the bronchospasm worsens (increased RSI), then the bronchodilator frequency can be increased to a maximum of every 4 hours. If greater than every 4 hours is required, the physician will be contacted. 4. If the bronchospasm improves, the frequency of the bronchodilator can be decreased, based on the patient's RSI, but not less than home treatment regimen frequency. 5. Bronchodilator(s) will be discontinued if patient has a RSI less than 9 and has received no scheduled or as needed treatment for 72  Hrs. Patients Ordered on a Mucolytic Agent:    1. Must always be administered with a bronchodilator. 2. Discontinue if patient experiences worsened bronchospasm, or secretions have lessened to the point that the patient is able to clear them with a cough. Anti-inflammatory and Combination Medications:    1. If the patient lacks prior history of lung disease, is not using inhaled anti-inflammatory medication at home, and lacks wheezing by examination or by history for at least 24 hours, contact physician for possible discontinuation.

## 2019-04-08 NOTE — PROGRESS NOTES
Clinical Pharmacy Consult Note    Admit date: 4/4/2019    Interval Update:    Nurse reports patient was too weak to ambulate yesterday afternoon. Cough persists and is mostly unproductive. Febrile overnight to 100.1 despite broad spectrum abx. Bronch cx NGTD, fungal infection to be discussed as possible etiology. Subjective/Objective:  69 yo presenting with AMS and SOB at nursing home was found to have O2 tubing kinked by EMS. EMS fixed the tubing and patient became A&Ox3 on 3L O2 in ED. Patient was found to be febrile (102.6 in ED) and admitted due to  suspicion for HAP. Of note, patient was discharged 1 week prior to arrival on azithromycin for SOB symptoms. PMH significant for ILD, RA, HFpEF, HTN, and depression. Pharmacy is consulted to dose Vancomycin per     Pertinent Medications:  Vancomycin 1,250mg q12h, Day 4   Cefepime 2 gm q12h, Day 4  Azithromycin 500mg IV q24h, Day 4     PERTINENT LABS:  Recent Labs     04/07/19  0532 04/08/19  0442    138   K 4.1 4.7    98*   CO2 27 24   BUN 11 10   CREATININE 0.7 0.6       CrCl: Estimated to be 33.8mL/min - 56mL/min   (using IBW of 45.5kg and Scr 1 - 0.6)    Recent Labs     04/07/19  0532 04/08/19  0442   WBC 8.3 9.3   HGB 12.1 13.4   HCT 36.7 41.2   MCV 87.3 86.8    218       Micro:  Date Site Micro Susceptibility   4/4 Blood (x2) NGTD(Prelim)     4/4 urine sent    4/4 Rapid flu (A&B) negative    4/5 Respiratory panel None detected    4/5 BAL-AFB No result    4/5 BAL 1+WBC  NOS    4/5 Diatherix No result    4/5 HSV No result    4/5 Aspergillus  Galactamanan No result    4/5 Viral No result    4/5 Legionella No result    4/5  BAL- Fungal (x2) No fungal elements seen    4/5 BAL- Respiratory (x2)  1+ WBC  No organisms seen        Assessment/Plan:  1. Fevers/hypoxia 2/2 HAP :  vancomycin + cefepime + azithromycin (Day 4)  Vancomycin  · Current 1.25g IV Q12H  · Renal function unchanged from yesterday.   · Trough drawn on 4/6 was therapeutic @ 16.6 mcg/mL. Repeat trough today remains therapeutic 15.7 mcg/mL  · Renal function will be monitored closely and dosing will be adjusted as appropriate. Please call with any questions.     Hemanth Holder  PharmD Candidate 2019 4/8/2019  9:29 AM     Lupe Marion PharmD, MPH  PGY-1 Pharmacy Resident  Office: [phone]  Wireless: [phone]  4/8/2019 12:18 PM\

## 2019-04-08 NOTE — PROGRESS NOTES
Recent Labs     04/06/19  0431 04/07/19  0532 04/08/19  0442   * 137 138   K 5.0 4.1 4.7    100 98*   CO2 26 27 24   BUN 11 11 10   CREATININE 0.8 0.7 0.6   CALCIUM 8.7 8.8 9.4     No results for input(s): AST, ALT, BILIDIR, BILITOT, ALKPHOS in the last 72 hours. No results for input(s): INR in the last 72 hours. No results for input(s): Willard Mylar in the last 72 hours. Urinalysis:      Lab Results   Component Value Date    NITRU Negative 04/04/2019    WBCUA 0-2 04/04/2019    BACTERIA 1+ 09/02/2018    RBCUA 0-2 04/04/2019    BLOODU Negative 04/04/2019    SPECGRAV <=1.005 04/04/2019    GLUCOSEU Negative 04/04/2019       Radiology:  CT CHEST WO CONTRAST   Final Result       The central airways are patent. Multifocal ill-defined patchy upper zone and left lower lobe predominant    peripheral nonspecific airspace opacities now seen since 2017 and may be    inflammatory, infectious. Clinically correlate. Background of chronic interstitial changes again noted. XR CHEST PORTABLE   Final Result      Mild bilateral interstitial and airspace opacities, not significantly changed compared to prior, representing edema or infection. CTA PULMONARY W CONTRAST    (Results Pending)   MRI LUMBAR SPINE WO CONTRAST    (Results Pending)     Assessment/Plan:    Iraj Paige is a 70 y.o. female with a past medical history of interstitial lung disease (on chronic steroids), rheumatoid arthritis (on Humira and leflunamide), HFpEF admitted for SOB and confusion, suspect HCAP as cause.      Acute on chronic hypoxic respiratory failure in the setting of PNA  - increased O2 requirement to 5L  - CTPA performed 3/25 negative for PE, however will repeat in the setting of hypoxia, tachycardia and fever  - suspect 2/2 bronchiectasis associated with rheumatic disease, cannot r/o PNA  - acapella  - appreciate pulmonology recs    Sepsis 2/2 health-care associated pneumonia, however still febrile despite broad-spectrum abx  On admission Tmax 102.6, RR 22, CT chest: multifocal patchy upper and LL airspace opacities, suspect infectious  Continues to be tachycardic and febrile  -Pharmacy to dose vanc  -Cefepime 2 grams q8h   -Azithromycin 500 mg daily   -Blood cultures x 2   -Strep and legionella antigens are negative.  -supplemental O2 - currently 5L, goal SpO2 >88%  -Albuterol nebulizers   -infectious differential broad given patient is on immunocompromising agents  - f/u BAL cultures  - procal mildly elevated at 0.27, suggestive of focal infection  - f/u ESR, CRP  - cultures and diatherix pending  - resp cultures, fungal, blood cultures negative, urine histo negative  - aspergillus, 1,3 beta d glucan pending  - given focal tenderness to palpation of L1-L2 spine with recent facet injections, will obtain MRI lumbar w/ and w/o contrast to r/o any paraspinal abscess as source of fever  -if patient still febrile, will consider LP to r/o meningitis   - appreciate ID recommendations    2nd degree heart block - Mobitz type two  - CPAP nightly for DEWAYNE  - Paroxysmal AV block - asymptomatic, nocturnal, in the setting of not using CPAP  - no recommendation for pacemaker as of now, unless block persists despite respiration improvement  - cardiology following    HFpEF -Last echocardiogram (3/25/2019) showing grade II diastolic dysfunction. Patient does not appear to be volume overloaded at this time.  JVD appreciated on physical exam.   -Furosemide 40 mg daily     Interstitial Lung Disease -Patient has a long history of ILD (suspect 2/2 RA and/or MTX-induced)   -followed by pulmonology (Dr. Camryn Marks) outpatient  -prednisone 2 mg PO qD     Rheumatoid arthritis   - Holding home immunosuppressants in the setting of suspected PNA   - percocet for chronic back/joint pain      Hypertension   -Continue home losartan 25 mg nighty   -holding amlodipine in setting of heart block   -hydralazine 25 mg PO TID     Depression -lexapro 10 mg PO qD  -trazodone 50 mg PO qHs    Morbid obesity w/ BMI: 90.1  Complicating assessment and treatment.  Placing patient at risk for multiple co-morbidities as well as early death and contributing to the patient's presentation.      DVT Prophylaxis: Lovenox   Diet: General diet  Code Status: Limited (no x 4)      PT/OT Eval Status: Consulted      Dispo - [facility]    Princess Fernandez MD    I will discuss the patient with the senior resident and MD Princess Almanza MD  Internal Medicine Resident PGY-1

## 2019-04-08 NOTE — DISCHARGE SUMMARY
Hospital Medicine Discharge Summary    Patient ID: Amna Murcia   Gender: female  : 1940   Age: 70 y.o. MRN: [id number]  Code Status: Limited    Patient's PCP: Мария David Date: 2019     Discharge Date:   2019    Admitting Physician: Gabriela Yuan MD     Discharge Physician: Sanket Aragon DO     Discharge Diagnoses: Active Hospital Problems    Diagnosis Date Noted    Multifocal pneumonia [J18.9] 2019    HB (heart block) [I45.9] 2019    Pneumonia due to organism [J18.9] 2019    Rheumatoid arthritis of multiple sites with negative rheumatoid factor ([facility] [address] [M06.09] 2017    Interstitial lung disease ([facility] [address] [J84.9] 01/10/2017    Immunosuppression ([facility] [address] [D89.9] 2016    Obstructive sleep apnea syndrome [G47.33] 2010       The patient was seen and examined on day of discharge and this discharge summary is in conjunction with any daily progress note from day of discharge. Hospital Course:     Leta Perez a 70 y. o. female with a past medical history of interstitial lung disease (on chronic steroids), rheumatoid arthritis (on Humira and leflunamide), chronic lumbar back pain (s/p L1/L2 branch ablations and steroid injections, on daily percocet), HFpEF (grade II diastolic dysfunction), hypertension and depression who presented to [facility] with confusion and shortness of breath at home, recently discharged on 3/28 on 3L O2 while being treated for PNA. Had run of Mobitz II HB with PVCs on night of admission, seen by EPS (recommended strict compliance with nightly CPAP for paroxysmal asymptomatic AV block, noctural). She was admitted with sepsis 2/2 what was originally thought to be HCAP and was started on broad-spectrum abx with vanc and cefepime and underwent bronchoscopy () with BAL of BEN and RUL which identified bronchial inflammation and increased mucoid secretions.  Work-up for infectious PNA has since all returned negative, including search for bacterial, viral and fungal pathogens. CT chest 4/8 identified worsening multifocal PNA on ILD. Antibiotics were discontinued following 5 days of administration given limited clinical improvement. She has persistently been on 5 L O2 NC and became afebrile once antibiotics were discontinued. 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 The etiology remains elusive and in the differential is still AIP, BOOP or RA-ILD. She was continued on steroids, transitioned from IV to oral prednisone on discharge. She was started on calcium and vitamin D supplements for osteoporosis ppx on steroids, protonix 40 mg PO BID and dapsone 100 mg PO qD for PCP ppx while on steroids. She was discharged to SNF given her inability to perform all ADLs on her own without assistance. She was living alone at home in an apartment prior to this admission. Given pain due to her rheumatoid arthritis, she had significant difficulty in maneuvering with her oxygen tank. Disposition:  Skilled nursing facility    Physical Exam Performed:     BP (!) 133/54 Comment: rechecked  Pulse 70   Temp 98.2 °F (36.8 °C) (Oral)   Resp 18   Ht 4' 11.84\" (1.52 m)   Wt 230 lb 8 oz (104.6 kg)   SpO2 93%   BMI 45.25 kg/m²       General appearance: No apparent distress, appears stated age and cooperative. HEENT: Conjunctivae/corneas clear. Neck: Supple, with full range of motion. Trachea midline. Respiratory:  Normal respiratory effort. Bibasilar rales. No wheezing  Cardiovascular: Regular rate and rhythm with normal S1/S2 without murmurs  Abdomen: Soft, non-tender, non-distended with normal bowel sounds. Musculoskeletal: No edema.    Skin: warm and dry  Neurologic:  Alert and oriented x3, answering questions appropriately, moving all extremities, following commands  Peripheral Pulses: +2 palpable radial pulses, equal bilaterally          Labs: For convenience and continuity at follow-up the following most recent labs are provided:      CBC:    Lab Results   Component Value Date    WBC 14.2 04/17/2019    HGB 10.7 04/17/2019    HCT 33.9 04/17/2019     04/17/2019       Renal:    Lab Results   Component Value Date     04/17/2019    K 4.0 04/17/2019     04/17/2019    CO2 28 04/17/2019    BUN 21 04/17/2019    CREATININE <0.5 04/17/2019    CALCIUM 8.7 04/17/2019    PHOS 3.2 05/23/2017         Significant Diagnostic Studies    Radiology:   CT CHEST WO CONTRAST   Final Result   Addendum 1 of 1   [** ADDENDUM #1 **   Addendum:      Compared with CTA chest dated 4/9/2019 and CT chest dated 4/5/2019. Crazy pavement appearance appears minimally improved since CTA chest dated    4/9/2019. Final      CT ABDOMEN PELVIS W IV CONTRAST Additional Contrast? None   Final Result      1. No evidence of obstruction or free air. No acute abdominal or pelvic abnormality clearly identified. 2. Diverticulosis without evidence of focal diverticulitis. 3. Incidental indeterminate pedunculated cystic type lesion in the posterior left kidney. Follow-up CT renal mass protocol or MRI renal mass protocol in 3 months is recommended to evaluate the lesion further and to document stability. CTA PULMONARY W CONTRAST   Final Result      1. No evidence of pulmonary embolism. 2. Extensive bilateral groundglass opacities are increased compared to 4/5/2019 likely representing multifocal pneumonia superimposed on chronic interstitial fibrosis. CT CHEST WO CONTRAST   Final Result       The central airways are patent. Multifocal ill-defined patchy upper zone and left lower lobe predominant    peripheral nonspecific airspace opacities now seen since 2017 and may be    inflammatory, infectious. Clinically correlate. Background of chronic interstitial changes again noted. XR CHEST PORTABLE   Final Result      Mild bilateral interstitial and airspace opacities, not significantly changed compared to prior, representing edema or infection. Consults:     PHARMACY TO DOSE VANCOMYCIN  IP CONSULT TO HOSPITALIST  IP CONSULT TO RESIDENT INTERNAL MEDICINE  IP CONSULT TO PULMONOLOGY  PHARMACY TO DOSE VANCOMYCIN  IP CONSULT TO CARDIOLOGY  IP CONSULT TO INFECTIOUS DISEASES  IP CONSULT TO DIETITIAN    Disposition:  Discharge to skilled nursing facility    Condition at Discharge: Stable    Discharge Instructions/Follow-up:      Follow up with Dr. Adolph Grimaldo, pulmonology, for steroid taper and management of chronic respiratory failure. Follow up with rheumatologist regarding RA medications.      Code Status:  Limited     Activity: activity as tolerated    Diet: regular diet      Discharge Medications:     Current Discharge Medication List           Details   calcium carbonate (TUMS) 500 MG chewable tablet Take 1 tablet by mouth 3 times daily as needed for Heartburn  Qty: 30 tablet, Refills: 0      hydrALAZINE (APRESOLINE) 25 MG tablet Take 1 tablet by mouth every 8 hours  Qty: 90 tablet, Refills: 3      dapsone 100 MG tablet Take 1 tablet by mouth daily  Qty: 30 tablet, Refills: 0      albuterol (PROVENTIL) (2.5 MG/3ML) 0.083% nebulizer solution Take 3 mLs by nebulization every 4 hours as needed for Wheezing or Shortness of Breath  Qty: 120 each, Refills: 3      guaiFENesin (ALTARUSSIN) 100 MG/5ML syrup Take 10 mLs by mouth every 4 hours as needed for Cough  Qty: 1 Bottle, Refills: 0              Details   furosemide (LASIX) 20 MG tablet Take 2 tablets by mouth daily  Qty: 60 tablet, Refills: 3      Calcium Carb-Cholecalciferol (CALCIUM-VITAMIN D) 500-200 MG-UNIT per tablet Take 2 tablets by mouth Daily with lunch  Qty: 60 tablet, Refills: 3      predniSONE (DELTASONE) 20 MG tablet Take 3 tablets by mouth daily  Qty: 90 tablet, Refills: 0      !! oxyCODONE-acetaminophen (PERCOCET) 5-325 MG per tablet Take 1 tablet by mouth every 8 hours for 5 days. Indications: Backache  Qty: 15 tablet, Refills: 0    Comments: Reduce doses taken as pain becomes manageable  Associated Diagnoses: Rheumatoid arthritis of multiple sites with negative rheumatoid factor ([address]       ! ! - Potential duplicate medications found. Please discuss with provider. Details   losartan (COZAAR) 25 MG tablet Take 25 mg by mouth nightly       escitalopram (LEXAPRO) 10 MG tablet Take 10 mg by mouth nightly      montelukast (SINGULAIR) 10 MG tablet Take 10 mg by mouth every morning      saccharomyces boulardii (FLORASTOR) 250 MG capsule Take 250 mg by mouth 2 times daily      traZODone (DESYREL) 50 MG tablet Take 50 mg by mouth every morning      Mirabegron ER 25 MG TB24 Take 1 tablet by mouth every evening      pantoprazole (PROTONIX) 40 MG tablet Take 40 mg by mouth every evening      !! oxyCODONE-acetaminophen (PERCOCET) 5-325 MG per tablet Take 1 tablet by mouth every 8 hours. Indications: Backache      fluticasone (FLONASE) 50 MCG/ACT nasal spray 1 spray by Each Nare route nightly      amLODIPine (NORVASC) 10 MG tablet Take 10 mg by mouth nightly       albuterol sulfate HFA (VENTOLIN HFA) 108 (90 Base) MCG/ACT inhaler Inhale 2 puffs into the lungs every 6 hours as needed for Wheezing or Shortness of Breath MAY USE ALTERNATIVE PER INSURANCE  Qty: 1 Inhaler, Refills: 11    Associated Diagnoses: Bronchitis      colestipol (COLESTID) 1 g tablet Take 1 g by mouth daily Indications: Diarrhea       gabapentin (NEURONTIN) 600 MG tablet Take 600 mg by mouth 3 times daily. cetirizine (ZYRTEC) 10 MG tablet Take 10 mg by mouth every morning        !! - Potential duplicate medications found. Please discuss with provider.           Time Spent on discharge is more than 30 minutes in the examination, evaluation, counseling and review

## 2019-04-08 NOTE — PROGRESS NOTES
Electrophysiology - PROGRESS NOTE    Admit Date: 4/4/2019     Chief Complaint: bradycardia, AV block     Interval History:   Patient seen and examined and notes reviewed. Patient is being followed for bradycardia, AV block. Patient with NSR, ST overnight. C/o generalized discomfort this am. NPC. No CP, some KHAN.      In: 360 [P.O.:360]  Out: 850    Wt Readings from Last 2 Encounters:   04/08/19 220 lb 3.8 oz (99.9 kg)   04/04/19 227 lb (103 kg)         Data:   Scheduled Meds:   Scheduled Meds:   cetirizine  10 mg Oral QAM    escitalopram  10 mg Oral Daily    cholestyramine light  4 g Oral Daily    fluticasone  1 spray Each Nare Nightly    gabapentin  600 mg Oral TID    losartan  25 mg Oral Daily    Mirabegron ER  1 tablet Oral QPM    oxyCODONE-acetaminophen  1 tablet Oral Q8H    pantoprazole  40 mg Oral QPM    sodium chloride flush  10 mL Intravenous 2 times per day    enoxaparin  40 mg Subcutaneous Daily    albuterol  2.5 mg Nebulization Q4H WA    azithromycin  500 mg Intravenous Q24H    cefepime  2 g Intravenous Q8H    furosemide  40 mg Oral Daily    predniSONE  2 mg Oral Daily    traZODone  50 mg Oral Nightly    vancomycin  1,250 mg Intravenous Q12H    hydrALAZINE  25 mg Oral 3 times per day     Continuous Infusions:  PRN Meds:.albuterol, sodium chloride flush, magnesium hydroxide, ondansetron, acetaminophen, calcium carbonate, sodium chloride (Inhalant)  Continuous Infusions:    Intake/Output Summary (Last 24 hours) at 4/8/2019 0826  Last data filed at 4/8/2019 0530  Gross per 24 hour   Intake 510 ml   Output 1650 ml   Net -1140 ml       CBC:   Lab Results   Component Value Date    WBC 9.3 04/08/2019    HGB 13.4 04/08/2019     04/08/2019     BMP:  Lab Results   Component Value Date     04/08/2019    K 4.7 04/08/2019    CL 98 04/08/2019    CO2 24 04/08/2019    BUN 10 04/08/2019    CREATININE 0.6 04/08/2019    GLUCOSE 92 04/08/2019    GLUCOSE 107 03/19/2012     INR:   Lab Results   Component Value Date    INR 1.13 04/04/2019    INR 1.1 05/07/2018    INR 0.97 02/20/2017        CARDIAC LABS  ENZYMES:  Recent Labs     04/05/19  0934   TROPONINI <0.01     FASTING LIPID PANEL:  Lab Results   Component Value Date    HDL 40 03/12/2018    HDL 61 07/30/2010    LDLDIRECT 79 04/08/2013    LDLCALC 91 03/12/2018    TRIG 284 03/12/2018    TSH 3.48 03/12/2018     LIVER PROFILE:  Lab Results   Component Value Date    AST 27 09/06/2018    AST 24 09/05/2018    ALT 16 09/06/2018    ALT 17 09/05/2018       -----------------------------------------------------------------  Telemetry: Personally reviewed  [facility]    Objective:   Vitals: BP (!) 142/58   Pulse 101   Temp 99.6 °F (37.6 °C) (Oral)   Resp 18   Ht 4' 11.84\" (1.52 m)   Wt 220 lb 3.8 oz (99.9 kg)   SpO2 96%   BMI 43.24 kg/m²   General appearance: alert, appears stated age and cooperative, No acute distress   Skin: Skin color, texture, turgor normal. No rashes or ecchymosis.   Neck: no JVD, supple, symmetrical, trachea midline   Lungs: , no accessory muscle use, no respiratory distress, exp wheezes throughout  Heart: tachy, reg  Abdomen: soft, non-tender; bowel sounds normal  Extremities: No edema, DP +  Psychiatric: normal insight and affect    Patient Active Problem List:     Candidiasis of skin and nails     Dermatophytosis of nail     Depressive disorder, not elsewhere classified     Obstructive sleep apnea syndrome     Diverticulosis of large intestine     Allergic rhinitis     Osteoporosis     Rheumatoid arthritis (HCC)     Diaphragmatic hernia     Mitral valve regurgitation     Granulomatous lung disease (HCC)     Complete tear of left rotator cuff     Biceps tendinitis on right     Postmenopausal osteoporosis     Asthma     Arthritis of right acromioclavicular joint     Glenohumeral arthritis right     Essential hypertension     Gastroesophageal reflux disease without esophagitis Mixed hyperlipidemia     Status post reverse total replacement of right shoulder     Chronic pain of right knee     Morbid obesity with BMI of 40.0-44.9, adult (HCC)     Chronic nonseasonal allergic rhinitis due to pollen     Mild intermittent asthma without complication     Interstitial lung disease (HCC)     Chronic low back pain     Disorder resulting from impaired renal function     Spinal stenosis     Status post total right knee replacement using cement on 2/20/2017     Major depressive disorder with single episode, in partial remission (HCC)     Rheumatoid arthritis of multiple sites with negative rheumatoid factor (HCC)     Osteoarthritis of lumbar spine     Immunosuppression ([facility] [address]     S/P left rotator cuff repair     Primary osteoarthritis of left shoulder     Recurrent cold sores     Diarrhea     Nausea with vomiting     Chronic diarrhea     Hypokalemia     Hypomagnesemia     Weight loss     Vertigo     Acute diverticulitis     Hypoxia     Chronic diastolic CHF (congestive heart failure) ([facility] [address]     Pneumonia due to organism     HB (heart block)        Assessment & Plan:      1. AV block  2. PNA    70 y.o. woman with a h/o HTN, HLD, RA, DEWAYNE on CPAP, HFpEF, who was admitted with increasing SOB and AMS, found to have PNA, noted to have nocturnal AV block, was not wearing CPAP at the time, asleep and no s/s at the time.      AV block  - Probably r/t DEWAYNE as patient has been compliant with CPAP and no further bradycardia AV block noted on tele  - EP will sign off for now    HFpEF  - Some JVD  - Neg 0.9 L  - BNP in am  - Lasix 40 mg daily  - Keep K+ > 4.0  - Check Mg - keep > 2.0      Tonny Pouch [address]

## 2019-04-08 NOTE — PROGRESS NOTES
Occupational Therapy  Facility/Department: [place] [id number] 4 PCU  Daily Treatment Note  NAME: Elma Santa  : 1940  MRN: [id number]    Date of Service: 2019    Discharge Recommendations:    lEma Santa scored a 19/24 on the AM-PAC ADL Inpatient form. Current research shows that an AM-PAC score of 17 or less is typically not associated with a discharge to the patient's home setting. Based on the patients AM-PAC score and their current ADL deficits, it is recommended that the patient have 3-5 sessions per week of Occupational Therapy at d/c to increase the patients independence. OT Equipment Recommendations  Equipment Needed: No  Other: Defer to next level of care    Assessment   Performance deficits / Impairments: Decreased functional mobility ; Decreased ADL status; Decreased endurance;Decreased high-level IADLs;Decreased balance;Decreased strength  Assessment: Functional independence is decreased from baseline. Pt limited by decreased activity tolerance and generalized weakness. She requires assist for bed mobility, CGA for gait and transfers, and assist with lower body ADLs. Anticipate improvement in independence with improvement in medical status as long as pt increases activity levels during admit. Treatment Diagnosis: Decreased activity tolerance, impaired ADLs and mobility 2/2 PNA  Prognosis: Good  REQUIRES OT FOLLOW UP: Yes  Activity Tolerance  Activity Tolerance: Patient Tolerated treatment well  Safety Devices  Safety Devices in place: Yes  Type of devices: Call light within reach;Nurse notified; Left in chair;Gait belt         Patient Diagnosis(es): The encounter diagnosis was Pneumonia due to organism.       has a past medical history of Allergic rhinitis, Asthma, Chicken pox, Chronic diastolic CHF (congestive heart failure) ([facility] [address] Depression, Diverticulosis of large intestine, Essential hypertension, Gastroesophageal reflux disease without esophagitis, GERD (gastroesophageal reflux disease), RW)    Functional Mobility  Functional - Mobility Device: Rolling Walker  Activity: To/from bathroom  Assist Level: Contact guard assistance(to SBA)    Toilet Transfers  Toilet - Technique: Ambulating(with RW)  Equipment Used: Standard toilet(with R grab bar)  Toilet Transfer: Contact guard assistance    Bed mobility  Rolling to Left: Minimal assistance  Supine to Sit: Minimal assistance(HOB elevated, use of bed rail)  Scooting: Minimal assistance(to EOB with increased time)     Transfers  Sit to stand: Contact guard assistance(initially off bed, then SBA from recliner)  Stand to sit: Contact guard assistance  Transfer Comments: VCs for safe t/f technique and safe use of RW                          Cognition  Overall Cognitive Status: WFL                                      Education: Role of OT, safe t/f training, safe use of DME, awareness of deficits, discharge planning, ADL as therapeutic exercise, importance of OOB, breathing techniques    Plan   Plan  Times per week: 2-5x  Times per day: Daily    AM-PAC Score        AM-[facility] Inpatient Daily Activity Raw Score: 19  AM-PAC Inpatient ADL T-Scale Score : 40.22  ADL Inpatient CMS 0-100% Score: 42.8  ADL Inpatient CMS G-Code Modifier : CK    Goals  Short term goals  Time Frame for Short term goals: by D/C  Short term goal 1: Increase standing/mobility tolerance to 8 min - Not met  Short term goal 2: Complete toileting with SBA - Not met  Short term goal 3: Kasi Alamin pants/underwear with SBA - Not met  Short term goal 4: Transfer to/from toilet and chairs with SBA - Not met  Patient Goals   Patient goals : recover, return home       Therapy Time   Individual Concurrent Group Co-treatment   Time In 0937         Time Out 1016         Minutes 39         Timed Code Treatment Minutes: 44 Minutes       If patient is discharged prior to next treatment session, this note will serve as the discharge summary.   Jose A Madison, OTR/L #[id number]

## 2019-04-08 NOTE — PROGRESS NOTES
Pt returned to room 4457. Will continue to monitor.  Electronically signed by Lizzy Brewer RN on 4/8/2019 at 5:33 PM

## 2019-04-09 ENCOUNTER — APPOINTMENT (OUTPATIENT)
Dept: CT IMAGING | Age: 79
DRG: 871 | End: 2019-04-09
Payer: MEDICARE

## 2019-04-09 LAB
(1,3)-BETA-D-GLUCAN (FUNGITELL) INTERPRETATION: ABNORMAL
(1,3)-BETA-D-GLUCAN (FUNGITELL): 62 PG/ML
ANION GAP SERPL CALCULATED.3IONS-SCNC: 10 MMOL/L (ref 3–16)
ASPERGILLUS GALACTO AG: NEGATIVE
ASPERGILLUS GALACTO AG: NEGATIVE
ASPERGILLUS GALACTO INDEX: 0.09
ASPERGILLUS GALACTO INDEX: 0.1
BASOPHILS ABSOLUTE: 0.1 K/UL (ref 0–0.2)
BASOPHILS RELATIVE PERCENT: 0.9 %
BLOOD CULTURE, ROUTINE: NORMAL
BUN BLDV-MCNC: 12 MG/DL (ref 7–20)
CALCIUM SERPL-MCNC: 8.7 MG/DL (ref 8.3–10.6)
CHLORIDE BLD-SCNC: 98 MMOL/L (ref 99–110)
CO2: 25 MMOL/L (ref 21–32)
CREAT SERPL-MCNC: 0.5 MG/DL (ref 0.6–1.2)
CULTURE, BLOOD 2: NORMAL
EOSINOPHILS ABSOLUTE: 0.3 K/UL (ref 0–0.6)
EOSINOPHILS RELATIVE PERCENT: 3.6 %
GFR AFRICAN AMERICAN: >60
GFR NON-AFRICAN AMERICAN: >60
GLUCOSE BLD-MCNC: 111 MG/DL (ref 70–99)
HCT VFR BLD CALC: 32.7 % (ref 36–48)
HCT VFR BLD CALC: 34.1 % (ref 36–48)
HEMOGLOBIN: 10.7 G/DL (ref 12–16)
HEMOGLOBIN: 10.9 G/DL (ref 12–16)
LYMPHOCYTES ABSOLUTE: 1.6 K/UL (ref 1–5.1)
LYMPHOCYTES RELATIVE PERCENT: 21.6 %
MCH RBC QN AUTO: 28.6 PG (ref 26–34)
MCHC RBC AUTO-ENTMCNC: 32.6 G/DL (ref 31–36)
MCV RBC AUTO: 87.8 FL (ref 80–100)
MONOCYTES ABSOLUTE: 1.2 K/UL (ref 0–1.3)
MONOCYTES RELATIVE PERCENT: 15.5 %
NEUTROPHILS ABSOLUTE: 4.4 K/UL (ref 1.7–7.7)
NEUTROPHILS RELATIVE PERCENT: 58.4 %
PDW BLD-RTO: 15.2 % (ref 12.4–15.4)
PLATELET # BLD: 242 K/UL (ref 135–450)
PMV BLD AUTO: 7.8 FL (ref 5–10.5)
POTASSIUM REFLEX MAGNESIUM: 4 MMOL/L (ref 3.5–5.1)
RBC # BLD: 3.72 M/UL (ref 4–5.2)
RHEUMATOID FACTOR: 39 IU/ML
SODIUM BLD-SCNC: 133 MMOL/L (ref 136–145)
WBC # BLD: 7.5 K/UL (ref 4–11)

## 2019-04-09 PROCEDURE — 2580000003 HC RX 258: Performed by: STUDENT IN AN ORGANIZED HEALTH CARE EDUCATION/TRAINING PROGRAM

## 2019-04-09 PROCEDURE — 36415 COLL VENOUS BLD VENIPUNCTURE: CPT

## 2019-04-09 PROCEDURE — 99233 SBSQ HOSP IP/OBS HIGH 50: CPT | Performed by: INTERNAL MEDICINE

## 2019-04-09 PROCEDURE — 87798 DETECT AGENT NOS DNA AMP: CPT

## 2019-04-09 PROCEDURE — 94668 MNPJ CHEST WALL SBSQ: CPT

## 2019-04-09 PROCEDURE — 94640 AIRWAY INHALATION TREATMENT: CPT

## 2019-04-09 PROCEDURE — 6360000002 HC RX W HCPCS: Performed by: STUDENT IN AN ORGANIZED HEALTH CARE EDUCATION/TRAINING PROGRAM

## 2019-04-09 PROCEDURE — 2060000000 HC ICU INTERMEDIATE R&B

## 2019-04-09 PROCEDURE — 6370000000 HC RX 637 (ALT 250 FOR IP): Performed by: INTERNAL MEDICINE

## 2019-04-09 PROCEDURE — 94761 N-INVAS EAR/PLS OXIMETRY MLT: CPT

## 2019-04-09 PROCEDURE — 2700000000 HC OXYGEN THERAPY PER DAY

## 2019-04-09 PROCEDURE — 6370000000 HC RX 637 (ALT 250 FOR IP): Performed by: STUDENT IN AN ORGANIZED HEALTH CARE EDUCATION/TRAINING PROGRAM

## 2019-04-09 PROCEDURE — 85025 COMPLETE CBC W/AUTO DIFF WBC: CPT

## 2019-04-09 PROCEDURE — 94660 CPAP INITIATION&MGMT: CPT

## 2019-04-09 PROCEDURE — 6360000004 HC RX CONTRAST MEDICATION: Performed by: FAMILY MEDICINE

## 2019-04-09 PROCEDURE — 80048 BASIC METABOLIC PNL TOTAL CA: CPT

## 2019-04-09 PROCEDURE — 94664 DEMO&/EVAL PT USE INHALER: CPT

## 2019-04-09 PROCEDURE — 74177 CT ABD & PELVIS W/CONTRAST: CPT

## 2019-04-09 PROCEDURE — 86038 ANTINUCLEAR ANTIBODIES: CPT

## 2019-04-09 PROCEDURE — 85018 HEMOGLOBIN: CPT

## 2019-04-09 PROCEDURE — 85014 HEMATOCRIT: CPT

## 2019-04-09 PROCEDURE — 86431 RHEUMATOID FACTOR QUANT: CPT

## 2019-04-09 PROCEDURE — 86255 FLUORESCENT ANTIBODY SCREEN: CPT

## 2019-04-09 RX ORDER — SACCHAROMYCES BOULARDII 250 MG
250 CAPSULE ORAL 2 TIMES DAILY
Status: DISCONTINUED | OUTPATIENT
Start: 2019-04-09 | End: 2019-04-17 | Stop reason: HOSPADM

## 2019-04-09 RX ADMIN — ONDANSETRON 4 MG: 2 INJECTION INTRAMUSCULAR; INTRAVENOUS at 16:03

## 2019-04-09 RX ADMIN — MUPIROCIN: 20 OINTMENT TOPICAL at 09:04

## 2019-04-09 RX ADMIN — CHOLESTYRAMINE 4 G: 4 POWDER, FOR SUSPENSION ORAL at 09:03

## 2019-04-09 RX ADMIN — HYDRALAZINE HYDROCHLORIDE 25 MG: 25 TABLET, FILM COATED ORAL at 14:12

## 2019-04-09 RX ADMIN — Medication 10 ML: at 09:04

## 2019-04-09 RX ADMIN — CETIRIZINE HYDROCHLORIDE 10 MG: 10 TABLET, FILM COATED ORAL at 09:03

## 2019-04-09 RX ADMIN — GABAPENTIN 600 MG: 600 TABLET, FILM COATED ORAL at 09:03

## 2019-04-09 RX ADMIN — OXYCODONE HYDROCHLORIDE AND ACETAMINOPHEN 1 TABLET: 5; 325 TABLET ORAL at 00:07

## 2019-04-09 RX ADMIN — ALBUTEROL SULFATE 2.5 MG: 2.5 SOLUTION RESPIRATORY (INHALATION) at 21:10

## 2019-04-09 RX ADMIN — GABAPENTIN 600 MG: 600 TABLET, FILM COATED ORAL at 14:12

## 2019-04-09 RX ADMIN — ANORECTAL OINTMENT: 15.7; .44; 24; 20.6 OINTMENT TOPICAL at 15:59

## 2019-04-09 RX ADMIN — PREDNISONE 2 MG: 1 TABLET ORAL at 09:03

## 2019-04-09 RX ADMIN — GABAPENTIN 600 MG: 600 TABLET, FILM COATED ORAL at 20:16

## 2019-04-09 RX ADMIN — AZITHROMYCIN DIHYDRATE 500 MG: 500 INJECTION, POWDER, LYOPHILIZED, FOR SOLUTION INTRAVENOUS at 01:40

## 2019-04-09 RX ADMIN — ALBUTEROL SULFATE 2.5 MG: 2.5 SOLUTION RESPIRATORY (INHALATION) at 08:00

## 2019-04-09 RX ADMIN — MUPIROCIN: 20 OINTMENT TOPICAL at 14:12

## 2019-04-09 RX ADMIN — CEFEPIME HYDROCHLORIDE 2 G: 2 INJECTION, POWDER, FOR SOLUTION INTRAVENOUS at 04:07

## 2019-04-09 RX ADMIN — ALBUTEROL SULFATE 2.5 MG: 2.5 SOLUTION RESPIRATORY (INHALATION) at 17:04

## 2019-04-09 RX ADMIN — ENOXAPARIN SODIUM 40 MG: 40 INJECTION SUBCUTANEOUS at 09:04

## 2019-04-09 RX ADMIN — MUPIROCIN: 20 OINTMENT TOPICAL at 20:17

## 2019-04-09 RX ADMIN — ESCITALOPRAM OXALATE 10 MG: 10 TABLET ORAL at 09:03

## 2019-04-09 RX ADMIN — FUROSEMIDE 40 MG: 40 TABLET ORAL at 09:03

## 2019-04-09 RX ADMIN — MUPIROCIN: 20 OINTMENT TOPICAL at 18:28

## 2019-04-09 RX ADMIN — Medication 10 ML: at 20:17

## 2019-04-09 RX ADMIN — PANTOPRAZOLE SODIUM 40 MG: 40 TABLET, DELAYED RELEASE ORAL at 18:27

## 2019-04-09 RX ADMIN — HYDRALAZINE HYDROCHLORIDE 25 MG: 25 TABLET, FILM COATED ORAL at 20:16

## 2019-04-09 RX ADMIN — Medication 250 MG: at 20:16

## 2019-04-09 RX ADMIN — OXYCODONE HYDROCHLORIDE AND ACETAMINOPHEN 1 TABLET: 5; 325 TABLET ORAL at 09:03

## 2019-04-09 RX ADMIN — Medication 250 MG: at 11:07

## 2019-04-09 RX ADMIN — IOPAMIDOL 80 ML: 755 INJECTION, SOLUTION INTRAVENOUS at 09:47

## 2019-04-09 RX ADMIN — FLUTICASONE PROPIONATE 1 SPRAY: 50 SPRAY, METERED NASAL at 20:16

## 2019-04-09 RX ADMIN — ONDANSETRON 4 MG: 2 INJECTION INTRAMUSCULAR; INTRAVENOUS at 08:50

## 2019-04-09 RX ADMIN — HYDRALAZINE HYDROCHLORIDE 25 MG: 25 TABLET, FILM COATED ORAL at 04:54

## 2019-04-09 RX ADMIN — LOSARTAN POTASSIUM 25 MG: 25 TABLET, FILM COATED ORAL at 09:03

## 2019-04-09 RX ADMIN — TRAZODONE HYDROCHLORIDE 50 MG: 50 TABLET ORAL at 20:16

## 2019-04-09 RX ADMIN — OXYCODONE HYDROCHLORIDE AND ACETAMINOPHEN 1 TABLET: 5; 325 TABLET ORAL at 17:18

## 2019-04-09 ASSESSMENT — PAIN SCALES - GENERAL
PAINLEVEL_OUTOF10: 0
PAINLEVEL_OUTOF10: 5
PAINLEVEL_OUTOF10: 0
PAINLEVEL_OUTOF10: 6
PAINLEVEL_OUTOF10: 8
PAINLEVEL_OUTOF10: 6

## 2019-04-09 ASSESSMENT — PAIN DESCRIPTION - PROGRESSION
CLINICAL_PROGRESSION: GRADUALLY WORSENING

## 2019-04-09 ASSESSMENT — PAIN - FUNCTIONAL ASSESSMENT: PAIN_FUNCTIONAL_ASSESSMENT: ACTIVITIES ARE NOT PREVENTED

## 2019-04-09 ASSESSMENT — PAIN DESCRIPTION - PAIN TYPE: TYPE: CHRONIC PAIN

## 2019-04-09 ASSESSMENT — PAIN DESCRIPTION - ONSET: ONSET: GRADUAL

## 2019-04-09 ASSESSMENT — PAIN DESCRIPTION - LOCATION: LOCATION: BACK

## 2019-04-09 ASSESSMENT — PAIN DESCRIPTION - FREQUENCY: FREQUENCY: INTERMITTENT

## 2019-04-09 ASSESSMENT — PAIN DESCRIPTION - ORIENTATION: ORIENTATION: MID;LOWER

## 2019-04-09 ASSESSMENT — PAIN DESCRIPTION - DESCRIPTORS: DESCRIPTORS: ACHING

## 2019-04-09 NOTE — PROGRESS NOTES
ID Follow-up NOTE  RESIDENT NOTE - reviewed / edited, attending note at bottom    CC:   Fever    Antibiotics: Azithromycin, cefepime,vancomycin     Admit Date: 4/4/2019  Hospital Day: 6    Subjective:     Patient endorsing left lower quadrant pain. Per daughter, patient slept for most of the night. Objective:     Patient Vitals for the past 8 hrs:   BP Temp Temp src Pulse Resp SpO2 Weight   04/09/19 0505 -- -- -- -- -- 93 % --   04/09/19 0455 -- -- -- -- -- -- 221 lb 5.5 oz (100.4 kg)   04/09/19 0445 (!) 147/61 99 °F (37.2 °C) Oral 92 20 94 % --   04/09/19 0145 -- 99.4 °F (37.4 °C) Oral -- -- -- --   04/09/19 0103 -- -- -- -- 20 92 % --   04/08/19 2352 (!) 150/61 100.4 °F (38 °C) Oral -- 20 -- --     I/O last 3 completed shifts: In: 680 [P.O.:360; I.V.:320]  Out: 2250 [Urine:2250]  No intake/output data recorded. EXAM:  GENERAL: No apparent distress.     HEENT: Membranes moist, no oral lesion  NECK:  Supple  LUNGS: Rales b/lm labored breathing  CARDIAC: Tachycardic, no murmur, rub, gallop  ABD:  Tender in LLQ  EXT:  B/l ulnar deviation of fingers  NEURO: No focal neurologic findings  PSYCH: Orientation, sensorium, mood normal    Data Review:  Lab Results   Component Value Date    WBC 7.5 04/09/2019    HGB 10.7 (L) 04/09/2019    HCT 32.7 (L) 04/09/2019    MCV 87.8 04/09/2019     04/09/2019     Lab Results   Component Value Date    CREATININE 0.5 (L) 04/09/2019    BUN 12 04/09/2019     (L) 04/09/2019    K 4.0 04/09/2019    CL 98 (L) 04/09/2019    CO2 25 04/09/2019       Hepatic Function Panel:   Lab Results   Component Value Date    ALKPHOS 87 09/06/2018    ALT 16 09/06/2018    AST 27 09/06/2018    PROT 5.4 09/06/2018    PROT 6.8 06/19/2012    BILITOT 0.7 09/06/2018    BILIDIR 0.6 09/02/2018    IBILI 0.5 09/02/2018    LABALBU 2.9 09/06/2018     Micro:  4/4 Blood culture NGTD  4/5 Rapid influenza negative   4/5 Histoplasma antigen in urine not detected, Strep, Legionella - negative   4/6 AFB- not detected  4/6 respiratory panel PCR- negative        4/6 CMV, HSV,  viral respiratory culture, blood culture   - pending      Imaging:   CT chest without contrast  Impression       The central airways are patent.         Multifocal ill-defined patchy upper zone and left lower lobe predominant    peripheral nonspecific airspace opacities now seen since 2017 and may be    inflammatory, infectious.  Clinically correlate.       Background of chronic interstitial changes again noted.      4/8 CTPA   Impression      1. No evidence of pulmonary embolism.       2. Extensive bilateral groundglass opacities are increased compared to 4/5/2019 likely representing multifocal pneumonia superimposed on chronic interstitial fibrosis. MRI lumbar spine - pending      Scheduled Meds:   mupirocin   Topical 4x Daily    cetirizine  10 mg Oral QAM    escitalopram  10 mg Oral Daily    cholestyramine light  4 g Oral Daily    fluticasone  1 spray Each Nare Nightly    gabapentin  600 mg Oral TID    losartan  25 mg Oral Daily    Mirabegron ER  1 tablet Oral QPM    oxyCODONE-acetaminophen  1 tablet Oral Q8H    pantoprazole  40 mg Oral QPM    sodium chloride flush  10 mL Intravenous 2 times per day    enoxaparin  40 mg Subcutaneous Daily    albuterol  2.5 mg Nebulization Q4H WA    azithromycin  500 mg Intravenous Q24H    cefepime  2 g Intravenous Q8H    furosemide  40 mg Oral Daily    predniSONE  2 mg Oral Daily    traZODone  50 mg Oral Nightly    vancomycin  1,250 mg Intravenous Q12H    hydrALAZINE  25 mg Oral 3 times per day       Continuous Infusions:      PRN Meds:  albuterol, sodium chloride flush, magnesium hydroxide, ondansetron, acetaminophen, calcium carbonate, sodium chloride (Inhalant)    Assessment:     Yumiko Finch a 70 y. o. female with a past medical history of interstitial lung disease (on chronic steroids), rheumatoid arthritis (on Humira and leflunamide), HFpEF admitted for SOB and confusion, suspect HCAP as cause.     Bronchoscopy with BAL on 4/5/2019- bronchial inflammation and increased mucoid secretions     4/7 Tm 101.6  4/8 Tm 100. 4   4/9  Afebrile overnight, WBC 7.8    Plan:     - recommend holding further antibiotic administration  -f/u1, 3 beta d glucan and gallactomanan, mycoplasma   -CTPA- demonstrates worsening lung dz, no PE  -reviewed CT images with radiology  -f/u MRI spine     Discussed with Dr. Jasmin Joyner MD PGY-1    Addendum to Resident Progress note:  Pt seen, examined and evaluated. I have reviewed the current history, physical findings, labs and assessment and plan and agree with note as documented by resident (Dr. Lorenzo Carpio).    71 yo woman with hx interstitial lung (on chronic steroid, pred 2.5 mg /d), RA (has received humira, leflunamide, CHF, DEWAYNE  Hx mult admissions, on last admission 3/25, discharged on oxygen     Admit 4/4 - fever 102.6, WBC 11.2. Started on azithro, cefepime, vanco  Seen by Pul, had bronch / BAL on 4/5 - inflamed airways, 'mucoid' secretions     CT with 'groundglass opacities'; f/u 4/8 - no PE, 'extensive bilateral groundglass opacities are increased'  W/u - non-diagnostic (see above)     Today 4/8 - Tm 99.7.   On 5L    IMP/  Immunocompromised  ILD / RA  Pul densities worse on f/u CT 4/8  Fever - unclear source.       REC/  Consider d/c antibiotic  Will f/u on pending tests  Will review CT with Radiologist  Lumbar MRI ordered  Will confer with Pul     Discussed with pt  Kevan Snellen, MD

## 2019-04-09 NOTE — PLAN OF CARE
Problem: Falls - Risk of:  Goal: Will remain free from falls  Description  Will remain free from falls  4/9/2019 0324 by Josue Oneil RN  Outcome: Ongoing   Fall precautions in place. Bed alarm activated, low position, wheels locked. Up with assist x 1 with walker. Patient calls appropriately for assistance. Call light and belongings within reach. Patient encouraged to call nurse with needs and concerns. Continue to monitor safety. Problem: Airway Clearance - Ineffective:  Goal: Clear lung sounds  Description  Clear lung sounds  Outcome: Ongoing  Lungs with scattered crackles and expiratory wheezes. Receiving breathing tx, IV antibiotics. HS CPAP. Problem: Gas Exchange - Impaired:  Goal: Levels of oxygenation will improve  Description  Levels of oxygenation will improve  Outcome: Ongoing  SpO2 89% 4L Hi-flow while sleeping. Oxygen increased to 5L, CPAP applied. Problem: Hyperthermia:  Goal: Ability to maintain a body temperature in the normal range will improve  Description  Ability to maintain a body temperature in the normal range will improve  Outcome: Ongoing   Temp 100.4. Medicated with scheduled percocet. Re-check 99.4. Will monitor. Problem: Pain:  Goal: Pain level will decrease  Description  Pain level will decrease  Outcome: Ongoing   Scheduled percocet administered for back pain.

## 2019-04-09 NOTE — CARE COORDINATION
1939 [address] predict tool not give at this time. Predict Tool recommending SNF placement, plan is for patient to return home with family support and [facility] services to be resumed by [facility].       Thank Mir Espinosa RN  Care Transition Coordinator  Contact [id number]:[phone]

## 2019-04-09 NOTE — PROGRESS NOTES
Internal Medicine PGY-1 Resident Progress Note        PCP: Charlene Marrero    Date of Admission: 4/4/2019    Chief Complaint: Confusion; shortness of breath; fevers     Hospital Course:     Mamadou Sandoval is a 70 y.o. female with a past medical history of interstitial lung disease (on chronic steroids), rheumatoid arthritis (on Humira and leflunamide), HFpEF (grade II diastolic dysfunction), hypertension and depression who presented to [facility] with confusion and shortness of breath at home, recently discharged on 3/28 on 3L O2 while being treated for PNA. Had run of Mobitz II HB with PVCs on night of admission, seen by EPS (recommended strict compliance with nightly CPAP for paroxysmal asymptomatic AV block, noctural). She was admitted with sepsis 2/2 HCAP started on broad-spectrum abx and underwent bronchoscopy (4/5) with BAL of BEN and RUL which identified bronchial inflammation and increased mucoid secretions. Of note she has received BL L1, L2 medial branch ablations on 2/27 at [address] [place] [address], followed by facet injections at [facility] on 1/23/2019. Subjective:     AAOx3, fatigued, denies any significant improvement in breathing. Febrile overnight to 100.4. Currently on 5L NC. Reports fevers, chills, and persistent back pain. Denies neck stiffness. Still has non-productive cough. Last BM yesterday, no overt signs of bleeding.      Medications:  Reviewed    Infusion Medications   Scheduled Medications    saccharomyces boulardii  250 mg Oral BID    mupirocin   Topical 4x Daily    cetirizine  10 mg Oral QAM    escitalopram  10 mg Oral Daily    cholestyramine light  4 g Oral Daily    fluticasone  1 spray Each Nare Nightly    gabapentin  600 mg Oral TID    losartan  25 mg Oral Daily    Mirabegron ER  1 tablet Oral QPM    oxyCODONE-acetaminophen  1 tablet Oral Q8H    pantoprazole  40 mg Oral QPM    sodium chloride flush  10 mL Intravenous 2 times per day    enoxaparin  40 mg Subcutaneous Daily    albuterol  2.5 mg Nebulization Q4H WA    azithromycin  500 mg Intravenous Q24H    cefepime  2 g Intravenous Q8H    furosemide  40 mg Oral Daily    predniSONE  2 mg Oral Daily    traZODone  50 mg Oral Nightly    vancomycin  1,250 mg Intravenous Q12H    hydrALAZINE  25 mg Oral 3 times per day     PRN Meds: albuterol, sodium chloride flush, magnesium hydroxide, ondansetron, acetaminophen, calcium carbonate, sodium chloride (Inhalant)      Intake/Output Summary (Last 24 hours) at 4/9/2019 0908  Last data filed at 4/9/2019 0446  Gross per 24 hour   Intake 680 ml   Output 2250 ml   Net -1570 ml       Physical Exam Performed:    BP (!) 144/60   Pulse 108   Temp 99.7 °F (37.6 °C) (Oral)   Resp 20   Ht 4' 11.84\" (1.52 m)   Wt 221 lb 5.5 oz (100.4 kg)   SpO2 94%   BMI 43.45 kg/m²       General appearance:  Patient has mildly labored breathing and appears short of breath. Pt was somnolent   HEENT:  Normal cephalic, atraumatic without obviousdeformity. Pupils equal, round, and reactive to light. Extra ocular muscles intact. Conjunctivae/corneas clear. Neck: Supple, with full range of motion. No jugular venous distention. Trachea midline. Respiratory: Labored breathing. Velcro like crackles present in bases bilaterally  Cardiovascular: Tachycardia, regular rhythm. Jugular venous distension present. Trace pitting edema  Abdomen: Soft, non-tender,non-distended with normal bowel sounds. Tender to deep palpation of LLQ  Musculoskeletal:  No clubbing or cyanosis present. BL ulnar deviation, selin notes, swan neck defomities. Exquisitely tender to palpation L1-L2 spinal processes  Skin: Skin color, texture, turgor normal.  No rashes or lesions. Neurologic:  Neurovascularly intact without any focal sensory/motor deficits.  Cranial nerves: II-XII intact, grosslynon-focal.  Psychiatric:  AOx3, thought content appropriate, normal insight  Capillary Refill: Brisk,< 3 seconds   Peripheral Pulses: +2 palpable, equal bilaterally       Labs:   Recent Labs     04/07/19  0532 04/08/19  0442 04/09/19  0435   WBC 8.3 9.3 7.5   HGB 12.1 13.4 10.7*   HCT 36.7 41.2 32.7*    218 242     Recent Labs     04/07/19  0532 04/08/19  0442 04/09/19  0435    138 133*   K 4.1 4.7 4.0    98* 98*   CO2 27 24 25   BUN 11 10 12   CREATININE 0.7 0.6 0.5*   CALCIUM 8.8 9.4 8.7     No results for input(s): AST, ALT, BILIDIR, BILITOT, ALKPHOS in the last 72 hours. No results for input(s): INR in the last 72 hours. No results for input(s): Evern Ida in the last 72 hours. Urinalysis:      Lab Results   Component Value Date    NITRU Negative 04/04/2019    WBCUA 0-2 04/04/2019    BACTERIA 1+ 09/02/2018    RBCUA 0-2 04/04/2019    BLOODU Negative 04/04/2019    SPECGRAV <=1.005 04/04/2019    GLUCOSEU Negative 04/04/2019       Radiology:  CTA PULMONARY W CONTRAST   Final Result      1. No evidence of pulmonary embolism. 2. Extensive bilateral groundglass opacities are increased compared to 4/5/2019 likely representing multifocal pneumonia superimposed on chronic interstitial fibrosis. CT CHEST WO CONTRAST   Final Result       The central airways are patent. Multifocal ill-defined patchy upper zone and left lower lobe predominant    peripheral nonspecific airspace opacities now seen since 2017 and may be    inflammatory, infectious. Clinically correlate. Background of chronic interstitial changes again noted. XR CHEST PORTABLE   Final Result      Mild bilateral interstitial and airspace opacities, not significantly changed compared to prior, representing edema or infection.          MRI LUMBAR SPINE WO CONTRAST    (Results Pending)   CT ABDOMEN PELVIS W IV CONTRAST Additional Contrast? None    (Results Pending)     Assessment/Plan:    Heriberto Berry is a 70 y.o. female with a past medical history of interstitial lung disease (on chronic steroids), rheumatoid arthritis (on Humira and leflunamide), HFpEF admitted for SOB and confusion, suspect HCAP as cause. Acute on chronic hypoxic respiratory failure in the setting of known interstitial lung disease with superimposed PNA, suspect fungal  - increased O2 requirement to 5L  - CTPA performed 4/8 negative for PE, Extensive bilateral groundglass opacities are increased compared to 4/5/2019 likely representing multifocal pneumonia superimposed on chronic interstitial fibrosis. - acapella  - appreciate pulmonology recs    Sepsis 2/2 health-care associated pneumonia, suspect fungal however still febrile despite broad-spectrum abx  On admission Tmax 102.6, RR 22, CT chest: multifocal patchy upper and LL airspace opacities, suspect infectious  Continues to be tachycardic and febrile  - Will D/C abx at this time  - supplemental O2 - currently 5L, goal SpO2 >88%  - Albuterol nebulizers   - infectious differential broad given patient is on immunocompromising agents  - procal mildly elevated at 0.27, suggestive of focal infection  - elevated ESR and CRP   - resp panel PCR neg, AFB neg, histo/strep/legionella neg, blood culture NGtD  - aspergillus, 1,3 beta d glucan, PCP sputum induction pending  - given focal tenderness to palpation of L1-L2 spine with recent facet injections, will obtain MRI lumbar w/o contrast to r/o any paraspinal abscess/discitis as source of fever  -CT abdo/pelvis for LLQ pain  - probiotics added   - appreciate ID recommendations    2nd degree heart block - Mobitz type two  - CPAP nightly for DEWAYNE  - Paroxysmal AV block - asymptomatic, nocturnal, in the setting of not using CPAP  - no recommendation for pacemaker as of now, unless block persists despite respiration improvement  - cardiology following    HFpEF -Last echocardiogram (3/25/2019) showing grade II diastolic dysfunction. Patient does not appear to be volume overloaded at this time.  JVD appreciated on physical exam.   -Furosemide 40 mg daily     Interstitial Lung Disease -Patient has a long history of ILD (suspect 2/2 RA and/or MTX-induced)   -followed by pulmonology (Dr. Chance Espinosa) outpatient  -prednisone 2 mg PO qD     Rheumatoid arthritis   - Holding home immunosuppressants in the setting of suspected PNA   - percocet for chronic back/joint pain      Hypertension   -Continue home losartan 25 mg nighty   -holding amlodipine in setting of heart block   -hydralazine 25 mg PO TID     Depression   -lexapro 10 mg PO qD  -trazodone 50 mg PO qHs    Morbid obesity w/ BMI: 95.8  Complicating assessment and treatment.  Placing patient at risk for multiple co-morbidities as well as early death and contributing to the patient's presentation.      DVT Prophylaxis: Lovenox   Diet: General diet  Code Status: Limited (no x 4)      PT/OT Eval Status: 19/24 OT  Alisha Combs MD    I will discuss the patient with the senior resident and MD Rg Tate MD  Internal Medicine Resident PGY-1

## 2019-04-09 NOTE — PROGRESS NOTES
04/07/19  0532 04/08/19  0442 04/09/19  0435 04/09/19  1535   WBC 8.3 9.3 7.5  --    HGB 12.1 13.4 10.7* 10.9*   HCT 36.7 41.2 32.7* 34.1*   MCV 87.3 86.8 87.8  --     218 242  --      BMP:   Recent Labs     04/07/19  0532 04/08/19  0442 04/09/19  0435    138 133*   K 4.1 4.7 4.0    98* 98*   CO2 27 24 25   BUN 11 10 12   CREATININE 0.7 0.6 0.5*     No results for input(s): PHART, PHZ0KNQ, PO2ART in the last 72 hours. LIVER PROFILE: No results for input(s): AST, ALT, LIPASE, BILIDIR, BILITOT, ALKPHOS in the last 72 hours. Invalid input(s): AMYLASE,  ALB    CXR REVIEWED BY ME AND SHOWED:  CT ABDOMEN PELVIS W IV CONTRAST Additional Contrast? None   Final Result      1. No evidence of obstruction or free air. No acute abdominal or pelvic abnormality clearly identified. 2. Diverticulosis without evidence of focal diverticulitis. 3. Incidental indeterminate pedunculated cystic type lesion in the posterior left kidney. Follow-up CT renal mass protocol or MRI renal mass protocol in 3 months is recommended to evaluate the lesion further and to document stability. CTA PULMONARY W CONTRAST   Final Result      1. No evidence of pulmonary embolism. 2. Extensive bilateral groundglass opacities are increased compared to 4/5/2019 likely representing multifocal pneumonia superimposed on chronic interstitial fibrosis. CT CHEST WO CONTRAST   Final Result       The central airways are patent. Multifocal ill-defined patchy upper zone and left lower lobe predominant    peripheral nonspecific airspace opacities now seen since 2017 and may be    inflammatory, infectious. Clinically correlate. Background of chronic interstitial changes again noted. XR CHEST PORTABLE   Final Result      Mild bilateral interstitial and airspace opacities, not significantly changed compared to prior, representing edema or infection.          MRI LUMBAR SPINE WO CONTRAST    (Results the morning. If this is bronchiolitis obliterans organizing pneumonia AKA Boop then she should have a dramatic response in terms of her infiltrative lung disease within 72 hours and she will need a very long slow taper of steroids.     Andrei Stock MD

## 2019-04-10 LAB
ANION GAP SERPL CALCULATED.3IONS-SCNC: 12 MMOL/L (ref 3–16)
ANTI-NUCLEAR ANTIBODY (ANA): NEGATIVE
ASPERGILLUS GALACTO AG: NEGATIVE
ASPERGILLUS GALACTO INDEX: 0.05
BANDED NEUTROPHILS RELATIVE PERCENT: 1 % (ref 0–7)
BASOPHILS ABSOLUTE: 0 K/UL (ref 0–0.2)
BASOPHILS RELATIVE PERCENT: 0 %
BUN BLDV-MCNC: 15 MG/DL (ref 7–20)
CALCIUM SERPL-MCNC: 9.4 MG/DL (ref 8.3–10.6)
CHLORIDE BLD-SCNC: 99 MMOL/L (ref 99–110)
CO2: 25 MMOL/L (ref 21–32)
CREAT SERPL-MCNC: 0.6 MG/DL (ref 0.6–1.2)
EOSINOPHILS ABSOLUTE: 0.2 K/UL (ref 0–0.6)
EOSINOPHILS RELATIVE PERCENT: 3 %
GFR AFRICAN AMERICAN: >60
GFR NON-AFRICAN AMERICAN: >60
GLUCOSE BLD-MCNC: 89 MG/DL (ref 70–99)
HCT VFR BLD CALC: 34.4 % (ref 36–48)
HEMOGLOBIN: 11 G/DL (ref 12–16)
LYMPHOCYTES ABSOLUTE: 2.1 K/UL (ref 1–5.1)
LYMPHOCYTES RELATIVE PERCENT: 28 %
MCH RBC QN AUTO: 28 PG (ref 26–34)
MCHC RBC AUTO-ENTMCNC: 31.9 G/DL (ref 31–36)
MCV RBC AUTO: 87.7 FL (ref 80–100)
MONOCYTES ABSOLUTE: 1.2 K/UL (ref 0–1.3)
MONOCYTES RELATIVE PERCENT: 16 %
NEUTROPHILS ABSOLUTE: 4 K/UL (ref 1.7–7.7)
NEUTROPHILS RELATIVE PERCENT: 52 %
PDW BLD-RTO: 15.1 % (ref 12.4–15.4)
PLATELET # BLD: 308 K/UL (ref 135–450)
PMV BLD AUTO: 8 FL (ref 5–10.5)
POTASSIUM REFLEX MAGNESIUM: 4 MMOL/L (ref 3.5–5.1)
RBC # BLD: 3.92 M/UL (ref 4–5.2)
SODIUM BLD-SCNC: 136 MMOL/L (ref 136–145)
WBC # BLD: 7.5 K/UL (ref 4–11)

## 2019-04-10 PROCEDURE — 94640 AIRWAY INHALATION TREATMENT: CPT

## 2019-04-10 PROCEDURE — 94668 MNPJ CHEST WALL SBSQ: CPT

## 2019-04-10 PROCEDURE — 94150 VITAL CAPACITY TEST: CPT

## 2019-04-10 PROCEDURE — 99232 SBSQ HOSP IP/OBS MODERATE 35: CPT | Performed by: INTERNAL MEDICINE

## 2019-04-10 PROCEDURE — 2580000003 HC RX 258: Performed by: STUDENT IN AN ORGANIZED HEALTH CARE EDUCATION/TRAINING PROGRAM

## 2019-04-10 PROCEDURE — 6370000000 HC RX 637 (ALT 250 FOR IP): Performed by: FAMILY MEDICINE

## 2019-04-10 PROCEDURE — 2060000000 HC ICU INTERMEDIATE R&B

## 2019-04-10 PROCEDURE — 94660 CPAP INITIATION&MGMT: CPT

## 2019-04-10 PROCEDURE — 6370000000 HC RX 637 (ALT 250 FOR IP): Performed by: STUDENT IN AN ORGANIZED HEALTH CARE EDUCATION/TRAINING PROGRAM

## 2019-04-10 PROCEDURE — 97530 THERAPEUTIC ACTIVITIES: CPT

## 2019-04-10 PROCEDURE — 36415 COLL VENOUS BLD VENIPUNCTURE: CPT

## 2019-04-10 PROCEDURE — 6360000002 HC RX W HCPCS: Performed by: STUDENT IN AN ORGANIZED HEALTH CARE EDUCATION/TRAINING PROGRAM

## 2019-04-10 PROCEDURE — 97116 GAIT TRAINING THERAPY: CPT

## 2019-04-10 PROCEDURE — 85025 COMPLETE CBC W/AUTO DIFF WBC: CPT

## 2019-04-10 PROCEDURE — 99233 SBSQ HOSP IP/OBS HIGH 50: CPT | Performed by: INTERNAL MEDICINE

## 2019-04-10 PROCEDURE — 2700000000 HC OXYGEN THERAPY PER DAY

## 2019-04-10 PROCEDURE — 80048 BASIC METABOLIC PNL TOTAL CA: CPT

## 2019-04-10 PROCEDURE — 82785 ASSAY OF IGE: CPT

## 2019-04-10 PROCEDURE — 6360000002 HC RX W HCPCS: Performed by: INTERNAL MEDICINE

## 2019-04-10 PROCEDURE — 94761 N-INVAS EAR/PLS OXIMETRY MLT: CPT

## 2019-04-10 RX ORDER — PANTOPRAZOLE SODIUM 40 MG/1
40 TABLET, DELAYED RELEASE ORAL
Status: DISCONTINUED | OUTPATIENT
Start: 2019-04-10 | End: 2019-04-17 | Stop reason: HOSPADM

## 2019-04-10 RX ORDER — METHYLPREDNISOLONE SODIUM SUCCINATE 125 MG/2ML
125 INJECTION, POWDER, LYOPHILIZED, FOR SOLUTION INTRAMUSCULAR; INTRAVENOUS EVERY 6 HOURS
Status: DISCONTINUED | OUTPATIENT
Start: 2019-04-10 | End: 2019-04-13

## 2019-04-10 RX ADMIN — OXYCODONE HYDROCHLORIDE AND ACETAMINOPHEN 1 TABLET: 5; 325 TABLET ORAL at 00:10

## 2019-04-10 RX ADMIN — FLUTICASONE PROPIONATE 1 SPRAY: 50 SPRAY, METERED NASAL at 20:30

## 2019-04-10 RX ADMIN — METHYLPREDNISOLONE SODIUM SUCCINATE 125 MG: 125 INJECTION, POWDER, FOR SOLUTION INTRAMUSCULAR; INTRAVENOUS at 20:31

## 2019-04-10 RX ADMIN — ALBUTEROL SULFATE 2.5 MG: 2.5 SOLUTION RESPIRATORY (INHALATION) at 14:23

## 2019-04-10 RX ADMIN — NYSTATIN 500000 UNITS: 100000 SUSPENSION ORAL at 20:30

## 2019-04-10 RX ADMIN — MUPIROCIN: 20 OINTMENT TOPICAL at 16:16

## 2019-04-10 RX ADMIN — Medication 10 ML: at 09:26

## 2019-04-10 RX ADMIN — OXYCODONE HYDROCHLORIDE AND ACETAMINOPHEN 1 TABLET: 5; 325 TABLET ORAL at 17:36

## 2019-04-10 RX ADMIN — OXYCODONE HYDROCHLORIDE AND ACETAMINOPHEN 1 TABLET: 5; 325 TABLET ORAL at 23:39

## 2019-04-10 RX ADMIN — NYSTATIN 500000 UNITS: 100000 SUSPENSION ORAL at 16:04

## 2019-04-10 RX ADMIN — METHYLPREDNISOLONE SODIUM SUCCINATE 125 MG: 125 INJECTION, POWDER, FOR SOLUTION INTRAMUSCULAR; INTRAVENOUS at 16:04

## 2019-04-10 RX ADMIN — Medication 10 ML: at 20:37

## 2019-04-10 RX ADMIN — NYSTATIN 500000 UNITS: 100000 SUSPENSION ORAL at 19:08

## 2019-04-10 RX ADMIN — CHOLESTYRAMINE 4 G: 4 POWDER, FOR SUSPENSION ORAL at 09:25

## 2019-04-10 RX ADMIN — Medication 250 MG: at 20:30

## 2019-04-10 RX ADMIN — MUPIROCIN: 20 OINTMENT TOPICAL at 09:25

## 2019-04-10 RX ADMIN — ALBUTEROL SULFATE 2.5 MG: 2.5 SOLUTION RESPIRATORY (INHALATION) at 10:50

## 2019-04-10 RX ADMIN — GABAPENTIN 600 MG: 600 TABLET, FILM COATED ORAL at 20:30

## 2019-04-10 RX ADMIN — PANTOPRAZOLE SODIUM 40 MG: 40 TABLET, DELAYED RELEASE ORAL at 17:37

## 2019-04-10 RX ADMIN — HYDRALAZINE HYDROCHLORIDE 25 MG: 25 TABLET, FILM COATED ORAL at 16:04

## 2019-04-10 RX ADMIN — GABAPENTIN 600 MG: 600 TABLET, FILM COATED ORAL at 09:24

## 2019-04-10 RX ADMIN — HYDRALAZINE HYDROCHLORIDE 25 MG: 25 TABLET, FILM COATED ORAL at 05:12

## 2019-04-10 RX ADMIN — HYDRALAZINE HYDROCHLORIDE 25 MG: 25 TABLET, FILM COATED ORAL at 20:30

## 2019-04-10 RX ADMIN — METHYLPREDNISOLONE SODIUM SUCCINATE 125 MG: 125 INJECTION, POWDER, FOR SOLUTION INTRAMUSCULAR; INTRAVENOUS at 09:24

## 2019-04-10 RX ADMIN — FUROSEMIDE 40 MG: 40 TABLET ORAL at 09:23

## 2019-04-10 RX ADMIN — Medication 250 MG: at 09:23

## 2019-04-10 RX ADMIN — TRAZODONE HYDROCHLORIDE 50 MG: 50 TABLET ORAL at 20:30

## 2019-04-10 RX ADMIN — LOSARTAN POTASSIUM 25 MG: 25 TABLET, FILM COATED ORAL at 09:24

## 2019-04-10 RX ADMIN — ENOXAPARIN SODIUM 40 MG: 40 INJECTION SUBCUTANEOUS at 09:24

## 2019-04-10 RX ADMIN — MUPIROCIN: 20 OINTMENT TOPICAL at 20:30

## 2019-04-10 RX ADMIN — GABAPENTIN 600 MG: 600 TABLET, FILM COATED ORAL at 16:04

## 2019-04-10 RX ADMIN — MUPIROCIN: 20 OINTMENT TOPICAL at 19:08

## 2019-04-10 RX ADMIN — ALBUTEROL SULFATE 2.5 MG: 2.5 SOLUTION RESPIRATORY (INHALATION) at 18:00

## 2019-04-10 RX ADMIN — CETIRIZINE HYDROCHLORIDE 10 MG: 10 TABLET, FILM COATED ORAL at 09:23

## 2019-04-10 RX ADMIN — ESCITALOPRAM OXALATE 10 MG: 10 TABLET ORAL at 09:23

## 2019-04-10 RX ADMIN — OXYCODONE HYDROCHLORIDE AND ACETAMINOPHEN 1 TABLET: 5; 325 TABLET ORAL at 09:38

## 2019-04-10 ASSESSMENT — PAIN DESCRIPTION - PROGRESSION
CLINICAL_PROGRESSION: GRADUALLY WORSENING

## 2019-04-10 ASSESSMENT — PAIN SCALES - GENERAL
PAINLEVEL_OUTOF10: 0
PAINLEVEL_OUTOF10: 0
PAINLEVEL_OUTOF10: 5
PAINLEVEL_OUTOF10: 8
PAINLEVEL_OUTOF10: 7
PAINLEVEL_OUTOF10: 6

## 2019-04-10 NOTE — PROGRESS NOTES
ID Follow-up NOTE  RESIDENT NOTE - reviewed / edited, attending note at bottom    CC:   Fever   Antibiotics: none    Admit Date: 4/4/2019  Hospital Day: 7    Subjective:     Patient not as talkative this AM.   Per daughter, patient has become more confused and overwhelmed by all that is going on. Objective:     Patient Vitals for the past 8 hrs:   BP Temp Temp src Pulse Resp SpO2 Weight   04/10/19 0500 -- -- -- -- -- -- 227 lb 12.8 oz (103.3 kg)   04/10/19 0430 (!) 138/57 99 °F (37.2 °C) Oral 95 16 93 % --   04/10/19 0010 -- -- -- -- 18 92 % --     I/O last 3 completed shifts: In: 520 [P.O.:520]  Out: 650 [Urine:650]  No intake/output data recorded.     EXAM:  GENERAL: Appears confused, sad  HEENT: Membranes moist, no oral lesion  NECK:  Supple  LUNGS: Very labored breathing  CARDIAC: RRR, no murmur appreciated  ABD:  + BS, soft / NT  EXT:  No rash, no edema, no lesions  NEURO: CN II-XII in tact   PSYCH: Cooperative but more confused then prior days     Data Review:  Lab Results   Component Value Date    WBC 7.5 04/10/2019    HGB 11.0 (L) 04/10/2019    HCT 34.4 (L) 04/10/2019    MCV 87.7 04/10/2019     04/10/2019     Lab Results   Component Value Date    CREATININE 0.6 04/10/2019    BUN 15 04/10/2019     04/10/2019    K 4.0 04/10/2019    CL 99 04/10/2019    CO2 25 04/10/2019       Hepatic Function Panel:   Lab Results   Component Value Date    ALKPHOS 87 09/06/2018    ALT 16 09/06/2018    AST 27 09/06/2018    PROT 5.4 09/06/2018    PROT 6.8 06/19/2012    BILITOT 0.7 09/06/2018    BILIDIR 0.6 09/02/2018    IBILI 0.5 09/02/2018    LABALBU 2.9 09/06/2018       MICRO:  4/4 Blood culture NGTD  4/5 Rapid influenza negative   4/5 Histoplasma antigen in urine not detected, Strep, Legionella - negative    -respiratory panel PCR negative   4/6 AFB- not detected  4/6 respiratory panel PCR- negative        4/6 CMV, HSV- pending  4/8 fungal staining negative, Aspergillos Galacto AG negative, 1,3 Beta D Gluc - intermediated       IMAGING:  CXR REVIEWED BY ME AND SHOWED:  CT ABDOMEN PELVIS W IV CONTRAST Additional Contrast? None   Final Result       1. No evidence of obstruction or free air. No acute abdominal or pelvic abnormality clearly identified. 2. Diverticulosis without evidence of focal diverticulitis. 3. Incidental indeterminate pedunculated cystic type lesion in the posterior left kidney. Follow-up CT renal mass protocol or MRI renal mass protocol in 3 months is recommended to evaluate the lesion further and to document stability.               CTA PULMONARY W CONTRAST   Final Result       1. No evidence of pulmonary embolism.       2. Extensive bilateral groundglass opacities are increased compared to 4/5/2019 likely representing multifocal pneumonia superimposed on chronic interstitial fibrosis.         CT CHEST WO CONTRAST   Final Result       The central airways are patent. Multifocal ill-defined patchy upper zone and left lower lobe predominant    peripheral nonspecific airspace opacities now seen since 2017 and may be    inflammatory, infectious. Clinically correlate. Background of chronic interstitial changes again noted.           XR CHEST PORTABLE   Final Result       Mild bilateral interstitial and airspace opacities, not significantly changed compared to prior, representing edema or infection.        Scheduled Meds:   saccharomyces boulardii  250 mg Oral BID    mupirocin   Topical 4x Daily    cetirizine  10 mg Oral QAM    escitalopram  10 mg Oral Daily    cholestyramine light  4 g Oral Daily    fluticasone  1 spray Each Nare Nightly    gabapentin  600 mg Oral TID    losartan  25 mg Oral Daily    Mirabegron ER  1 tablet Oral QPM    oxyCODONE-acetaminophen  1 tablet Oral Q8H    pantoprazole  40 mg Oral QPM    sodium chloride flush  10 mL Intravenous 2 times per day    enoxaparin  40 mg Subcutaneous Daily    albuterol  2.5 mg Nebulization Q4H WA    furosemide  40 mg Oral Daily    predniSONE  2 mg Oral Daily    traZODone  50 mg Oral Nightly    hydrALAZINE  25 mg Oral 3 times per day       Continuous Infusions:      PRN Meds:  menthol-zinc oxide, albuterol, sodium chloride flush, magnesium hydroxide, ondansetron, acetaminophen, calcium carbonate, sodium chloride (Inhalant)      Assessment:     Madiha Ocampo a 70 y. o. female with a past medical history of interstitial lung disease (on chronic steroids), rheumatoid arthritis (on Humira and leflunamide), HFpEF admitted for SOB and confusion, suspect HCAP as cause.     Bronchoscopy with BAL on 4/5/2019- bronchial inflammation and increased mucoid secretions     4/7 Tm 101.6  4/8 Tm 100. 4  4/9  Afebrile   4/10 Afebrile     Plan:     Recommend holding further antibiotic administration  Plan for high dose steroids   CTPA- demonstrates worsening lung dz, no PE  Reviewed CT images with radiology     Discussed with Dr. Refugio Gordon MD PGY-1    Addendum to Resident Progress note:  Pt seen, examined and evaluated. I have reviewed the current history, physical findings, labs and assessment and plan and agree with note as documented by resident (Dr. Parker).      69 yo woman with hx interstitial lung (on chronic steroid, pred 2.5 mg /d), RA (has received humira, leflunamide, CHF, DEWAYNE  Hx mult admissions, on last admission 3/25, discharged on oxygen     Admit 4/4 - fever 102.6, WBC 11.2.    Started on azithro, cefepime, vanco  Seen by Pul, had bronch / BAL on 4/5 - inflamed airways, 'mucoid' secretions     CT with 'groundglass opacities'; f/u 4/8 - no PE, 'extensive bilateral groundglass opacities are increased'  W/u - non-diagnostic (see above)     Today 4/10 - Tm 99.0 (Tm 99.4 last evening).  On 5L     IMP/  Immunocompromised  ILD / RA  Pul densities worse on f/u CT 4/8  Fever - unclear source.       REC/  Agree with high dose steroids - discussed with Gerardo Hall on 4/9  Agree with stopping iv antibiotics     Discussed with pt, farzana Carbajal MD

## 2019-04-10 NOTE — FLOWSHEET NOTE
04/10/19 1528   Encounter Summary   Services provided to: Patient and family together   Referral/Consult From: [facility] Family members   Continue Visiting   (4/10seven )   Complexity of Encounter Moderate   Length of Encounter 30 minutes   Routine   Type Initial   Assessment Calm; Approachable; Hopeful   Intervention Active listening;Explored feelings, thoughts, concerns;Nurtured hope;Prayer;Discussed belief system/Hinduism practices/karen   Outcome Comfort;Expressed gratitude;Engaged in conversation;Expressed feelings/needs/concerns   Sacraments   Communion Patient/Family wants communion

## 2019-04-10 NOTE — PROGRESS NOTES
hours.  Still has cough, began to be productive of small amount of yellowish phlegm. Last BM yesterday, no overt signs of bleeding. Discussion had at bedside with Dr. Luisramin Vasquezy, daughter and patient. 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 Medications:  Reviewed    Infusion Medications   Scheduled Medications    methylPREDNISolone  125 mg Intravenous Q6H    pantoprazole  40 mg Oral BID AC    saccharomyces boulardii  250 mg Oral BID    mupirocin   Topical 4x Daily    cetirizine  10 mg Oral QAM    escitalopram  10 mg Oral Daily    cholestyramine light  4 g Oral Daily    fluticasone  1 spray Each Nare Nightly    gabapentin  600 mg Oral TID    losartan  25 mg Oral Daily    Mirabegron ER  1 tablet Oral QPM    oxyCODONE-acetaminophen  1 tablet Oral Q8H    sodium chloride flush  10 mL Intravenous 2 times per day    enoxaparin  40 mg Subcutaneous Daily    albuterol  2.5 mg Nebulization Q4H WA    furosemide  40 mg Oral Daily    predniSONE  2 mg Oral Daily    traZODone  50 mg Oral Nightly    hydrALAZINE  25 mg Oral 3 times per day     PRN Meds: menthol-zinc oxide, albuterol, sodium chloride flush, magnesium hydroxide, ondansetron, acetaminophen, calcium carbonate, sodium chloride (Inhalant)      Intake/Output Summary (Last 24 hours) at 4/10/2019 0905  Last data filed at 4/10/2019 0438  Gross per 24 hour   Intake 520 ml   Output 650 ml   Net -130 ml       Physical Exam Performed:    BP (!) 138/57   Pulse 95   Temp 99 °F (37.2 °C) (Oral)   Resp 16   Ht 4' 11.84\" (1.52 m)   Wt 227 lb 12.8 oz (103.3 kg)   SpO2 93%   BMI 44.72 kg/m²       General appearance:  Patient has mildly labored breathing and appears short of breath. Pt was somnolent   HEENT:  Normal cephalic, atraumatic without obviousdeformity. Pupils equal, round, and reactive to light. Extra ocular muscles intact. evidence of focal diverticulitis. 3. Incidental indeterminate pedunculated cystic type lesion in the posterior left kidney. Follow-up CT renal mass protocol or MRI renal mass protocol in 3 months is recommended to evaluate the lesion further and to document stability. CTA PULMONARY W CONTRAST   Final Result      1. No evidence of pulmonary embolism. 2. Extensive bilateral groundglass opacities are increased compared to 4/5/2019 likely representing multifocal pneumonia superimposed on chronic interstitial fibrosis. CT CHEST WO CONTRAST   Final Result       The central airways are patent. Multifocal ill-defined patchy upper zone and left lower lobe predominant    peripheral nonspecific airspace opacities now seen since 2017 and may be    inflammatory, infectious. Clinically correlate. Background of chronic interstitial changes again noted. XR CHEST PORTABLE   Final Result      Mild bilateral interstitial and airspace opacities, not significantly changed compared to prior, representing edema or infection. Assessment/Plan:    Macey Sanders is a 70 y.o. female with a past medical history of interstitial lung disease (on chronic steroids), rheumatoid arthritis (on Humira and leflunamide), HFpEF admitted for SOB and confusion, suspect HCAP as cause.      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, given no improvement on antibiotics and no identifiable infectious cause  - solumedrol 125 mg IV q6h   - protonix 40 mg PO BID  - acapella and IS  - pulmonology following    Sepsis 2/2 Cryptogenic organizing pneumonia, infectious pneumonia ruled-out  Sepsis resolved  On admission Tmax 102.6, RR 22, CT chest: multifocal patchy upper and LL airspace opacities, suspect infectious  - abx d/c'd yesterday  - supplemental O2 - currently 5L, goal SpO2 >88%  - Albuterol nebulizers   - procal mildly elevated at 0.27  - elevated ESR and CRP, RF  - resp panel PCR neg, AFB neg, histo/strep/legionella neg, blood culture NGtD  - aspergillus negative, 1,3 beta d glucan indeterminate, low nl, PCP sputum induction pending  - MRI lumbar spine cancelled d/t resolution of fevers and back pain   - probiotics added   - appreciate ID recommendations    2nd degree heart block - Mobitz type two  - CPAP nightly for DEWAYNE  - Paroxysmal AV block - asymptomatic, nocturnal, in the setting of not using CPAP  - no recommendation for pacemaker as of now, unless block persists despite respiration improvement  - cardiology following    HFpEF -Last echocardiogram (3/25/2019) showing grade II diastolic dysfunction. Patient does not appear to be volume overloaded at this time. JVD appreciated on physical exam.   -Furosemide 40 mg daily     Interstitial Lung Disease -Patient has a long history of ILD (suspect 2/2 RA and/or MTX-induced)   -followed by pulmonology (Dr. 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  Placing patient at risk for multiple co-morbidities as well as early death and contributing to the patient's presentation.      DVT Prophylaxis: Lovenox   Diet: General diet  Code Status: Limited (no x 4)      PT/OT Eval Status: 19/24 OT  Emilie Messer MD    I will discuss the patient with the senior resident and MD Racheal Saavedra MD  Internal Medicine Resident PGY-1

## 2019-04-10 NOTE — FLOWSHEET NOTE
04/10/19 1936   Assessment   Charting Type Shift assessment   Neurological   Neuro (WDL) WDL   Level of Consciousness 0   Orientation Level Oriented X4   Cognition Appropriate judgement; Appropriate safety awareness; Appropriate attention/concentration; Appropriate for developmental age; Follows commands   Language Clear   Tess Coma Scale   Eye Opening 4   Best Verbal Response 5   Best Motor Response 6   Tess Coma Scale Score 15   HEENT   HEENT (WDL) X   Right Eye Impaired vision   Left Eye Impaired vision   Respiratory   Respiratory (WDL) X   R Breath Sounds Clear;Fine Crackles   L Breath Sounds Clear;Fine Crackles   Respiratory Quality/Effort Unlabored   Chest Assessment Chest expansion symmetrical;Trachea midline   Respiratory Pattern Regular   Respiratory Depth Normal   Breath Sounds   Right Upper Lobe Clear   Right Middle Lobe Clear   Right Lower Lobe Crackles   Left Upper Lobe Clear   Left Lower Lobe Crackles   Cough/Sputum   Cough Productive   Cough Description   Sputum Amount Small   Sputum Color Yellow   Tenacity Thick   Sputum How Obtained Spontaneous cough   Cardiac   Cardiac (WDL) X   Cardiac Rhythm NSR   Cardiac Regularity Regular   Heart Sounds S1, S2   Rhythm Interpretation   Pulse 84   Cardiac Monitor   Telemetry Monitor On Yes   Telemetry Audible Yes   Telemetry Alarms Set Yes   Telemetry Box Number [id number]   Telemetry Monitor Alarm Parameters 50/120   Gastrointestinal   Abdominal (WDL) WDL   Abdomen Inspection Soft   RUQ Bowel Sounds Active   LUQ Bowel Sounds Active   RLQ Bowel Sounds Active   LLQ Bowel Sounds Active   Peripheral Vascular   Peripheral Vascular (WDL) WDL   Edema None   RLE Edema None   LLE Edema None   Skin Color/Condition   Skin Color/Condition (WDL) WDL   Skin Integrity   Skin Integrity (WDL) X   Musculoskeletal   Musculoskeletal (WDL) WDL   Genitourinary   Genitourinary (WDL) X  (incontinent)   Flank Tenderness No   Suprapubic Tenderness No   Dysuria No   Urine Assessment

## 2019-04-10 NOTE — PLAN OF CARE
Problem: Nutrition  Goal: Optimal nutrition therapy  Outcome: Ongoing   Nutrition Problem: Inadequate oral intake  Intervention: Food and/or Nutrient Delivery: Continue current diet  Nutritional Goals: pt will tolerate diet and consume >50% of meals

## 2019-04-10 NOTE — PROGRESS NOTES
Pulmonary Followup Note    CC: acute on chronic hypoxia, pneumonia, fever  Subjective:  Fever curve has lessened some, but oxygen requirement has increased and she continues to feel lousy with poor appetite and poor energy    ROS:  Denies headache, nausea . 24HR INTAKE/OUTPUT:      Intake/Output Summary (Last 24 hours) at 4/10/2019 0907  Last data filed at 4/10/2019 0438  Gross per 24 hour   Intake 520 ml   Output 650 ml   Net -130 ml        methylPREDNISolone  125 mg Intravenous Q6H    pantoprazole  40 mg Oral BID AC    saccharomyces boulardii  250 mg Oral BID    mupirocin   Topical 4x Daily    cetirizine  10 mg Oral QAM    escitalopram  10 mg Oral Daily    cholestyramine light  4 g Oral Daily    fluticasone  1 spray Each Nare Nightly    gabapentin  600 mg Oral TID    losartan  25 mg Oral Daily    Mirabegron ER  1 tablet Oral QPM    oxyCODONE-acetaminophen  1 tablet Oral Q8H    sodium chloride flush  10 mL Intravenous 2 times per day    enoxaparin  40 mg Subcutaneous Daily    albuterol  2.5 mg Nebulization Q4H WA    furosemide  40 mg Oral Daily    predniSONE  2 mg Oral Daily    traZODone  50 mg Oral Nightly    hydrALAZINE  25 mg Oral 3 times per day           PHYSICAL EXAMINATION:  BP (!) 138/57   Pulse 95   Temp 99 °F (37.2 °C) (Oral)   Resp 16   Ht 4' 11.84\" (1.52 m)   Wt 227 lb 12.8 oz (103.3 kg)   SpO2 93%   BMI 44.72 kg/m²   CURRENT PULSE OXIMETRY:  SpO2: 93 %  24HR PULSE OXIMETRY RANGE:  SpO2  Av.1 %  Min: 89 %  Max: 97 % on 6L      Gen: mild distress. Speaking in full sentences without accessory muscle use. HEENT: PERRL, EOMI, OP nl  Lung: Diffuse crackles at the bases and mid lung zones. Upper airway wheezes  CV: RRR without M/R/R  Abd: +BS, soft, NT/ND  Ext: No edema.     DATA  CBC:   Recent Labs     19  0442 19  0435 19  1535 04/10/19  0440   WBC 9.3 7.5  --  7.5   HGB 13.4 10.7* 10.9* 11.0*   HCT 41.2 32.7* 34.1* 34.4*   MCV 86.8 87.8  --  87.7    242  --  308     BMP:   Recent Labs     04/08/19  0442 04/09/19  0435 04/10/19  0440    133* 136   K 4.7 4.0 4.0   CL 98* 98* 99   CO2 24 25 25   BUN 10 12 15   CREATININE 0.6 0.5* 0.6     No results for input(s): PHART, LRX0NSG, PO2ART in the last 72 hours. LIVER PROFILE: No results for input(s): AST, ALT, LIPASE, BILIDIR, BILITOT, ALKPHOS in the last 72 hours. Invalid input(s): AMYLASE,  ALB    CXR REVIEWED BY ME AND SHOWED:  CT ABDOMEN PELVIS W IV CONTRAST Additional Contrast? None   Final Result      1. No evidence of obstruction or free air. No acute abdominal or pelvic abnormality clearly identified. 2. Diverticulosis without evidence of focal diverticulitis. 3. Incidental indeterminate pedunculated cystic type lesion in the posterior left kidney. Follow-up CT renal mass protocol or MRI renal mass protocol in 3 months is recommended to evaluate the lesion further and to document stability. CTA PULMONARY W CONTRAST   Final Result      1. No evidence of pulmonary embolism. 2. Extensive bilateral groundglass opacities are increased compared to 4/5/2019 likely representing multifocal pneumonia superimposed on chronic interstitial fibrosis. CT CHEST WO CONTRAST   Final Result       The central airways are patent. Multifocal ill-defined patchy upper zone and left lower lobe predominant    peripheral nonspecific airspace opacities now seen since 2017 and may be    inflammatory, infectious. Clinically correlate. Background of chronic interstitial changes again noted. XR CHEST PORTABLE   Final Result      Mild bilateral interstitial and airspace opacities, not significantly changed compared to prior, representing edema or infection. ASSESSMENT/PLAN:  This is a 70 y.o. female with ILD and acute on chronic hypoxemic respiratory failure with presumed pneumonia.     Bronchoscopy Friday with lavage of bilateral upper lobes. Gram stains and fungal stains negative. Galactomannan negative  Cytology negative  Diatherix panel negative  Cultures are negative or showing normal respiratory taniya at this point. Strep, histo and legionella antigens are negative. indeteriminate 1, 3 beta d glucan  procal was equivacol. antibiotics stopped today    Sed rate and CRP are elevated which is nonspecific and could be from pneumonia, RA or something else. Rheumatoid factor elevated, MICHELLE negative, ANCA pending. Starting high dose steroids today.     Ileana De La Cruz MD

## 2019-04-10 NOTE — PROGRESS NOTES
Physical Therapy    Daily Treatment Note      Assessment:  Pt fatigues easily with minimal activity/ambulation. Overall needing min A for balance using rolling walker. Safety concerns for pt returning home alone upon d/c. Pt may benefit from continued skilled PT to increase strength and maximize functional independence prior to returning home. Discharge Recommendations:  Khang Singh scored a 16/24 on the AM-PAC short mobility form. Current research shows that an AM-PAC score of 17 or less is typically not associated with a discharge to the patient's home setting. Based on the patients AM-PAC score and their current functional mobility deficits, it is recommended that the patient have 3-5 sessions per week of Physical Therapy at d/c to increase the patients independence. Equipment Needs:  Defer    Chart Reviewed: Yes   Other position/activity restrictions: up with assist   Additional Pertinent Hx: Patient is a 69 y/o female admitted 4/4 with confusion and shortness of breath. CT chest (+) for Multifocal ill-defined patchy upper zone and left lower lobe predominant peripheral nonspecific airspace opacities now seen since 2017 and may be inflammatory or infectious. PMH significant for asthma, CHF, HTN, GERD, HLD, interstitial lung disease, obesity, polio, RA, sleep apnea, B foot surgery, right shoulder replacement, right TKA, left shoulder arthroscopy. Diagnosis: PNA   Treatment Diagnosis: impaired mobility associated with PNA      Subjective:  Pt found sitting up in chair with friend visiting. Pt agreeable for PT, requesting to use the bathroom. \"I need to sit down. \"      Pain:  Denies      Objective:    Bed mobility  Sit to Supine:  SBA with bed flat, max effort to lift LEs back up into bed    Transfers  Sit to stand:  CGA from chair and commode (cues for hand placement, pt using momentum to stand)  Stand to sit:  CGA (decreased control to sit, cues for hand placement and safety)    Ambulation  Assistance Level:  Min A   Assistive device:  Rolling walker  Distance:  10ft x2  Quality of gait:  Flexed posture, wide HOANG, effortful gait, assist with walker management when turning, unsteady gait  Other:  5L high flow O2    Neuro/balance  Static stance:  CGA with UE support of walker while PT performed pericare  Dynamic stance:  Min A with rolling walker for ambulation    Patient Education  Activity promotion with RN staff. Pt verbalized understanding. Safety Devices  Pt left with needs in reach, supine in bed with alarm in place and RN aware. Friend visiting. RT present in room for breathing treatment. Assessment:  Pt fatigues easily with minimal activity/ambulation. Overall needing min A for balance using rolling walker. Safety concerns for pt returning home alone upon d/c. Pt may benefit from continued skilled PT to increase strength and maximize functional independence prior to returning home. AM-PAC score  AM-PAC Inpatient Mobility Raw Score : 16  AM-PAC Inpatient T-Scale Score : 40.78  Mobility Inpatient CMS 0-100% Score: 54.16  Mobility Inpatient CMS G-Code Modifier : CK    Goals:  Goals not met this treatment, continue with current goals.    Time Frame for Short term goals: discharge  Short term goal 1: patient will perform bed mobility with supervision   Short term goal 2: patient will perform transfers sit<>stand with supervision   Short term goal 3: patient will ambulate 76' with FWW and supervision          Plan:  Times per week: 2-5;    Current Treatment Recommendations: Strengthening, Transfer Training, Endurance Training, Patient/Caregiver Education & Training, Balance Training, Gait Training, Functional Mobility Training, Safety Education & Training    Therapy Time    Individual  Concurrent  Group  Co-treatment    Time In  1351          Time Out  1432            Minutes  41              Timed Code Treatment Minutes:  41  Total Treatment Minutes: 41      If patient is discharged prior to next treatment, this note will serve as the discharge summary.     Eli Orellana PT

## 2019-04-10 NOTE — PLAN OF CARE
Problem: Falls - Risk of:  Goal: Will remain free from falls  Description  Will remain free from falls  4/9/2019 2115 by Sofía Grove RN  Outcome: Ongoing  Note:   No falls thus far on this shift. Bed locked and in low position. Call light within reach. Pt encouraged to call out to voice any needs. Bedside table in reach. Bed alarm armed. 4/9/2019 1902 by Francisco Flores RN  Note:   Pt free from injury or falls at this time, fall precautions in place, bed in low position, side rail up x2, Prater Fall Risk: Medium (25-44), bed alarm on, reoriented to room and call light, reminded not to get up without assistance. Pt verbalizes understanding of fall risk procedures. Will continue with hourly rounds for PO intake, pain needs, toileting, and repositioning as needed. No needs expressed at this time. Call light in reach, will continue to monitor.      Goal: Absence of physical injury  Description  Absence of physical injury  Outcome: Ongoing     Problem: Discharge Planning:  Goal: Discharged to appropriate level of care  Description  Discharged to appropriate level of care  Outcome: Ongoing  Goal: Participates in care planning  Description  Participates in care planning  Outcome: Ongoing     Problem: Airway Clearance - Ineffective:  Goal: Clear lung sounds  Description  Clear lung sounds  Outcome: Ongoing  Goal: Ability to maintain a clear airway will improve  Description  Ability to maintain a clear airway will improve  Outcome: Ongoing     Problem: Fluid Volume - Deficit:  Goal: Achieves intake and output within specified parameters  Description  Achieves intake and output within specified parameters  Outcome: Ongoing     Problem: Gas Exchange - Impaired:  Goal: Levels of oxygenation will improve  Description  Levels of oxygenation will improve  Outcome: Ongoing     Problem: Hyperthermia:  Goal: Ability to maintain a body temperature in the normal range will improve  Description  Ability to maintain a body temperature in the normal range will improve  Outcome: Ongoing     Problem: Pain:  Goal: Pain level will decrease  Description  Pain level will decrease  Outcome: Ongoing  Goal: Control of acute pain  Description  Control of acute pain  Outcome: Ongoing  Goal: Control of chronic pain  Description  Control of chronic pain  Outcome: Ongoing

## 2019-04-10 NOTE — CONSULTS
Nutrition Assessment    Type and Reason for Visit: Initial, Consult    Nutrition Recommendations:   · Encourage small, frequent meals/ snacks on general diet   · Pt declined ONS at this time     Nutrition Assessment: Pt is at nutritional risk r/t poor PO intake since admission (<50% x6 days). States PTA appetite was good and denied wt loss. CBW stable from admission wt. Daughter reports pt was hungry and eating some this morning, consumed yogurt, bites of pudding and fruit. Daughter concerned regarding redness on tongue and possible thrush and encourage to discuss with MD. Encouraged to have soft, cool, non acidic foods if mouth/ tongue are sore. Declined ONS as previously had N/V after consuming. Discussed having small, frequent meals to increase PO intake. Malnutrition Assessment:  · Malnutrition Status: At risk for malnutrition  · Context: Acute illness or injury  · Findings of the 6 clinical characteristics of malnutrition (Minimum of 2 out of 6 clinical characteristics is required to make the diagnosis of moderate or severe Protein Calorie Malnutrition based on AND/ASPEN Guidelines):  1. Energy Intake-Less than or equal to 50% of estimated energy requirement, Greater than or equal to 5 days    2. Weight Loss-No significant weight loss,    3. Fat Loss-Unable to assess(not appropriate ),    4. Muscle Loss-Unable to assess,    5. Fluid Accumulation-No significant fluid accumulation,    6.  Strength-Not measured    Nutrition Risk Level:  Moderate    Nutrient Needs:  · Estimated Daily Total Kcal: 9086-1841 kcal   · Estimated Daily Protein (g): 60-69 grams  · Estimated Daily Total Fluid (ml/day):      Nutrition Diagnosis:   · Problem: Inadequate oral intake  · Etiology: related to Insufficient energy/nutrient consumption     Signs and symptoms:  as evidenced by Intake 25-50%    Objective Information:  · Nutrition-Focused Physical Findings: LBM 4/7; no edema  · Wound Type: None  · Current Nutrition Therapies:  · Oral Diet Orders: General   · Oral Diet intake: 1-25%, 26-50%  · Oral Nutrition Supplement (ONS) Orders: None  · Anthropometric Measures:  · Ht: 4' 11.84\" (152 cm)   · Current Body Wt: 227 lb 11.8 oz (103.3 kg)  · Admission Body Wt: 227 lb 1.2 oz (103 kg)  · Usual Body Wt: 225 lb (102.1 kg)(per EMR review)  · Ideal Body Wt: 100 lb (45.4 kg)   · BMI Classification: BMI > or equal to 40.0 Obese Class III    Nutrition Interventions:   Continue current diet  Continued Inpatient Monitoring    Nutrition Evaluation:   · Evaluation: Goals set   · Goals: pt will tolerate diet and consume >50% of meals     · Monitoring: Meal Intake, Supplement Intake, Monitor Bowel Function, Mental Status/Confusion      Electronically signed by Tj Pantoja RD, LD on 4/10/19 at 1:26 PM    Contact Number:   Pager: [phone]  Office: [phone]

## 2019-04-11 LAB
ANION GAP SERPL CALCULATED.3IONS-SCNC: 11 MMOL/L (ref 3–16)
BASOPHILS ABSOLUTE: 0 K/UL (ref 0–0.2)
BASOPHILS RELATIVE PERCENT: 0.6 %
BUN BLDV-MCNC: 16 MG/DL (ref 7–20)
CALCIUM SERPL-MCNC: 9.7 MG/DL (ref 8.3–10.6)
CHLORIDE BLD-SCNC: 103 MMOL/L (ref 99–110)
CO2: 25 MMOL/L (ref 21–32)
CREAT SERPL-MCNC: 0.5 MG/DL (ref 0.6–1.2)
EOSINOPHILS ABSOLUTE: 0 K/UL (ref 0–0.6)
EOSINOPHILS RELATIVE PERCENT: 0 %
FINAL REPORT: NORMAL
FINAL REPORT: NORMAL
GFR AFRICAN AMERICAN: >60
GFR NON-AFRICAN AMERICAN: >60
GLUCOSE BLD-MCNC: 147 MG/DL (ref 70–99)
GLUCOSE BLD-MCNC: 149 MG/DL (ref 70–99)
GLUCOSE BLD-MCNC: 170 MG/DL (ref 70–99)
HCT VFR BLD CALC: 35.5 % (ref 36–48)
HEMOGLOBIN: 11.4 G/DL (ref 12–16)
LYMPHOCYTES ABSOLUTE: 0.9 K/UL (ref 1–5.1)
LYMPHOCYTES RELATIVE PERCENT: 13.6 %
MCH RBC QN AUTO: 28 PG (ref 26–34)
MCHC RBC AUTO-ENTMCNC: 32.1 G/DL (ref 31–36)
MCV RBC AUTO: 87 FL (ref 80–100)
MONOCYTES ABSOLUTE: 0.4 K/UL (ref 0–1.3)
MONOCYTES RELATIVE PERCENT: 5.9 %
NEUTROPHILS ABSOLUTE: 5.5 K/UL (ref 1.7–7.7)
NEUTROPHILS RELATIVE PERCENT: 79.9 %
PDW BLD-RTO: 14.8 % (ref 12.4–15.4)
PERFORMED ON: ABNORMAL
PERFORMED ON: ABNORMAL
PLATELET # BLD: 356 K/UL (ref 135–450)
PMV BLD AUTO: 7.7 FL (ref 5–10.5)
POTASSIUM REFLEX MAGNESIUM: 4.7 MMOL/L (ref 3.5–5.1)
PRELIMINARY: NORMAL
PRELIMINARY: NORMAL
RBC # BLD: 4.08 M/UL (ref 4–5.2)
SODIUM BLD-SCNC: 139 MMOL/L (ref 136–145)
WBC # BLD: 6.9 K/UL (ref 4–11)

## 2019-04-11 PROCEDURE — 6370000000 HC RX 637 (ALT 250 FOR IP): Performed by: FAMILY MEDICINE

## 2019-04-11 PROCEDURE — 2700000000 HC OXYGEN THERAPY PER DAY

## 2019-04-11 PROCEDURE — 99232 SBSQ HOSP IP/OBS MODERATE 35: CPT | Performed by: INTERNAL MEDICINE

## 2019-04-11 PROCEDURE — 6370000000 HC RX 637 (ALT 250 FOR IP): Performed by: STUDENT IN AN ORGANIZED HEALTH CARE EDUCATION/TRAINING PROGRAM

## 2019-04-11 PROCEDURE — 94640 AIRWAY INHALATION TREATMENT: CPT

## 2019-04-11 PROCEDURE — 99233 SBSQ HOSP IP/OBS HIGH 50: CPT | Performed by: INTERNAL MEDICINE

## 2019-04-11 PROCEDURE — 86200 CCP ANTIBODY: CPT

## 2019-04-11 PROCEDURE — 94761 N-INVAS EAR/PLS OXIMETRY MLT: CPT

## 2019-04-11 PROCEDURE — 94668 MNPJ CHEST WALL SBSQ: CPT

## 2019-04-11 PROCEDURE — 2580000003 HC RX 258: Performed by: STUDENT IN AN ORGANIZED HEALTH CARE EDUCATION/TRAINING PROGRAM

## 2019-04-11 PROCEDURE — 94150 VITAL CAPACITY TEST: CPT

## 2019-04-11 PROCEDURE — 80048 BASIC METABOLIC PNL TOTAL CA: CPT

## 2019-04-11 PROCEDURE — 85025 COMPLETE CBC W/AUTO DIFF WBC: CPT

## 2019-04-11 PROCEDURE — 6360000002 HC RX W HCPCS: Performed by: STUDENT IN AN ORGANIZED HEALTH CARE EDUCATION/TRAINING PROGRAM

## 2019-04-11 PROCEDURE — 6360000002 HC RX W HCPCS: Performed by: INTERNAL MEDICINE

## 2019-04-11 PROCEDURE — 94660 CPAP INITIATION&MGMT: CPT

## 2019-04-11 PROCEDURE — 2060000000 HC ICU INTERMEDIATE R&B

## 2019-04-11 PROCEDURE — 36415 COLL VENOUS BLD VENIPUNCTURE: CPT

## 2019-04-11 RX ORDER — NICOTINE POLACRILEX 4 MG
15 LOZENGE BUCCAL PRN
Status: DISCONTINUED | OUTPATIENT
Start: 2019-04-11 | End: 2019-04-17 | Stop reason: HOSPADM

## 2019-04-11 RX ORDER — DEXTROSE MONOHYDRATE 25 G/50ML
12.5 INJECTION, SOLUTION INTRAVENOUS PRN
Status: DISCONTINUED | OUTPATIENT
Start: 2019-04-11 | End: 2019-04-17 | Stop reason: HOSPADM

## 2019-04-11 RX ORDER — DEXTROSE MONOHYDRATE 50 MG/ML
100 INJECTION, SOLUTION INTRAVENOUS PRN
Status: DISCONTINUED | OUTPATIENT
Start: 2019-04-11 | End: 2019-04-17 | Stop reason: HOSPADM

## 2019-04-11 RX ORDER — AMLODIPINE BESYLATE 10 MG/1
10 TABLET ORAL NIGHTLY
Status: DISCONTINUED | OUTPATIENT
Start: 2019-04-11 | End: 2019-04-17 | Stop reason: HOSPADM

## 2019-04-11 RX ADMIN — CETIRIZINE HYDROCHLORIDE 10 MG: 10 TABLET, FILM COATED ORAL at 08:57

## 2019-04-11 RX ADMIN — MUPIROCIN: 20 OINTMENT TOPICAL at 09:08

## 2019-04-11 RX ADMIN — METHYLPREDNISOLONE SODIUM SUCCINATE 125 MG: 125 INJECTION, POWDER, FOR SOLUTION INTRAMUSCULAR; INTRAVENOUS at 21:16

## 2019-04-11 RX ADMIN — METHYLPREDNISOLONE SODIUM SUCCINATE 125 MG: 125 INJECTION, POWDER, FOR SOLUTION INTRAMUSCULAR; INTRAVENOUS at 03:30

## 2019-04-11 RX ADMIN — GABAPENTIN 600 MG: 600 TABLET, FILM COATED ORAL at 14:19

## 2019-04-11 RX ADMIN — GABAPENTIN 600 MG: 600 TABLET, FILM COATED ORAL at 08:56

## 2019-04-11 RX ADMIN — ALBUTEROL SULFATE 2.5 MG: 2.5 SOLUTION RESPIRATORY (INHALATION) at 09:14

## 2019-04-11 RX ADMIN — LOSARTAN POTASSIUM 25 MG: 25 TABLET, FILM COATED ORAL at 08:57

## 2019-04-11 RX ADMIN — FUROSEMIDE 40 MG: 40 TABLET ORAL at 08:57

## 2019-04-11 RX ADMIN — TRAZODONE HYDROCHLORIDE 50 MG: 50 TABLET ORAL at 21:16

## 2019-04-11 RX ADMIN — HYDRALAZINE HYDROCHLORIDE 25 MG: 25 TABLET, FILM COATED ORAL at 14:19

## 2019-04-11 RX ADMIN — CHOLESTYRAMINE 4 G: 4 POWDER, FOR SUSPENSION ORAL at 08:57

## 2019-04-11 RX ADMIN — METHYLPREDNISOLONE SODIUM SUCCINATE 125 MG: 125 INJECTION, POWDER, FOR SOLUTION INTRAMUSCULAR; INTRAVENOUS at 16:31

## 2019-04-11 RX ADMIN — FLUTICASONE PROPIONATE 1 SPRAY: 50 SPRAY, METERED NASAL at 21:18

## 2019-04-11 RX ADMIN — NYSTATIN 500000 UNITS: 100000 SUSPENSION ORAL at 21:16

## 2019-04-11 RX ADMIN — ALBUTEROL SULFATE 2.5 MG: 2.5 SOLUTION RESPIRATORY (INHALATION) at 12:05

## 2019-04-11 RX ADMIN — HYDRALAZINE HYDROCHLORIDE 25 MG: 25 TABLET, FILM COATED ORAL at 04:50

## 2019-04-11 RX ADMIN — ENOXAPARIN SODIUM 40 MG: 40 INJECTION SUBCUTANEOUS at 08:57

## 2019-04-11 RX ADMIN — Medication 10 ML: at 09:08

## 2019-04-11 RX ADMIN — PANTOPRAZOLE SODIUM 40 MG: 40 TABLET, DELAYED RELEASE ORAL at 09:52

## 2019-04-11 RX ADMIN — AMLODIPINE BESYLATE 10 MG: 10 TABLET ORAL at 21:16

## 2019-04-11 RX ADMIN — MUPIROCIN: 20 OINTMENT TOPICAL at 18:29

## 2019-04-11 RX ADMIN — Medication 250 MG: at 21:16

## 2019-04-11 RX ADMIN — GABAPENTIN 600 MG: 600 TABLET, FILM COATED ORAL at 21:19

## 2019-04-11 RX ADMIN — OXYCODONE HYDROCHLORIDE AND ACETAMINOPHEN 1 TABLET: 5; 325 TABLET ORAL at 16:31

## 2019-04-11 RX ADMIN — PANTOPRAZOLE SODIUM 40 MG: 40 TABLET, DELAYED RELEASE ORAL at 16:31

## 2019-04-11 RX ADMIN — NYSTATIN 500000 UNITS: 100000 SUSPENSION ORAL at 16:31

## 2019-04-11 RX ADMIN — Medication 10 ML: at 21:18

## 2019-04-11 RX ADMIN — ANORECTAL OINTMENT: 15.7; .44; 24; 20.6 OINTMENT TOPICAL at 21:17

## 2019-04-11 RX ADMIN — ESCITALOPRAM OXALATE 10 MG: 10 TABLET ORAL at 08:57

## 2019-04-11 RX ADMIN — ALBUTEROL SULFATE 2.5 MG: 2.5 SOLUTION RESPIRATORY (INHALATION) at 16:02

## 2019-04-11 RX ADMIN — Medication 250 MG: at 08:57

## 2019-04-11 RX ADMIN — OXYCODONE HYDROCHLORIDE AND ACETAMINOPHEN 1 TABLET: 5; 325 TABLET ORAL at 08:57

## 2019-04-11 RX ADMIN — HYDRALAZINE HYDROCHLORIDE 25 MG: 25 TABLET, FILM COATED ORAL at 21:16

## 2019-04-11 RX ADMIN — METHYLPREDNISOLONE SODIUM SUCCINATE 125 MG: 125 INJECTION, POWDER, FOR SOLUTION INTRAMUSCULAR; INTRAVENOUS at 08:57

## 2019-04-11 RX ADMIN — NYSTATIN 500000 UNITS: 100000 SUSPENSION ORAL at 11:31

## 2019-04-11 RX ADMIN — ALBUTEROL SULFATE 2.5 MG: 2.5 SOLUTION RESPIRATORY (INHALATION) at 20:51

## 2019-04-11 RX ADMIN — MUPIROCIN: 20 OINTMENT TOPICAL at 21:16

## 2019-04-11 ASSESSMENT — PAIN DESCRIPTION - ORIENTATION
ORIENTATION: MID;LOWER
ORIENTATION: MID;OUTER
ORIENTATION: MID;LOWER

## 2019-04-11 ASSESSMENT — PAIN SCALES - GENERAL
PAINLEVEL_OUTOF10: 6
PAINLEVEL_OUTOF10: 8
PAINLEVEL_OUTOF10: 7
PAINLEVEL_OUTOF10: 0
PAINLEVEL_OUTOF10: 6
PAINLEVEL_OUTOF10: 7
PAINLEVEL_OUTOF10: 7

## 2019-04-11 ASSESSMENT — PAIN DESCRIPTION - DESCRIPTORS
DESCRIPTORS: ACHING
DESCRIPTORS: ACHING

## 2019-04-11 ASSESSMENT — PAIN DESCRIPTION - LOCATION
LOCATION: BACK

## 2019-04-11 ASSESSMENT — PAIN DESCRIPTION - PAIN TYPE
TYPE: CHRONIC PAIN

## 2019-04-11 NOTE — PROGRESS NOTES
RESPIRATORY THERAPY ASSESSMENT    Name:  [address] Record Number:  [id number]  Age: 70 y.o. Gender: female  : 1940  Today's Date:  2019  Room:  [facility]/1140-33    Assessment     Is the patient being admitted for a COPD or Asthma exacerbation? No   (If yes the patient will be seen every 4 hours for the first 24 hours and then reassessed)    Patient Admission Diagnosis  ILD< DEAWYNE      Allergies  Allergies   Allergen Reactions    Sulfa Antibiotics Hives    Lamotrigine Other (See Comments)     Blurred vision    Lomotil  [Diphenoxylate-Atropine] Other (See Comments)     Changes in vision    Sulfacetamide Sodium Hives     Other reaction(s): Other (See Comments)       Minimum Predicted Vital Capacity:    680    Actual Vital Capacity:      500              Pulmonary History: DEWAYNE, ILD   Home Oxygen Therapy: 2  liters/min via nasal cannula  Home Respiratory Therapy:Albuterol   Current Respiratory Therapy:  Alb q4wa and BIPAP @hs  Treatment Type: HHN, IS, Vibratory Mucous Clearing Therapy or Intervention  Medications: Albuterol    Respiratory Severity Index(RSI)   Patients with orders for inhalation medications, oxygen, or any therapeutic treatment modality will be placed on Respiratory Protocol. They will be assessed with the first treatment and at least every 72 hours thereafter. The following severity scale will be used to determine frequency of treatment intervention. Smoking History: Mild Exacerbation = 3    Social History  Social History     Tobacco Use    Smoking status: Former Smoker     Packs/day: 1.00     Years: 14.00     Pack years: 14.00     Types: Cigarettes     Last attempt to quit: 1976     Years since quittin.3    Smokeless tobacco: Never Used    Tobacco comment: quit age 29   Substance Use Topics    Alcohol use:  Yes     Alcohol/week: 0.6 - 1.2 oz     Types: 1 - 2 Glasses of wine per week     Comment: social drinker    Drug use: No       Recent Surgical History: reinforcement       ? No available caregiver               ? Other:     Response to education:  Good     Is patient being placed on Home Treatment Regimen? No     Does the patient have everything they need prior to discharge? NA     Comments: reviewed chart and assessed patient     Plan of Care: Maintain present regiment     Electronically signed by Robin Rosen RCP on 4/11/2019 at 9:40 AM    Respiratory Protocol Guidelines     1. Assessment and treatment by Respiratory Therapy will be initiated for medication and therapeutic interventions upon initiation of aerosolized medication. 2. Physician will be contacted for respiratory rate (RR) greater than 35 breaths per minute. Therapy will be held for heart rate (HR) greater than 140 beats per minute, pending direction from physician. 3. Bronchodilators will be administered via Metered Dose Inhaler (MDI) with spacer when the following criteria are met:  a. Alert and cooperative     b. HR < 140 bpm  c. RR < 30 bpm                d. Can demonstrate a 2-3 second inspiratory hold  4. Bronchodilators will be administered via Hand Held Nebulizer [facility]) to patients when ANY of the following criteria are met  a. Incognizant or uncooperative          b. Patients treated with HHN at Home        c. Unable to demonstrate proper use of MDI with spacer     d. RR > 30 bpm   5. Bronchodilators will be delivered via Metered Dose Inhaler (MDI), HHN, Aerogen to intubated patients on mechanical ventilation. 6. Inhalation medication orders will be delivered and/or substituted as outlined below. Aerosolized Medications Ordering and Administration Guidelines:    1. All Medications will be ordered by a physician, and their frequency and/or modality will be adjusted as defined by the patients Respiratory Severity Index (RSI) score.   2. If the patient does not have documented COPD, consider discontinuing anticholinergics when RSI is less than 9.  3. If the bronchospasm worsens

## 2019-04-11 NOTE — PROGRESS NOTES
Internal Medicine PGY-1 Resident Progress Note        PCP: Rosita Rodriguez    Date of Admission: 4/4/2019    Chief Complaint: Confusion; shortness of breath; fevers     Hospital Course:     Monique Ojeda is a 70 y.o. female with a past medical history of interstitial lung disease (on chronic steroids), rheumatoid arthritis (on Humira and leflunamide), HFpEF (grade II diastolic dysfunction), hypertension and depression who presented to [facility] with confusion and shortness of breath at home, recently discharged on 3/28 on 3L O2 while being treated for PNA. Had run of Mobitz II HB with PVCs on night of admission, seen by EPS (recommended strict compliance with nightly CPAP for paroxysmal asymptomatic AV block, noctural). She was admitted with sepsis 2/2 HCAP started on broad-spectrum abx and underwent bronchoscopy (4/5) with BAL of BEN and RUL which identified bronchial inflammation and increased mucoid secretions. Work-up for infectious PNA has since all returned negative, including search for bacterial, viral and fungal pathogens. CT chest 4/8 worsening multifocal PNA on ILD. Antibiotics were discontinued following 5 days of administration given limited clinical improvement. She has persistently been on 5 L O2 NC and became afebrile once antibiotics were discontinued. 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 Of note she has received BL L1, L2 medial branch ablations on 2/27 at [address] [place] [address], followed by steroid facet injections at [facility] on 1/23/2019 and has been complaining of lumbar back pain throughout admission, nothing worse than her baseline for which she is taking her home percocet. Subjective:     AAOx3, reports some increase in energy, walked to the bathroom yesterday.  Agreeable to working more with PT/OT today. She has been afebrile last 48 hours. Still has cough, minimally yellowish-white sputum. Reports significant improvement in breathing and O2 was down to 4L NC yesterday. Last BM yesterday, no overt signs of bleeding. Increased appetite. Will monitor BP, glucose and swelling closely while on steroids. Medications:  Reviewed    Infusion Medications   Scheduled Medications    amLODIPine  10 mg Oral Nightly    methylPREDNISolone  125 mg Intravenous Q6H    pantoprazole  40 mg Oral BID AC    nystatin  5 mL Oral 4x Daily    saccharomyces boulardii  250 mg Oral BID    mupirocin   Topical 4x Daily    cetirizine  10 mg Oral QAM    escitalopram  10 mg Oral Daily    cholestyramine light  4 g Oral Daily    fluticasone  1 spray Each Nare Nightly    gabapentin  600 mg Oral TID    losartan  25 mg Oral Daily    Mirabegron ER  1 tablet Oral QPM    oxyCODONE-acetaminophen  1 tablet Oral Q8H    sodium chloride flush  10 mL Intravenous 2 times per day    enoxaparin  40 mg Subcutaneous Daily    albuterol  2.5 mg Nebulization Q4H WA    furosemide  40 mg Oral Daily    traZODone  50 mg Oral Nightly    hydrALAZINE  25 mg Oral 3 times per day     PRN Meds: menthol-zinc oxide, albuterol, sodium chloride flush, magnesium hydroxide, ondansetron, acetaminophen, calcium carbonate, sodium chloride (Inhalant)      Intake/Output Summary (Last 24 hours) at 4/11/2019 0854  Last data filed at 4/10/2019 2024  Gross per 24 hour   Intake 720 ml   Output 1775 ml   Net -1055 ml       Physical Exam Performed:    BP (!) 149/66   Pulse 97   Temp 98.3 °F (36.8 °C) (Oral)   Resp 16   Ht 4' 11.84\" (1.52 m)   Wt 227 lb 9.6 oz (103.2 kg)   SpO2 92%   BMI 44.68 kg/m²       General appearance:  Patient has mildly labored breathing and appears short of breath. Pt was somnolent   HEENT:  Normal cephalic, atraumatic without obviousdeformity. Pupils equal, round, and reactive to light.   Extra ocular muscles intact. Conjunctivae/corneas clear. Neck: Supple, with full range of motion. No jugular venous distention. Trachea midline. Respiratory: Normal respiratory effort. Minimal upper inspiratory wheezing. Velcro like crackles present in bases bilaterally  Cardiovascular: Regular rate and rhythm. No JVD. Trace pitting edema, not increased from yesterday  Abdomen: Soft, non-tender,non-distended with normal bowel sounds. Non-tender to palpation  Musculoskeletal:  No clubbing or cyanosis present. BL ulnar deviation, selin notes, swan neck defomities. Non-tender to palpation spinal processes  Skin: Skin color, texture, turgor normal.  No rashes or lesions. Neurologic:  Neurovascularly intact without any focal sensory/motor deficits. Cranial nerves: II-XII intact, grosslynon-focal.  Psychiatric:  AOx3, thought content appropriate, normal insight  Capillary Refill: Brisk,< 3 seconds   Peripheral Pulses: +2 palpable, equal bilaterally     Labs:   Recent Labs     04/09/19  0435 04/09/19  1535 04/10/19  0440 04/11/19  0450   WBC 7.5  --  7.5 6.9   HGB 10.7* 10.9* 11.0* 11.4*   HCT 32.7* 34.1* 34.4* 35.5*     --  308 356     Recent Labs     04/09/19  0435 04/10/19  0440 04/11/19  0450   * 136 139   K 4.0 4.0 4.7   CL 98* 99 103   CO2 25 25 25   BUN 12 15 16   CREATININE 0.5* 0.6 0.5*   CALCIUM 8.7 9.4 9.7     No results for input(s): AST, ALT, BILIDIR, BILITOT, ALKPHOS in the last 72 hours. No results for input(s): INR in the last 72 hours. No results for input(s): Jules Barn in the last 72 hours. Urinalysis:      Lab Results   Component Value Date    NITRU Negative 04/04/2019    WBCUA 0-2 04/04/2019    BACTERIA 1+ 09/02/2018    RBCUA 0-2 04/04/2019    BLOODU Negative 04/04/2019    SPECGRAV <=1.005 04/04/2019    GLUCOSEU Negative 04/04/2019       Radiology:  CT ABDOMEN PELVIS W IV CONTRAST Additional Contrast? None   Final Result      1. No evidence of obstruction or free air.  No acute abdominal or pelvic abnormality clearly identified. 2. Diverticulosis without evidence of focal diverticulitis. 3. Incidental indeterminate pedunculated cystic type lesion in the posterior left kidney. Follow-up CT renal mass protocol or MRI renal mass protocol in 3 months is recommended to evaluate the lesion further and to document stability. CTA PULMONARY W CONTRAST   Final Result      1. No evidence of pulmonary embolism. 2. Extensive bilateral groundglass opacities are increased compared to 4/5/2019 likely representing multifocal pneumonia superimposed on chronic interstitial fibrosis. CT CHEST WO CONTRAST   Final Result       The central airways are patent. Multifocal ill-defined patchy upper zone and left lower lobe predominant    peripheral nonspecific airspace opacities now seen since 2017 and may be    inflammatory, infectious. Clinically correlate. Background of chronic interstitial changes again noted. XR CHEST PORTABLE   Final Result      Mild bilateral interstitial and airspace opacities, not significantly changed compared to prior, representing edema or infection. Assessment/Plan:    Brent Murry is a 70 y.o. female with a past medical history of interstitial lung disease (on chronic steroids), rheumatoid arthritis (on Humira and leflunamide), HFpEF admitted for SOB and confusion, suspect HCAP as cause.      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, given no improvement on antibiotics and no identifiable infectious cause  - solumedrol 125 mg IV q6h, continue for next 4 days prior to beginning gradual taper over next 6 months  - protonix 40 mg PO BID  - acapella and IS  - pulmonology following    Sepsis 2/2 Cryptogenic organizing pneumonia, infectious pneumonia ruled-out  Sepsis resolved  On admission Tmax 102.6, RR 22, CT chest: multifocal patchy upper and LL airspace opacities, suspect infectious  - abx d/c'd yesterday  - supplemental O2 - currently 5L, goal SpO2 >88%  - Albuterol nebulizers   - procal mildly elevated at 0.27  - elevated ESR and CRP, RF  - resp panel PCR neg, AFB neg, histo/strep/legionella neg, blood culture NGtD  - aspergillus negative, 1,3 beta d glucan indeterminate, low nl, PCP sputum induction pending  - MRI lumbar spine cancelled d/t resolution of fevers and back pain   - probiotics added   - appreciate ID recommendations    2nd degree heart block - Mobitz type two, has not recurred  - CPAP nightly for DEWAYNE  - Paroxysmal AV block - asymptomatic, nocturnal, in the setting of not using CPAP  - no recommendation for pacemaker as of now, unless block persists despite respiration improvement  - cardiology following    HFpEF -Last echocardiogram (3/25/2019) showing grade II diastolic dysfunction.  Patient does not appear to be volume overloaded at this time.   -Furosemide 40 mg daily     Interstitial Lung Disease -Patient has a long history of ILD (suspect 2/2 RA and/or MTX-induced)   -followed by pulmonology (Dr. 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

## 2019-04-11 NOTE — PROGRESS NOTES
AM assessment charted. BP (!) 143/56   Pulse 85   Temp 98.1 °F (36.7 °C) (Oral)   Resp 16   Ht 4' 11.84\" (1.52 m)   Wt 227 lb 9.6 oz (103.2 kg)   SpO2 93%   BMI 44.68 kg/m²   Pt c/o 8/10 chronic back pain, medicated per MAR. Denies chest pain or SOB. Will continue to monitor.

## 2019-04-11 NOTE — PROGRESS NOTES
Pulmonary Followup Note    CC: acute on chronic hypoxia, pneumonia, fever  Subjective:  Remains afebrile and feels subjectively improved. Continues to have back pain, modestly productive cough and remains on oxygen but only 4L currently. ROS:  Denies headache, nausea . 24HR INTAKE/OUTPUT:      Intake/Output Summary (Last 24 hours) at 2019 1304  Last data filed at 2019 0915  Gross per 24 hour   Intake 500 ml   Output 2075 ml   Net -1575 ml        amLODIPine  10 mg Oral Nightly    methylPREDNISolone  125 mg Intravenous Q6H    pantoprazole  40 mg Oral BID AC    nystatin  5 mL Oral 4x Daily    saccharomyces boulardii  250 mg Oral BID    mupirocin   Topical 4x Daily    cetirizine  10 mg Oral QAM    escitalopram  10 mg Oral Daily    cholestyramine light  4 g Oral Daily    fluticasone  1 spray Each Nare Nightly    gabapentin  600 mg Oral TID    losartan  25 mg Oral Daily    Mirabegron ER  1 tablet Oral QPM    oxyCODONE-acetaminophen  1 tablet Oral Q8H    sodium chloride flush  10 mL Intravenous 2 times per day    enoxaparin  40 mg Subcutaneous Daily    albuterol  2.5 mg Nebulization Q4H WA    furosemide  40 mg Oral Daily    traZODone  50 mg Oral Nightly    hydrALAZINE  25 mg Oral 3 times per day           PHYSICAL EXAMINATION:  BP (!) 143/56   Pulse 85   Temp 98.1 °F (36.7 °C) (Oral)   Resp 16   Ht 4' 11.84\" (1.52 m)   Wt 227 lb 9.6 oz (103.2 kg)   SpO2 93%   BMI 44.68 kg/m²   CURRENT PULSE OXIMETRY:  SpO2: 93 %  24HR PULSE OXIMETRY RANGE:  SpO2  Av.2 %  Min: 90 %  Max: 98 % on 4L      Gen: mild distress. Speaking in full sentences without accessory muscle use. HEENT: PERRL, EOMI, OP nl  Lung: Diffuse crackles at the bases and mid lung zones. No wheezes. CV: RRR without M/R/R  Abd: +BS, soft, NT/ND  Ext: No edema.     DATA  CBC:   Recent Labs     19  0435 19  1535 04/10/19  0440 19  0450   WBC 7.5  --  7.5 6.9 HGB 10.7* 10.9* 11.0* 11.4*   HCT 32.7* 34.1* 34.4* 35.5*   MCV 87.8  --  87.7 87.0     --  308 356     BMP:   Recent Labs     04/09/19  0435 04/10/19  0440 04/11/19  0450   * 136 139   K 4.0 4.0 4.7   CL 98* 99 103   CO2 25 25 25   BUN 12 15 16   CREATININE 0.5* 0.6 0.5*     No results for input(s): PHART, WAS3JIV, PO2ART in the last 72 hours. LIVER PROFILE: No results for input(s): AST, ALT, LIPASE, BILIDIR, BILITOT, ALKPHOS in the last 72 hours. Invalid input(s): AMYLASE,  ALB    CXR REVIEWED BY ME AND SHOWED:  CT ABDOMEN PELVIS W IV CONTRAST Additional Contrast? None   Final Result      1. No evidence of obstruction or free air. No acute abdominal or pelvic abnormality clearly identified. 2. Diverticulosis without evidence of focal diverticulitis. 3. Incidental indeterminate pedunculated cystic type lesion in the posterior left kidney. Follow-up CT renal mass protocol or MRI renal mass protocol in 3 months is recommended to evaluate the lesion further and to document stability. CTA PULMONARY W CONTRAST   Final Result      1. No evidence of pulmonary embolism. 2. Extensive bilateral groundglass opacities are increased compared to 4/5/2019 likely representing multifocal pneumonia superimposed on chronic interstitial fibrosis. CT CHEST WO CONTRAST   Final Result       The central airways are patent. Multifocal ill-defined patchy upper zone and left lower lobe predominant    peripheral nonspecific airspace opacities now seen since 2017 and may be    inflammatory, infectious. Clinically correlate. Background of chronic interstitial changes again noted. XR CHEST PORTABLE   Final Result      Mild bilateral interstitial and airspace opacities, not significantly changed compared to prior, representing edema or infection.               ASSESSMENT/PLAN:  This is a 70 y.o. female with ILD and acute on chronic hypoxemic respiratory failure with presumed pneumonia. Bronchoscopy Friday the 5th with lavage of bilateral upper lobes. Gram and fungal stains were negative. Galactomannan negative  Cytology negative  Diatherix panel negative  Cultures are negative or showing normal respiratory taniya. Strep, histo and legionella antigens are negative. indeteriminate 1, 3 beta d glucan  procal was equivacol. antibiotics stopped    Sed rate and CRP are elevated which is nonspecific and could be from pneumonia, RA or something else. Rheumatoid factor elevated, MICHELLE negative, ANCA pending. Started solumedrol 125mg Q6hrs yesterday. Having the subjective improvement expected, but objectively there's no change other than she's on 4L NC. If that improvement persists or, hopefully continues, then the steroids are likely helping her acute changes and diagnosis of BOOP or other steroid responsive ILD is likely. This may or may not be related to her ILD.     Bernadine Dawson MD

## 2019-04-11 NOTE — PLAN OF CARE
Problem: Falls - Risk of:  Goal: Will remain free from falls  Description  Will remain free from falls  Outcome: Ongoing  Note:   No falls thus far on this shift. Bed locked and in low position. Call light within reach. Pt encouraged to call out to voice any needs. Bedside table in reach.     Goal: Absence of physical injury  Description  Absence of physical injury  Outcome: Ongoing     Problem: Discharge Planning:  Goal: Discharged to appropriate level of care  Description  Discharged to appropriate level of care  Outcome: Ongoing  Goal: Participates in care planning  Description  Participates in care planning  Outcome: Ongoing     Problem: Airway Clearance - Ineffective:  Goal: Clear lung sounds  Description  Clear lung sounds  Outcome: Ongoing  Goal: Ability to maintain a clear airway will improve  Description  Ability to maintain a clear airway will improve  Outcome: Ongoing     Problem: Fluid Volume - Deficit:  Goal: Achieves intake and output within specified parameters  Description  Achieves intake and output within specified parameters  Outcome: Ongoing     Problem: Gas Exchange - Impaired:  Goal: Levels of oxygenation will improve  Description  Levels of oxygenation will improve  Outcome: Ongoing     Problem: Hyperthermia:  Goal: Ability to maintain a body temperature in the normal range will improve  Description  Ability to maintain a body temperature in the normal range will improve  Outcome: Ongoing  Note:   Patient body temperature has been WNL for the past 24 hours+  Will continue to assess temps and treat fevers as indicated     Problem: Pain:  Goal: Pain level will decrease  Description  Pain level will decrease  Outcome: Ongoing  Goal: Control of acute pain  Description  Control of acute pain  Outcome: Ongoing  Goal: Control of chronic pain  Description  Control of chronic pain  Outcome: Ongoing     Problem: Nutrition  Goal: Optimal nutrition therapy  4/10/2019 2141 by Ros Tran RN  Outcome: Ongoing  4/10/2019 1332 by Nir Zheng RD, LD  Outcome: Ongoing

## 2019-04-11 NOTE — PROGRESS NOTES
ID Follow-up NOTE  RESIDENT NOTE - reviewed / edited, attending note at bottom    CC:            Fever    Antibiotics:  None     Admit Date: 4/4/2019  Hospital Day: 8    Subjective:     Patient feels better, denied fever, chills, nausea, emesis and chest pain. Still has a cough and back pain. Objective:     Patient Vitals for the past 8 hrs:   BP Temp Temp src Pulse Resp SpO2 Weight   04/11/19 0329 (!) 163/71 -- -- -- -- -- --   04/11/19 0303 (!) 177/81 98 °F (36.7 °C) Oral 103 14 92 % 227 lb 9.6 oz (103.2 kg)     I/O last 3 completed shifts: In: 720 [P.O.:720]  Out: 1775 [Urine:1775]  No intake/output data recorded.     EXAM:  GENERAL: Labored breathing   HEENT: Membranes moist, no oral lesion  NECK:  Supple  LUNGS: B/l crackles in bases  CARDIAC: RRR, no murmur appreciated  ABD:  + BS, soft / NT  EXT:  B/l ulnar deviation of fingers  NEURO: CN II-XII grossly in tact   PSYCH: Orientation, sensorium, mood normal       Data Review:  Lab Results   Component Value Date    WBC 6.9 04/11/2019    HGB 11.4 (L) 04/11/2019    HCT 35.5 (L) 04/11/2019    MCV 87.0 04/11/2019     04/11/2019     Lab Results   Component Value Date    CREATININE 0.5 (L) 04/11/2019    BUN 16 04/11/2019     04/11/2019    K 4.7 04/11/2019     04/11/2019    CO2 25 04/11/2019       Hepatic Function Panel:   Lab Results   Component Value Date    ALKPHOS 87 09/06/2018    ALT 16 09/06/2018    AST 27 09/06/2018    PROT 5.4 09/06/2018    PROT 6.8 06/19/2012    BILITOT 0.7 09/06/2018    BILIDIR 0.6 09/02/2018    IBILI 0.5 09/02/2018    LABALBU 2.9 09/06/2018     MICRO:  4/4 Blood culture NGTD  4/5 Rapid influenza negative   4/5 Histoplasma antigen in urine not detected, Strep, Legionella - negative               -respiratory panel PCR negative   4/6 AFB- not detected  4/6 respiratory panel PCR- negative        4/6 CMV, HSV- pending  4/8 fungal staining negative, Aspergillos Galacto AG negative, 1,3 Beta D Gluc - intermediated       IMAGING:  CXR REVIEWED BY ME AND SHOWED:  CT ABDOMEN PELVIS W IV CONTRAST Additional Contrast? None   Final Result       1. No evidence of obstruction or free air. No acute abdominal or pelvic abnormality clearly identified. 2. Diverticulosis without evidence of focal diverticulitis. 3. Incidental indeterminate pedunculated cystic type lesion in the posterior left kidney. Follow-up CT renal mass protocol or MRI renal mass protocol in 3 months is recommended to evaluate the lesion further and to document stability.               CTA PULMONARY W CONTRAST   Final Result       1. No evidence of pulmonary embolism.       2. Extensive bilateral groundglass opacities are increased compared to 4/5/2019 likely representing multifocal pneumonia superimposed on chronic interstitial fibrosis.         CT CHEST WO CONTRAST   Final Result       The central airways are patent.         Multifocal ill-defined patchy upper zone and left lower lobe predominant    peripheral nonspecific airspace opacities now seen since 2017 and may be    inflammatory, infectious.  Clinically correlate.       Background of chronic interstitial changes again noted.           XR CHEST PORTABLE   Final Result       Mild bilateral interstitial and airspace opacities, not significantly changed compared to prior, representing edema or infection.        Scheduled Meds:   methylPREDNISolone  125 mg Intravenous Q6H    pantoprazole  40 mg Oral BID AC    nystatin  5 mL Oral 4x Daily    saccharomyces boulardii  250 mg Oral BID    mupirocin   Topical 4x Daily    cetirizine  10 mg Oral QAM    escitalopram  10 mg Oral Daily    cholestyramine light  4 g Oral Daily    fluticasone  1 spray Each Nare Nightly    gabapentin  600 mg Oral TID    losartan  25 mg Oral Daily    Mirabegron ER  1 tablet Oral QPM    oxyCODONE-acetaminophen  1 tablet Oral Q8H    sodium chloride flush  10 mL Intravenous 2 times per day    enoxaparin  40 mg Subcutaneous

## 2019-04-11 NOTE — PROGRESS NOTES
RESPIRATORY THERAPY ASSESSMENT    Name:  [address] Record Number:  [id number]  Age: 70 y.o. Gender: female  : 1940  Today's Date:  4/10/2019  Room:  [address] [place] [address]    Assessment     Is the patient being admitted for a COPD or Asthma exacerbation? No   (If yes the patient will be seen every 4 hours for the first 24 hours and then reassessed)    Patient Admission Diagnosis      Allergies  Allergies   Allergen Reactions    Sulfa Antibiotics Hives    Lamotrigine Other (See Comments)     Blurred vision    Lomotil  [Diphenoxylate-Atropine] Other (See Comments)     Changes in vision    Sulfacetamide Sodium Hives     Other reaction(s): Other (See Comments)       Minimum Predicted Vital Capacity:     N/A          Actual Vital Capacity:      N/A              Pulmonary History:ILD  Home Oxygen Therapy:  3 liters/min via nasal cannula  Home Respiratory Therapy:Albuterol/Ipratropium Bromide HHN   Current Respiratory Therapy:  Albuterol Q4WA  Treatment Type: (Pt refused IS and Acapella tonight)  Medications: Albuterol    Respiratory Severity Index(RSI)   Patients with orders for inhalation medications, oxygen, or any therapeutic treatment modality will be placed on Respiratory Protocol. They will be assessed with the first treatment and at least every 72 hours thereafter. The following severity scale will be used to determine frequency of treatment intervention. Smoking History: Pulmonary Disease or Smoking History, Greater than 15 pack year = 2    Social History  Social History     Tobacco Use    Smoking status: Former Smoker     Packs/day: 1.00     Years: 14.00     Pack years: 14.00     Types: Cigarettes     Last attempt to quit: 1976     Years since quittin.3    Smokeless tobacco: Never Used    Tobacco comment: quit age 29   Substance Use Topics    Alcohol use: Yes     Alcohol/week: 0.6 - 1.2 oz     Types: 1 - 2 Glasses of wine per week     Comment: social drinker    Drug use:  No Recent Surgical History: None = 0  Past Surgical History  Past Surgical History:   Procedure Laterality Date    APPENDECTOMY      BRONCHOSCOPY N/A 4/5/2019    BRONCHOSCOPY ALVEOLAR LAVAGE- RIGHT/ LEFT UPPER LOBE performed by Chinmay Rodriguez MD at [address]  4/5/2019    BRONCHOSCOPY DIAGNOSTIC OR CELL [address] ONLY performed by Chinmay Rodriguez MD at [address] [facility] Bilateral     CHOLECYSTECTOMY      FOOT SURGERY Bilateral     metatarsal removal    HYSTERECTOMY, TOTAL ABDOMINAL      JOINT REPLACEMENT Right 04/25/2016    Dr. Yahir Santos , shoulder    JOINT REPLACEMENT Right     OTHER SURGICAL HISTORY Right 02/20/2017    RIGHT TOTAL KNEE ARTHROPLASTY             SALPINGO-OOPHORECTOMY      SHOULDER ARTHROPLASTY Right     SHOULDER ARTHROSCOPY Left 11/15/2017    TONSILLECTOMY      TOTAL KNEE ARTHROPLASTY Right        Level of Consciousness: Alert, Oriented, and Cooperative = 0    Level of Activity: Walking with assistance = 1    Respiratory Pattern: Regular Pattern; RR 8-20 = 0    Breath Sounds: Diminshed bilaterally and/or crackles = 2    Sputum  Sputum Color: Yellow, Tenacity: Thick, Sputum How Obtained: Spontaneous cough  Cough: Strong, spontaneous, non-productive = 0    Vital Signs   BP (!) 127/54   Pulse 75   Temp 98.2 °F (36.8 °C) (Oral)   Resp 18   Ht 4' 11.84\" (1.52 m)   Wt 227 lb 12.8 oz (103.3 kg)   SpO2 98%   BMI 44.72 kg/m²   SPO2 (COPD values may differ): 2    Peak Flow (asthma only): not applicable = 0    RSI: 7-8 = BID and Q4HPRN (every four hours as needed) for dyspnea        Plan       Goals:     Patient/caregiver was educated on the proper method of use for Respiratory Care Devices:  Yes      Level of patient/caregiver understanding able to:   ? Verbalize understanding   ? Demonstrate understanding       ? Teach back        ? Needs reinforcement       ? No available caregiver               ?   Other:     Response to education: Excellent     Is patient being placed on Home Treatment Regimen? Yes     Does the patient have everything they need prior to discharge? NA     Comments: None    Plan of Care: None    Electronically signed by Kim Farley RCP on 4/10/2019 at 11:32 PM    Respiratory Protocol Guidelines     1. Assessment and treatment by Respiratory Therapy will be initiated for medication and therapeutic interventions upon initiation of aerosolized medication. 2. Physician will be contacted for respiratory rate (RR) greater than 35 breaths per minute. Therapy will be held for heart rate (HR) greater than 140 beats per minute, pending direction from physician. 3. Bronchodilators will be administered via Metered Dose Inhaler (MDI) with spacer when the following criteria are met:  a. Alert and cooperative     b. HR < 140 bpm  c. RR < 30 bpm                d. Can demonstrate a 2-3 second inspiratory hold  4. Bronchodilators will be administered via Hand Held Nebulizer [facility]) to patients when ANY of the following criteria are met  a. Incognizant or uncooperative          b. Patients treated with HHN at Home        c. Unable to demonstrate proper use of MDI with spacer     d. RR > 30 bpm   5. Bronchodilators will be delivered via Metered Dose Inhaler (MDI), HHN, Aerogen to intubated patients on mechanical ventilation. 6. Inhalation medication orders will be delivered and/or substituted as outlined below. Aerosolized Medications Ordering and Administration Guidelines:    1. All Medications will be ordered by a physician, and their frequency and/or modality will be adjusted as defined by the patients Respiratory Severity Index (RSI) score. 2. If the patient does not have documented COPD, consider discontinuing anticholinergics when RSI is less than 9.  3. If the bronchospasm worsens (increased RSI), then the bronchodilator frequency can be increased to a maximum of every 4 hours.   If greater than every 4 hours is required, the physician will be contacted. 4. If the bronchospasm improves, the frequency of the bronchodilator can be decreased, based on the patient's RSI, but not less than home treatment regimen frequency. 5. Bronchodilator(s) will be discontinued if patient has a RSI less than 9 and has received no scheduled or as needed treatment for 72  Hrs. Patients Ordered on a Mucolytic Agent:    1. Must always be administered with a bronchodilator. 2. Discontinue if patient experiences worsened bronchospasm, or secretions have lessened to the point that the patient is able to clear them with a cough. Anti-inflammatory and Combination Medications:    1. If the patient lacks prior history of lung disease, is not using inhaled anti-inflammatory medication at home, and lacks wheezing by examination or by history for at least 24 hours, contact physician for possible discontinuation.

## 2019-04-11 NOTE — CARE COORDINATION
Case Management Note    Patient unavailable at this time for assessment      CM attempted to meet with patient for completion of initial face to face assessment at this time. Consult noted for assistance with discharge planning. Patient currently unavailable due to working with respiratory staff. CM has reviewed chart and noted patient is from home alone, low am-pac scores, possible SNF placement needed at discharge. Patient on new high dose steroids and oxygen requirement has come down. CM will follow up when patient available and will complete initial assessment and assist with needs for discharge.     Thank you,  Fawn Mora RN,   4th Floor Progressive Care Unit  [phone]

## 2019-04-11 NOTE — PROGRESS NOTES
(N/A, 4/5/2019); and bronchoscopy (4/5/2019). Restrictions  Position Activity Restriction  Other position/activity restrictions: up with assist  Subjective   Subjective  Subjective: Pt supine in bed upon PT entrance, agreeable to working with PT  Pain Screening  Patient Currently in Pain: Denies  Vital Signs  Patient Currently in Pain: Denies       Objective   Bed mobility  Supine to Sit: Contact guard assistance(HOB elevated, use of side rail, increased time to complete task)  Scooting: Contact guard assistance(increased time to complete task)  Transfers  Sit to Stand: Contact guard assistance(from eob and toilet and chair, cues for hand placement)  Stand to sit: Contact guard assistance(to toilet and chair)  Ambulation  Ambulation?: Yes  Ambulation 1  Surface: level tile  Device: Rolling Walker  Other Apparatus: O2(5L O2)  Assistance: Contact guard assistance  Quality of Gait: slow and steady gait, effortful, good stability  Distance: 10 ft x 2     Balance  Standing - Static: Good  Standing - Dynamic: Good  Comments: good stability with RW and CG    Pt stood at sink to wash hands with good stability and was able to stand and pull up briefs. Needed assist with cleaning after toileting. Assisted pt with washing up, donning gown and getting into recliner to eat. Strength RLE  Strength RLE: WFL  Strength LLE  Strength LLE: WFL       Education  Patient educated regarding safety with functional mobility, transfers and gait. Pt demonstrates good safety awareness. Pt also educated regarding use of call light for safety with mobility. Pt expresses understanding. Assessment   Assessment: Pt demonstrated improved tolerance for functional mobility, does benefit from resting in between activities. Recommend continued therapies at DC to help pt return to normal functional mobility level.   Activity Tolerance  Activity Tolerance: Pt on 5 L O2, at rest SpO2 were 91-92%, dropped to 82% momentarily with supine to sit, back to 92% after going to bathroom, washing up. AM-PAC Score   17          Goals  Short term goals  Time Frame for Short term goals: discharge  ongoing 4/11  Short term goal 1: patient will perform bed mobility with supervision  Short term goal 2: patient will perform transfers sit<>stand with supervision  Short term goal 3: patient will ambulate 76' with FWW and supervision  Patient Goals   Patient goals : to go home    Plan    Plan  Times per week: 2-5  Current Treatment Recommendations: Strengthening, Transfer Training, Endurance Training, Patient/Caregiver Education & Training, Balance Training, Gait Training, Functional Mobility Training, Safety Education & Training  Safety Devices  Type of devices: Nurse notified, Chair alarm in place, Left in chair, Call light within reach     Therapy Time   Individual Concurrent Group Co-treatment   Time In 1455         Time Out 1535         Minutes 40              Timed Code Treatment Minutes:  40    Total Treatment Minutes:  [facility], [id number]     This note will serve as a discharge summary in the event that the patient is discharged prior to the next treatment session.

## 2019-04-12 LAB
ANION GAP SERPL CALCULATED.3IONS-SCNC: 11 MMOL/L (ref 3–16)
BASOPHILS ABSOLUTE: 0 K/UL (ref 0–0.2)
BASOPHILS RELATIVE PERCENT: 0.4 %
BUN BLDV-MCNC: 22 MG/DL (ref 7–20)
CALCIUM SERPL-MCNC: 10 MG/DL (ref 8.3–10.6)
CHLORIDE BLD-SCNC: 101 MMOL/L (ref 99–110)
CO2: 24 MMOL/L (ref 21–32)
CREAT SERPL-MCNC: 0.7 MG/DL (ref 0.6–1.2)
EOSINOPHILS ABSOLUTE: 0 K/UL (ref 0–0.6)
EOSINOPHILS RELATIVE PERCENT: 0 %
GFR AFRICAN AMERICAN: >60
GFR NON-AFRICAN AMERICAN: >60
GLUCOSE BLD-MCNC: 158 MG/DL (ref 70–99)
GLUCOSE BLD-MCNC: 202 MG/DL (ref 70–99)
GLUCOSE BLD-MCNC: 215 MG/DL (ref 70–99)
HCT VFR BLD CALC: 35.6 % (ref 36–48)
HEMOGLOBIN: 11.3 G/DL (ref 12–16)
IGE: 82 KU/L
LYMPHOCYTES ABSOLUTE: 1.5 K/UL (ref 1–5.1)
LYMPHOCYTES RELATIVE PERCENT: 12.4 %
MCH RBC QN AUTO: 28 PG (ref 26–34)
MCHC RBC AUTO-ENTMCNC: 31.9 G/DL (ref 31–36)
MCV RBC AUTO: 87.8 FL (ref 80–100)
MONOCYTES ABSOLUTE: 0.6 K/UL (ref 0–1.3)
MONOCYTES RELATIVE PERCENT: 4.9 %
MYCOPLASMA PNEUMONIAE IGM: 0.11
NEUTROPHILS ABSOLUTE: 9.7 K/UL (ref 1.7–7.7)
NEUTROPHILS RELATIVE PERCENT: 82.3 %
PDW BLD-RTO: 14.9 % (ref 12.4–15.4)
PERFORMED ON: ABNORMAL
PERFORMED ON: ABNORMAL
PLATELET # BLD: 410 K/UL (ref 135–450)
PMV BLD AUTO: 7.3 FL (ref 5–10.5)
POTASSIUM REFLEX MAGNESIUM: 4.7 MMOL/L (ref 3.5–5.1)
RBC # BLD: 4.06 M/UL (ref 4–5.2)
SODIUM BLD-SCNC: 136 MMOL/L (ref 136–145)
WBC # BLD: 11.8 K/UL (ref 4–11)

## 2019-04-12 PROCEDURE — 6360000002 HC RX W HCPCS: Performed by: STUDENT IN AN ORGANIZED HEALTH CARE EDUCATION/TRAINING PROGRAM

## 2019-04-12 PROCEDURE — 94668 MNPJ CHEST WALL SBSQ: CPT

## 2019-04-12 PROCEDURE — 97116 GAIT TRAINING THERAPY: CPT

## 2019-04-12 PROCEDURE — 94640 AIRWAY INHALATION TREATMENT: CPT

## 2019-04-12 PROCEDURE — 2580000003 HC RX 258: Performed by: STUDENT IN AN ORGANIZED HEALTH CARE EDUCATION/TRAINING PROGRAM

## 2019-04-12 PROCEDURE — 94660 CPAP INITIATION&MGMT: CPT

## 2019-04-12 PROCEDURE — 97110 THERAPEUTIC EXERCISES: CPT

## 2019-04-12 PROCEDURE — 36415 COLL VENOUS BLD VENIPUNCTURE: CPT

## 2019-04-12 PROCEDURE — 94761 N-INVAS EAR/PLS OXIMETRY MLT: CPT

## 2019-04-12 PROCEDURE — 94150 VITAL CAPACITY TEST: CPT

## 2019-04-12 PROCEDURE — 2700000000 HC OXYGEN THERAPY PER DAY

## 2019-04-12 PROCEDURE — 6370000000 HC RX 637 (ALT 250 FOR IP): Performed by: FAMILY MEDICINE

## 2019-04-12 PROCEDURE — 6370000000 HC RX 637 (ALT 250 FOR IP): Performed by: STUDENT IN AN ORGANIZED HEALTH CARE EDUCATION/TRAINING PROGRAM

## 2019-04-12 PROCEDURE — 2060000000 HC ICU INTERMEDIATE R&B

## 2019-04-12 PROCEDURE — 85025 COMPLETE CBC W/AUTO DIFF WBC: CPT

## 2019-04-12 PROCEDURE — 94664 DEMO&/EVAL PT USE INHALER: CPT

## 2019-04-12 PROCEDURE — 99233 SBSQ HOSP IP/OBS HIGH 50: CPT | Performed by: INTERNAL MEDICINE

## 2019-04-12 PROCEDURE — 6360000002 HC RX W HCPCS: Performed by: INTERNAL MEDICINE

## 2019-04-12 PROCEDURE — 80048 BASIC METABOLIC PNL TOTAL CA: CPT

## 2019-04-12 RX ADMIN — FLUTICASONE PROPIONATE 1 SPRAY: 50 SPRAY, METERED NASAL at 20:08

## 2019-04-12 RX ADMIN — PANTOPRAZOLE SODIUM 40 MG: 40 TABLET, DELAYED RELEASE ORAL at 05:46

## 2019-04-12 RX ADMIN — ALBUTEROL SULFATE 2.5 MG: 2.5 SOLUTION RESPIRATORY (INHALATION) at 12:03

## 2019-04-12 RX ADMIN — MUPIROCIN: 20 OINTMENT TOPICAL at 20:07

## 2019-04-12 RX ADMIN — Medication 250 MG: at 08:13

## 2019-04-12 RX ADMIN — ALBUTEROL SULFATE 2.5 MG: 2.5 SOLUTION RESPIRATORY (INHALATION) at 08:31

## 2019-04-12 RX ADMIN — MUPIROCIN: 20 OINTMENT TOPICAL at 16:49

## 2019-04-12 RX ADMIN — ALBUTEROL SULFATE 2.5 MG: 2.5 SOLUTION RESPIRATORY (INHALATION) at 21:12

## 2019-04-12 RX ADMIN — ENOXAPARIN SODIUM 40 MG: 40 INJECTION SUBCUTANEOUS at 08:13

## 2019-04-12 RX ADMIN — OXYCODONE HYDROCHLORIDE AND ACETAMINOPHEN 1 TABLET: 5; 325 TABLET ORAL at 23:49

## 2019-04-12 RX ADMIN — HYDRALAZINE HYDROCHLORIDE 25 MG: 25 TABLET, FILM COATED ORAL at 14:14

## 2019-04-12 RX ADMIN — ESCITALOPRAM OXALATE 10 MG: 10 TABLET ORAL at 08:13

## 2019-04-12 RX ADMIN — GABAPENTIN 600 MG: 600 TABLET, FILM COATED ORAL at 14:14

## 2019-04-12 RX ADMIN — METHYLPREDNISOLONE SODIUM SUCCINATE 125 MG: 125 INJECTION, POWDER, FOR SOLUTION INTRAMUSCULAR; INTRAVENOUS at 09:45

## 2019-04-12 RX ADMIN — NYSTATIN 500000 UNITS: 100000 SUSPENSION ORAL at 14:13

## 2019-04-12 RX ADMIN — METHYLPREDNISOLONE SODIUM SUCCINATE 125 MG: 125 INJECTION, POWDER, FOR SOLUTION INTRAMUSCULAR; INTRAVENOUS at 03:34

## 2019-04-12 RX ADMIN — GABAPENTIN 600 MG: 600 TABLET, FILM COATED ORAL at 20:07

## 2019-04-12 RX ADMIN — PANTOPRAZOLE SODIUM 40 MG: 40 TABLET, DELAYED RELEASE ORAL at 16:46

## 2019-04-12 RX ADMIN — SALINE NASAL SPRAY 1 SPRAY: 1.5 SOLUTION NASAL at 16:46

## 2019-04-12 RX ADMIN — NYSTATIN 500000 UNITS: 100000 SUSPENSION ORAL at 20:06

## 2019-04-12 RX ADMIN — OXYCODONE HYDROCHLORIDE AND ACETAMINOPHEN 1 TABLET: 5; 325 TABLET ORAL at 16:45

## 2019-04-12 RX ADMIN — ALBUTEROL SULFATE 2.5 MG: 2.5 SOLUTION RESPIRATORY (INHALATION) at 16:10

## 2019-04-12 RX ADMIN — CETIRIZINE HYDROCHLORIDE 10 MG: 10 TABLET, FILM COATED ORAL at 08:13

## 2019-04-12 RX ADMIN — HYDRALAZINE HYDROCHLORIDE 25 MG: 25 TABLET, FILM COATED ORAL at 20:07

## 2019-04-12 RX ADMIN — CHOLESTYRAMINE 4 G: 4 POWDER, FOR SUSPENSION ORAL at 08:12

## 2019-04-12 RX ADMIN — INSULIN LISPRO 1 UNITS: 100 INJECTION, SOLUTION INTRAVENOUS; SUBCUTANEOUS at 21:42

## 2019-04-12 RX ADMIN — TRAZODONE HYDROCHLORIDE 50 MG: 50 TABLET ORAL at 20:07

## 2019-04-12 RX ADMIN — MUPIROCIN: 20 OINTMENT TOPICAL at 14:13

## 2019-04-12 RX ADMIN — OXYCODONE HYDROCHLORIDE AND ACETAMINOPHEN 1 TABLET: 5; 325 TABLET ORAL at 00:16

## 2019-04-12 RX ADMIN — MUPIROCIN: 20 OINTMENT TOPICAL at 08:22

## 2019-04-12 RX ADMIN — LOSARTAN POTASSIUM 25 MG: 25 TABLET, FILM COATED ORAL at 08:13

## 2019-04-12 RX ADMIN — Medication 250 MG: at 20:07

## 2019-04-12 RX ADMIN — METHYLPREDNISOLONE SODIUM SUCCINATE 125 MG: 125 INJECTION, POWDER, FOR SOLUTION INTRAMUSCULAR; INTRAVENOUS at 20:06

## 2019-04-12 RX ADMIN — METHYLPREDNISOLONE SODIUM SUCCINATE 125 MG: 125 INJECTION, POWDER, FOR SOLUTION INTRAMUSCULAR; INTRAVENOUS at 16:45

## 2019-04-12 RX ADMIN — NYSTATIN 500000 UNITS: 100000 SUSPENSION ORAL at 16:46

## 2019-04-12 RX ADMIN — Medication 10 ML: at 08:23

## 2019-04-12 RX ADMIN — NYSTATIN 500000 UNITS: 100000 SUSPENSION ORAL at 08:12

## 2019-04-12 RX ADMIN — SALINE NASAL SPRAY 1 SPRAY: 1.5 SOLUTION NASAL at 20:08

## 2019-04-12 RX ADMIN — OXYCODONE HYDROCHLORIDE AND ACETAMINOPHEN 1 TABLET: 5; 325 TABLET ORAL at 08:13

## 2019-04-12 RX ADMIN — Medication 10 ML: at 20:07

## 2019-04-12 RX ADMIN — FUROSEMIDE 40 MG: 40 TABLET ORAL at 08:13

## 2019-04-12 RX ADMIN — GABAPENTIN 600 MG: 600 TABLET, FILM COATED ORAL at 08:13

## 2019-04-12 RX ADMIN — HYDRALAZINE HYDROCHLORIDE 25 MG: 25 TABLET, FILM COATED ORAL at 05:47

## 2019-04-12 RX ADMIN — AMLODIPINE BESYLATE 10 MG: 10 TABLET ORAL at 20:07

## 2019-04-12 ASSESSMENT — PAIN SCALES - GENERAL
PAINLEVEL_OUTOF10: 0
PAINLEVEL_OUTOF10: 0
PAINLEVEL_OUTOF10: 7
PAINLEVEL_OUTOF10: 0
PAINLEVEL_OUTOF10: 0
PAINLEVEL_OUTOF10: 7
PAINLEVEL_OUTOF10: 0
PAINLEVEL_OUTOF10: 0
PAINLEVEL_OUTOF10: 5
PAINLEVEL_OUTOF10: 6
PAINLEVEL_OUTOF10: 5
PAINLEVEL_OUTOF10: 0

## 2019-04-12 ASSESSMENT — PAIN DESCRIPTION - DESCRIPTORS
DESCRIPTORS: ACHING
DESCRIPTORS: ACHING

## 2019-04-12 ASSESSMENT — PAIN DESCRIPTION - FREQUENCY: FREQUENCY: INTERMITTENT

## 2019-04-12 ASSESSMENT — PAIN DESCRIPTION - PROGRESSION: CLINICAL_PROGRESSION: GRADUALLY WORSENING

## 2019-04-12 ASSESSMENT — PAIN DESCRIPTION - LOCATION
LOCATION: BACK
LOCATION: BACK

## 2019-04-12 ASSESSMENT — PAIN DESCRIPTION - PAIN TYPE
TYPE: CHRONIC PAIN
TYPE: CHRONIC PAIN

## 2019-04-12 ASSESSMENT — PAIN DESCRIPTION - ORIENTATION
ORIENTATION: LOWER;MID
ORIENTATION: MID;LOWER

## 2019-04-12 ASSESSMENT — PAIN - FUNCTIONAL ASSESSMENT: PAIN_FUNCTIONAL_ASSESSMENT: ACTIVITIES ARE NOT PREVENTED

## 2019-04-12 ASSESSMENT — PAIN DESCRIPTION - ONSET: ONSET: GRADUAL

## 2019-04-12 NOTE — PROGRESS NOTES
Internal Medicine PGY-1 Resident Progress Note        PCP: Tray No    Date of Admission: 4/4/2019    Chief Complaint: Confusion; shortness of breath; fevers     Hospital Course:     Meg Chavez is a 70 y.o. female with a past medical history of interstitial lung disease (on chronic steroids), rheumatoid arthritis (on Humira and leflunamide), HFpEF (grade II diastolic dysfunction), hypertension and depression who presented to [facility] with confusion and shortness of breath at home, recently discharged on 3/28 on 3L O2 while being treated for PNA. Had run of Mobitz II HB with PVCs on night of admission, seen by EPS (recommended strict compliance with nightly CPAP for paroxysmal asymptomatic AV block, noctural). She was admitted with sepsis 2/2 HCAP started on broad-spectrum abx and underwent bronchoscopy (4/5) with BAL of BEN and RUL which identified bronchial inflammation and increased mucoid secretions. Work-up for infectious PNA has since all returned negative, including search for bacterial, viral and fungal pathogens. CT chest 4/8 worsening multifocal PNA on ILD. Antibiotics were discontinued following 5 days of administration given limited clinical improvement. She has persistently been on 5 L O2 NC and became afebrile once antibiotics were discontinued. 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 Of note she has received BL L1, L2 medial branch ablations on 2/27 at [address] [place] [address], followed by steroid facet injections at [facility] on 1/23/2019 and has been complaining of lumbar back pain throughout admission, nothing worse than her baseline for which she is taking her home percocet. Subjective:     AAOx3, reports some increase in energy, walking with PT/OT.  Subjectively feels better, however O2 requirement still elevated 4-5L NC. Afebrile last 3 days. Still has mild dry cough. Increased appetite. Will monitor BP, glucose and swelling closely while on steroids. Medications:  Reviewed    Infusion Medications    dextrose       Scheduled Medications    amLODIPine  10 mg Oral Nightly    methylPREDNISolone  125 mg Intravenous Q6H    pantoprazole  40 mg Oral BID AC    nystatin  5 mL Oral 4x Daily    saccharomyces boulardii  250 mg Oral BID    mupirocin   Topical 4x Daily    cetirizine  10 mg Oral QAM    escitalopram  10 mg Oral Daily    cholestyramine light  4 g Oral Daily    fluticasone  1 spray Each Nare Nightly    gabapentin  600 mg Oral TID    losartan  25 mg Oral Daily    Mirabegron ER  1 tablet Oral QPM    oxyCODONE-acetaminophen  1 tablet Oral Q8H    sodium chloride flush  10 mL Intravenous 2 times per day    enoxaparin  40 mg Subcutaneous Daily    albuterol  2.5 mg Nebulization Q4H WA    furosemide  40 mg Oral Daily    traZODone  50 mg Oral Nightly    hydrALAZINE  25 mg Oral 3 times per day     PRN Meds: glucose, dextrose, glucagon (rDNA), dextrose, menthol-zinc oxide, albuterol, sodium chloride flush, magnesium hydroxide, ondansetron, acetaminophen, calcium carbonate, sodium chloride (Inhalant)      Intake/Output Summary (Last 24 hours) at 4/12/2019 0956  Last data filed at 4/12/2019 0823  Gross per 24 hour   Intake 490 ml   Output 675 ml   Net -185 ml       Physical Exam Performed:    BP (!) 153/77   Pulse 90   Temp 98.1 °F (36.7 °C) (Oral)   Resp 16   Ht 4' 11.84\" (1.52 m)   Wt 219 lb 2.2 oz (99.4 kg)   SpO2 94%   BMI 43.02 kg/m²       General appearance:  Patient has mildly labored breathing and appears short of breath. Pt was somnolent   HEENT:  Normal cephalic, atraumatic without obviousdeformity. Pupils equal, round, and reactive to light. Extra ocular muscles intact. Conjunctivae/corneas clear. Neck: Supple, with full range of motion.  No jugular venous distention. Trachea midline. Respiratory: Normal respiratory effort. Minimal upper inspiratory wheezing. Velcro like crackles present in bases bilaterally  Cardiovascular: Regular rate and rhythm. No JVD. Trace pitting edema, not increased from yesterday  Abdomen: Soft, non-tender,non-distended with normal bowel sounds. Non-tender to palpation  Musculoskeletal:  No clubbing or cyanosis present. BL ulnar deviation, selin notes, swan neck defomities. Non-tender to palpation spinal processes  Skin: Skin color, texture, turgor normal.  No rashes or lesions. Neurologic:  Neurovascularly intact without any focal sensory/motor deficits. Cranial nerves: II-XII intact, grosslynon-focal.  Psychiatric:  AOx3, thought content appropriate, normal insight  Capillary Refill: Brisk,< 3 seconds   Peripheral Pulses: +2 palpable, equal bilaterally     Labs:   Recent Labs     04/10/19  0440 04/11/19  0450 04/12/19 0419   WBC 7.5 6.9 11.8*   HGB 11.0* 11.4* 11.3*   HCT 34.4* 35.5* 35.6*    356 410     Recent Labs     04/10/19  0440 04/11/19  0450 04/12/19  0419    139 136   K 4.0 4.7 4.7   CL 99 103 101   CO2 25 25 24   BUN 15 16 22*   CREATININE 0.6 0.5* 0.7   CALCIUM 9.4 9.7 10.0     No results for input(s): AST, ALT, BILIDIR, BILITOT, ALKPHOS in the last 72 hours. No results for input(s): INR in the last 72 hours. No results for input(s): Willia Beverage in the last 72 hours. Urinalysis:      Lab Results   Component Value Date    NITRU Negative 04/04/2019    WBCUA 0-2 04/04/2019    BACTERIA 1+ 09/02/2018    RBCUA 0-2 04/04/2019    BLOODU Negative 04/04/2019    SPECGRAV <=1.005 04/04/2019    GLUCOSEU Negative 04/04/2019       Radiology:  CT ABDOMEN PELVIS W IV CONTRAST Additional Contrast? None   Final Result      1. No evidence of obstruction or free air. No acute abdominal or pelvic abnormality clearly identified. 2. Diverticulosis without evidence of focal diverticulitis.    3. Incidental indeterminate pedunculated cystic type lesion in the posterior left kidney. Follow-up CT renal mass protocol or MRI renal mass protocol in 3 months is recommended to evaluate the lesion further and to document stability. CTA PULMONARY W CONTRAST   Final Result      1. No evidence of pulmonary embolism. 2. Extensive bilateral groundglass opacities are increased compared to 4/5/2019 likely representing multifocal pneumonia superimposed on chronic interstitial fibrosis. CT CHEST WO CONTRAST   Final Result       The central airways are patent. Multifocal ill-defined patchy upper zone and left lower lobe predominant    peripheral nonspecific airspace opacities now seen since 2017 and may be    inflammatory, infectious. Clinically correlate. Background of chronic interstitial changes again noted. XR CHEST PORTABLE   Final Result      Mild bilateral interstitial and airspace opacities, not significantly changed compared to prior, representing edema or infection. CT CHEST WO CONTRAST    (Results Pending)     Assessment/Plan:    Roshan Hinojosa is a 70 y.o. female with a past medical history of interstitial lung disease (on chronic steroids), rheumatoid arthritis (on Humira and leflunamide), HFpEF admitted for SOB and confusion, suspect HCAP as cause.      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, given no improvement on antibiotics and no identifiable infectious cause  - solumedrol 125 mg IV q6h (day 3)  - repeat CT chest tomorrow, if no improvement consider discontinuation of steroids as this is likely progressively worsening of her ILD  - protonix 40 mg PO BID  - acapella and IS  - pulmonology following    Sepsis 2/2 Cryptogenic organizing pneumonia, infectious pneumonia ruled-out  Sepsis resolved  On admission Tmax 102.6, RR 22, CT chest: multifocal patchy upper and LL airspace opacities, suspect infectious  - abx d/c'd yesterday  - supplemental O2 - currently 5L, goal SpO2 >88%  - Albuterol nebulizers   - procal mildly elevated at 0.27  - elevated ESR and CRP, RF  - resp panel PCR neg, AFB neg, histo/strep/legionella neg, blood culture NGtD  - aspergillus negative, 1,3 beta d glucan indeterminate, low nl, PCP sputum induction pending  - MRI lumbar spine cancelled d/t resolution of fevers and back pain   - probiotics added   - ID signed off    2nd degree heart block - Mobitz type two, has not recurred  - CPAP nightly for DEWAYNE  - Paroxysmal AV block - asymptomatic, nocturnal, in the setting of not using CPAP  - no recommendation for pacemaker as of now, unless block persists despite respiration improvement  - cardiology following    HFpEF -Last echocardiogram (3/25/2019) showing grade II diastolic dysfunction.  Patient does not appear to be volume overloaded at this time.   -Furosemide 40 mg daily     Interstitial Lung Disease -Patient has a long history of ILD (suspect 2/2 RA and/or MTX-induced)   -followed by pulmonology (Dr. 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  Placing patient at risk for multiple co-morbidities as well as early death and contributing to the patient's presentation.      DVT Prophylaxis: Lovenox   Diet: General diet  Code Status: Limited (no x 4)      PT/OT Eval Status: 19/24 OT  Ofelia Juarez MD    I will discuss the patient with the senior resident and Leonarda Fothergill, MD Lorelee Gurney, MD  Internal Medicine Resident PGY-1

## 2019-04-12 NOTE — PLAN OF CARE
Problem: Falls - Risk of:  Goal: Absence of physical injury  Description  Absence of physical injury  Outcome: Met This Shift  Note:   Pt is a medium fall risk, A&O x4. All pathways within the room are free of clutter. Bed/chair alarm in use. Will continue to monitor. Problem: Airway Clearance - Ineffective:  Goal: Ability to maintain a clear airway will improve  Description  Ability to maintain a clear airway will improve  Outcome: Met This Shift  Note:   Pt continues to have a productive cough with small,thin secretions. Lungs sounds are clear, diminished. Will continue to monitor. Problem: Gas Exchange - Impaired:  Goal: Levels of oxygenation will improve  Description  Levels of oxygenation will improve  4/12/2019 1507 by Glo Nissen, RN  Outcome: Met This Shift  Note:   Able to wean pt from 5L to 4l, sats remain in the mid 90s. Pt denies any SOB. Will continue wean and monitor.

## 2019-04-12 NOTE — PROGRESS NOTES
Physical Therapy  Facility/Department: [facility] 4 PCU  Daily Treatment Note  NAME: Vahe Santiago  : 1940  MRN: [id number]    Date of Service: 2019    Discharge Recommendations:  Vahe Santiago scored a 18/24 on the AM-PAC short mobility form. Current research shows that an AM-PAC score of 17 or less is typically not associated with a discharge to the patient's home setting. Based on the patients AM-PAC score and their current functional mobility deficits, it is recommended that the patient have 3-5 sessions per week of Physical Therapy at d/c to increase the patients independence. PT Equipment Recommendations  Equipment Needed: Yes  Mobility Devices: Dwight Negrito: Rollator (4 Wheeled)  Other: patient interested in obtaining 4WW due to decreased activity tolerance    Patient Diagnosis(es): The encounter diagnosis was Pneumonia due to organism. has a past medical history of Allergic rhinitis, Asthma, Chicken pox, Chronic diastolic CHF (congestive heart failure) ([address] Depression, Diverticulosis of large intestine, Essential hypertension, Gastroesophageal reflux disease without esophagitis, GERD (gastroesophageal reflux disease), Hyperlipidemia, Hypertension, Interstitial lung disease ([address] Mitral valve regurgitation, Mixed hyperlipidemia, Obesity, Osteoarthritis, Osteoporosis, Polio, Prolonged emergence from general anesthesia, RA (rheumatoid arthritis) ([address] Rheumatoid arthritis ([address] and Sleep apnea. has a past surgical history that includes Appendectomy; Cholecystectomy; Salpingo-oophorectomy; Carpal tunnel release (Bilateral); Foot surgery (Bilateral); Total shoulder arthroplasty (Right); other surgical history (Right, 2017); Hysterectomy, total abdominal; Tonsillectomy; Shoulder arthroscopy (Left, 11/15/2017); joint replacement (Right, 2016); joint replacement (Right);  Total knee arthroplasty (Right); bronchoscopy (N/A, 2019); and bronchoscopy (4/5/2019). Restrictions  Position Activity Restriction  Other position/activity restrictions: up with assist  Subjective   General  Additional Pertinent Hx: Patient is a 71 y/o female admitted 4/4 with confusion and shortness of breath. CT chest (+) for Multifocal ill-defined patchy upper zone and left lower lobe predominant peripheral nonspecific airspace opacities now seen since 2017 and may be inflammatory or infectious. PMH significant for asthma, CHF, HTN, GERD, HLD, interstitial lung disease, obesity, polio, RA, sleep apnea, B foot surgery, right shoulder replacement, right TKA, left shoulder arthroscopy. Referring Practitioner: Reginaldo Boateng MD  Subjective  Subjective: Pt finishing up in the restroom willing to participate   General Comment  Comments: Patient supine in bed upon arrival.  Pain Screening  Patient Currently in Pain: Denies  Vital Signs  Patient Currently in Pain: Denies  Orientation  Orientation  Overall Orientation Status: Within Functional Limits  Objective   Bed mobility  Sit to Supine: Stand by assistance   Transfers   Sit to stand: SBA   Stand to sit: SBA   Gait Training:   Device: RW  Distance: 40 ft  Gait deviations: poor walker placement with turns (kicking walker); slow miguel angel; decreased miguel angel, foot clearance and step length; wide HOANG. Mildly unsteady with turns       Exercises  Quad Sets: 15x5 sec B   Gluteal Sets: 15x5 sec B   Ankle Pumps: x20 B    Assessment   Assessment: Pt limited due to hypoxia and generalized fatigue. Spo2 dropped to 80% on 4.5Ls after ambulating for 40 ft; recovering to 90% after 2 mins of PLB. Presented with fair trunk and LE control during bed mobility with the HOB elevated and use of the handrails to return to the supine position. Transfers and gait training were mildly unsteady requiring sequencing cues throughout gait training; no LOB noted.      Treatment Diagnosis: impaired mobility associated with PNA  Prognosis: Good  Patient Education: Educated on role of PT, safety with mobility, use of call light, d/c recommendations; patient verbalized understanding.    REQUIRES PT FOLLOW UP: Yes     AM-PAC Score  AM-PAC Inpatient Mobility Raw Score : 18  AM-PAC Inpatient T-Scale Score : 43.63  Mobility Inpatient CMS 0-100% Score: 46.58  Mobility Inpatient CMS G-Code Modifier : CK        Goals  Short term goals  Time Frame for Short term goals: discharge  ongoing 4/11  Short term goal 1: patient will perform bed mobility with supervision  Short term goal 2: patient will perform transfers sit<>stand with supervision  Short term goal 3: patient will ambulate 76' with FWW and supervision  Patient Goals   Patient goals : to go home    Plan    Plan  Times per week: 2-5  Current Treatment Recommendations: Strengthening, Transfer Training, Endurance Training, Patient/Caregiver Education & Training, Balance Training, Gait Training, Functional Mobility Training, Safety Education & Training  Safety Devices  Type of devices: Nurse notified, Call light within reach, Bed alarm in place, Left in bed   Therapy Time   Individual Concurrent Group Co-treatment   Time In 1550         Time Out 1613         Minutes 23         Timed Code Treatment Minutes: [address], PTA

## 2019-04-12 NOTE — PLAN OF CARE
Problem: Gas Exchange - Impaired:  Goal: Levels of oxygenation will improve  Description  Levels of oxygenation will improve  Outcome: Ongoing  Unlabored breathing at rest. Oxygen saturation maintained greater than 92% while on 5lpm O2. Minimal KHAN. Will continue to monitor. Problem: Pain:  Goal: Control of chronic pain  Description  Control of chronic pain  Pt c/o of generalized pain, scheduled Percocet administered. Upon reassessment, pt appears to be resting comfortably in bed with eyes closed, no visual signs of distress. Will continue to assess for pain and treat as needed.

## 2019-04-12 NOTE — PROGRESS NOTES
AM assessment charted. BP (!) 153/77   Pulse 90   Temp 98.1 °F (36.7 °C) (Oral)   Resp 16   Ht 4' 11.84\" (1.52 m)   Wt 219 lb 2.2 oz (99.4 kg)   SpO2 94%   BMI 43.02 kg/m²   Pt c/o chronic back pain. Medicated per MAR. No other issues at this time. Will continue to monitor.

## 2019-04-12 NOTE — PROGRESS NOTES
Pulmonary Followup Note    CC: acute on chronic hypoxia, pneumonia, fever  Subjective:  Remains afebrile and feels subjectively improved, but continues to have a dry nonproductive cough and is back on 5L NC and saturating 92%. ROS:  Denies headache, nausea . 24HR INTAKE/OUTPUT:      Intake/Output Summary (Last 24 hours) at 2019 1051  Last data filed at 2019 0823  Gross per 24 hour   Intake 490 ml   Output 675 ml   Net -185 ml        amLODIPine  10 mg Oral Nightly    methylPREDNISolone  125 mg Intravenous Q6H    pantoprazole  40 mg Oral BID AC    nystatin  5 mL Oral 4x Daily    saccharomyces boulardii  250 mg Oral BID    mupirocin   Topical 4x Daily    cetirizine  10 mg Oral QAM    escitalopram  10 mg Oral Daily    cholestyramine light  4 g Oral Daily    fluticasone  1 spray Each Nare Nightly    gabapentin  600 mg Oral TID    losartan  25 mg Oral Daily    Mirabegron ER  1 tablet Oral QPM    oxyCODONE-acetaminophen  1 tablet Oral Q8H    sodium chloride flush  10 mL Intravenous 2 times per day    enoxaparin  40 mg Subcutaneous Daily    albuterol  2.5 mg Nebulization Q4H WA    furosemide  40 mg Oral Daily    traZODone  50 mg Oral Nightly    hydrALAZINE  25 mg Oral 3 times per day           PHYSICAL EXAMINATION:  BP (!) 153/77   Pulse 90   Temp 98.1 °F (36.7 °C) (Oral)   Resp 16   Ht 4' 11.84\" (1.52 m)   Wt 219 lb 2.2 oz (99.4 kg)   SpO2 94%   BMI 43.02 kg/m²   CURRENT PULSE OXIMETRY:  SpO2: 94 %  24HR PULSE OXIMETRY RANGE:  SpO2  Av.7 %  Min: 90 %  Max: 95 % on 4L      Gen: mild distress. Speaking in full sentences without accessory muscle use. HEENT: PERRL, EOMI, OP nl  Lung: Diffuse crackles at the bases and mid lung zones. No wheezes. CV: RRR without M/R/R  Abd: +BS, soft, NT/ND  Ext: No edema.     DATA  CBC:   Recent Labs     04/10/19  0440 19  0450 19  0419   WBC 7.5 6.9 11.8*   HGB 11.0* 11.4* 11.3*   HCT 34.4* pneumonia. Bronchoscopy Friday the 5th with lavage of bilateral upper lobes. Gram and fungal stains were negative. Galactomannan negative  Cytology negative  Diatherix panel negative  Cultures are negative or showing normal respiratory taniya. Strep, histo and legionella antigens are negative. indeteriminate 1, 3 beta d glucan  procal was equivacol. antibiotics stopped    Sed rate and CRP are elevated which is nonspecific and could be from pneumonia, RA or something else. Rheumatoid factor elevated. MICHELLE negative,   ANCA pending. Started solumedrol 125mg Q6hrs 4/10. Having the subjective improvement expected, but objectively there's no change objective change. Will get a repeat CT chest tomorrow to see if there's objective improvement. Spoke with her rheumatologist yesterday.   If chest CT is the same or worse then may not be a benefit to continue the steroids    Yajaira Lin MD

## 2019-04-13 ENCOUNTER — APPOINTMENT (OUTPATIENT)
Dept: CT IMAGING | Age: 79
DRG: 871 | End: 2019-04-13
Payer: MEDICARE

## 2019-04-13 LAB
ANCA IFA: NORMAL
ANION GAP SERPL CALCULATED.3IONS-SCNC: 11 MMOL/L (ref 3–16)
BASOPHILS ABSOLUTE: 0 K/UL (ref 0–0.2)
BASOPHILS RELATIVE PERCENT: 0 %
BUN BLDV-MCNC: 24 MG/DL (ref 7–20)
CALCIUM SERPL-MCNC: 9.4 MG/DL (ref 8.3–10.6)
CCP IGG ANTIBODIES: 170 UNITS (ref 0–19)
CHLORIDE BLD-SCNC: 97 MMOL/L (ref 99–110)
CO2: 22 MMOL/L (ref 21–32)
CREAT SERPL-MCNC: 0.8 MG/DL (ref 0.6–1.2)
EOSINOPHILS ABSOLUTE: 0 K/UL (ref 0–0.6)
EOSINOPHILS RELATIVE PERCENT: 0 %
GFR AFRICAN AMERICAN: >60
GFR NON-AFRICAN AMERICAN: >60
GLUCOSE BLD-MCNC: 131 MG/DL (ref 70–99)
GLUCOSE BLD-MCNC: 140 MG/DL (ref 70–99)
GLUCOSE BLD-MCNC: 144 MG/DL (ref 70–99)
GLUCOSE BLD-MCNC: 174 MG/DL (ref 70–99)
GLUCOSE BLD-MCNC: 190 MG/DL (ref 70–99)
HCT VFR BLD CALC: 36.3 % (ref 36–48)
HEMOGLOBIN: 11.4 G/DL (ref 12–16)
LYMPHOCYTES ABSOLUTE: 1 K/UL (ref 1–5.1)
LYMPHOCYTES RELATIVE PERCENT: 8 %
MCH RBC QN AUTO: 27.9 PG (ref 26–34)
MCHC RBC AUTO-ENTMCNC: 31.5 G/DL (ref 31–36)
MCV RBC AUTO: 88.6 FL (ref 80–100)
MONOCYTES ABSOLUTE: 0.8 K/UL (ref 0–1.3)
MONOCYTES RELATIVE PERCENT: 6 %
NEUTROPHILS ABSOLUTE: 10.9 K/UL (ref 1.7–7.7)
NEUTROPHILS RELATIVE PERCENT: 86 %
P JIROVECII BY PCR: NOT DETECTED
P JIROVECII SOURCE: NORMAL
PDW BLD-RTO: 15.1 % (ref 12.4–15.4)
PERFORMED ON: ABNORMAL
PLATELET # BLD: 442 K/UL (ref 135–450)
PMV BLD AUTO: 7.3 FL (ref 5–10.5)
POTASSIUM REFLEX MAGNESIUM: 5 MMOL/L (ref 3.5–5.1)
RBC # BLD: 4.09 M/UL (ref 4–5.2)
SODIUM BLD-SCNC: 130 MMOL/L (ref 136–145)
WBC # BLD: 12.7 K/UL (ref 4–11)

## 2019-04-13 PROCEDURE — 94640 AIRWAY INHALATION TREATMENT: CPT

## 2019-04-13 PROCEDURE — 94668 MNPJ CHEST WALL SBSQ: CPT

## 2019-04-13 PROCEDURE — 94660 CPAP INITIATION&MGMT: CPT

## 2019-04-13 PROCEDURE — 71250 CT THORAX DX C-: CPT

## 2019-04-13 PROCEDURE — 2060000000 HC ICU INTERMEDIATE R&B

## 2019-04-13 PROCEDURE — 6360000002 HC RX W HCPCS: Performed by: INTERNAL MEDICINE

## 2019-04-13 PROCEDURE — 6370000000 HC RX 637 (ALT 250 FOR IP): Performed by: STUDENT IN AN ORGANIZED HEALTH CARE EDUCATION/TRAINING PROGRAM

## 2019-04-13 PROCEDURE — 85025 COMPLETE CBC W/AUTO DIFF WBC: CPT

## 2019-04-13 PROCEDURE — 6370000000 HC RX 637 (ALT 250 FOR IP): Performed by: FAMILY MEDICINE

## 2019-04-13 PROCEDURE — 99233 SBSQ HOSP IP/OBS HIGH 50: CPT | Performed by: INTERNAL MEDICINE

## 2019-04-13 PROCEDURE — 6360000002 HC RX W HCPCS: Performed by: STUDENT IN AN ORGANIZED HEALTH CARE EDUCATION/TRAINING PROGRAM

## 2019-04-13 PROCEDURE — 2700000000 HC OXYGEN THERAPY PER DAY

## 2019-04-13 PROCEDURE — 2580000003 HC RX 258: Performed by: STUDENT IN AN ORGANIZED HEALTH CARE EDUCATION/TRAINING PROGRAM

## 2019-04-13 PROCEDURE — 80048 BASIC METABOLIC PNL TOTAL CA: CPT

## 2019-04-13 PROCEDURE — 94761 N-INVAS EAR/PLS OXIMETRY MLT: CPT

## 2019-04-13 RX ORDER — METHYLPREDNISOLONE SODIUM SUCCINATE 40 MG/ML
40 INJECTION, POWDER, LYOPHILIZED, FOR SOLUTION INTRAMUSCULAR; INTRAVENOUS EVERY 12 HOURS
Status: COMPLETED | OUTPATIENT
Start: 2019-04-13 | End: 2019-04-15

## 2019-04-13 RX ORDER — DAPSONE 100 MG/1
100 TABLET ORAL DAILY
Status: DISCONTINUED | OUTPATIENT
Start: 2019-04-13 | End: 2019-04-17 | Stop reason: HOSPADM

## 2019-04-13 RX ORDER — OYSTER SHELL CALCIUM WITH VITAMIN D 500; 200 MG/1; [IU]/1
2 TABLET, FILM COATED ORAL DAILY
Status: DISCONTINUED | OUTPATIENT
Start: 2019-04-13 | End: 2019-04-17 | Stop reason: HOSPADM

## 2019-04-13 RX ADMIN — INSULIN LISPRO 1 UNITS: 100 INJECTION, SOLUTION INTRAVENOUS; SUBCUTANEOUS at 13:28

## 2019-04-13 RX ADMIN — GABAPENTIN 600 MG: 600 TABLET, FILM COATED ORAL at 13:24

## 2019-04-13 RX ADMIN — ALBUTEROL SULFATE 2.5 MG: 2.5 SOLUTION RESPIRATORY (INHALATION) at 21:45

## 2019-04-13 RX ADMIN — AMLODIPINE BESYLATE 10 MG: 10 TABLET ORAL at 22:11

## 2019-04-13 RX ADMIN — Medication 10 ML: at 22:12

## 2019-04-13 RX ADMIN — PANTOPRAZOLE SODIUM 40 MG: 40 TABLET, DELAYED RELEASE ORAL at 16:11

## 2019-04-13 RX ADMIN — Medication 10 ML: at 09:28

## 2019-04-13 RX ADMIN — METHYLPREDNISOLONE SODIUM SUCCINATE 125 MG: 125 INJECTION, POWDER, FOR SOLUTION INTRAMUSCULAR; INTRAVENOUS at 04:02

## 2019-04-13 RX ADMIN — HYDRALAZINE HYDROCHLORIDE 25 MG: 25 TABLET, FILM COATED ORAL at 13:24

## 2019-04-13 RX ADMIN — DAPSONE 100 MG: 100 TABLET ORAL at 18:11

## 2019-04-13 RX ADMIN — NYSTATIN 500000 UNITS: 100000 SUSPENSION ORAL at 13:25

## 2019-04-13 RX ADMIN — CHOLESTYRAMINE 4 G: 4 POWDER, FOR SUSPENSION ORAL at 09:23

## 2019-04-13 RX ADMIN — CETIRIZINE HYDROCHLORIDE 10 MG: 10 TABLET, FILM COATED ORAL at 09:20

## 2019-04-13 RX ADMIN — TRAZODONE HYDROCHLORIDE 50 MG: 50 TABLET ORAL at 22:11

## 2019-04-13 RX ADMIN — FLUTICASONE PROPIONATE 1 SPRAY: 50 SPRAY, METERED NASAL at 22:12

## 2019-04-13 RX ADMIN — GABAPENTIN 600 MG: 600 TABLET, FILM COATED ORAL at 22:11

## 2019-04-13 RX ADMIN — ESCITALOPRAM OXALATE 10 MG: 10 TABLET ORAL at 09:19

## 2019-04-13 RX ADMIN — NYSTATIN 500000 UNITS: 100000 SUSPENSION ORAL at 09:22

## 2019-04-13 RX ADMIN — GABAPENTIN 600 MG: 600 TABLET, FILM COATED ORAL at 09:22

## 2019-04-13 RX ADMIN — HYDRALAZINE HYDROCHLORIDE 25 MG: 25 TABLET, FILM COATED ORAL at 06:49

## 2019-04-13 RX ADMIN — MUPIROCIN: 20 OINTMENT TOPICAL at 13:38

## 2019-04-13 RX ADMIN — FUROSEMIDE 40 MG: 40 TABLET ORAL at 09:16

## 2019-04-13 RX ADMIN — MUPIROCIN: 20 OINTMENT TOPICAL at 22:12

## 2019-04-13 RX ADMIN — PANTOPRAZOLE SODIUM 40 MG: 40 TABLET, DELAYED RELEASE ORAL at 06:49

## 2019-04-13 RX ADMIN — NYSTATIN 500000 UNITS: 100000 SUSPENSION ORAL at 17:37

## 2019-04-13 RX ADMIN — ALBUTEROL SULFATE 2.5 MG: 2.5 SOLUTION RESPIRATORY (INHALATION) at 12:02

## 2019-04-13 RX ADMIN — MUPIROCIN: 20 OINTMENT TOPICAL at 09:27

## 2019-04-13 RX ADMIN — Medication 250 MG: at 09:19

## 2019-04-13 RX ADMIN — INSULIN LISPRO 1 UNITS: 100 INJECTION, SOLUTION INTRAVENOUS; SUBCUTANEOUS at 22:15

## 2019-04-13 RX ADMIN — CALCIUM CARBONATE-VITAMIN D TAB 500 MG-200 UNIT 2 TABLET: 500-200 TAB at 17:37

## 2019-04-13 RX ADMIN — INSULIN LISPRO 1 UNITS: 100 INJECTION, SOLUTION INTRAVENOUS; SUBCUTANEOUS at 17:44

## 2019-04-13 RX ADMIN — LOSARTAN POTASSIUM 25 MG: 25 TABLET, FILM COATED ORAL at 09:20

## 2019-04-13 RX ADMIN — METHYLPREDNISOLONE SODIUM SUCCINATE 125 MG: 125 INJECTION, POWDER, FOR SOLUTION INTRAMUSCULAR; INTRAVENOUS at 09:22

## 2019-04-13 RX ADMIN — METHYLPREDNISOLONE SODIUM SUCCINATE 40 MG: 40 INJECTION, POWDER, FOR SOLUTION INTRAMUSCULAR; INTRAVENOUS at 22:11

## 2019-04-13 RX ADMIN — ENOXAPARIN SODIUM 40 MG: 40 INJECTION SUBCUTANEOUS at 09:22

## 2019-04-13 RX ADMIN — OXYCODONE HYDROCHLORIDE AND ACETAMINOPHEN 1 TABLET: 5; 325 TABLET ORAL at 16:09

## 2019-04-13 RX ADMIN — OXYCODONE HYDROCHLORIDE AND ACETAMINOPHEN 1 TABLET: 5; 325 TABLET ORAL at 09:17

## 2019-04-13 RX ADMIN — MUPIROCIN: 20 OINTMENT TOPICAL at 17:43

## 2019-04-13 RX ADMIN — Medication 250 MG: at 22:11

## 2019-04-13 RX ADMIN — NYSTATIN 500000 UNITS: 100000 SUSPENSION ORAL at 22:28

## 2019-04-13 RX ADMIN — HYDRALAZINE HYDROCHLORIDE 25 MG: 25 TABLET, FILM COATED ORAL at 22:11

## 2019-04-13 ASSESSMENT — PAIN SCALES - GENERAL
PAINLEVEL_OUTOF10: 7
PAINLEVEL_OUTOF10: 8
PAINLEVEL_OUTOF10: 8
PAINLEVEL_OUTOF10: 6
PAINLEVEL_OUTOF10: 4
PAINLEVEL_OUTOF10: 0

## 2019-04-13 ASSESSMENT — PAIN DESCRIPTION - LOCATION
LOCATION: BACK

## 2019-04-13 ASSESSMENT — PAIN DESCRIPTION - ORIENTATION: ORIENTATION: LOWER

## 2019-04-13 ASSESSMENT — PAIN - FUNCTIONAL ASSESSMENT: PAIN_FUNCTIONAL_ASSESSMENT: ACTIVITIES ARE NOT PREVENTED

## 2019-04-13 ASSESSMENT — PAIN DESCRIPTION - PROGRESSION: CLINICAL_PROGRESSION: GRADUALLY WORSENING

## 2019-04-13 ASSESSMENT — PAIN DESCRIPTION - DESCRIPTORS
DESCRIPTORS: ACHING;SHARP
DESCRIPTORS: ACHING;DISCOMFORT

## 2019-04-13 ASSESSMENT — PAIN DESCRIPTION - FREQUENCY: FREQUENCY: INTERMITTENT

## 2019-04-13 ASSESSMENT — PAIN DESCRIPTION - PAIN TYPE: TYPE: CHRONIC PAIN

## 2019-04-13 ASSESSMENT — PAIN DESCRIPTION - ONSET: ONSET: GRADUAL

## 2019-04-13 NOTE — PROGRESS NOTES
Internal Medicine PGY-1 Resident Progress Note        PCP: Concetta Yan    Date of Admission: 4/4/2019    Chief Complaint: Confusion; shortness of breath; fevers     Hospital Course:     Cassidy Arreola is a 70 y.o. female with a past medical history of interstitial lung disease (on chronic steroids), rheumatoid arthritis (on Humira and leflunamide), HFpEF (grade II diastolic dysfunction), hypertension and depression who presented to [place] [address] with confusion and shortness of breath at home, recently discharged on 3/28 on 3L O2 while being treated for PNA. Had run of Mobitz II HB with PVCs on night of admission, seen by EPS (recommended strict compliance with nightly CPAP for paroxysmal asymptomatic AV block, noctural). She was admitted with sepsis 2/2 HCAP started on broad-spectrum abx and underwent bronchoscopy (4/5) with BAL of BEN and RUL which identified bronchial inflammation and increased mucoid secretions. Work-up for infectious PNA has since all returned negative, including search for bacterial, viral and fungal pathogens. CT chest 4/8 worsening multifocal PNA on ILD. Antibiotics were discontinued following 5 days of administration given limited clinical improvement. She has persistently been on 5 L O2 NC and became afebrile once antibiotics were discontinued. 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 Of note she has received BL L1, L2 medial branch ablations on 2/27 at [facility], followed by steroid facet injections at [facility] on 1/23/2019 and has been complaining of lumbar back pain throughout admission, nothing worse than her baseline for which she is taking her home percocet. Subjective:     AAOx3, reports some increase in energy, walking with PT/OT.  Subjectively feels better, O2 requirement decreased to 2L. Afebrile. Still has mild dry cough. CT chest today identified mild improvement from 4/8/2018. Continuing on steroids    Medications:  Reviewed    Infusion Medications    dextrose       Scheduled Medications    methylPREDNISolone  40 mg Intravenous Q12H    calcium-vitamin D  2 tablet Oral Daily    dapsone  100 mg Oral Daily    insulin lispro  0-6 Units Subcutaneous TID WC    insulin lispro  0-3 Units Subcutaneous Nightly    amLODIPine  10 mg Oral Nightly    pantoprazole  40 mg Oral BID AC    nystatin  5 mL Oral 4x Daily    saccharomyces boulardii  250 mg Oral BID    mupirocin   Topical 4x Daily    cetirizine  10 mg Oral QAM    escitalopram  10 mg Oral Daily    cholestyramine light  4 g Oral Daily    fluticasone  1 spray Each Nare Nightly    gabapentin  600 mg Oral TID    losartan  25 mg Oral Daily    Mirabegron ER  1 tablet Oral QPM    oxyCODONE-acetaminophen  1 tablet Oral Q8H    sodium chloride flush  10 mL Intravenous 2 times per day    enoxaparin  40 mg Subcutaneous Daily    albuterol  2.5 mg Nebulization Q4H WA    furosemide  40 mg Oral Daily    traZODone  50 mg Oral Nightly    hydrALAZINE  25 mg Oral 3 times per day     PRN Meds: sodium chloride, glucose, dextrose, glucagon (rDNA), dextrose, menthol-zinc oxide, albuterol, sodium chloride flush, magnesium hydroxide, ondansetron, acetaminophen, calcium carbonate, sodium chloride (Inhalant)      Intake/Output Summary (Last 24 hours) at 4/13/2019 1718  Last data filed at 4/13/2019 1000  Gross per 24 hour   Intake 480 ml   Output 750 ml   Net -270 ml       Physical Exam Performed:    /71   Pulse 71   Temp 99.3 °F (37.4 °C) (Oral)   Resp 18   Ht 4' 11.84\" (1.52 m)   Wt 219 lb 4.8 oz (99.5 kg)   SpO2 94%   BMI 43.05 kg/m²       General appearance:  Patient has mildly labored breathing and appears short of breath.  Pt was somnolent   HEENT:  Normal cephalic, atraumatic without obviousdeformity. Pupils equal, round, and reactive to light. Extra ocular muscles intact. Conjunctivae/corneas clear. Neck: Supple, with full range of motion. No jugular venous distention. Trachea midline. Respiratory: Normal respiratory effort. Velcro like crackles present in bases bilaterally  Cardiovascular: Regular rate and rhythm. No JVD. Trace pitting edema, not increased from yesterday  Abdomen: Soft, non-tender,non-distended with normal bowel sounds. Non-tender to palpation  Musculoskeletal:  No clubbing or cyanosis present. BL ulnar deviation, selin notes, swan neck defomities. Non-tender to palpation spinal processes  Skin: Skin color, texture, turgor normal.  No rashes or lesions. Neurologic:  Neurovascularly intact without any focal sensory/motor deficits. Cranial nerves: II-XII intact, grosslynon-focal.  Psychiatric:  AOx3, thought content appropriate, normal insight  Capillary Refill: Brisk,< 3 seconds   Peripheral Pulses: +2 palpable, equal bilaterally     Labs:   Recent Labs     04/11/19 0450 04/12/19  0419   WBC 6.9 11.8*   HGB 11.4* 11.3*   HCT 35.5* 35.6*    410     Recent Labs     04/11/19  0450 04/12/19  0419    136   K 4.7 4.7    101   CO2 25 24   BUN 16 22*   CREATININE 0.5* 0.7   CALCIUM 9.7 10.0     No results for input(s): AST, ALT, BILIDIR, BILITOT, ALKPHOS in the last 72 hours. No results for input(s): INR in the last 72 hours. No results for input(s): Jessie Aguilar in the last 72 hours. Urinalysis:      Lab Results   Component Value Date    NITRU Negative 04/04/2019    WBCUA 0-2 04/04/2019    BACTERIA 1+ 09/02/2018    RBCUA 0-2 04/04/2019    BLOODU Negative 04/04/2019    SPECGRAV <=1.005 04/04/2019    GLUCOSEU Negative 04/04/2019       Radiology:  CT CHEST WO CONTRAST   Final Result   Addendum 1 of 1   [** ADDENDUM #1 **   Addendum:      Compared with CTA chest dated 4/9/2019 and CT chest dated 4/5/2019.       Crazy pavement appearance appears minimally improved since CTA chest dated    4/9/2019. Final      CT ABDOMEN PELVIS W IV CONTRAST Additional Contrast? None   Final Result      1. No evidence of obstruction or free air. No acute abdominal or pelvic abnormality clearly identified. 2. Diverticulosis without evidence of focal diverticulitis. 3. Incidental indeterminate pedunculated cystic type lesion in the posterior left kidney. Follow-up CT renal mass protocol or MRI renal mass protocol in 3 months is recommended to evaluate the lesion further and to document stability. CTA PULMONARY W CONTRAST   Final Result      1. No evidence of pulmonary embolism. 2. Extensive bilateral groundglass opacities are increased compared to 4/5/2019 likely representing multifocal pneumonia superimposed on chronic interstitial fibrosis. CT CHEST WO CONTRAST   Final Result       The central airways are patent. Multifocal ill-defined patchy upper zone and left lower lobe predominant    peripheral nonspecific airspace opacities now seen since 2017 and may be    inflammatory, infectious. Clinically correlate. Background of chronic interstitial changes again noted. XR CHEST PORTABLE   Final Result      Mild bilateral interstitial and airspace opacities, not significantly changed compared to prior, representing edema or infection. Assessment/Plan:    Jareth De La Garza is a 70 y.o. female with a past medical history of interstitial lung disease (on chronic steroids), rheumatoid arthritis (on Humira and leflunamide), HFpEF admitted for SOB and confusion, suspect HCAP as cause.      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, given no improvement on antibiotics and no identifiable infectious cause  - solumedrol 125 mg IV q6h (day 3) decreased to 40 mg IV q12h per pulmonology recommendations  - add calcium and vitamin D supplements for osteoporosis ppx while on steroids  - start dapsone 100 mg PO qD as PCP ppx while on steroids  - protonix 40 mg PO BID  - acapella and IS  - pulmonology following    Sepsis 2/2 Cryptogenic organizing pneumonia, infectious pneumonia ruled-out  Sepsis resolved  On admission Tmax 102.6, RR 22, CT chest: multifocal patchy upper and LL airspace opacities, suspect infectious  - abx d/c'd yesterday  - supplemental O2 - currently 2L, goal SpO2 >88%  - Albuterol nebulizers   - procal mildly elevated at 0.27  - elevated ESR and CRP, RF, anti-CCP consistent with Rheumatoid Arthritis  - resp panel PCR neg, AFB neg, histo/strep/legionella neg, blood culture NGtD  - aspergillus negative, 1,3 beta d glucan indeterminate, low nl, PCP sputum induction pending  - MRI lumbar spine cancelled d/t resolution of fevers and back pain   - probiotics added   - ID signed off    2nd degree heart block - Mobitz type two, has not recurred  - CPAP nightly for DEWAYNE  - Paroxysmal AV block - asymptomatic, nocturnal, in the setting of not using CPAP  - no recommendation for pacemaker as of now, unless block persists despite respiration improvement  - cardiology following    HFpEF -Last echocardiogram (3/25/2019) showing grade II diastolic dysfunction.  Patient does not appear to be volume overloaded at this time.   -Furosemide 40 mg daily     Interstitial Lung Disease -Patient has a long history of ILD (suspect 2/2 RA and/or MTX-induced) -followed by pulmonology (Dr. 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  Placing patient at risk for multiple co-morbidities as well as early death and contributing to the patient's presentation.      DVT Prophylaxis: Lovenox   Diet: General diet  Code Status: Limited (no x 4)      PT/OT Eval Status: 19/24 OT  Keven Cobian MD    I will discuss the patient with the senior resident and MD Cal Colón MD  Internal Medicine Resident PGY-1

## 2019-04-13 NOTE — PLAN OF CARE
RN/Writer encouraged patient to be more active during the day. Patient agreed to sit up in the chair for awhile. Patient denies feeling short of breath with movement. O2 sats remained above 90% on 4.5L.  Gael Lopez

## 2019-04-13 NOTE — PROGRESS NOTES
Patient seen and examined. Discussed with resident physician. Patient continues to improve symptomatically. Her oxygen requirements are slightly last period she had a CT done this morning which showed possible worsening however it was compared to the initial CT. Pulmonology will be seeing today. She remains on high-dose steroids at this time. She is tolerating her diet and has been more mobile since the steroids were introduced. Coarse breath sounds but improved airflow  Abdomen soft nontender nondistended    We'll wean steroids per pulmonology's recommendations.   Await pulmonary's evaluation of her CT from today    Electronically signed by John Haynes MD on 4/13/2019 at 3:52 PM

## 2019-04-13 NOTE — PROGRESS NOTES
Pulmonary Followup Note    CC: acute on chronic hypoxia, pneumonia, fever  Subjective:  Remains afebrile and feels subjectively improved. Oxygen requirement is down to 3.5 L. Monserrat Isidro and her daughter both feel that she has improved with the steroids. She had a follow-up CT chest today    ROS:  Denies headache, nausea . 24HR INTAKE/OUTPUT:      Intake/Output Summary (Last 24 hours) at 2019 1711  Last data filed at 2019 1000  Gross per 24 hour   Intake 480 ml   Output 750 ml   Net -270 ml        methylPREDNISolone  40 mg Intravenous Q12H    insulin lispro  0-6 Units Subcutaneous TID WC    insulin lispro  0-3 Units Subcutaneous Nightly    amLODIPine  10 mg Oral Nightly    pantoprazole  40 mg Oral BID AC    nystatin  5 mL Oral 4x Daily    saccharomyces boulardii  250 mg Oral BID    mupirocin   Topical 4x Daily    cetirizine  10 mg Oral QAM    escitalopram  10 mg Oral Daily    cholestyramine light  4 g Oral Daily    fluticasone  1 spray Each Nare Nightly    gabapentin  600 mg Oral TID    losartan  25 mg Oral Daily    Mirabegron ER  1 tablet Oral QPM    oxyCODONE-acetaminophen  1 tablet Oral Q8H    sodium chloride flush  10 mL Intravenous 2 times per day    enoxaparin  40 mg Subcutaneous Daily    albuterol  2.5 mg Nebulization Q4H WA    furosemide  40 mg Oral Daily    traZODone  50 mg Oral Nightly    hydrALAZINE  25 mg Oral 3 times per day     PHYSICAL EXAMINATION:  /71   Pulse 71   Temp 99.3 °F (37.4 °C) (Oral)   Resp 18   Ht 4' 11.84\" (1.52 m)   Wt 219 lb 4.8 oz (99.5 kg)   SpO2 94%   BMI 43.05 kg/m²   CURRENT PULSE OXIMETRY:  SpO2: 94 %  24HR PULSE OXIMETRY RANGE:  SpO2  Av %  Min: 91 %  Max: 95 % on 4L    Gen: No distress. Speaking in full sentences without accessory muscle use. HEENT: PERRL, EOMI, OP nl  Lung: Diffuse crackles at the bases and mid lung zones. No wheezes.   CV: RRR without M/R/R  Abd: +BS, soft, NT/ND  Ext: No edema. DATA  CBC:   Recent Labs     04/11/19  0450 04/12/19  0419   WBC 6.9 11.8*   HGB 11.4* 11.3*   HCT 35.5* 35.6*   MCV 87.0 87.8    410     BMP:   Recent Labs     04/11/19  0450 04/12/19  0419    136   K 4.7 4.7    101   CO2 25 24   BUN 16 22*   CREATININE 0.5* 0.7     Strep, histo and legionella antigens are negative. indeteriminate 1, 3 beta d glucan  RF 39  MICHELLE:Neg  ANCA < 1:20  IgE 82  ESR 87      Cultures:  Bronchoscopy with BAL April 5th with lavage of bilateral upper lobes. Gram and fungal stains were negative. Galactomannan negative  Cytology negative  Diatherix panel negative  Cultures are negative, NGTD or showing normal respiratory taniya. CT CHEST APRIL 13, 2009 REVIEWED AND COMPARED TO PREVIOUS CT CHEST APRIL 8 AND APRIL 5, 2009 BY ME AND SHOWED:  Signed by Matt Burns MD on 04/13/19 4020        Addendum:     Compared with CTA chest dated 4/9/2019 and CT chest dated 4/5/2019.     Crazy pavement appearance appears minimally improved since CTA chest dated 4/9/2019. Narrative   EXAM: CT CHEST WITHOUT CONTRAST       INDICATION: Acute respiratory illness       COMPARISON: None       TECHNIQUE: CT of the chest was performed without contrast. Axial images, multiplanar reformatted images and axial maximum intensity projection images provided for review.   Individualized dose optimization technique was used in order to meet ALARA    standards for radiation dose reduction.  In addition to vendor specific dose reduction algorithms, the dose reduction techniques vary based on the specific scanner utilized but frequently include automated exposure control, adjustment of the mA and/or kV    according to patient size, and use of iterative reconstruction technique.       CONTRAST: None.       FINDINGS:       Trachea and mainstem bronchi are patent and clear. No enlarged mediastinal lymph node. Calcified mediastinal and hilar lymph nodes consistent with old granulomatous disease. Mild atherosclerotic calcification of aorta and coronary arteries. Prosthetic    cardiac valve. Mildly dilated central pulmonary arteries suggestive of pulmonary hypertension. Heart is not enlarged. No pericardial effusion. No pleural effusion or pneumothorax.       There is interval progression of scattered patchy groundglass opacities with interlobular and intralobular septal thickening (crazy pavement appearance) predominantly involving the upper lobes.       Multilevel mild degenerative changes of thoracic spine. Stable mild chronic compression deformity of L1.       No acute abnormality in the visualized upper abdomen.               Impression       Diffuse scattered patchy groundglass opacities with interlobular septal thickening predominantly involving the upper lobes with interval progression since 4/5/2019. This is a nonspecific finding and may relate to acute interstitial pneumonia, ARDS,    bacterial pneumonia, hypersensitivity pneumonitis or pulmonary alveolar proteinosis. Recommended clinical correlation and short-term follow-up.       Mildly enlarged central pulmonary arteries suggestive of pulmonary hypertension. ASSESSMENT/PLAN:  This is a 70 y.o. female with ILD and acute on chronic hypoxemic respiratory failure with presumed pneumonia. BAL negative so far for infection. Pro-calcitonin was mildly elevated at 0.27. She finished 5 days of cefepime and azithromycin. Patient finished 3 days of Solu-Medrol 125 mg IV every 6 hours and had a CT chest for comparison. Her CT chest today while worse then her April 5 CT chest does show small but definitive improvement compared to her CT chest April 8. The etiology remains elusive and in the differential is still AIP, BOOP or RA-ILD. I will drop her Solu-Medrol to 40 mg IV every 12 hours. I do not think an open lung biopsy would be beneficial at this time. I was able to wean her oxygen to 2 L.      Clinically she thinks she is doing better as does her daughter.     Graham Juarez MD

## 2019-04-13 NOTE — PLAN OF CARE
Problem: Falls - Risk of:  Goal: Will remain free from falls  Description  Will remain free from falls  Outcome: Ongoing   Patient meets Samira Smaller fall risk guidelines. Non skid footwear with ambulation. Bed in lowest position. Call light in reach. Bed alarm activated. Reminded to call for assistance before exiting bed. Continue safety precautions. Problem: Airway Clearance - Ineffective:  Goal: Clear lung sounds  Description  Clear lung sounds  Outcome: Ongoing   Patients lungs diminished with fine crackles. Continue I.V. Steroids and current medication regimen. Continue 4.5 liters oxygen per NC. C-PAP at [facility]. Continue to monitor.

## 2019-04-14 LAB
ANION GAP SERPL CALCULATED.3IONS-SCNC: 10 MMOL/L (ref 3–16)
BASOPHILS ABSOLUTE: 0 K/UL (ref 0–0.2)
BASOPHILS RELATIVE PERCENT: 0 %
BUN BLDV-MCNC: 23 MG/DL (ref 7–20)
CALCIUM SERPL-MCNC: 9.5 MG/DL (ref 8.3–10.6)
CHLORIDE BLD-SCNC: 102 MMOL/L (ref 99–110)
CO2: 24 MMOL/L (ref 21–32)
CREAT SERPL-MCNC: 0.6 MG/DL (ref 0.6–1.2)
EOSINOPHILS ABSOLUTE: 0 K/UL (ref 0–0.6)
EOSINOPHILS RELATIVE PERCENT: 0 %
GFR AFRICAN AMERICAN: >60
GFR NON-AFRICAN AMERICAN: >60
GLUCOSE BLD-MCNC: 113 MG/DL (ref 70–99)
GLUCOSE BLD-MCNC: 127 MG/DL (ref 70–99)
GLUCOSE BLD-MCNC: 133 MG/DL (ref 70–99)
GLUCOSE BLD-MCNC: 147 MG/DL (ref 70–99)
GLUCOSE BLD-MCNC: 152 MG/DL (ref 70–99)
HCT VFR BLD CALC: 33.3 % (ref 36–48)
HEMOGLOBIN: 10.9 G/DL (ref 12–16)
LYMPHOCYTES ABSOLUTE: 1.4 K/UL (ref 1–5.1)
LYMPHOCYTES RELATIVE PERCENT: 14 %
MCH RBC QN AUTO: 28.2 PG (ref 26–34)
MCHC RBC AUTO-ENTMCNC: 32.7 G/DL (ref 31–36)
MCV RBC AUTO: 86.5 FL (ref 80–100)
MONOCYTES ABSOLUTE: 0.9 K/UL (ref 0–1.3)
MONOCYTES RELATIVE PERCENT: 9 %
NEUTROPHILS ABSOLUTE: 7.8 K/UL (ref 1.7–7.7)
NEUTROPHILS RELATIVE PERCENT: 77 %
PDW BLD-RTO: 15.1 % (ref 12.4–15.4)
PERFORMED ON: ABNORMAL
PLATELET # BLD: 406 K/UL (ref 135–450)
PMV BLD AUTO: 7.4 FL (ref 5–10.5)
POTASSIUM REFLEX MAGNESIUM: 4.6 MMOL/L (ref 3.5–5.1)
RBC # BLD: 3.85 M/UL (ref 4–5.2)
SODIUM BLD-SCNC: 136 MMOL/L (ref 136–145)
WBC # BLD: 10.1 K/UL (ref 4–11)

## 2019-04-14 PROCEDURE — 94761 N-INVAS EAR/PLS OXIMETRY MLT: CPT

## 2019-04-14 PROCEDURE — 6370000000 HC RX 637 (ALT 250 FOR IP): Performed by: STUDENT IN AN ORGANIZED HEALTH CARE EDUCATION/TRAINING PROGRAM

## 2019-04-14 PROCEDURE — 36415 COLL VENOUS BLD VENIPUNCTURE: CPT

## 2019-04-14 PROCEDURE — 6360000002 HC RX W HCPCS: Performed by: INTERNAL MEDICINE

## 2019-04-14 PROCEDURE — 99233 SBSQ HOSP IP/OBS HIGH 50: CPT | Performed by: INTERNAL MEDICINE

## 2019-04-14 PROCEDURE — 94660 CPAP INITIATION&MGMT: CPT

## 2019-04-14 PROCEDURE — 2060000000 HC ICU INTERMEDIATE R&B

## 2019-04-14 PROCEDURE — 6360000002 HC RX W HCPCS: Performed by: STUDENT IN AN ORGANIZED HEALTH CARE EDUCATION/TRAINING PROGRAM

## 2019-04-14 PROCEDURE — 80048 BASIC METABOLIC PNL TOTAL CA: CPT

## 2019-04-14 PROCEDURE — 85025 COMPLETE CBC W/AUTO DIFF WBC: CPT

## 2019-04-14 PROCEDURE — 6370000000 HC RX 637 (ALT 250 FOR IP): Performed by: FAMILY MEDICINE

## 2019-04-14 PROCEDURE — 94150 VITAL CAPACITY TEST: CPT

## 2019-04-14 PROCEDURE — 2580000003 HC RX 258: Performed by: STUDENT IN AN ORGANIZED HEALTH CARE EDUCATION/TRAINING PROGRAM

## 2019-04-14 PROCEDURE — 94668 MNPJ CHEST WALL SBSQ: CPT

## 2019-04-14 PROCEDURE — 2700000000 HC OXYGEN THERAPY PER DAY

## 2019-04-14 PROCEDURE — 94640 AIRWAY INHALATION TREATMENT: CPT

## 2019-04-14 RX ORDER — ALBUTEROL SULFATE 2.5 MG/3ML
2.5 SOLUTION RESPIRATORY (INHALATION) 3 TIMES DAILY
Status: DISCONTINUED | OUTPATIENT
Start: 2019-04-14 | End: 2019-04-17

## 2019-04-14 RX ADMIN — Medication 10 ML: at 08:38

## 2019-04-14 RX ADMIN — CHOLESTYRAMINE 4 G: 4 POWDER, FOR SUSPENSION ORAL at 08:33

## 2019-04-14 RX ADMIN — OXYCODONE HYDROCHLORIDE AND ACETAMINOPHEN 1 TABLET: 5; 325 TABLET ORAL at 17:35

## 2019-04-14 RX ADMIN — MUPIROCIN: 20 OINTMENT TOPICAL at 21:23

## 2019-04-14 RX ADMIN — MUPIROCIN: 20 OINTMENT TOPICAL at 17:37

## 2019-04-14 RX ADMIN — ALBUTEROL SULFATE 2.5 MG: 2.5 SOLUTION RESPIRATORY (INHALATION) at 20:37

## 2019-04-14 RX ADMIN — HYDRALAZINE HYDROCHLORIDE 25 MG: 25 TABLET, FILM COATED ORAL at 21:22

## 2019-04-14 RX ADMIN — ENOXAPARIN SODIUM 40 MG: 40 INJECTION SUBCUTANEOUS at 08:28

## 2019-04-14 RX ADMIN — HYDRALAZINE HYDROCHLORIDE 25 MG: 25 TABLET, FILM COATED ORAL at 07:11

## 2019-04-14 RX ADMIN — NYSTATIN 500000 UNITS: 100000 SUSPENSION ORAL at 17:37

## 2019-04-14 RX ADMIN — PANTOPRAZOLE SODIUM 40 MG: 40 TABLET, DELAYED RELEASE ORAL at 17:36

## 2019-04-14 RX ADMIN — ALBUTEROL SULFATE 2.5 MG: 2.5 SOLUTION RESPIRATORY (INHALATION) at 15:10

## 2019-04-14 RX ADMIN — LOSARTAN POTASSIUM 25 MG: 25 TABLET, FILM COATED ORAL at 08:25

## 2019-04-14 RX ADMIN — HYDRALAZINE HYDROCHLORIDE 25 MG: 25 TABLET, FILM COATED ORAL at 14:24

## 2019-04-14 RX ADMIN — METHYLPREDNISOLONE SODIUM SUCCINATE 40 MG: 40 INJECTION, POWDER, FOR SOLUTION INTRAMUSCULAR; INTRAVENOUS at 08:29

## 2019-04-14 RX ADMIN — PANTOPRAZOLE SODIUM 40 MG: 40 TABLET, DELAYED RELEASE ORAL at 07:11

## 2019-04-14 RX ADMIN — ALBUTEROL SULFATE 2.5 MG: 2.5 SOLUTION RESPIRATORY (INHALATION) at 07:57

## 2019-04-14 RX ADMIN — OXYCODONE HYDROCHLORIDE AND ACETAMINOPHEN 1 TABLET: 5; 325 TABLET ORAL at 23:59

## 2019-04-14 RX ADMIN — TRAZODONE HYDROCHLORIDE 50 MG: 50 TABLET ORAL at 21:22

## 2019-04-14 RX ADMIN — Medication 10 ML: at 21:23

## 2019-04-14 RX ADMIN — NYSTATIN 500000 UNITS: 100000 SUSPENSION ORAL at 21:23

## 2019-04-14 RX ADMIN — OXYCODONE HYDROCHLORIDE AND ACETAMINOPHEN 1 TABLET: 5; 325 TABLET ORAL at 08:26

## 2019-04-14 RX ADMIN — GABAPENTIN 600 MG: 600 TABLET, FILM COATED ORAL at 21:22

## 2019-04-14 RX ADMIN — NYSTATIN 500000 UNITS: 100000 SUSPENSION ORAL at 08:27

## 2019-04-14 RX ADMIN — ALBUTEROL SULFATE 2.5 MG: 2.5 SOLUTION RESPIRATORY (INHALATION) at 11:27

## 2019-04-14 RX ADMIN — GABAPENTIN 600 MG: 600 TABLET, FILM COATED ORAL at 08:23

## 2019-04-14 RX ADMIN — NYSTATIN 500000 UNITS: 100000 SUSPENSION ORAL at 14:24

## 2019-04-14 RX ADMIN — OXYCODONE HYDROCHLORIDE AND ACETAMINOPHEN 1 TABLET: 5; 325 TABLET ORAL at 00:42

## 2019-04-14 RX ADMIN — METHYLPREDNISOLONE SODIUM SUCCINATE 40 MG: 40 INJECTION, POWDER, FOR SOLUTION INTRAMUSCULAR; INTRAVENOUS at 21:23

## 2019-04-14 RX ADMIN — FLUTICASONE PROPIONATE 1 SPRAY: 50 SPRAY, METERED NASAL at 21:23

## 2019-04-14 RX ADMIN — FUROSEMIDE 40 MG: 40 TABLET ORAL at 08:33

## 2019-04-14 RX ADMIN — ESCITALOPRAM OXALATE 10 MG: 10 TABLET ORAL at 08:25

## 2019-04-14 RX ADMIN — MUPIROCIN: 20 OINTMENT TOPICAL at 08:39

## 2019-04-14 RX ADMIN — Medication 250 MG: at 08:23

## 2019-04-14 RX ADMIN — INSULIN LISPRO 1 UNITS: 100 INJECTION, SOLUTION INTRAVENOUS; SUBCUTANEOUS at 17:39

## 2019-04-14 RX ADMIN — Medication 250 MG: at 21:22

## 2019-04-14 RX ADMIN — GABAPENTIN 600 MG: 600 TABLET, FILM COATED ORAL at 14:23

## 2019-04-14 RX ADMIN — AMLODIPINE BESYLATE 10 MG: 10 TABLET ORAL at 21:23

## 2019-04-14 RX ADMIN — CETIRIZINE HYDROCHLORIDE 10 MG: 10 TABLET, FILM COATED ORAL at 08:26

## 2019-04-14 RX ADMIN — CALCIUM CARBONATE-VITAMIN D TAB 500 MG-200 UNIT 2 TABLET: 500-200 TAB at 08:23

## 2019-04-14 RX ADMIN — MUPIROCIN: 20 OINTMENT TOPICAL at 14:00

## 2019-04-14 RX ADMIN — DAPSONE 100 MG: 100 TABLET ORAL at 08:39

## 2019-04-14 ASSESSMENT — PAIN DESCRIPTION - ONSET
ONSET: AWAKENED FROM SLEEP
ONSET: AWAKENED FROM SLEEP

## 2019-04-14 ASSESSMENT — PAIN DESCRIPTION - FREQUENCY
FREQUENCY: INTERMITTENT
FREQUENCY: INTERMITTENT

## 2019-04-14 ASSESSMENT — PAIN DESCRIPTION - PAIN TYPE
TYPE: CHRONIC PAIN
TYPE: CHRONIC PAIN

## 2019-04-14 ASSESSMENT — PAIN SCALES - GENERAL
PAINLEVEL_OUTOF10: 7
PAINLEVEL_OUTOF10: 8
PAINLEVEL_OUTOF10: 0
PAINLEVEL_OUTOF10: 6
PAINLEVEL_OUTOF10: 0
PAINLEVEL_OUTOF10: 7
PAINLEVEL_OUTOF10: 5
PAINLEVEL_OUTOF10: 8
PAINLEVEL_OUTOF10: 0

## 2019-04-14 ASSESSMENT — PAIN DESCRIPTION - LOCATION
LOCATION: BACK

## 2019-04-14 ASSESSMENT — PAIN DESCRIPTION - DESCRIPTORS
DESCRIPTORS: ACHING;DISCOMFORT
DESCRIPTORS: ACHING;DISCOMFORT

## 2019-04-14 ASSESSMENT — PAIN DESCRIPTION - PROGRESSION
CLINICAL_PROGRESSION: GRADUALLY WORSENING
CLINICAL_PROGRESSION: GRADUALLY WORSENING

## 2019-04-14 ASSESSMENT — PAIN DESCRIPTION - ORIENTATION
ORIENTATION: LOWER
ORIENTATION: LOWER

## 2019-04-14 NOTE — PROGRESS NOTES
BRONCHOSCOPY N/A 4/5/2019    BRONCHOSCOPY ALVEOLAR LAVAGE- RIGHT/ LEFT UPPER LOBE performed by Poornima Aguila MD at [address]  4/5/2019    BRONCHOSCOPY DIAGNOSTIC OR CELL [address] ONLY performed by Poornima Aguila MD at [address] [facility] Bilateral     CHOLECYSTECTOMY      FOOT SURGERY Bilateral     metatarsal removal    HYSTERECTOMY, TOTAL ABDOMINAL      JOINT REPLACEMENT Right 04/25/2016    Dr. Florentino Broussard , shoulder    JOINT REPLACEMENT Right     OTHER SURGICAL HISTORY Right 02/20/2017    RIGHT TOTAL KNEE ARTHROPLASTY             SALPINGO-OOPHORECTOMY      SHOULDER ARTHROPLASTY Right     SHOULDER ARTHROSCOPY Left 11/15/2017    TONSILLECTOMY      TOTAL KNEE ARTHROPLASTY Right        Level of Consciousness: Alert, Oriented, and Cooperative = 0    Level of Activity: Walking with assistance = 1    Respiratory Pattern: Regular Pattern; RR 8-20 = 0    Breath Sounds: Diminished bilaterally with wheezes = 3    Sputum  Sputum Color: Clear, Tenacity: Thin, Sputum How Obtained: Spontaneous cough  Cough: Strong, spontaneous, non-productive = 0    Vital Signs   BP (!) 123/55   Pulse 75   Temp 98.5 °F (36.9 °C) (Oral)   Resp 18   Ht 4' 11.84\" (1.52 m)   Wt 222 lb 0.1 oz (100.7 kg)   SpO2 (!) 89%   BMI 43.58 kg/m²   SPO2 (COPD values may differ): 86-87% on room air or greater than 92% on FiO2 35- 50% = 3    Peak Flow (asthma only): not applicable = 0    RSI: 4-73 = TID (three times daily) and Q4hr PRN for dyspnea        Plan       Goals: Medication delivery and improve oxygenation    Patient/caregiver was educated on the proper method of use for Respiratory Care Devices:  Yes      Level of patient/caregiver understanding able to:   x Verbalize understanding   ? Demonstrate understanding       ? Teach back        ? Needs reinforcement       ? No available caregiver               ?   Other:     Response to education:  Very Good     Is patient being placed on Home Treatment Regimen? No     Does the patient have everything they need prior to discharge? Yes     Comments: Chart reviewed    Plan of Care: Albuterol, IS and Acapella TID per RSI score    Electronically signed by Iraj Molina RCP on 4/14/2019 at 4:19 PM    Respiratory Protocol Guidelines     1. Assessment and treatment by Respiratory Therapy will be initiated for medication and therapeutic interventions upon initiation of aerosolized medication. 2. Physician will be contacted for respiratory rate (RR) greater than 35 breaths per minute. Therapy will be held for heart rate (HR) greater than 140 beats per minute, pending direction from physician. 3. Bronchodilators will be administered via Metered Dose Inhaler (MDI) with spacer when the following criteria are met:  a. Alert and cooperative     b. HR < 140 bpm  c. RR < 30 bpm                d. Can demonstrate a 2-3 second inspiratory hold  4. Bronchodilators will be administered via Hand Held Nebulizer [facility]) to patients when ANY of the following criteria are met  a. Incognizant or uncooperative          b. Patients treated with HHN at Home        c. Unable to demonstrate proper use of MDI with spacer     d. RR > 30 bpm   5. Bronchodilators will be delivered via Metered Dose Inhaler (MDI), HHN, Aerogen to intubated patients on mechanical ventilation. 6. Inhalation medication orders will be delivered and/or substituted as outlined below. Aerosolized Medications Ordering and Administration Guidelines:    1. All Medications will be ordered by a physician, and their frequency and/or modality will be adjusted as defined by the patients Respiratory Severity Index (RSI) score. 2. If the patient does not have documented COPD, consider discontinuing anticholinergics when RSI is less than 9.  3. If the bronchospasm worsens (increased RSI), then the bronchodilator frequency can be increased to a maximum of every 4 hours.   If greater than every 4 hours is required, the physician will be contacted. 4. If the bronchospasm improves, the frequency of the bronchodilator can be decreased, based on the patient's RSI, but not less than home treatment regimen frequency. 5. Bronchodilator(s) will be discontinued if patient has a RSI less than 9 and has received no scheduled or as needed treatment for 72  Hrs. Patients Ordered on a Mucolytic Agent:    1. Must always be administered with a bronchodilator. 2. Discontinue if patient experiences worsened bronchospasm, or secretions have lessened to the point that the patient is able to clear them with a cough. Anti-inflammatory and Combination Medications:    1. If the patient lacks prior history of lung disease, is not using inhaled anti-inflammatory medication at home, and lacks wheezing by examination or by history for at least 24 hours, contact physician for possible discontinuation.

## 2019-04-14 NOTE — PROGRESS NOTES
diarrhea or dysuria. Medications:  Reviewed    Infusion Medications    dextrose       Scheduled Medications    albuterol  2.5 mg Nebulization TID    methylPREDNISolone  40 mg Intravenous Q12H    calcium-vitamin D  2 tablet Oral Daily    dapsone  100 mg Oral Daily    insulin lispro  0-6 Units Subcutaneous TID WC    insulin lispro  0-3 Units Subcutaneous Nightly    amLODIPine  10 mg Oral Nightly    pantoprazole  40 mg Oral BID AC    nystatin  5 mL Oral 4x Daily    saccharomyces boulardii  250 mg Oral BID    mupirocin   Topical 4x Daily    cetirizine  10 mg Oral QAM    escitalopram  10 mg Oral Daily    cholestyramine light  4 g Oral Daily    fluticasone  1 spray Each Nare Nightly    gabapentin  600 mg Oral TID    losartan  25 mg Oral Daily    Mirabegron ER  1 tablet Oral QPM    oxyCODONE-acetaminophen  1 tablet Oral Q8H    sodium chloride flush  10 mL Intravenous 2 times per day    enoxaparin  40 mg Subcutaneous Daily    furosemide  40 mg Oral Daily    traZODone  50 mg Oral Nightly    hydrALAZINE  25 mg Oral 3 times per day     PRN Meds: sodium chloride, glucose, dextrose, glucagon (rDNA), dextrose, menthol-zinc oxide, albuterol, sodium chloride flush, magnesium hydroxide, ondansetron, acetaminophen, calcium carbonate, sodium chloride (Inhalant)      Intake/Output Summary (Last 24 hours) at 4/14/2019 1702  Last data filed at 4/14/2019 1221  Gross per 24 hour   Intake 720 ml   Output 975 ml   Net -255 ml       Physical Exam Performed:    BP (!) 123/55   Pulse 75   Temp 98.5 °F (36.9 °C) (Oral)   Resp 18   Ht 4' 11.84\" (1.52 m)   Wt 222 lb 0.1 oz (100.7 kg)   SpO2 (!) 89%   BMI 43.58 kg/m²       General appearance:  Patient has mildly labored breathing and appears short of breath. Pt was somnolent   HEENT:  Normal cephalic, atraumatic without obviousdeformity. Pupils equal, round, and reactive to light. Extra ocular muscles intact. Conjunctivae/corneas clear.   Neck: Supple, with full range of motion. No jugular venous distention. Trachea midline. Respiratory: Normal respiratory effort. Velcro like crackles present in bases bilaterally  Cardiovascular: Regular rate and rhythm. No JVD. Trace pitting edema, not increased from yesterday  Abdomen: Soft, non-tender,non-distended with normal bowel sounds. Non-tender to palpation  Musculoskeletal:  No clubbing or cyanosis present. BL ulnar deviation, selin notes, swan neck defomities. Non-tender to palpation spinal processes  Skin: Skin color, texture, turgor normal.  No rashes or lesions. Neurologic:  Neurovascularly intact without any focal sensory/motor deficits. Cranial nerves: II-XII intact, grosslynon-focal.  Psychiatric:  AOx3, thought content appropriate, normal insight  Capillary Refill: Brisk,< 3 seconds   Peripheral Pulses: +2 palpable, equal bilaterally     Labs:   Recent Labs     04/12/19 0419 04/13/19 1959 04/14/19 0429   WBC 11.8* 12.7* 10.1   HGB 11.3* 11.4* 10.9*   HCT 35.6* 36.3 33.3*    442 406     Recent Labs     04/12/19 0419 04/13/19 1959 04/14/19 0429    130* 136   K 4.7 5.0 4.6    97* 102   CO2 24 22 24   BUN 22* 24* 23*   CREATININE 0.7 0.8 0.6   CALCIUM 10.0 9.4 9.5     No results for input(s): AST, ALT, BILIDIR, BILITOT, ALKPHOS in the last 72 hours. No results for input(s): INR in the last 72 hours. No results for input(s): Fredi Aver in the last 72 hours. Urinalysis:      Lab Results   Component Value Date    NITRU Negative 04/04/2019    WBCUA 0-2 04/04/2019    BACTERIA 1+ 09/02/2018    RBCUA 0-2 04/04/2019    BLOODU Negative 04/04/2019    SPECGRAV <=1.005 04/04/2019    GLUCOSEU Negative 04/04/2019       Radiology:  CT CHEST WO CONTRAST   Final Result   Addendum 1 of 1   [** ADDENDUM #1 **   Addendum:      Compared with CTA chest dated 4/9/2019 and CT chest dated 4/5/2019. Crazy pavement appearance appears minimally improved since CTA chest dated    4/9/2019.       Final      CT ABDOMEN PELVIS W IV CONTRAST Additional Contrast? None   Final Result      1. No evidence of obstruction or free air. No acute abdominal or pelvic abnormality clearly identified. 2. Diverticulosis without evidence of focal diverticulitis. 3. Incidental indeterminate pedunculated cystic type lesion in the posterior left kidney. Follow-up CT renal mass protocol or MRI renal mass protocol in 3 months is recommended to evaluate the lesion further and to document stability. CTA PULMONARY W CONTRAST   Final Result      1. No evidence of pulmonary embolism. 2. Extensive bilateral groundglass opacities are increased compared to 4/5/2019 likely representing multifocal pneumonia superimposed on chronic interstitial fibrosis. CT CHEST WO CONTRAST   Final Result       The central airways are patent. Multifocal ill-defined patchy upper zone and left lower lobe predominant    peripheral nonspecific airspace opacities now seen since 2017 and may be    inflammatory, infectious. Clinically correlate. Background of chronic interstitial changes again noted. XR CHEST PORTABLE   Final Result      Mild bilateral interstitial and airspace opacities, not significantly changed compared to prior, representing edema or infection. Assessment/Plan:    Aide Kwon is a 70 y.o. female with a past medical history of interstitial lung disease (on chronic steroids), rheumatoid arthritis (on Humira and leflunamide), HFpEF admitted for SOB and confusion, suspect HCAP as cause. 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, given no improvement on antibiotics and no identifiable infectious cause  - solumedrol 125 mg IV q6h (day 3) decreased to 40 mg IV q12h per pulmonology recommendations - continue this dose currently  - add calcium and vitamin D supplements for osteoporosis ppx while on steroids  - continue dapsone 100 mg PO qD as PCP ppx while on steroids  - protonix 40 mg PO BID  - acapella and IS  - pulmonology following    Sepsis 2/2 Cryptogenic organizing pneumonia, infectious pneumonia ruled-out  Sepsis resolved  On admission Tmax 102.6, RR 22, CT chest: multifocal patchy upper and LL airspace opacities, suspect infectious  - abx d/c'd yesterday  - supplemental O2 - currently 2L, goal SpO2 >88%  - Albuterol nebulizers   - procal mildly elevated at 0.27  - elevated ESR and CRP, RF, anti-CCP consistent with Rheumatoid Arthritis  - resp panel PCR neg, AFB neg, histo/strep/legionella neg, blood culture NGtD  - aspergillus negative, 1,3 beta d glucan indeterminate, low nl, PCP sputum induction pending  - MRI lumbar spine cancelled d/t resolution of fevers and back pain   - probiotics added   - ID signed off    2nd degree heart block - Mobitz type two, has not recurred  - CPAP nightly for DEWAYNE  - Paroxysmal AV block - asymptomatic, nocturnal, in the setting of not using CPAP  - no recommendation for pacemaker as of now, unless block persists despite respiration improvement  - cardiology following    HFpEF -Last echocardiogram (3/25/2019) showing grade II diastolic dysfunction.  Patient does not appear to be volume overloaded at this time.   -Furosemide 40 mg daily     Interstitial Lung Disease -Patient has a long history of ILD (suspect 2/2 RA and/or MTX-induced)   -followed by pulmonology (Dr. 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 Placing patient at risk for multiple co-morbidities as well as early death and contributing to the patient's presentation.      DVT Prophylaxis: Lovenox   Diet: General diet  Code Status: Limited (no x 4)      PT/OT Eval Status: 19/24 OT     Dispo - [facility]    Tamika Grady DO    I will discuss the patient with the senior resident and MD Tamika Bishop MD  Internal Medicine Resident PGY-1    Patient seen and examined. Discussed with resident physician. Patient continues to improve. She's symptomatically feeling better. Her CT scan yesterday did show some mild improvement. Her oxygen today is slightly worse and she is requiring an increase in her O2. Coarse breath sounds bilaterally but fair air movement    Will continue with steroids. Continue to support her with supplemental oxygen. We'll wean that as she tolerates. Increase her mobility.   Pulmonology continues to follow    Electronically signed by Joanne Nichols MD on 4/14/2019 at 8:05 PM

## 2019-04-14 NOTE — PLAN OF CARE
Attempted to wean o2 today. 88% on 3L o2. 91% on 4L o2. Left at 4L o2 at this time. Will continue to monitor o2 sats q4 hours and as needed.  Alberto Jiménez

## 2019-04-14 NOTE — PROGRESS NOTES
FINDINGS:       Trachea and mainstem bronchi are patent and clear. No enlarged mediastinal lymph node. Calcified mediastinal and hilar lymph nodes consistent with old granulomatous disease. Mild atherosclerotic calcification of aorta and coronary arteries. Prosthetic    cardiac valve. Mildly dilated central pulmonary arteries suggestive of pulmonary hypertension. Heart is not enlarged. No pericardial effusion. No pleural effusion or pneumothorax.       There is interval progression of scattered patchy groundglass opacities with interlobular and intralobular septal thickening (crazy pavement appearance) predominantly involving the upper lobes.       Multilevel mild degenerative changes of thoracic spine. Stable mild chronic compression deformity of L1.       No acute abnormality in the visualized upper abdomen.               Impression       Diffuse scattered patchy groundglass opacities with interlobular septal thickening predominantly involving the upper lobes with interval progression since 4/5/2019. This is a nonspecific finding and may relate to acute interstitial pneumonia, ARDS,    bacterial pneumonia, hypersensitivity pneumonitis or pulmonary alveolar proteinosis. Recommended clinical correlation and short-term follow-up.       Mildly enlarged central pulmonary arteries suggestive of pulmonary hypertension. ASSESSMENT/PLAN:  This is a 70 y.o. female with ILD and acute on chronic hypoxemic respiratory failure with presumed pneumonia. BAL negative so far for infection. Pro-calcitonin was mildly elevated at 0.27. She finished 5 days of cefepime and azithromycin. Patient finished 3 days of Solu-Medrol 125 mg IV every 6 hours yesterday and had a CT chest for comparison. Her CT chest today while worse then her April 5 CT chest does show small but definitive improvement compared to her CT chest April 8. The etiology remains elusive and in the differential is still AIP, BOOP or RA-ILD.   I dropped her Solu-Medrol to 40 mg IV every 12 hours yesterday. Continue that dose for now and I will likley convert her to prednisone 60 mg daily tmrw. I do not think an open lung biopsy would be beneficial at this time. Wean oxygen for goal O2 sat of 88% or higher    Clinically she thinks she is doing better as does her daughter.     Amarilis Nam MD

## 2019-04-14 NOTE — PLAN OF CARE
Problem: Falls - Risk of:  Goal: Will remain free from falls  Description  Will remain free from falls  4/14/2019 0344 by Mickey Dobbs, RN  Outcome: Ongoing  Patient meets Rodrigo Lamb fall risk guidelines. Non skid footwear with ambulation. Bed in lowest position. Call light in reach. Bed alarm activated. Reminded to call for assistance before exiting bed. Continue safety precautions. Problem: Fluid Volume - Deficit:  Goal: Achieves intake and output within specified parameters  Description  Achieves intake and output within specified parameters  4/14/2019 0344 by Mickey Dobbs, RN  Outcome: Ongoing  Patient voiding adequate amounts of urine per purwick. See I&O tab. Appetite was reported as decreased by patient. Encouraged to drink water. Will continue to monitor.

## 2019-04-15 LAB
ANION GAP SERPL CALCULATED.3IONS-SCNC: 10 MMOL/L (ref 3–16)
ANISOCYTOSIS: ABNORMAL
BASOPHILS ABSOLUTE: 0 K/UL (ref 0–0.2)
BASOPHILS RELATIVE PERCENT: 0 %
BUN BLDV-MCNC: 21 MG/DL (ref 7–20)
CALCIUM SERPL-MCNC: 9.3 MG/DL (ref 8.3–10.6)
CHLORIDE BLD-SCNC: 100 MMOL/L (ref 99–110)
CO2: 26 MMOL/L (ref 21–32)
CREAT SERPL-MCNC: 0.6 MG/DL (ref 0.6–1.2)
EOSINOPHILS ABSOLUTE: 0 K/UL (ref 0–0.6)
EOSINOPHILS RELATIVE PERCENT: 0 %
FINAL REPORT: NORMAL
FINAL REPORT: NORMAL
GFR AFRICAN AMERICAN: >60
GFR NON-AFRICAN AMERICAN: >60
GLUCOSE BLD-MCNC: 104 MG/DL (ref 70–99)
GLUCOSE BLD-MCNC: 124 MG/DL (ref 70–99)
GLUCOSE BLD-MCNC: 144 MG/DL (ref 70–99)
GLUCOSE BLD-MCNC: 149 MG/DL (ref 70–99)
GLUCOSE BLD-MCNC: 150 MG/DL (ref 70–99)
HCT VFR BLD CALC: 34.5 % (ref 36–48)
HEMOGLOBIN: 11.1 G/DL (ref 12–16)
LYMPHOCYTES ABSOLUTE: 2 K/UL (ref 1–5.1)
LYMPHOCYTES RELATIVE PERCENT: 22 %
MCH RBC QN AUTO: 28 PG (ref 26–34)
MCHC RBC AUTO-ENTMCNC: 32.2 G/DL (ref 31–36)
MCV RBC AUTO: 86.8 FL (ref 80–100)
METAMYELOCYTES RELATIVE PERCENT: 2 %
MONOCYTES ABSOLUTE: 0.3 K/UL (ref 0–1.3)
MONOCYTES RELATIVE PERCENT: 3 %
NEUTROPHILS ABSOLUTE: 6.8 K/UL (ref 1.7–7.7)
NEUTROPHILS RELATIVE PERCENT: 73 %
PDW BLD-RTO: 15.2 % (ref 12.4–15.4)
PERFORMED ON: ABNORMAL
PLATELET # BLD: 392 K/UL (ref 135–450)
PMV BLD AUTO: 7.1 FL (ref 5–10.5)
POTASSIUM REFLEX MAGNESIUM: 4.8 MMOL/L (ref 3.5–5.1)
PRELIMINARY: NORMAL
PRELIMINARY: NORMAL
RBC # BLD: 3.97 M/UL (ref 4–5.2)
REPORT: NORMAL
SODIUM BLD-SCNC: 136 MMOL/L (ref 136–145)
WBC # BLD: 9.1 K/UL (ref 4–11)

## 2019-04-15 PROCEDURE — 2700000000 HC OXYGEN THERAPY PER DAY

## 2019-04-15 PROCEDURE — 36415 COLL VENOUS BLD VENIPUNCTURE: CPT

## 2019-04-15 PROCEDURE — 6370000000 HC RX 637 (ALT 250 FOR IP): Performed by: STUDENT IN AN ORGANIZED HEALTH CARE EDUCATION/TRAINING PROGRAM

## 2019-04-15 PROCEDURE — 2060000000 HC ICU INTERMEDIATE R&B

## 2019-04-15 PROCEDURE — 2580000003 HC RX 258: Performed by: STUDENT IN AN ORGANIZED HEALTH CARE EDUCATION/TRAINING PROGRAM

## 2019-04-15 PROCEDURE — 6370000000 HC RX 637 (ALT 250 FOR IP): Performed by: FAMILY MEDICINE

## 2019-04-15 PROCEDURE — 99232 SBSQ HOSP IP/OBS MODERATE 35: CPT | Performed by: INTERNAL MEDICINE

## 2019-04-15 PROCEDURE — 94640 AIRWAY INHALATION TREATMENT: CPT

## 2019-04-15 PROCEDURE — 80048 BASIC METABOLIC PNL TOTAL CA: CPT

## 2019-04-15 PROCEDURE — 94668 MNPJ CHEST WALL SBSQ: CPT

## 2019-04-15 PROCEDURE — 97530 THERAPEUTIC ACTIVITIES: CPT

## 2019-04-15 PROCEDURE — 6360000002 HC RX W HCPCS: Performed by: STUDENT IN AN ORGANIZED HEALTH CARE EDUCATION/TRAINING PROGRAM

## 2019-04-15 PROCEDURE — 6360000002 HC RX W HCPCS: Performed by: INTERNAL MEDICINE

## 2019-04-15 PROCEDURE — 94660 CPAP INITIATION&MGMT: CPT

## 2019-04-15 PROCEDURE — 85025 COMPLETE CBC W/AUTO DIFF WBC: CPT

## 2019-04-15 PROCEDURE — 94761 N-INVAS EAR/PLS OXIMETRY MLT: CPT

## 2019-04-15 PROCEDURE — 97110 THERAPEUTIC EXERCISES: CPT

## 2019-04-15 RX ADMIN — ESCITALOPRAM OXALATE 10 MG: 10 TABLET ORAL at 08:54

## 2019-04-15 RX ADMIN — GABAPENTIN 600 MG: 600 TABLET, FILM COATED ORAL at 21:23

## 2019-04-15 RX ADMIN — ALBUTEROL SULFATE 2.5 MG: 2.5 SOLUTION RESPIRATORY (INHALATION) at 07:58

## 2019-04-15 RX ADMIN — MUPIROCIN: 20 OINTMENT TOPICAL at 16:06

## 2019-04-15 RX ADMIN — PANTOPRAZOLE SODIUM 40 MG: 40 TABLET, DELAYED RELEASE ORAL at 06:30

## 2019-04-15 RX ADMIN — CETIRIZINE HYDROCHLORIDE 10 MG: 10 TABLET, FILM COATED ORAL at 08:54

## 2019-04-15 RX ADMIN — CHOLESTYRAMINE 4 G: 4 POWDER, FOR SUSPENSION ORAL at 08:54

## 2019-04-15 RX ADMIN — MUPIROCIN: 20 OINTMENT TOPICAL at 12:55

## 2019-04-15 RX ADMIN — MUPIROCIN: 20 OINTMENT TOPICAL at 08:55

## 2019-04-15 RX ADMIN — Medication 250 MG: at 08:54

## 2019-04-15 RX ADMIN — OXYCODONE HYDROCHLORIDE AND ACETAMINOPHEN 1 TABLET: 5; 325 TABLET ORAL at 08:54

## 2019-04-15 RX ADMIN — NYSTATIN 500000 UNITS: 100000 SUSPENSION ORAL at 12:55

## 2019-04-15 RX ADMIN — ENOXAPARIN SODIUM 40 MG: 40 INJECTION SUBCUTANEOUS at 08:55

## 2019-04-15 RX ADMIN — METHYLPREDNISOLONE SODIUM SUCCINATE 40 MG: 40 INJECTION, POWDER, FOR SOLUTION INTRAMUSCULAR; INTRAVENOUS at 21:22

## 2019-04-15 RX ADMIN — ALBUTEROL SULFATE 2.5 MG: 2.5 SOLUTION RESPIRATORY (INHALATION) at 21:15

## 2019-04-15 RX ADMIN — LOSARTAN POTASSIUM 25 MG: 25 TABLET, FILM COATED ORAL at 08:54

## 2019-04-15 RX ADMIN — Medication 250 MG: at 21:23

## 2019-04-15 RX ADMIN — FUROSEMIDE 40 MG: 40 TABLET ORAL at 08:54

## 2019-04-15 RX ADMIN — INSULIN LISPRO 1 UNITS: 100 INJECTION, SOLUTION INTRAVENOUS; SUBCUTANEOUS at 18:40

## 2019-04-15 RX ADMIN — MUPIROCIN: 20 OINTMENT TOPICAL at 21:22

## 2019-04-15 RX ADMIN — NYSTATIN 500000 UNITS: 100000 SUSPENSION ORAL at 21:22

## 2019-04-15 RX ADMIN — HYDRALAZINE HYDROCHLORIDE 25 MG: 25 TABLET, FILM COATED ORAL at 21:23

## 2019-04-15 RX ADMIN — NYSTATIN 500000 UNITS: 100000 SUSPENSION ORAL at 08:54

## 2019-04-15 RX ADMIN — Medication 10 ML: at 08:55

## 2019-04-15 RX ADMIN — AMLODIPINE BESYLATE 10 MG: 10 TABLET ORAL at 21:23

## 2019-04-15 RX ADMIN — OXYCODONE HYDROCHLORIDE AND ACETAMINOPHEN 1 TABLET: 5; 325 TABLET ORAL at 16:06

## 2019-04-15 RX ADMIN — INSULIN LISPRO 1 UNITS: 100 INJECTION, SOLUTION INTRAVENOUS; SUBCUTANEOUS at 12:50

## 2019-04-15 RX ADMIN — HYDRALAZINE HYDROCHLORIDE 25 MG: 25 TABLET, FILM COATED ORAL at 06:30

## 2019-04-15 RX ADMIN — HYDRALAZINE HYDROCHLORIDE 25 MG: 25 TABLET, FILM COATED ORAL at 12:55

## 2019-04-15 RX ADMIN — PANTOPRAZOLE SODIUM 40 MG: 40 TABLET, DELAYED RELEASE ORAL at 16:06

## 2019-04-15 RX ADMIN — FLUTICASONE PROPIONATE 1 SPRAY: 50 SPRAY, METERED NASAL at 21:23

## 2019-04-15 RX ADMIN — NYSTATIN 500000 UNITS: 100000 SUSPENSION ORAL at 16:06

## 2019-04-15 RX ADMIN — Medication 10 ML: at 21:23

## 2019-04-15 RX ADMIN — TRAZODONE HYDROCHLORIDE 50 MG: 50 TABLET ORAL at 21:23

## 2019-04-15 RX ADMIN — CALCIUM CARBONATE-VITAMIN D TAB 500 MG-200 UNIT 2 TABLET: 500-200 TAB at 08:54

## 2019-04-15 RX ADMIN — ALBUTEROL SULFATE 2.5 MG: 2.5 SOLUTION RESPIRATORY (INHALATION) at 15:49

## 2019-04-15 RX ADMIN — GABAPENTIN 600 MG: 600 TABLET, FILM COATED ORAL at 08:54

## 2019-04-15 RX ADMIN — DAPSONE 100 MG: 100 TABLET ORAL at 09:32

## 2019-04-15 RX ADMIN — GABAPENTIN 600 MG: 600 TABLET, FILM COATED ORAL at 12:55

## 2019-04-15 RX ADMIN — METHYLPREDNISOLONE SODIUM SUCCINATE 40 MG: 40 INJECTION, POWDER, FOR SOLUTION INTRAMUSCULAR; INTRAVENOUS at 08:54

## 2019-04-15 ASSESSMENT — PAIN SCALES - GENERAL
PAINLEVEL_OUTOF10: 0
PAINLEVEL_OUTOF10: 0
PAINLEVEL_OUTOF10: 8
PAINLEVEL_OUTOF10: 0
PAINLEVEL_OUTOF10: 0

## 2019-04-15 NOTE — PROGRESS NOTES
Pulmonary Followup Note    CC: acute on chronic hypoxia, pneumonia, fever  Subjective:    Remains afebrile and feels dyspnea is 50% improved. Oxygen requirement is hanging around 4 L. She had a follow-up CT chest on Saturday    ROS:  Denies headache, nausea . 24HR INTAKE/OUTPUT:      Intake/Output Summary (Last 24 hours) at 4/15/2019 1356  Last data filed at 4/15/2019 1300  Gross per 24 hour   Intake 660 ml   Output 1450 ml   Net -790 ml        albuterol  2.5 mg Nebulization TID    methylPREDNISolone  40 mg Intravenous Q12H    calcium-vitamin D  2 tablet Oral Daily    dapsone  100 mg Oral Daily    insulin lispro  0-6 Units Subcutaneous TID WC    insulin lispro  0-3 Units Subcutaneous Nightly    amLODIPine  10 mg Oral Nightly    pantoprazole  40 mg Oral BID AC    nystatin  5 mL Oral 4x Daily    saccharomyces boulardii  250 mg Oral BID    mupirocin   Topical 4x Daily    cetirizine  10 mg Oral QAM    escitalopram  10 mg Oral Daily    cholestyramine light  4 g Oral Daily    fluticasone  1 spray Each Nare Nightly    gabapentin  600 mg Oral TID    losartan  25 mg Oral Daily    Mirabegron ER  1 tablet Oral QPM    oxyCODONE-acetaminophen  1 tablet Oral Q8H    sodium chloride flush  10 mL Intravenous 2 times per day    enoxaparin  40 mg Subcutaneous Daily    furosemide  40 mg Oral Daily    traZODone  50 mg Oral Nightly    hydrALAZINE  25 mg Oral 3 times per day     PHYSICAL EXAMINATION:  BP (!) 149/58   Pulse 78   Temp 98.5 °F (36.9 °C) (Oral)   Resp 18   Ht 4' 11.84\" (1.52 m)   Wt 220 lb 14.4 oz (100.2 kg)   SpO2 92%   BMI 43.37 kg/m²   CURRENT PULSE OXIMETRY:  SpO2: 92 %  24HR PULSE OXIMETRY RANGE:  SpO2  Av.3 %  Min: 89 %  Max: 94 % on 4 L    Gen: No distress. Speaking in full sentences without accessory muscle use. HEENT: PERRL, EOMI, OP nl  Lung: Diffuse crackles at the bases and mid lung zones. No wheezes.   CV: RRR without M/R/R  Abd: +BS, soft, NT/ND  Ext: No edema. DATA  CBC:   Recent Labs     04/13/19  1959 04/14/19  0429 04/15/19  0434   WBC 12.7* 10.1 9.1   HGB 11.4* 10.9* 11.1*   HCT 36.3 33.3* 34.5*   MCV 88.6 86.5 86.8    406 392     BMP:   Recent Labs     04/13/19  1959 04/14/19  0429 04/15/19  0434   * 136 136   K 5.0 4.6 4.8   CL 97* 102 100   CO2 22 24 26   BUN 24* 23* 21*   CREATININE 0.8 0.6 0.6     Strep, histo and legionella antigens are negative. indeteriminate 1, 3 beta d glucan  RF 39  MICHELLE:Neg  ANCA < 1:20  IgE 82  ESR 87      Cultures:  Bronchoscopy with BAL April 5th with lavage of bilateral upper lobes. Gram and fungal stains were negative. Galactomannan negative  Cytology negative  Diatherix panel negative  Cultures are negative, NGTD or showing normal respiratory taniya. CT CHEST APRIL 13, 2009 REVIEWED AND COMPARED TO PREVIOUS CT CHEST APRIL 8 AND APRIL 5, 2009 BY ME AND SHOWED:  Signed by Sofya Cantu MD on 04/13/19 1250        Addendum:     Compared with CTA chest dated 4/9/2019 and CT chest dated 4/5/2019.     Crazy pavement appearance appears minimally improved since CTA chest dated 4/9/2019. Narrative   EXAM: CT CHEST WITHOUT CONTRAST       INDICATION: Acute respiratory illness       COMPARISON: None       TECHNIQUE: CT of the chest was performed without contrast. Axial images, multiplanar reformatted images and axial maximum intensity projection images provided for review.   Individualized dose optimization technique was used in order to meet ALARA    standards for radiation dose reduction.  In addition to vendor specific dose reduction algorithms, the dose reduction techniques vary based on the specific scanner utilized but frequently include automated exposure control, adjustment of the mA and/or kV    according to patient size, and use of iterative reconstruction technique.       CONTRAST: None.       FINDINGS:       Trachea and mainstem bronchi are patent and clear.  No enlarged mediastinal lymph node. Calcified mediastinal and hilar lymph nodes consistent with old granulomatous disease. Mild atherosclerotic calcification of aorta and coronary arteries. Prosthetic    cardiac valve. Mildly dilated central pulmonary arteries suggestive of pulmonary hypertension. Heart is not enlarged. No pericardial effusion. No pleural effusion or pneumothorax.       There is interval progression of scattered patchy groundglass opacities with interlobular and intralobular septal thickening (crazy pavement appearance) predominantly involving the upper lobes.       Multilevel mild degenerative changes of thoracic spine. Stable mild chronic compression deformity of L1.       No acute abnormality in the visualized upper abdomen.               Impression       Diffuse scattered patchy groundglass opacities with interlobular septal thickening predominantly involving the upper lobes with interval progression since 4/5/2019. This is a nonspecific finding and may relate to acute interstitial pneumonia, ARDS,    bacterial pneumonia, hypersensitivity pneumonitis or pulmonary alveolar proteinosis. Recommended clinical correlation and short-term follow-up.       Mildly enlarged central pulmonary arteries suggestive of pulmonary hypertension. ASSESSMENT/PLAN:  This is a 70 y.o. female with ILD and acute on chronic hypoxemic respiratory failure with presumed pneumonia. BAL negative so far for infection. Pro-calcitonin was mildly elevated at 0.27. She finished 5 days of cefepime and azithromycin. Patient finished 3 days of Solu-Medrol 125 mg IV every 6 hours yesterday and had a CT chest for comparison. Her CT chest Saturday while worse then her April 5 CT chest does show small but definitive improvement compared to her CT chest April 8. The etiology remains elusive and in the differential is still AIP, BOOP or RA-ILD.   I dropped her Solu-Medrol to 40 mg IV every 12 hours on Saturday and will convert her to

## 2019-04-15 NOTE — PROGRESS NOTES
Nutrition Assessment    Type and Reason for Visit: Reassess    Nutrition Recommendations:   · Encourage PO intake on General diet  · Encourage snacks between meals (yogurt or cereal and milk) to increase intake     Nutrition Assessment: Pt is improving from nutritional standpoint. States she is consuming 50% of meals offered, meals documented per flowsheet % consumed on general diet. Pt dislikes ONS but encouraged to continue to have three meals and add snacks between. Pt likes yogurt and encouraged to consume between meals. Reports tongue pain is improving w/ mouthwash. Noted pt w/ wt loss 3% since admission wt (not consider significant per guidelines) although difficult to determine fluid vs nutritional losses as weight fluctuations are expected w/ diuretics. Will continue to monitor weight and fluid status. Encouraged to consume small, frequent meals. Malnutrition Assessment:  · Malnutrition Status: At risk for malnutrition   · Context: Acute illness or injury  · Findings of the 6 clinical characteristics of malnutrition (Minimum of 2 out of 6 clinical characteristics is required to make the diagnosis of moderate or severe Protein Calorie Malnutrition based on AND/ASPEN Guidelines):  1. Energy Intake-Less than or equal to 75% of estimated energy requirement, Greater than or equal to 7 days    2. Weight Loss-Unable to assess(Fluid vs nutritional losses),    3. Fat Loss-No significant subcutaneous fat loss,    4. Muscle Loss-No significant muscle mass loss,    5. Fluid Accumulation-No significant fluid accumulation,    6.  Strength-Not measured    Nutrition Risk Level:  Moderate    Nutrient Needs:  · Estimated Daily Total Kcal: 8016-6930 kccal(15-18)  · Estimated Daily Protein (g): 60-69 grams (1.3-1.5 )  · Estimated Daily Total Fluid (ml/day):      Nutrition Diagnosis:   · Problem: Inadequate oral intake  · Etiology: related to Insufficient energy/nutrient consumption     Signs and symptoms:  as evidenced by Intake 50-75%    Objective Information:  · Nutrition-Focused Physical Findings: LBM 4/15; no edema   · Wound Type: None  · Current Nutrition Therapies:  · Oral Diet Orders: General   · Oral Diet intake: 51-75%  · Oral Nutrition Supplement (ONS) Orders: None  · Anthropometric Measures:  · Ht: 4' 11.84\" (152 cm)   · Current Body Wt: 220 lb 14.4 oz (100.2 kg)(-5L per I/O\)  · Admission Body Wt: 227 lb 1.2 oz (103 kg)  · Usual Body Wt: 225 lb (102.1 kg)(per EMR review)  · % Weight Change:   ?fluid vs nutritional losses  · Ideal Body Wt: 100 lb (45.4 kg)   · BMI Classification: BMI > or equal to 40.0 Obese Class III    Nutrition Interventions:   Continue current diet  Continued Inpatient Monitoring    Nutrition Evaluation:   · Evaluation: Progressing toward goals   · Goals: pt will tolerate diet and consume >50% of meals     · Monitoring: Meal Intake, Weight, Diet Tolerance      Electronically signed by Linda Razo RD, LD on 4/15/19 at 2:45 PM    Contact Number:   Pager: [phone]  Office: [phone]

## 2019-04-15 NOTE — PLAN OF CARE
Problem: Falls - Risk of:  Goal: Will remain free from falls  Description  Will remain free from falls  4/14/2019 2348 by Breanna Rosen, RN  Outcome: Ongoing  Patient meets Levi Adan fall risk guidelines. Non skid footwear with ambulation. Bed in lowest position. Call light in reach. Bed alarm activated. Reminded to call for assistance before exiting bed. Continue safety precautions. Problem: Gas Exchange - Impaired:  Goal: Levels of oxygenation will improve  Description  Levels of oxygenation will improve  4/14/2019 2348 by Breanna Rosen, RN  Outcome: Ongoing  Patient on 4 liters high flow NC at 93%. CPAP at night. Encouraged C&DB. Lungs diminished with occasional wheezes and crackles. Denies any SOB. Resp. effort normal. Continue to monitor.

## 2019-04-15 NOTE — CARE COORDINATION
4/15/2019  [facility])  Clinical Case Management Department    Patient: Yonny Weiss  MRN: [id number] / : 1940  ACCT: [de-identified]     Emergency Contacts  Healthcare Agent Appointed: Healthcare power of   Healthcare Agent's Name: Jovanny Ram)    Admission Documentation  Attending Provider: Chris Davenport MD  Admit date/time: 2019  7:11 PM  Status: Inpatient [101]  Diagnosis: PNA (pneumonia)     Readmission within last 30 days:  yes     Living Situation  Discharge Planning  Living Arrangements: Alone  Support Systems: Family Members, Friends/Neighbors  Potential Assistance Needed: Outpatient PT/OT, Home Care  Type of Home Care Services: None  Patient expects to be discharged to[de-identified] Home  Expected Discharge Date: 19    Service Assessment       Values / Beliefs  Do you have any ethnic, cultural, sacramental, or spiritual [employer] needs you would like us to be aware of while you are in the hospital?: No    Advance Directives (For Healthcare)  Pre-existing DNR Comfort Care/DNR Arrest/DNI Order: Yes, notify physician for order  Healthcare Directive: Yes, patient has an advance directive for healthcare treatment  Type of Healthcare Directive: Durable power of  for health care  Copy in Chart: No, copy requested from family  [facility] Agent's Name: Jovanny Ram)  If you are unable to speak for yourself, does your Healthcare Agent or Legal Spokesperson know your healthcare wishes?: Yes    Destination  skilled nursing facility, home health and discharge home with supervision     Patient was from home with [facility] services prior to admission, patient and daughter currently working on plan for discharge of home with [address] vs SNF (here or in [place] closer to daughters home)    [address] [facility] [facility] to [facility] with [facility]/Skilled Nursing  Services at Discharge: Other [facility] vs SNF  Home care at home? Yes Provider: [facility] Provider Phone: [phone]     Therapy Consults  PT evaluation needed?: No  OT Evalulation Needed?: No  SLP evaluation needed?: No    Home Medical Care  Patient manages home medical care prior to admission. Pharmacy: [facility]   Potential Assistance Purchasing Medications:  No  Does patient want to participate in local refill/meds to beds program?: No    Goals of Care  Patient expects to be discharged to[de-identified] Home  Patient plans for SNF: Possibly, patient is discussing with daughter and MD in regards to SNF vs return home at discharge. Patient does not have 24 hour assistance at discharge and her daughter lives in [place]. Patient has Medicare and can go to SNF in [place] closer to her daughters home if she chooses to go to SNF at discharge. CM discussed with patient about medicare benefits for SNF and she reports that she is waiting to hear from her daughter who was going to talk with the MD today and then she would have a decision about what she plans to do at discharge.      Mode of transport from hospital: family vs ambulance     Factors facilitating achievement of predicted outcomes: Cooperative, Pleasant and Good insight into deficits    Barriers to discharge: Limited family support, Upper extremity weakness, Lower extremity weakness, Medical complications and Medication managment     Ralph Rico RN  [facility]  Case Management Department  Ph: [phone] Fax: [phone]

## 2019-04-15 NOTE — PLAN OF CARE
Problem: Falls - Risk of:  Goal: Will remain free from falls  Description  Will remain free from falls  2/81/1132 5888 by Alfa Mayo RN  Outcome: Ongoing  Note:   Patient remains free of falls at this time. Patient scores as a medium fall risk, chair/bed alarm on for added safety. Patient alert/ oriented, calls out appropriately. Will monitor. Problem: Discharge Planning:  Goal: Discharged to appropriate level of care  Description  Discharged to appropriate level of care  0/20/8102 4387 by Alfa Mayo RN  Outcome: Ongoing  Note:   Patient remains on IV steroids. Lungs with rhonchi to bases. Patient still on 4L O2 per NC. Problem: Pain:  Goal: Pain level will decrease  Description  Pain level will decrease  5/79/6283 5978 by Alfa Mayo RN  Outcome: Ongoing  Note:   Patient has scheduled pain medication Q8, denies pain at this time. Will monitor.

## 2019-04-15 NOTE — PROGRESS NOTES
10 seconds  Activity: functional mobility in room and in hallway (using wh walker)  Comment: SOB on exertion; ambulated ~72 feet on 3L O2 - O2 sats dropped to 83% and took ~2 minutes to recover to 90% utilizing PLB technique    Functional Mobility  Functional - Mobility Device: Rolling Walker  Activity: Other  Assist Level: Contact guard assistance     Transfers  Sit to stand: Contact guard assistance  Stand to sit: Contact guard assistance            UE seated: 1 set of 10 AROM - shoulder flex, elbow flex/ext, wrist flex/ext, finger flex/ext           Cognition  Overall Cognitive Status: [facility] [employer]                    Exercises  Chair Push-ups: x 5 (CGA) - O2 sats dropped to 86% on 3L O2 per NC; took ~ 2 minutes to recover to 90%                    Plan  This note will serve as a discharge summary in the event the patient is discharged prior to next treatment session. Plan  Times per week: 2-5x  Times per day: Daily  Current Treatment Recommendations: Balance Training, Strengthening, Functional Mobility Training, Endurance Training, Safety Education & Training, Self-Care / ADL                                                    AM-PAC Score        AM-[facility] Inpatient Daily Activity Raw Score: 19  AM-PAC Inpatient ADL T-Scale Score : 40.22  ADL Inpatient CMS 0-100% Score: 42.8  ADL Inpatient CMS G-Code Modifier : CK    Goals  Short term goals  Time Frame for Short term goals: by D/C  Short term goal 1:  Increase standing/mobility tolerance to 8 min - Not met  Short term goal 2: Complete toileting with SBA - Not met  Short term goal 3: Jodene Regino pants/underwear with SBA - Not met  Short term goal 4: Transfer to/from toilet and chairs with SBA - Not met  Patient Goals   Patient goals : recover, return home       Therapy Time   Individual Concurrent Group Co-treatment   Time In 1456         Time Out 1524         Minutes 28           Timed Code Treatment Minutes: 28  Total Treatment Minutes:  1970 Nithin Barreto OTR/L #[id number]

## 2019-04-16 LAB
ANION GAP SERPL CALCULATED.3IONS-SCNC: 7 MMOL/L (ref 3–16)
ANISOCYTOSIS: ABNORMAL
BANDED NEUTROPHILS RELATIVE PERCENT: 8 % (ref 0–7)
BASOPHILS ABSOLUTE: 0 K/UL (ref 0–0.2)
BASOPHILS RELATIVE PERCENT: 0 %
BUN BLDV-MCNC: 20 MG/DL (ref 7–20)
CALCIUM SERPL-MCNC: 9 MG/DL (ref 8.3–10.6)
CHLORIDE BLD-SCNC: 101 MMOL/L (ref 99–110)
CO2: 28 MMOL/L (ref 21–32)
CREAT SERPL-MCNC: 0.6 MG/DL (ref 0.6–1.2)
EOSINOPHILS ABSOLUTE: 0 K/UL (ref 0–0.6)
EOSINOPHILS RELATIVE PERCENT: 0 %
FINAL REPORT: NORMAL
FINAL REPORT: NORMAL
GFR AFRICAN AMERICAN: >60
GFR NON-AFRICAN AMERICAN: >60
GLUCOSE BLD-MCNC: 110 MG/DL (ref 70–99)
GLUCOSE BLD-MCNC: 154 MG/DL (ref 70–99)
GLUCOSE BLD-MCNC: 161 MG/DL (ref 70–99)
GLUCOSE BLD-MCNC: 162 MG/DL (ref 70–99)
GLUCOSE BLD-MCNC: 189 MG/DL (ref 70–99)
HCT VFR BLD CALC: 35.3 % (ref 36–48)
HEMOGLOBIN: 11.5 G/DL (ref 12–16)
LYMPHOCYTES ABSOLUTE: 1.7 K/UL (ref 1–5.1)
LYMPHOCYTES RELATIVE PERCENT: 17 %
MCH RBC QN AUTO: 28.3 PG (ref 26–34)
MCHC RBC AUTO-ENTMCNC: 32.5 G/DL (ref 31–36)
MCV RBC AUTO: 87 FL (ref 80–100)
METAMYELOCYTES RELATIVE PERCENT: 9 %
MONOCYTES ABSOLUTE: 0.5 K/UL (ref 0–1.3)
MONOCYTES RELATIVE PERCENT: 5 %
NEUTROPHILS ABSOLUTE: 7.6 K/UL (ref 1.7–7.7)
NEUTROPHILS RELATIVE PERCENT: 61 %
PDW BLD-RTO: 15.2 % (ref 12.4–15.4)
PERFORMED ON: ABNORMAL
PLATELET # BLD: 373 K/UL (ref 135–450)
PMV BLD AUTO: 7.1 FL (ref 5–10.5)
POTASSIUM REFLEX MAGNESIUM: 4.7 MMOL/L (ref 3.5–5.1)
PRELIMINARY: NORMAL
PRELIMINARY: NORMAL
RBC # BLD: 4.06 M/UL (ref 4–5.2)
SODIUM BLD-SCNC: 136 MMOL/L (ref 136–145)
WBC # BLD: 9.8 K/UL (ref 4–11)

## 2019-04-16 PROCEDURE — 94660 CPAP INITIATION&MGMT: CPT

## 2019-04-16 PROCEDURE — 6360000002 HC RX W HCPCS: Performed by: INTERNAL MEDICINE

## 2019-04-16 PROCEDURE — 36415 COLL VENOUS BLD VENIPUNCTURE: CPT

## 2019-04-16 PROCEDURE — 85025 COMPLETE CBC W/AUTO DIFF WBC: CPT

## 2019-04-16 PROCEDURE — 6370000000 HC RX 637 (ALT 250 FOR IP): Performed by: FAMILY MEDICINE

## 2019-04-16 PROCEDURE — 6370000000 HC RX 637 (ALT 250 FOR IP): Performed by: INTERNAL MEDICINE

## 2019-04-16 PROCEDURE — 2700000000 HC OXYGEN THERAPY PER DAY

## 2019-04-16 PROCEDURE — 2580000003 HC RX 258: Performed by: STUDENT IN AN ORGANIZED HEALTH CARE EDUCATION/TRAINING PROGRAM

## 2019-04-16 PROCEDURE — 94761 N-INVAS EAR/PLS OXIMETRY MLT: CPT

## 2019-04-16 PROCEDURE — 99232 SBSQ HOSP IP/OBS MODERATE 35: CPT | Performed by: INTERNAL MEDICINE

## 2019-04-16 PROCEDURE — 6370000000 HC RX 637 (ALT 250 FOR IP): Performed by: STUDENT IN AN ORGANIZED HEALTH CARE EDUCATION/TRAINING PROGRAM

## 2019-04-16 PROCEDURE — 80048 BASIC METABOLIC PNL TOTAL CA: CPT

## 2019-04-16 PROCEDURE — 2060000000 HC ICU INTERMEDIATE R&B

## 2019-04-16 PROCEDURE — 6360000002 HC RX W HCPCS: Performed by: STUDENT IN AN ORGANIZED HEALTH CARE EDUCATION/TRAINING PROGRAM

## 2019-04-16 PROCEDURE — 94640 AIRWAY INHALATION TREATMENT: CPT

## 2019-04-16 RX ADMIN — ESCITALOPRAM OXALATE 10 MG: 10 TABLET ORAL at 08:51

## 2019-04-16 RX ADMIN — CETIRIZINE HYDROCHLORIDE 10 MG: 10 TABLET, FILM COATED ORAL at 08:51

## 2019-04-16 RX ADMIN — ENOXAPARIN SODIUM 40 MG: 40 INJECTION SUBCUTANEOUS at 08:50

## 2019-04-16 RX ADMIN — CHOLESTYRAMINE 4 G: 4 POWDER, FOR SUSPENSION ORAL at 08:50

## 2019-04-16 RX ADMIN — FLUTICASONE PROPIONATE 1 SPRAY: 50 SPRAY, METERED NASAL at 20:42

## 2019-04-16 RX ADMIN — GABAPENTIN 600 MG: 600 TABLET, FILM COATED ORAL at 14:15

## 2019-04-16 RX ADMIN — NYSTATIN 500000 UNITS: 100000 SUSPENSION ORAL at 08:55

## 2019-04-16 RX ADMIN — DAPSONE 100 MG: 100 TABLET ORAL at 08:55

## 2019-04-16 RX ADMIN — ACETAMINOPHEN 650 MG: 325 TABLET ORAL at 11:06

## 2019-04-16 RX ADMIN — AMLODIPINE BESYLATE 10 MG: 10 TABLET ORAL at 20:42

## 2019-04-16 RX ADMIN — LOSARTAN POTASSIUM 25 MG: 25 TABLET, FILM COATED ORAL at 08:51

## 2019-04-16 RX ADMIN — NYSTATIN 500000 UNITS: 100000 SUSPENSION ORAL at 14:15

## 2019-04-16 RX ADMIN — HYDRALAZINE HYDROCHLORIDE 25 MG: 25 TABLET, FILM COATED ORAL at 21:48

## 2019-04-16 RX ADMIN — Medication 10 ML: at 20:59

## 2019-04-16 RX ADMIN — PANTOPRAZOLE SODIUM 40 MG: 40 TABLET, DELAYED RELEASE ORAL at 08:51

## 2019-04-16 RX ADMIN — HYDRALAZINE HYDROCHLORIDE 25 MG: 25 TABLET, FILM COATED ORAL at 14:16

## 2019-04-16 RX ADMIN — PANTOPRAZOLE SODIUM 40 MG: 40 TABLET, DELAYED RELEASE ORAL at 17:18

## 2019-04-16 RX ADMIN — MUPIROCIN: 20 OINTMENT TOPICAL at 14:16

## 2019-04-16 RX ADMIN — TRAZODONE HYDROCHLORIDE 50 MG: 50 TABLET ORAL at 20:42

## 2019-04-16 RX ADMIN — NYSTATIN 500000 UNITS: 100000 SUSPENSION ORAL at 17:18

## 2019-04-16 RX ADMIN — MUPIROCIN: 20 OINTMENT TOPICAL at 08:51

## 2019-04-16 RX ADMIN — Medication 250 MG: at 08:50

## 2019-04-16 RX ADMIN — CALCIUM CARBONATE-VITAMIN D TAB 500 MG-200 UNIT 2 TABLET: 500-200 TAB at 08:50

## 2019-04-16 RX ADMIN — MUPIROCIN: 20 OINTMENT TOPICAL at 20:43

## 2019-04-16 RX ADMIN — HYDRALAZINE HYDROCHLORIDE 25 MG: 25 TABLET, FILM COATED ORAL at 05:43

## 2019-04-16 RX ADMIN — ALBUTEROL SULFATE 2.5 MG: 2.5 SOLUTION RESPIRATORY (INHALATION) at 14:23

## 2019-04-16 RX ADMIN — OXYCODONE HYDROCHLORIDE AND ACETAMINOPHEN 1 TABLET: 5; 325 TABLET ORAL at 14:15

## 2019-04-16 RX ADMIN — ALBUTEROL SULFATE 2.5 MG: 2.5 SOLUTION RESPIRATORY (INHALATION) at 08:40

## 2019-04-16 RX ADMIN — NYSTATIN 500000 UNITS: 100000 SUSPENSION ORAL at 20:42

## 2019-04-16 RX ADMIN — OXYCODONE HYDROCHLORIDE AND ACETAMINOPHEN 1 TABLET: 5; 325 TABLET ORAL at 05:43

## 2019-04-16 RX ADMIN — GABAPENTIN 600 MG: 600 TABLET, FILM COATED ORAL at 08:51

## 2019-04-16 RX ADMIN — GABAPENTIN 600 MG: 600 TABLET, FILM COATED ORAL at 20:42

## 2019-04-16 RX ADMIN — Medication 10 ML: at 09:01

## 2019-04-16 RX ADMIN — MUPIROCIN: 20 OINTMENT TOPICAL at 17:19

## 2019-04-16 RX ADMIN — OXYCODONE HYDROCHLORIDE AND ACETAMINOPHEN 1 TABLET: 5; 325 TABLET ORAL at 21:48

## 2019-04-16 RX ADMIN — PREDNISONE 60 MG: 50 TABLET ORAL at 08:51

## 2019-04-16 RX ADMIN — Medication 250 MG: at 20:42

## 2019-04-16 RX ADMIN — FUROSEMIDE 40 MG: 40 TABLET ORAL at 08:51

## 2019-04-16 ASSESSMENT — PAIN SCALES - GENERAL
PAINLEVEL_OUTOF10: 7
PAINLEVEL_OUTOF10: 7
PAINLEVEL_OUTOF10: 0
PAINLEVEL_OUTOF10: 3
PAINLEVEL_OUTOF10: 7

## 2019-04-16 ASSESSMENT — PAIN DESCRIPTION - PROGRESSION
CLINICAL_PROGRESSION: NOT CHANGED

## 2019-04-16 ASSESSMENT — PAIN DESCRIPTION - FREQUENCY: FREQUENCY: INTERMITTENT

## 2019-04-16 ASSESSMENT — PAIN DESCRIPTION - ONSET: ONSET: ON-GOING

## 2019-04-16 ASSESSMENT — PAIN DESCRIPTION - LOCATION: LOCATION: BACK

## 2019-04-16 ASSESSMENT — PAIN DESCRIPTION - DESCRIPTORS: DESCRIPTORS: ACHING

## 2019-04-16 ASSESSMENT — PAIN DESCRIPTION - ORIENTATION: ORIENTATION: LOWER

## 2019-04-16 ASSESSMENT — PAIN DESCRIPTION - PAIN TYPE: TYPE: CHRONIC PAIN

## 2019-04-16 NOTE — PROGRESS NOTES
consistent with old granulomatous disease. Mild atherosclerotic calcification of aorta and coronary arteries. Prosthetic    cardiac valve. Mildly dilated central pulmonary arteries suggestive of pulmonary hypertension. Heart is not enlarged. No pericardial effusion. No pleural effusion or pneumothorax.       There is interval progression of scattered patchy groundglass opacities with interlobular and intralobular septal thickening (crazy pavement appearance) predominantly involving the upper lobes.       Multilevel mild degenerative changes of thoracic spine. Stable mild chronic compression deformity of L1.       No acute abnormality in the visualized upper abdomen.               Impression       Diffuse scattered patchy groundglass opacities with interlobular septal thickening predominantly involving the upper lobes with interval progression since 4/5/2019. This is a nonspecific finding and may relate to acute interstitial pneumonia, ARDS,    bacterial pneumonia, hypersensitivity pneumonitis or pulmonary alveolar proteinosis. Recommended clinical correlation and short-term follow-up.       Mildly enlarged central pulmonary arteries suggestive of pulmonary hypertension. ASSESSMENT/PLAN:  This is a 70 y.o. female with acute on chronic hypoxemic respiratory failure with presumed ILD    Patient finished 3 days of Solu-Medrol 125 mg IV every 6 hours Saturday and had a CT chest for comparison. Her CT chest Saturday while worse then her April 5 CT chest does show some small but definitive improvement compared to her CT chest April 8. The etiology remains elusive and in the differential is still AIP, BOOP or RA-ILD. She is now on prednisone 60 mg daily and will need a slow prednisone wean. I do not think an open lung biopsy would be beneficial at this time. Wean oxygen for goal O2 sat of 88% or higher. She is wanting to go to SNF at discharge.  She is close to D/C from my standpoint    Natalya Bennett MD

## 2019-04-16 NOTE — CARE COORDINATION
CM met with patient at bedside, daughter via speaker phone. They are interested in [place] [address] and [facility]. Daughter is on her way in town from [place] and will meet with both facilities this evening and will have final decision for patient in AM.    CM has faxed referrals to both facilities and awaiting return call with decision for admission. CM was asked to meet with both patient and daughter in AM in regards to final SNF decision. Questions answered and daughter and patient have multiple questions regarding respiratory status and oxygen requirements as well as portable concentrator as patient has difficulty with portable oxygen tanks due to her RA.     Thank you,  Carmelina Hannah RN,   4th Floor Progressive Care Unit  [phone]

## 2019-04-16 NOTE — CARE COORDINATION
Case Management Daily Note:    Current Plan of Care: awaiting SNF placement, per MD medically stable for discharge      PT AM-PAC: 17 /24  Per last eval on: 04/12/2019    OT AM-PAC: 19 /24  Per last eval on: 04/15/2019    DME needs: rolling walker and oxygen      Discharge Plan: SNF- no beds available at this time at [facility] or [facility]; pt discussing with daughter for 3rd choice    Tentative Discharge Date: 04/16/2019    Current Barriers to Discharge: awaiting SNF choice and bed availability    Resources/Information given: new SNF list provided for review for SNF choice      Case Management Notes: CM met with patient at the bedside to update on beds not available at first or second choices for SNF at this time. Patient was provided another SNF list for review and she reports that she was going to call her daughter to further discuss SNF options. CM to follow up early afternoon for SNF choice. If bed available patient able to discharge today and may need transport to SNF for continued oxygen needs at discharge. Patient does not feel safe to go home as she does not have 24 hour assistance and home care will only come a few days per week at discharge. Patient agreeable for SNF and making decision on placement.     Thank you,  Valerie Bazan RN,   4th Floor Progressive Care Unit  [phone]

## 2019-04-16 NOTE — PLAN OF CARE
Problem: Falls - Risk of:  Goal: Will remain free from falls  Outcome: Ongoing   Patient has remained free from falls during shift. Patient uses call light appropriately. Prater Fall Risk; Medium (25-44). Patient has non-skid socks on, fall precautions are in place, dome light illuminated. Bed is in lowest position, with alarm on, locked wheels, and call light is with in reach. Will continue to monitor. Problem: Gas Exchange - Impaired:  Goal: Levels of oxygenation will improve  Outcome: Ongoing  Patient is on 3L via NC . O2 saturations have been >88%, with respirations >12. Patient is not currently SOB at this time. Will continue to monitor. Problem: Pain:  Goal: Pain level will decrease  Outcome: Ongoing   Patient has not complained of any pain this shift. Will continue to monitor.

## 2019-04-16 NOTE — PROGRESS NOTES
11.84\" (1.52 m)   Wt 229 lb 8 oz (104.1 kg)   SpO2 90%   BMI 45.06 kg/m²     General appearance: No apparent distress, appears stated age and cooperative. HEENT: Conjunctivae/corneas clear. Neck: Supple, with full range of motion. Trachea midline. Respiratory:  Normal respiratory effort. Bibasilar rales. No wheezing  Cardiovascular: Regular rate and rhythm with normal S1/S2 without murmurs  Abdomen: Soft, non-tender, non-distended with normal bowel sounds. Musculoskeletal: No edema. Skin: warm and dry  Neurologic:  Alert and oriented x3, answering questions appropriately, moving all extremities, following commands  Peripheral Pulses: +2 palpable radial pulses, equal bilaterally       Labs:   Recent Labs     04/14/19  0429 04/15/19  0434 04/16/19  0433   WBC 10.1 9.1 9.8   HGB 10.9* 11.1* 11.5*   HCT 33.3* 34.5* 35.3*    392 373     Recent Labs     04/14/19  0429 04/15/19  0434 04/16/19  0432    136 136   K 4.6 4.8 4.7    100 101   CO2 24 26 28   BUN 23* 21* 20   CREATININE 0.6 0.6 0.6   CALCIUM 9.5 9.3 9.0     No results for input(s): AST, ALT, BILIDIR, BILITOT, ALKPHOS in the last 72 hours. No results for input(s): INR in the last 72 hours. No results for input(s): Clayton Caruso in the last 72 hours. Urinalysis:      Lab Results   Component Value Date    NITRU Negative 04/04/2019    WBCUA 0-2 04/04/2019    BACTERIA 1+ 09/02/2018    RBCUA 0-2 04/04/2019    BLOODU Negative 04/04/2019    SPECGRAV <=1.005 04/04/2019    GLUCOSEU Negative 04/04/2019       Radiology:  CT CHEST WO CONTRAST   Final Result   Addendum 1 of 1   [ ADDENDUM #1    Addendum:      Compared with CTA chest dated 4/9/2019 and CT chest dated 4/5/2019. Crazy pavement appearance appears minimally improved since CTA chest dated    4/9/2019. Final      CT ABDOMEN PELVIS W IV CONTRAST Additional Contrast? None   Final Result      1. No evidence of obstruction or free air.  No acute abdominal or pelvic abnormality clearly identified. 2. Diverticulosis without evidence of focal diverticulitis. 3. Incidental indeterminate pedunculated cystic type lesion in the posterior left kidney. Follow-up CT renal mass protocol or MRI renal mass protocol in 3 months is recommended to evaluate the lesion further and to document stability. CTA PULMONARY W CONTRAST   Final Result      1. No evidence of pulmonary embolism. 2. Extensive bilateral groundglass opacities are increased compared to 4/5/2019 likely representing multifocal pneumonia superimposed on chronic interstitial fibrosis. CT CHEST WO CONTRAST   Final Result       The central airways are patent. Multifocal ill-defined patchy upper zone and left lower lobe predominant    peripheral nonspecific airspace opacities now seen since 2017 and may be    inflammatory, infectious. Clinically correlate. Background of chronic interstitial changes again noted. XR CHEST PORTABLE   Final Result      Mild bilateral interstitial and airspace opacities, not significantly changed compared to prior, representing edema or infection. Assessment/Plan:    Alina Mcleod, 70 y.o. female w/ a PMHx of ILD, RA, HFpEF who presented with SOB and confusion. Admitted for acute on chronic hypoxic respiratory failure. Suspicion for BOOP as etiology.   Active Hospital Problems    Diagnosis Date Noted    Multifocal pneumonia [J18.9] 04/08/2019    HB (heart block) [I45.9] 04/06/2019    Pneumonia due to organism [J18.9] 04/04/2019    Rheumatoid arthritis of multiple sites with negative rheumatoid factor ([facility] [M06.09] 05/23/2017    Interstitial lung disease ([facility] [J84.9] 01/10/2017    Immunosuppression ([facility] [D89.9] 08/29/2016    Obstructive sleep apnea syndrome [G47.33] 05/04/2010     Acute on chronic hypoxic respiratory failure   - suspect secondary to BOOP or progressively worsening ILD  - oxygen requirement improved to 3L today which is patient's baseline  - she remains afebrile with no leukocytosis. She has improved on steroids  - pulm is following, patient now on PO prednisone 60mg daily  - continue dapsone 100mg for PCP ppx while patient is on steroids    2nd degree heart block - Mobitz type two, has not recurred  - CPAP nightly for DEWAYNE  - Paroxysmal AV block - asymptomatic, nocturnal, in the setting of not using CPAP  - no recommendation for pacemaker as of now, unless block persists despite respiration improvement  - cardiology following    HFpEF -Last echocardiogram (3/25/2019) showing grade II diastolic dysfunction. Patient does not appear to be volume overloaded at this time.   -Furosemide 40 mg daily     Rheumatoid arthritis   - currently on steroids  - percocet for chronic back/joint pain     HTN  - continue losartan, hydralazine and amlodipine    Depression   -lexapro 10 mg PO qD  -trazodone 50 mg PO qHs     Morbid obesity w/ BMI: 29.4  Complicating assessment and treatment. Placing patient at risk for multiple co-morbidities as well as early death and contributing to the patient's presentation.        DVT Prophylaxis: lovenox  Diet: DIET GENERAL;  Code Status: Limited    PT/OT Eval Status: 19/24 OT [facility]    Dispo - inpatient, awaiting bed at SNF    I will discuss the patient with the senior resident and MD Seun Martínez,    Internal Medicine Resident PGY-1  Pager: reach via perfect serve

## 2019-04-17 VITALS
SYSTOLIC BLOOD PRESSURE: 132 MMHG | DIASTOLIC BLOOD PRESSURE: 64 MMHG | HEART RATE: 70 BPM | TEMPERATURE: 99 F | HEIGHT: 60 IN | BODY MASS INDEX: 45.25 KG/M2 | OXYGEN SATURATION: 91 % | WEIGHT: 230.5 LBS | RESPIRATION RATE: 18 BRPM

## 2019-04-17 LAB
ANION GAP SERPL CALCULATED.3IONS-SCNC: 8 MMOL/L (ref 3–16)
BASOPHILS ABSOLUTE: 0 K/UL (ref 0–0.2)
BASOPHILS RELATIVE PERCENT: 0 %
BUN BLDV-MCNC: 21 MG/DL (ref 7–20)
CALCIUM SERPL-MCNC: 8.7 MG/DL (ref 8.3–10.6)
CHLORIDE BLD-SCNC: 102 MMOL/L (ref 99–110)
CO2: 28 MMOL/L (ref 21–32)
CREAT SERPL-MCNC: <0.5 MG/DL (ref 0.6–1.2)
EOSINOPHILS ABSOLUTE: 0 K/UL (ref 0–0.6)
EOSINOPHILS RELATIVE PERCENT: 0 %
GFR AFRICAN AMERICAN: >60
GFR NON-AFRICAN AMERICAN: >60
GLUCOSE BLD-MCNC: 109 MG/DL (ref 70–99)
GLUCOSE BLD-MCNC: 142 MG/DL (ref 70–99)
HCT VFR BLD CALC: 33.9 % (ref 36–48)
HEMOGLOBIN: 10.7 G/DL (ref 12–16)
LYMPHOCYTES ABSOLUTE: 2.7 K/UL (ref 1–5.1)
LYMPHOCYTES RELATIVE PERCENT: 19 %
MCH RBC QN AUTO: 27.5 PG (ref 26–34)
MCHC RBC AUTO-ENTMCNC: 31.5 G/DL (ref 31–36)
MCV RBC AUTO: 87.2 FL (ref 80–100)
MONOCYTES ABSOLUTE: 1 K/UL (ref 0–1.3)
MONOCYTES RELATIVE PERCENT: 7 %
NEUTROPHILS ABSOLUTE: 10.5 K/UL (ref 1.7–7.7)
NEUTROPHILS RELATIVE PERCENT: 74 %
PDW BLD-RTO: 15 % (ref 12.4–15.4)
PERFORMED ON: ABNORMAL
PLATELET # BLD: 350 K/UL (ref 135–450)
PMV BLD AUTO: 7.5 FL (ref 5–10.5)
POTASSIUM REFLEX MAGNESIUM: 4 MMOL/L (ref 3.5–5.1)
RBC # BLD: 3.89 M/UL (ref 4–5.2)
SODIUM BLD-SCNC: 138 MMOL/L (ref 136–145)
WBC # BLD: 14.2 K/UL (ref 4–11)

## 2019-04-17 PROCEDURE — 6360000002 HC RX W HCPCS: Performed by: STUDENT IN AN ORGANIZED HEALTH CARE EDUCATION/TRAINING PROGRAM

## 2019-04-17 PROCEDURE — 6370000000 HC RX 637 (ALT 250 FOR IP): Performed by: STUDENT IN AN ORGANIZED HEALTH CARE EDUCATION/TRAINING PROGRAM

## 2019-04-17 PROCEDURE — 2700000000 HC OXYGEN THERAPY PER DAY

## 2019-04-17 PROCEDURE — 85025 COMPLETE CBC W/AUTO DIFF WBC: CPT

## 2019-04-17 PROCEDURE — 80048 BASIC METABOLIC PNL TOTAL CA: CPT

## 2019-04-17 PROCEDURE — 94761 N-INVAS EAR/PLS OXIMETRY MLT: CPT

## 2019-04-17 PROCEDURE — 94640 AIRWAY INHALATION TREATMENT: CPT

## 2019-04-17 PROCEDURE — 6370000000 HC RX 637 (ALT 250 FOR IP): Performed by: INTERNAL MEDICINE

## 2019-04-17 PROCEDURE — 2580000003 HC RX 258: Performed by: STUDENT IN AN ORGANIZED HEALTH CARE EDUCATION/TRAINING PROGRAM

## 2019-04-17 PROCEDURE — 36415 COLL VENOUS BLD VENIPUNCTURE: CPT

## 2019-04-17 PROCEDURE — 94680 O2 UPTK RST&XERS DIR SIMPLE: CPT

## 2019-04-17 PROCEDURE — 6360000002 HC RX W HCPCS: Performed by: INTERNAL MEDICINE

## 2019-04-17 PROCEDURE — 6370000000 HC RX 637 (ALT 250 FOR IP): Performed by: FAMILY MEDICINE

## 2019-04-17 RX ORDER — OXYCODONE HYDROCHLORIDE AND ACETAMINOPHEN 5; 325 MG/1; MG/1
1 TABLET ORAL EVERY 8 HOURS
Qty: 15 TABLET | Refills: 0 | Status: SHIPPED | OUTPATIENT
Start: 2019-04-17 | End: 2019-04-22

## 2019-04-17 RX ORDER — DAPSONE 100 MG/1
100 TABLET ORAL DAILY
Qty: 30 TABLET | Refills: 0 | Status: SHIPPED | OUTPATIENT
Start: 2019-04-17 | End: 2019-05-13 | Stop reason: SDUPTHER

## 2019-04-17 RX ORDER — PREDNISONE 20 MG/1
60 TABLET ORAL DAILY
Qty: 30 TABLET | Refills: 0 | Status: SHIPPED | OUTPATIENT
Start: 2019-04-17 | End: 2019-04-17

## 2019-04-17 RX ORDER — GUAIFENESIN 100 MG/5ML
10 SYRUP ORAL EVERY 4 HOURS PRN
Qty: 1 BOTTLE | Refills: 0 | Status: SHIPPED | OUTPATIENT
Start: 2019-04-17 | End: 2021-09-22

## 2019-04-17 RX ORDER — ALBUTEROL SULFATE 2.5 MG/3ML
2.5 SOLUTION RESPIRATORY (INHALATION) 2 TIMES DAILY
Status: DISCONTINUED | OUTPATIENT
Start: 2019-04-17 | End: 2019-04-17 | Stop reason: HOSPADM

## 2019-04-17 RX ORDER — FUROSEMIDE 20 MG/1
40 TABLET ORAL DAILY
Qty: 60 TABLET | Refills: 3 | Status: ON HOLD | OUTPATIENT
Start: 2019-04-17 | End: 2021-09-24 | Stop reason: CLARIF

## 2019-04-17 RX ORDER — ALBUTEROL SULFATE 2.5 MG/3ML
2.5 SOLUTION RESPIRATORY (INHALATION) EVERY 4 HOURS PRN
Qty: 120 EACH | Refills: 3 | Status: SHIPPED | OUTPATIENT
Start: 2019-04-17

## 2019-04-17 RX ORDER — HYDRALAZINE HYDROCHLORIDE 25 MG/1
25 TABLET, FILM COATED ORAL EVERY 8 HOURS SCHEDULED
Qty: 90 TABLET | Refills: 3 | Status: SHIPPED | OUTPATIENT
Start: 2019-04-17 | End: 2021-09-22

## 2019-04-17 RX ORDER — CALCIUM CARBONATE 200(500)MG
500 TABLET,CHEWABLE ORAL 3 TIMES DAILY PRN
Qty: 30 TABLET | Refills: 0 | Status: SHIPPED | OUTPATIENT
Start: 2019-04-17 | End: 2019-05-17

## 2019-04-17 RX ORDER — PREDNISONE 20 MG/1
60 TABLET ORAL DAILY
Qty: 90 TABLET | Refills: 0 | Status: SHIPPED | OUTPATIENT
Start: 2019-04-17 | End: 2019-05-13 | Stop reason: SDUPTHER

## 2019-04-17 RX ADMIN — ALBUTEROL SULFATE 2.5 MG: 2.5 SOLUTION RESPIRATORY (INHALATION) at 09:05

## 2019-04-17 RX ADMIN — ENOXAPARIN SODIUM 40 MG: 40 INJECTION SUBCUTANEOUS at 09:37

## 2019-04-17 RX ADMIN — CALCIUM CARBONATE-VITAMIN D TAB 500 MG-200 UNIT 2 TABLET: 500-200 TAB at 09:34

## 2019-04-17 RX ADMIN — NYSTATIN 500000 UNITS: 100000 SUSPENSION ORAL at 09:38

## 2019-04-17 RX ADMIN — HYDRALAZINE HYDROCHLORIDE 25 MG: 25 TABLET, FILM COATED ORAL at 15:08

## 2019-04-17 RX ADMIN — CETIRIZINE HYDROCHLORIDE 10 MG: 10 TABLET, FILM COATED ORAL at 09:36

## 2019-04-17 RX ADMIN — GABAPENTIN 600 MG: 600 TABLET, FILM COATED ORAL at 09:36

## 2019-04-17 RX ADMIN — OXYCODONE HYDROCHLORIDE AND ACETAMINOPHEN 1 TABLET: 5; 325 TABLET ORAL at 05:33

## 2019-04-17 RX ADMIN — Medication 10 ML: at 09:39

## 2019-04-17 RX ADMIN — PANTOPRAZOLE SODIUM 40 MG: 40 TABLET, DELAYED RELEASE ORAL at 05:33

## 2019-04-17 RX ADMIN — GABAPENTIN 600 MG: 600 TABLET, FILM COATED ORAL at 15:08

## 2019-04-17 RX ADMIN — ESCITALOPRAM OXALATE 10 MG: 10 TABLET ORAL at 09:37

## 2019-04-17 RX ADMIN — CHOLESTYRAMINE 4 G: 4 POWDER, FOR SUSPENSION ORAL at 09:37

## 2019-04-17 RX ADMIN — PREDNISONE 60 MG: 50 TABLET ORAL at 09:36

## 2019-04-17 RX ADMIN — FUROSEMIDE 40 MG: 40 TABLET ORAL at 09:35

## 2019-04-17 RX ADMIN — OXYCODONE HYDROCHLORIDE AND ACETAMINOPHEN 1 TABLET: 5; 325 TABLET ORAL at 15:08

## 2019-04-17 RX ADMIN — LOSARTAN POTASSIUM 25 MG: 25 TABLET, FILM COATED ORAL at 09:37

## 2019-04-17 RX ADMIN — DAPSONE 100 MG: 100 TABLET ORAL at 09:38

## 2019-04-17 RX ADMIN — Medication 250 MG: at 09:36

## 2019-04-17 RX ADMIN — MUPIROCIN: 20 OINTMENT TOPICAL at 09:39

## 2019-04-17 RX ADMIN — HYDRALAZINE HYDROCHLORIDE 25 MG: 25 TABLET, FILM COATED ORAL at 05:33

## 2019-04-17 ASSESSMENT — PAIN DESCRIPTION - PROGRESSION
CLINICAL_PROGRESSION: NOT CHANGED
CLINICAL_PROGRESSION: NOT CHANGED
CLINICAL_PROGRESSION: GRADUALLY WORSENING
CLINICAL_PROGRESSION: NOT CHANGED
CLINICAL_PROGRESSION: NOT CHANGED

## 2019-04-17 ASSESSMENT — PAIN SCALES - GENERAL
PAINLEVEL_OUTOF10: 7
PAINLEVEL_OUTOF10: 5
PAINLEVEL_OUTOF10: 7

## 2019-04-17 ASSESSMENT — PAIN DESCRIPTION - FREQUENCY
FREQUENCY: CONTINUOUS
FREQUENCY: INTERMITTENT

## 2019-04-17 ASSESSMENT — PAIN DESCRIPTION - ONSET
ONSET: ON-GOING
ONSET: ON-GOING

## 2019-04-17 ASSESSMENT — PAIN DESCRIPTION - DESCRIPTORS
DESCRIPTORS: ACHING
DESCRIPTORS: ACHING

## 2019-04-17 ASSESSMENT — PAIN DESCRIPTION - PAIN TYPE
TYPE: CHRONIC PAIN
TYPE: CHRONIC PAIN

## 2019-04-17 ASSESSMENT — PAIN DESCRIPTION - ORIENTATION
ORIENTATION: LOWER
ORIENTATION: LOWER

## 2019-04-17 ASSESSMENT — PAIN DESCRIPTION - LOCATION
LOCATION: BACK
LOCATION: BACK

## 2019-04-17 NOTE — DISCHARGE INSTR - COC
Continuity of Care Form    Patient Name: Syed Saenz   :  1940  MRN:  [id number]    Admit date:  2019  Discharge date:  2019    Code Status Order: Limited   Advance Directives:   [facility] Documentation     Date/Time Healthcare Directive Type of Healthcare Directive Copy in [facility] [address] Agent's Name Healthcare Agent's Phone Number    19  --  --  No, copy requested from family  --  --  --    19 1534  Yes, patient has an advance directive for healthcare treatment  --  --  --  --  --    19 0152  Yes, patient has an advance directive for healthcare treatment  Durable power of  for health care  No, copy requested from family  Healthcare power of   Vincent Roldan daughter  --          Admitting Physician:  Jose Lopez MD  PCP: Bonnie ADAN   Discharging Hospital Unit/Room#: 4302/8987-14  Discharging Unit Phone Number: [phone]    Emergency Contact:   Extended Emergency Contact Information  Primary Emergency Contact: [address] Phone: [phone]  Relation: Child  Secondary Emergency Contact: [address] Phone: [phone]  Mobile Phone: [phone]  Relation: Child    Past Surgical History:  Past Surgical History:   Procedure Laterality Date    APPENDECTOMY      BRONCHOSCOPY N/A 2019    BRONCHOSCOPY ALVEOLAR LAVAGE- RIGHT/ LEFT UPPER LOBE performed by Bryant Cantu MD at [address]  2019    BRONCHOSCOPY DIAGNOSTIC OR CELL [address] ONLY performed by Bryant Cantu MD at [address] [place] [address] Bilateral     CHOLECYSTECTOMY      FOOT SURGERY Bilateral     metatarsal removal    HYSTERECTOMY, TOTAL ABDOMINAL      JOINT REPLACEMENT Right 2016    Dr. Leigha Guerrero , shoulder    JOINT REPLACEMENT Right     OTHER SURGICAL HISTORY Right 02/20/2017    RIGHT TOTAL KNEE ARTHROPLASTY             SALPINGO-OOPHORECTOMY      SHOULDER ARTHROPLASTY Right     SHOULDER ARTHROSCOPY Left 11/15/2017    TONSILLECTOMY      TOTAL KNEE ARTHROPLASTY Right        Immunization History:   Immunization History   Administered Date(s) Administered    Influenza Nasal 10/22/2008    Influenza Vaccine, unspecified formulation 09/30/2016, 09/27/2017    Influenza, High Dose (Fluzone 65 yrs and older) 10/08/2015, 09/27/2017, 10/05/2018    Pneumococcal 13-valent Conjugate ([id number]) 10/19/2015    Pneumococcal Polysaccharide ([id number]) 09/30/2009    Tdap (Boostrix, Adacel) 08/22/2014    Zoster Live (Zostavax) 09/30/2009    Zoster Subunit (Shingrix) 03/06/2018       Active Problems:  Patient Active Problem List   Diagnosis Code    Candidiasis of skin and nails B37.2    Dermatophytosis of nail B35.1    Depressive disorder, not elsewhere classified F32.9    Obstructive sleep apnea syndrome G47.33    Diverticulosis of large intestine K57.30    Allergic rhinitis J30.9    Osteoporosis M81.0    Rheumatoid arthritis ([facility] M06.9    Diaphragmatic hernia K44.9    Mitral valve regurgitation I34.0    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    Complete tear of left rotator cuff M75.122    Biceps tendinitis on right M75.21    Postmenopausal osteoporosis M81.0    Asthma J45.909    Arthritis of right acromioclavicular joint M19.011    Glenohumeral arthritis right M19.019    Essential hypertension I10    Gastroesophageal reflux disease without esophagitis K21.9    Mixed hyperlipidemia E78.2    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    Chronic pain of right knee M25.561, G89.29    Morbid obesity with BMI of 40.0-44.9, adult ([facility]) E66.01, Z68.41    Chronic nonseasonal allergic rhinitis due to pollen J30.89    Mild intermittent asthma without complication I06.60    Interstitial lung disease ([facility]) J84.9    Chronic low back pain M54.5, G89.29    Disorder resulting from impaired renal function N25.9    Spinal stenosis M48.00    Status post total right knee replacement using cement on 2/20/2017 Z96.651    Major depressive disorder with single episode, in partial remission ([facility] [facility] F32.4    Rheumatoid arthritis of multiple sites with negative rheumatoid factor ([facility]) M06.09    Osteoarthritis of lumbar spine M47.816    Immunosuppression ([facility]) D89.9    S/P left rotator cuff repair Z98.890    Primary osteoarthritis of left shoulder M19.012    Recurrent cold sores B00.1    Diarrhea R19.7    Nausea with vomiting R11.2    Chronic diarrhea K52.9    Hypokalemia E87.6    Hypomagnesemia E83.42    Weight loss R63.4    Vertigo R42    Acute diverticulitis K57.92    Hypoxia R09.02    Chronic diastolic CHF (congestive heart failure) ([facility]) I50.32    Pneumonia due to organism J18.9    HB (heart block) I45.9    Multifocal pneumonia J18.9       Isolation/Infection:   Isolation          No Isolation            Nurse Assessment:  Last Vital Signs: BP (!) 133/54 Comment: rechecked  Pulse 70   Temp 98.2 °F (36.8 °C) (Oral)   Resp 18   Ht 4' 11.84\" (1.52 m)   Wt 230 lb 8 oz (104.6 kg)   SpO2 93%   BMI 45.25 kg/m²     Last documented pain score (0-10 scale): Pain Level: 5  Last Weight:   Wt Readings from Last 1 Encounters:   04/17/19 230 lb 8 oz (104.6 kg)     Mental Status:  oriented and alert x4     IV Access:  - None    Nursing Mobility/ADLs:  Walking   Assisted  Transfer  Assisted  Bathing  Assisted  Dressing  Assisted  Toileting  Assisted  Feeding  Independent  Med Admin  Independent  Med Delivery   whole    Wound Care Documentation and Therapy:        Elimination:  Continence:   · Bowel:  Yes  · Bladder: Yes  Urinary Catheter: None   Colostomy/Ileostomy/Ileal Conduit: No       Date of Last BM: 4/15/2019    Intake/Output Summary (Last 24 hours) at 4/17/2019 1004  Last data filed at 4/17/2019 0934  Gross per 24 hour   Intake 250 ml   Output -- Net 250 ml     I/O last 3 completed shifts: In: 480 [P.O.:480]  Out: 100 [Urine:100]    Safety Concerns: At Risk for Falls    Impairments/Disabilities:      None    Nutrition Therapy:  Current Nutrition Therapy:   - Oral Diet:  General    Routes of Feeding: Oral  Liquids: Thin Liquids  Daily Fluid Restriction: no  Last Modified Barium Swallow with Video (Video Swallowing Test): not done    Treatments at the Time of Hospital Discharge:   Respiratory Treatments: breathing treatments PRN, DME for nebulizer order being sent with patient for home discharge  Oxygen Therapy:  is on oxygen at 3-4 L/min per nasal cannula. Needs to be increased to 8 litters with activity.    Ventilator:    - No ventilator support    Rehab Therapies: Physical Therapy and Occupational Therapy  Weight Bearing Status/Restrictions: No weight bearing restirctions  Other Medical Equipment (for information only, NOT a DME order):  walker  Other Treatments:     Patient's personal belongings (please select all that are sent with patient):  Ta    RN SIGNATURE:  Electronically signed by Johnson Pepe RN on 4/17/19 at 12:13 PM    CASE MANAGEMENT/SOCIAL WORK SECTION    Inpatient Status Date: 04/04/2019    Readmission Risk Assessment Score:  Readmission Risk              Risk of Unplanned Readmission:        27           Discharging to Facility/ Agency   · Name: [facility]  · Address: [address] [place] [address]  · Phone: [phone]  · GQS:[phone]    Dialysis Facility (if applicable)   · Name:  · Address:  · Dialysis Schedule:  · Phone:  · Fax:    / signature: Electronically signed by Prasad Gray RN on 4/17/19 at 10:05 AM    PHYSICIAN SECTION    Prognosis: Good    Condition at Discharge: Stable    Rehab Potential (if transferring to Rehab): Good    Recommended Labs or Other Treatments After Discharge:     Erica Garcia  [address] Roselia Kumar Ofelia [address] [place] [address] [address]  [address] [facility] [address] [place] [address] [place] [address]          MD BRIANNA [address] [facility]  [phone]    Schedule an appointment as soon as possible for a visit in 1 week      [address]  [phone]    Schedule an appointment as soon as possible for a visit in 1 week      [address] [address]  [phone]              Physician Certification: I certify the above information and transfer of Evita Gaytan  is necessary for the continuing treatment of the diagnosis listed and that she requires [facility] for less 30 days.      Update Admission H&P: No change in H&P    PHYSICIAN SIGNATURE:  Electronically signed by Jenn David MD on 4/17/19 at 12:41 PM

## 2019-04-17 NOTE — PROGRESS NOTES
RESPIRATORY THERAPY ASSESSMENT    Name:  [address] Record Number:  [id number]  Age: 70 y.o. Gender: female  : 1940  Today's Date:  2019  Room:  8049/2216-02    Assessment     Is the patient being admitted for a COPD or Asthma exacerbation? No   (If yes the patient will be seen every 4 hours for the first 24 hours and then reassessed)    Patient Admission Diagnosis      Allergies  Allergies   Allergen Reactions    Sulfa Antibiotics Hives    Lamotrigine Other (See Comments)     Blurred vision    Lomotil  [Diphenoxylate-Atropine] Other (See Comments)     Changes in vision    Sulfacetamide Sodium Hives     Other reaction(s): Other (See Comments)       Minimum Predicted Vital Capacity:    680           Actual Vital Capacity:      600              Pulmonary History:DEWAYNE and ILD  Home Oxygen Therapy:  2 liters/min via nasal cannula  Home Respiratory Therapy:Albuterol   Current Respiratory Therapy: HHN Albuterol TID and Albuterol and 3% saline prn  Treatment Type: HHN  Medications: Albuterol    Respiratory Severity Index(RSI)   Patients with orders for inhalation medications, oxygen, or any therapeutic treatment modality will be placed on Respiratory Protocol. They will be assessed with the first treatment and at least every 72 hours thereafter. The following severity scale will be used to determine frequency of treatment intervention. Smoking History: Pulmonary Disease or Smoking History, Greater than 15 pack year = 2    Social History  Social History     Tobacco Use    Smoking status: Former Smoker     Packs/day: 1.00     Years: 14.00     Pack years: 14.00     Types: Cigarettes     Last attempt to quit: 1976     Years since quittin.3    Smokeless tobacco: Never Used    Tobacco comment: quit age 29   Substance Use Topics    Alcohol use:  Yes     Alcohol/week: 0.6 - 1.2 oz     Types: 1 - 2 Glasses of wine per week     Comment: social drinker    Drug use: No       Recent Surgical History: Surgery of Extremities = 1  Past Surgical History  Past Surgical History:   Procedure Laterality Date    APPENDECTOMY      BRONCHOSCOPY N/A 4/5/2019    BRONCHOSCOPY ALVEOLAR LAVAGE- RIGHT/ LEFT UPPER LOBE performed by Bryant Cantu MD at [address] [facility]  4/5/2019    BRONCHOSCOPY DIAGNOSTIC OR CELL [facility] ONLY performed by Bryant Cantu MD at [address] Bilateral     CHOLECYSTECTOMY      FOOT SURGERY Bilateral     metatarsal removal    HYSTERECTOMY, TOTAL ABDOMINAL      JOINT REPLACEMENT Right 04/25/2016    Dr. Leigha Guerrero , shoulder    JOINT REPLACEMENT Right     OTHER SURGICAL HISTORY Right 02/20/2017    RIGHT TOTAL KNEE ARTHROPLASTY             SALPINGO-OOPHORECTOMY      SHOULDER ARTHROPLASTY Right     SHOULDER ARTHROSCOPY Left 11/15/2017    TONSILLECTOMY      TOTAL KNEE ARTHROPLASTY Right        Level of Consciousness: Alert, Oriented, and Cooperative = 0    Level of Activity: Walking with assistance = 1    Respiratory Pattern: Regular Pattern; RR 8-20 = 0    Breath Sounds: Diminshed bilaterally and/or crackles = 2    Sputum  Sputum Color: Clear, Tenacity: Thin, Sputum How Obtained: Spontaneous cough  Cough: Strong, spontaneous, non-productive = 0    Vital Signs   BP (!) 133/54 Comment: rechecked  Pulse 70   Temp 98.2 °F (36.8 °C) (Oral)   Resp 18   Ht 4' 11.84\" (1.52 m)   Wt 230 lb 8 oz (104.6 kg)   SpO2 93%   BMI 45.25 kg/m²   SPO2 (COPD values may differ): 88-89% on room air or greater than 92% on FiO2 28- 35% = 2    Peak Flow (asthma only): not applicable = 0    RSI: 7-8 = BID and Q4HPRN (every four hours as needed) for dyspnea        Plan       Goals: medication delivery and improve oxygenation    Patient/caregiver was educated on the proper method of use for Respiratory Care Devices:  Yes      Level of patient/caregiver understanding able to:   ? Verbalize understanding   ? Demonstrate understanding       ?  Teach back ? Needs reinforcement       ? No available caregiver               ? Other:     Response to education:  Excellent     Is patient being placed on Home Treatment Regimen? No     Does the patient have everything they need prior to discharge? NA     Comments: Pt has improved respiratory status. Plan of Care: Continue HHN Albuterol as ordered BID. HHN Albuterol and hypertonic prn. Electronically signed by Jhonny Ford RCP on 4/17/2019 at 10:00 AM    Respiratory Protocol Guidelines     1. Assessment and treatment by Respiratory Therapy will be initiated for medication and therapeutic interventions upon initiation of aerosolized medication. 2. Physician will be contacted for respiratory rate (RR) greater than 35 breaths per minute. Therapy will be held for heart rate (HR) greater than 140 beats per minute, pending direction from physician. 3. Bronchodilators will be administered via Metered Dose Inhaler (MDI) with spacer when the following criteria are met:  a. Alert and cooperative     b. HR < 140 bpm  c. RR < 30 bpm                d. Can demonstrate a 2-3 second inspiratory hold  4. Bronchodilators will be administered via Hand Held Nebulizer [facility]) to patients when ANY of the following criteria are met  a. Incognizant or uncooperative          b. Patients treated with HHN at Home        c. Unable to demonstrate proper use of MDI with spacer     d. RR > 30 bpm   5. Bronchodilators will be delivered via Metered Dose Inhaler (MDI), HHN, Aerogen to intubated patients on mechanical ventilation. 6. Inhalation medication orders will be delivered and/or substituted as outlined below. Aerosolized Medications Ordering and Administration Guidelines:    1. All Medications will be ordered by a physician, and their frequency and/or modality will be adjusted as defined by the patients Respiratory Severity Index (RSI) score.   2. If the patient does not have documented COPD, consider discontinuing anticholinergics when RSI is less than 9.  3. If the bronchospasm worsens (increased RSI), then the bronchodilator frequency can be increased to a maximum of every 4 hours. If greater than every 4 hours is required, the physician will be contacted. 4. If the bronchospasm improves, the frequency of the bronchodilator can be decreased, based on the patient's RSI, but not less than home treatment regimen frequency. 5. Bronchodilator(s) will be discontinued if patient has a RSI less than 9 and has received no scheduled or as needed treatment for 72  Hrs. Patients Ordered on a Mucolytic Agent:    1. Must always be administered with a bronchodilator. 2. Discontinue if patient experiences worsened bronchospasm, or secretions have lessened to the point that the patient is able to clear them with a cough. Anti-inflammatory and Combination Medications:    1. If the patient lacks prior history of lung disease, is not using inhaled anti-inflammatory medication at home, and lacks wheezing by examination or by history for at least 24 hours, contact physician for possible discontinuation.

## 2019-04-17 NOTE — PROGRESS NOTES
[facility]    Respiratory Therapy     Home Oxygen Evaluation        Name: [address] Record Number: [id number]  Age: 70 y.o.   Gender:  female   : 1940  Today's date: 2019  Room: [address] [place] [address]      Assessment        BP (!) 133/54 Comment: rechecked  Pulse 70   Temp 98.2 °F (36.8 °C) (Oral)   Resp 18   Ht 4' 11.84\" (1.52 m)   Wt 230 lb 8 oz (104.6 kg)   SpO2 93%   BMI 45.25 kg/m²     Patient Active Problem List   Diagnosis    Candidiasis of skin and nails    Dermatophytosis of nail    Depressive disorder, not elsewhere classified    Obstructive sleep apnea syndrome    Diverticulosis of large intestine    Allergic rhinitis    Osteoporosis    Rheumatoid arthritis (HCC)    Diaphragmatic hernia    Mitral valve regurgitation    Granulomatous lung disease (HCC)    Complete tear of left rotator cuff    Biceps tendinitis on right    Postmenopausal osteoporosis    Asthma    Arthritis of right acromioclavicular joint    Glenohumeral arthritis right    Essential hypertension    Gastroesophageal reflux disease without esophagitis    Mixed hyperlipidemia    Status post reverse total replacement of right shoulder    Chronic pain of right knee    Morbid obesity with BMI of 40.0-44.9, adult (HCC)    Chronic nonseasonal allergic rhinitis due to pollen    Mild intermittent asthma without complication    Interstitial lung disease (HCC)    Chronic low back pain    Disorder resulting from impaired renal function    Spinal stenosis    Status post total right knee replacement using cement on 2017    Major depressive disorder with single episode, in partial remission ([address]    Rheumatoid arthritis of multiple sites with negative rheumatoid factor ([address]    Osteoarthritis of lumbar spine    Immunosuppression ([address]    S/P left rotator cuff repair    Primary osteoarthritis of left shoulder    Recurrent cold sores    Diarrhea    Nausea with vomiting    Chronic diarrhea    Hypokalemia    Hypomagnesemia    Weight loss    Vertigo    Acute diverticulitis    Hypoxia    Chronic diastolic CHF (congestive heart failure) (HCC)    Pneumonia due to organism    HB (heart block)    Multifocal pneumonia       Social History:  Social History     Tobacco Use    Smoking status: Former Smoker     Packs/day: 1.00     Years: 14.00     Pack years: 14.00     Types: Cigarettes     Last attempt to quit: 1976     Years since quittin.3    Smokeless tobacco: Never Used    Tobacco comment: quit age 29   Substance Use Topics    Alcohol use: Yes     Alcohol/week: 0.6 - 1.2 oz     Types: 1 - 2 Glasses of wine per week     Comment: social drinker    Drug use: No       Patient Room Air saturation at rest 89  %  Patient Room Air saturation upon ambulation 82 %    Oxygen saturations of 88% or less on RA qualifies patient for Home Oxygen    Patient resting on 0  lmp  with an oxygen saturation of  89 %     Patient ambulated on 1 lpm with an oxygen saturation of 81%    Patient ambulated on 2 lpm with an oxygen saturation of 82%    Patient ambulated on 3 lpm with an oxygen saturation of 83%     Patient ambulated on 4 lpm with an oxygen saturation of 84%     Patient ambulated on 6 lpm with an oxygen saturation of 85%     Patient ambulated on 8 lpm with an oxygen saturation of 89%     Patient ambulated on 10 lpm with an oxygen saturation of 92%       Qualifying patient for home oxygen with ambulation and continuous flow  @ 8 lpm.      In your clinical assessment does the Patient Require Portable Oxygen Tanks?     Yes               Patient/caregiver was educated on Home Oxygen process:  Yes      Level of patient/caregiver understanding able to:   [x] Verbalize understanding   [x] Demonstrate understanding       [] Teach back        [] Needs reinforcement        []  No available caregiver               []  Other:     Response to education:  Excellent     Time Spent with Home O2 Set Up:  15

## 2019-04-17 NOTE — PLAN OF CARE
Problem: Falls - Risk of:  Goal: Will remain free from falls  Description  Will remain free from falls  Outcome: Completed   Patient bed or chair alarm on still at risk for falls, being discharge to rehab today, fall protocol continued till discharge from here. Problem: Nutrition  Goal: Optimal nutrition therapy  Outcome: Completed   Patient appetite has been adequate, tolerating foods. Problem: Discharge Planning:  Goal: Discharged to appropriate level of care  Description  Discharged to appropriate level of care  Outcome: Completed   Being discharge to here to [facility] facility, daughter transporting and has brought her oxygen tank from home for transport, will call report and go over paperwork with patient and daughter too. Problem: Airway Clearance - Ineffective:  Goal: Clear lung sounds  Description  Clear lung sounds  4/17/2019 1215 by Kelsey Nazario RN  Outcome: Completed  Lungs clear with crackles still in bases, no complaints of SOB remains on 3 litters of oxygen and oxygen can drop with ambulation, have not needed to increase oxygen requirements today, patient on oxygen prior to stay in hospital and will continue lasix and oxygen at facility. Problem: Pain:  Goal: Pain level will decrease  Description  Pain level will decrease  Outcome: Completed   Patient has chronic back pain and getting scheduled percocet for, patient pain is brought down from 7-8/10 to 5/10 where patient states to be comfortable. Repositioning frequently to help with pain as a non pharmacological measure.

## 2019-04-17 NOTE — PLAN OF CARE
Problem: Airway Clearance - Ineffective:  Goal: Clear lung sounds  Description  Clear lung sounds  Outcome: Ongoing  Note:   Pt lungs clear but diminished this shift.  Will continue to monitor

## 2019-04-17 NOTE — FLOWSHEET NOTE
04/17/19 1200   Encounter Summary   Services provided to: Patient   Volunteer Visit   (Katie Reyes )   [address] [place] [facility] Patient received [facility]

## 2019-04-17 NOTE — CARE COORDINATION
Case Management Discharge Assessment    2019  [facility] Case Management Department    Patient: Lona Moreira  MRN: [id number] / : 1940  ACCT: [de-identified]     Emergency Contacts  Healthcare Agent Appointed: Healthcare power of   Healthcare Agent's Name: Johanny Bain)    Admission Documentation  Attending Provider: Demi Andrea MD  Admit date/time: 2019  7:11 PM  Status: Inpatient [101]  Diagnosis: PNA (pneumonia)     Readmission within last 30 days:  yes     Living Situation  Discharge Planning  Living Arrangements: Alone  Support Systems: Family Members, Friends/Neighbors  Potential Assistance Needed: Outpatient PT/OT, Home Care  Type of Home Care Services: None  Patient expects to be discharged to[de-identified] Home  Expected Discharge Date: 19    Service Assessment:    Values / Beliefs  Do you have any ethnic, cultural, sacramental, or spiritual [employer] needs you would like us to be aware of while you are in the hospital?: No    Advance Directives (For Healthcare)  Pre-existing DNR Comfort Care/DNR Arrest/DNI Order: Yes, notify physician for order  Healthcare Directive: Yes, patient has an advance directive for healthcare treatment  Type of Healthcare Directive: Durable power of  for health care  Copy in Chart: No, copy requested from family  [facility] Agent's Name: Johanny Bain)  If you are unable to speak for yourself, does your Healthcare Agent or Legal Spokesperson know your healthcare wishes?: Yes    Pharmacy: [facility] to manage medications   Potential Assistance Purchasing Medications:  No  Does patient want to participate in local refill/meds to beds program?: No    Notification completed in HENS/PAS? Yes     CM has completed HENS online through secure website for SNF admission at [facility].    HENS document ID #: [id number]     IMM  Yes     Case management has presented and reviewed [facility] letter #2 to the patient and/or family/ POA. Patient and/or family/POA verbalized understanding of their medicare rights and appeal process if needed. Patient and/or family/POA has signed, initialed and placed today's date (04/17/2019) and time (7103) on IMM letter #2 on the the appropriate lines. Patient and/or family/POA, copy of letter offered and they are aware that this original copy of IMM letter #2 is available prior to discharge from the paper chart on the unit. Electronic documentation has been entered into epic for IMM letter #2 and original paper copy has been added to the paper chart at the nurses station. Discharge disposition: [address] Phone: [phone] Fax: [phone]    Mode of Transport daughter to transport via private car    Stanislaw Oliveira and her family were provided with choice of provider; she and her family are in agreement with the discharge plan.       Care Transitions patient: Yes    Eduarda Rosario RN  [facility]  Case Management Department  Ph: [phone] Fax: [phone]

## 2019-04-17 NOTE — PROGRESS NOTES
Report to [facility] called to JACKSON Mendez, IV and tele removed. Daughter transporting with patient own oxygen tank. Paperwork packet given to daughter to give to facility. Patient wheeled out in wheelchair to main entrance. Went over paperwork with daughter and patient, who will make follow up appointments with Dr. Sindy Jean-Baptiste, rheumatologist, and PCP with 1-2 weeks.  Prescriptions in packet including percocet due to narcotic

## 2019-04-18 ENCOUNTER — TELEPHONE (OUTPATIENT)
Dept: PULMONOLOGY | Age: 79
End: 2019-04-18

## 2019-04-18 NOTE — CARE COORDINATION
[address] Transitions Interview     2019    Patient: Ernestina Blanton Patient : 1940   MRN: [id number]   Reason for Admission: PNA   RARS: Readmission Risk Score: 28         Spoke with: Ernestina Blanton    Readmission Risk  Patient Active Problem List   Diagnosis    Candidiasis of skin and nails    Dermatophytosis of nail    Depressive disorder, not elsewhere classified    Obstructive sleep apnea syndrome    Diverticulosis of large intestine    Allergic rhinitis    Osteoporosis    Rheumatoid arthritis ([address]    Diaphragmatic hernia    Mitral valve regurgitation    Granulomatous lung disease (HCC)    Complete tear of left rotator cuff    Biceps tendinitis on right    Postmenopausal osteoporosis    Asthma    Arthritis of right acromioclavicular joint    Glenohumeral arthritis right    Essential hypertension    Gastroesophageal reflux disease without esophagitis    Mixed hyperlipidemia    Status post reverse total replacement of right shoulder    Chronic pain of right knee    Morbid obesity with BMI of 40.0-44.9, adult (HCC)    Chronic nonseasonal allergic rhinitis due to pollen    Mild intermittent asthma without complication    Interstitial lung disease (HCC)    Chronic low back pain    Disorder resulting from impaired renal function    Spinal stenosis    Status post total right knee replacement using cement on 2017    Major depressive disorder with single episode, in partial remission (HCC)    Rheumatoid arthritis of multiple sites with negative rheumatoid factor (HCC)    Osteoarthritis of lumbar spine    Immunosuppression ([address]    S/P left rotator cuff repair    Primary osteoarthritis of left shoulder    Recurrent cold sores    Diarrhea    Nausea with vomiting    Chronic diarrhea    Hypokalemia    Hypomagnesemia    Weight loss    Vertigo    Acute diverticulitis    Hypoxia    Chronic diastolic CHF (congestive heart failure) ([address]   

## 2019-04-19 LAB
FINAL REPORT: NORMAL
FINAL REPORT: NORMAL
PRELIMINARY: NORMAL
PRELIMINARY: NORMAL

## 2019-04-29 ENCOUNTER — OFFICE VISIT (OUTPATIENT)
Dept: PULMONOLOGY | Age: 79
End: 2019-04-29
Payer: MEDICARE

## 2019-04-29 VITALS — OXYGEN SATURATION: 92 % | BODY MASS INDEX: 45.15 KG/M2 | HEART RATE: 82 BPM | WEIGHT: 230 LBS

## 2019-04-29 DIAGNOSIS — M06.9 RHEUMATOID ARTHRITIS, INVOLVING UNSPECIFIED SITE, UNSPECIFIED RHEUMATOID FACTOR PRESENCE: ICD-10-CM

## 2019-04-29 DIAGNOSIS — Z92.21 HX OF METHOTREXATE THERAPY: ICD-10-CM

## 2019-04-29 DIAGNOSIS — J96.21 ACUTE ON CHRONIC RESPIRATORY FAILURE WITH HYPOXIA (HCC): ICD-10-CM

## 2019-04-29 DIAGNOSIS — J84.9 ILD (INTERSTITIAL LUNG DISEASE) (HCC): Primary | ICD-10-CM

## 2019-04-29 PROCEDURE — G8417 CALC BMI ABV UP PARAM F/U: HCPCS | Performed by: INTERNAL MEDICINE

## 2019-04-29 PROCEDURE — G8399 PT W/DXA RESULTS DOCUMENT: HCPCS | Performed by: INTERNAL MEDICINE

## 2019-04-29 PROCEDURE — G8428 CUR MEDS NOT DOCUMENT: HCPCS | Performed by: INTERNAL MEDICINE

## 2019-04-29 PROCEDURE — 1090F PRES/ABSN URINE INCON ASSESS: CPT | Performed by: INTERNAL MEDICINE

## 2019-04-29 PROCEDURE — 1123F ACP DISCUSS/DSCN MKR DOCD: CPT | Performed by: INTERNAL MEDICINE

## 2019-04-29 PROCEDURE — 99215 OFFICE O/P EST HI 40 MIN: CPT | Performed by: INTERNAL MEDICINE

## 2019-04-29 PROCEDURE — 1111F DSCHRG MED/CURRENT MED MERGE: CPT | Performed by: INTERNAL MEDICINE

## 2019-04-29 PROCEDURE — 1036F TOBACCO NON-USER: CPT | Performed by: INTERNAL MEDICINE

## 2019-04-29 PROCEDURE — 4040F PNEUMOC VAC/ADMIN/RCVD: CPT | Performed by: INTERNAL MEDICINE

## 2019-04-29 NOTE — PROGRESS NOTES
[facility] Pulmonary and Critical Care    Outpatient Follow Up Note    Subjective:   CHIEF COMPLAINT / HPI:     The patient is 70 y.o. female who presents from [facility] after discharge from [facility]. 10 days ago for worsening acute on chronic hypoxemic respiratory failure associated with dyspnea on exertion and a rapidly progressive interstitial lung disease. Patient had a bronchoscopy with BAL that showed no infectious source. Her presentation was suspicious for AIP and patient was started on Solu-Medrol 500 mg daily ×3 days with a slow taper after that. Open lung biopsy was discussed with the patient and her daughter but ultimately for morbidity of the procedure dissuaded Jhon Leal from pursuing it. She currently is on prednisone 60 mg daily as well as dapsone for PCP prophylaxis. She is on 3 L oxygen at rest but needs significantly higher with exertion and she does desaturate into the 70s. While her cough and wheezing have improved there is not much improvement in her dyspnea on exertion. She is still performing far lower than her recent independent baseline and is active with therapy at [facility]. April 4th, 2019  Jhon Leal presents for hospital follow-up. Tono Raines states that she was brought to [facility] emergency room via ambulance from her sleep physician's office for evaluation of hypoxemia. On room air, which is her baseline, her oxygen saturation was 78-84%. In the hospital she was treated for both pneumonia with Rocephin and azithromycin as well as diastolic CHF exacerbation with IV Lasix. She was discharged on March 28 home with 3 L nasal cannula. Today she states that she really feels no better than when she first presented to the hospital.  She continues to have a dry cough and dyspnea with her activities of daily living.   When I dived into her recent history she states that her dyspnea on exertion have been slowly worsening for approximately 2-3 months with an associated dry cough. She does not mention any fevers, chills, night sweats, anorexia, malaise, myalgias, or hemoptysis. She did mention that prior to admission she has had some weight gain and increased in leg swelling. She did not previously carry a diagnosis of CHF and was not on Lasix. 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 She is seen by rheumatology at [facility] and is on Humira and leflunomide. Last Visit 8/28/2018  Florence Jasmine presents today for 3 month follow up of ILD NOS. She has been off MTX since Jan 2017 when ILD was appreciated on CT Chest.     Since last visit she did get her left shoulder replacement without issue. More recently she's had problems with a significant amount of diarrhea and even was admitted. There is some suspicion that this was from one of her RA medications, which has been recently stopped. She is under the care of a gastroenterologist as well    In regards to her ILD, she continues to do better compared to beginning at 2017 when first evaluated her and she was on methotrexate.   She denies any significant dyspnea on exertion, cough, or wheezing    Past Medical History:    Past Medical History:   Diagnosis Date    Allergic rhinitis 5/4/2010    Asthma 5/18/2014    Chicken pox     Chronic diastolic CHF (congestive heart failure) ([address] 3/28/2019    Depression     Diverticulosis of large intestine 5/4/2010    Essential hypertension 8/20/2015    Gastroesophageal reflux disease without esophagitis 8/20/2015    GERD (gastroesophageal reflux disease)     Hyperlipidemia     Hypertension     Interstitial lung disease ([address]     Mitral valve regurgitation 2/16/2011    Mixed hyperlipidemia 3/10/2016    Obesity     Osteoarthritis     Osteoporosis 5/4/2010    Polio     Prolonged emergence from general anesthesia     sats dropped    RA (rheumatoid arthritis) ([address]     Rheumatoid arthritis ([facility] 2010    Sleep apnea 2010    uses BPAP       Social History:    Social History     Tobacco Use   Smoking Status Former Smoker    Packs/day: 1.00    Years: 14.00    Pack years: 14.00    Types: Cigarettes    Last attempt to quit: 1976    Years since quittin.3   Smokeless Tobacco Never Used   Tobacco Comment    quit age 29       Current Medications:  Current Outpatient Medications on File Prior to Visit   Medication Sig Dispense Refill    calcium carbonate (TUMS) 500 MG chewable tablet Take 1 tablet by mouth 3 times daily as needed for Heartburn 30 tablet 0    hydrALAZINE (APRESOLINE) 25 MG tablet Take 1 tablet by mouth every 8 hours 90 tablet 3    furosemide (LASIX) 20 MG tablet Take 2 tablets by mouth daily 60 tablet 3    Calcium Carb-Cholecalciferol (CALCIUM-VITAMIN D) 500-200 MG-UNIT per tablet Take 2 tablets by mouth Daily with lunch 60 tablet 3    dapsone 100 MG tablet Take 1 tablet by mouth daily 30 tablet 0    albuterol (PROVENTIL) (2.5 MG/3ML) 0.083% nebulizer solution Take 3 mLs by nebulization every 4 hours as needed for Wheezing or Shortness of Breath 120 each 3    predniSONE (DELTASONE) 20 MG tablet Take 3 tablets by mouth daily 90 tablet 0    guaiFENesin (ALTARUSSIN) 100 MG/5ML syrup Take 10 mLs by mouth every 4 hours as needed for Cough 1 Bottle 0    losartan (COZAAR) 25 MG tablet Take 25 mg by mouth nightly       escitalopram (LEXAPRO) 10 MG tablet Take 10 mg by mouth nightly      montelukast (SINGULAIR) 10 MG tablet Take 10 mg by mouth every morning      Mirabegron ER 25 MG TB24 Take 1 tablet by mouth every evening      oxyCODONE-acetaminophen (PERCOCET) 5-325 MG per tablet Take 1 tablet by mouth every 8 hours.  Indications: Backache      fluticasone (FLONASE) 50 MCG/ACT nasal spray 1 spray by Each Nare route nightly      amLODIPine (NORVASC) 10 MG tablet Take 10 mg by mouth nightly       albuterol sulfate HFA (VENTOLIN HFA) 108 (90 Base) MCG/ACT inhaler Inhale 2 puffs into the lungs every 6 hours as needed for Wheezing or Shortness of Breath MAY USE ALTERNATIVE PER INSURANCE 1 Inhaler 11    colestipol (COLESTID) 1 g tablet Take 1 g by mouth daily Indications: Diarrhea       gabapentin (NEURONTIN) 600 MG tablet Take 600 mg by mouth 3 times daily.  cetirizine (ZYRTEC) 10 MG tablet Take 10 mg by mouth every morning       saccharomyces boulardii (FLORASTOR) 250 MG capsule Take 250 mg by mouth 2 times daily      traZODone (DESYREL) 50 MG tablet Take 50 mg by mouth every morning      pantoprazole (PROTONIX) 40 MG tablet Take 40 mg by mouth every evening       No current facility-administered medications on file prior to visit. REVIEW OF SYSTEMS:    CONSTITUTIONAL: Negative for fevers and chills  HEENT: Negative for oropharyngeal exudate, post nasal drip, sinus pain / pressure, nasal congestion, ear pain  RESPIRATORY:  See HPI  CARDIOVASCULAR: Negative for chest pain, palpitations, edema  GASTROINTESTINAL: Negative for nausea, vomiting, diarrhea, constipation and abdominal pain  HEMATOLOGICAL: Negative for adenopathy  SKIN: Negative for clubbing, cyanosis, skin lesions  EXTREMITIES: Negative for weakness, decreased ROM  NEUROLOGICAL: Negative for unilateral weakness, speech or gait abnormalities    Objective:   PHYSICAL EXAM:        VITALS:    Pulse 82   Wt 230 lb (104.3 kg)   SpO2 92%   BMI 45.15 kg/m²   On 3 L NC  CONSTITUTIONAL:  Awake, alert, cooperative, no apparent distress, and appears stated age  HEENT: No oropharyngeal exudate, PERRL, no cervical adenopathy, no tracheal deviation, thyroid size normal  LUNGS:  No increased work of breathing. Bilateral crackles more prominent lower lobes. No wheezing  CARDIOVASCULAR:  normal S1 and S2 and no JVD  ABDOMEN:  Normal bowel sounds, non-distended and non-tender to palpation  EXT: No edema, no calf tenderness. Pulses are present bilaterally.   NEUROLOGIC:  Mental Status Exam:  Level of Alertness:   awake  Orientation:   person, place, time. SKIN:  normal skin color, texture, turgor, no redness, warmth, or swelling     DATA:      Radiology Review:  Pertinent images / reports were reviewed as a part of this visit. CT Chest 4/13/2019  FINDINGS:       Trachea and mainstem bronchi are patent and clear. No enlarged mediastinal lymph node. Calcified mediastinal and hilar lymph nodes consistent with old granulomatous disease. Mild atherosclerotic calcification of aorta and coronary arteries. Prosthetic    cardiac valve. Mildly dilated central pulmonary arteries suggestive of pulmonary hypertension. Heart is not enlarged. No pericardial effusion. No pleural effusion or pneumothorax.       There is interval progression of scattered patchy groundglass opacities with interlobular and intralobular septal thickening (crazy pavement appearance) predominantly involving the upper lobes.       Multilevel mild degenerative changes of thoracic spine. Stable mild chronic compression deformity of L1.       No acute abnormality in the visualized upper abdomen.               Impression       Diffuse scattered patchy groundglass opacities with interlobular septal thickening predominantly involving the upper lobes with interval progression since 4/5/2019. This is a nonspecific finding and may relate to acute interstitial pneumonia, ARDS,    bacterial pneumonia, hypersensitivity pneumonitis or pulmonary alveolar proteinosis. Recommended clinical correlation and short-term follow-up.       Mildly enlarged central pulmonary arteries suggestive of pulmonary hypertension. CXR 3/25/2019  Findings: AP and lateral views of the chest were obtained. The trachea is midline and the cardiac silhouette is stable in size and configuration. Bilateral interstitial reticular opacities are noted of the lung bases. These are similar to prior studies. No new or confluent pulmonary infiltrate. No effusion or pneumothorax.  The bony structures are intact.           Impression   Impression: Bilateral interstitial reticular opacities of the bases may reflect edema or interstitial pneumonitis. CXR 8/10/2018  reveals the following:  Impression       No acute pulmonary pathology or change from the prior study     CT Chest 6/28/2017  Impression       Mild peripheral interstitial fibrosis in the lungs bilaterally,   nonspecific.       Interval improvement, with clearance of patchy mosaic densities   from the prior study.       No superimposed acute inflammatory changes, new infiltrates,   nodular or mass densities are seen. CT Chest 1/13/2017  Impression   1. Interstitial lung disease with mosaic pattern   2. Peripheral septal thickening, interstitial lung disease with   perivascular nodularity. 3. Differential diagnosis includes nonspecific interstitial   pneumonitis, hypersensitivity pneumonitis as well as autoimmune   etiologies such as rheumatoid and sarcoid. CT chest June 20, 2017  COMPARISON: 1/13/2017.       FINDINGS: The tracheobronchial tree is patent. Mild prominence of   the central main pulmonary arteries is seen, unchanged.       Dense right hilar and subcarinal, and scattered paratracheal   calcifications are seen, consistent with old granulomatous   disease. Peripheral subpleural interstitial fibrotic changes are   seen in the lungs bilaterally, most prominent in the right upper   lobe, left upper lobe and lingular segment, and lower lobes   bilaterally. There has been interval improvement, with significant   clearance of patchy areas of mosaic confluent since the prior   study.       No new infiltrates, dominant nodular or mass densities are seen. There is no pericardial or pleural effusion.       Bony structures are intact.  Schmorl's node deformity of the   superior endplate of the O81 vertebra is noted.           Impression       Mild peripheral interstitial fibrosis in the lungs bilaterally,   nonspecific.     Interval improvement, with clearance of patchy mosaic densities   from the prior study.       No superimposed acute inflammatory changes, new infiltrates,   nodular or mass densities are seen. Last PFTs:  DATE OF STUDY: 02/08/2017     Spirometry data is acceptable and reproducible. Room air oxygen saturation is  96%. BMI is 39.5.     SPIROMETRY: FEV1/FVC ratio is 88%. FEV1 is 1.72, which is 93% of predicted. FVC is 1.94, which is 79% of predicted.     LUNG VOLUMES: Show total lung capacity of 4.1, which is 89% of predicted. Residual volume is 2.24, which is 104% of predicted.     DIFFUSION CAPACITY: Shows a DLCO of 14.53, which is 70% of predicted.     IMPRESSION:  1. Normal spirometry. 2. Normal lung volumes. 3. Mild decrease in diffusion capacity. ECHO 3/27/2019  Summary   Left ventricular cavity size is normal. There is concentric hypertrophy of   the septum. Overall left ventricular systolic function appears normal with   an estimated ejection fraction of 60-65%. No regional wall motion   abnormalities are noted. Diastolic filling parameters suggests grade II   diastolic dysfunction.   Mitral annular calcification is present. Mild mitral regurgitation is   present.   Aortic valve leaflets appear to open adequately.   The left atrium is dilated.   Estimated pulmonary artery systolic pressure is at 41 mmHg assuming a right   atrial pressure of 8 mmHg. Pro-    Assessment:      Diagnosis Orders   1. ILD (interstitial lung disease) ([address]     2. Acute on chronic respiratory failure with hypoxia (HCC)     3. Rheumatoid arthritis, involving unspecified site, unspecified rheumatoid factor presence ([address]     4. Hx of methotrexate therapy         Plan:       1. Drop prednisone to 40 mg daily  2. Continue dapsone 100 mg daily and Lasix 40 mg daily  3. Continue oxygen at 3 L with rest but increas to 6 L prior to exertion. Goal oxygen saturation is 88% or higher  4.   We talked extensively about her diagnosis of ILD and our suspicion of acute interstitial pneumonitis (AIP). It appears that her respiratory status has stabilized and I'm hoping that she will gradually recover. However she does need more time at SNF to improve enough to allow for safe discharge back to a home environment. If at some point she does temporarily moved to [facility] for further recovery with her daughter, I did recommend follow-up with either a pulmonologist at [facility] or the ILD clinic at [address] [place] [address],[address] [place] [address] 6 weeks or sooner if needed.  I will want PFTs and CT Chest in the next 3 - 4 months or so    Over 50% of the total visit time of 43 minutes was spent on counseling and/or coordination of care of [employer]

## 2019-05-01 ENCOUNTER — TELEPHONE (OUTPATIENT)
Dept: PULMONOLOGY | Age: 79
End: 2019-05-01

## 2019-05-01 NOTE — TELEPHONE ENCOUNTER
Recieved call from pts daughter Toro Reyna, [phone]  Pt will be moving to [place] for atleast 1 month to recover. They have located a DME company to supply her O2 while she is there. They are going to need a written rx to proceed. DME is called [employer]  Fax # [phone] ATTN: Isamar Rogers. Please sign order if appropriate on your desk- thanks.

## 2019-05-02 ENCOUNTER — TELEPHONE (OUTPATIENT)
Dept: PULMONOLOGY | Age: 79
End: 2019-05-02

## 2019-05-04 LAB
FUNGUS (MYCOLOGY) CULTURE: ABNORMAL
FUNGUS (MYCOLOGY) CULTURE: ABNORMAL
FUNGUS STAIN: ABNORMAL
ORGANISM: ABNORMAL

## 2019-05-06 LAB
FUNGUS (MYCOLOGY) CULTURE: NORMAL
FUNGUS STAIN: NORMAL

## 2019-05-13 ENCOUNTER — CARE COORDINATION (OUTPATIENT)
Dept: CASE MANAGEMENT | Age: 79
End: 2019-05-13

## 2019-05-13 DIAGNOSIS — J84.9 ILD (INTERSTITIAL LUNG DISEASE) (HCC): Primary | ICD-10-CM

## 2019-05-13 RX ORDER — PREDNISONE 20 MG/1
20 TABLET ORAL DAILY
Qty: 180 TABLET | Refills: 0 | Status: SHIPPED | OUTPATIENT
Start: 2019-05-13 | End: 2019-06-12

## 2019-05-13 RX ORDER — DAPSONE 100 MG/1
100 TABLET ORAL DAILY
Qty: 90 TABLET | Refills: 0 | Status: SHIPPED | OUTPATIENT
Start: 2019-05-13 | End: 2019-06-12

## 2019-05-13 NOTE — CARE COORDINATION
Care Transition Discharge from [facility] Follow Up     Call within 2 business days of discharge: No  Discharged From: [facility]  Date of discharge: 4/30/2019    1st Attempt to contact patient for [facility] follow up. Unable to reach patient. Left message on voicemail with contact information and request for return phone call.       Follow up appointments:    Future Appointments   Date Time Provider Katie Arroyo   8/28/2019 11:40 AM Candelario Caroli, MD [facility] [place] RN

## 2019-05-13 NOTE — TELEPHONE ENCOUNTER
I received refill request from [facility] in [place] for the pending medication. Please fill if appropriate.  Thank you

## 2019-05-13 NOTE — TELEPHONE ENCOUNTER
Patient called to schedule follow up appt 5/28/19 or 5/29/19. I advised that Dr Spencer Parra would be out of the office that week. Patient is scheduled for 6/18 for follow up from April appt. Patient also scheduled in August (1 year f/u) pending testing need signed for August appt.  Thank you

## 2019-05-14 NOTE — CARE COORDINATION
Received return phone call from nurse at Dr. Jackson Terry' office. She states she spoke with Mendoza Camp NP and she gave the okay for a referral for home health services. Informed her that patient will be back in town the weekend of 5/25/19 and requesting  [address]. Nurse from Dr. Jackson Terry' office verbalized understanding.         Miranda Edwards RN

## 2019-05-14 NOTE — CARE COORDINATION
Care Transition Discharge from [facility] Follow Up     Call within 2 business days of discharge: No    Spoke with  Andrea Lezama, patient  Discharged From: [facility]  Date of discharge: 4/30/2019    Spoke with patient. She states she is doing about the same. She states she is getting stronger. Continues to become short of breath. On 3 liters of oxygen at rest and 6 liters with exertion. Denies having any cough, congestion, chest pain/discomfort. Patient currently in [address] [place] [address] for her grandson's wedding. Family currently helping patient. She will return home the weekend of Saturday, 5/25/19. Patient requesting home health care through [address]. Informed her that MD will have to make a referral.  If approved, patient would like to set up evaluation for weekend of 5/25/19. Patient has follow up appointment with PCP on 6/4/2019. Patient also states that her family is looking into assisted living facilities for her. No other needs or concerns at this time. Attempted to contact PCP. Left message on nurse's voicemail stating reason for call, contact information, and request for return phone call. 1. How have you been feeling since you were discharged from the post acute facility? She states she is doing about the same. States she is getting stronger. Any intervention needed? None at this time. 2. Do you have all of your medications? Yes    3. Do you have any questions about your medicatons? No    4. Have you scheduled your follow up appointment? Yes          How are you going to get there? Family member    5. Is Home Health involved: [address]: NA      Has someone from home health been in contact with you? No        Who else is helping you at home? Patient currently in [address] [place] [address]. Does anyone in your home depend on you for help? No    6. Do you feel like you have everything you need to keep you well at home? Yes      DME? [facility]      Follow up appointments:    Future Appointments   Date Time Provider Katie Dina   6/18/2019  3:00 PM Lily Etienne MD [facility] P/[facility]   8/28/2019 11:40 AM Lily Etienne MD [facility] P/[facility] [place] RN

## 2019-05-15 ENCOUNTER — TELEPHONE (OUTPATIENT)
Dept: PULMONOLOGY | Age: 79
End: 2019-05-15

## 2019-05-15 NOTE — TELEPHONE ENCOUNTER
Please advise patient had appt for 6-18-19  Patient is possibly moving to [place]. And would like to be seen sooner that June 18  Before June 12 if possible. Please advise .

## 2019-05-15 NOTE — TELEPHONE ENCOUNTER
Will she be coming back for an appt or does she just need the referral. You can send a copy of my office notes to Dr. Kate Celaya. I am not sure if she needs an official \"referral\".

## 2019-05-15 NOTE — TELEPHONE ENCOUNTER
PT called stating Dr. Trevor Fuentes referred her to pulmonary in [place]. She is needing a referral faxed to Dr. Emilia Aleman [phone].

## 2019-05-21 LAB
AFB CULTURE (MYCOBACTERIA): NORMAL
AFB CULTURE (MYCOBACTERIA): NORMAL
AFB SMEAR: NORMAL
AFB SMEAR: NORMAL

## 2019-05-22 ENCOUNTER — TELEPHONE (OUTPATIENT)
Dept: PULMONOLOGY | Age: 79
End: 2019-05-22

## 2019-05-22 NOTE — TELEPHONE ENCOUNTER
Please advise patient need a letter stating that she is no longer able to live alone and take care of her self. She would like letter sent to her apartment complex.

## 2019-05-22 NOTE — LETTER
[address]  [facility] [address] [facility]  Phone: [phone]  Fax: [phone]    Jo Layne MD        May 22, 2019     Patient: Ismael Hassan   YOB: 1940   Date of Visit: 5/22/2019       To Whom It May Concern: It is my medical opinion that Oscar Whelan who has multiple medical condition that makes it difficult for her to live independently. She is no longer able to live alone and care for her self. She will need assistance on a daily basis. If you have any questions or concerns, please don't hesitate to call.     Sincerely,        Jo Layne MD

## 2019-06-12 ENCOUNTER — OFFICE VISIT (OUTPATIENT)
Dept: PULMONOLOGY | Age: 79
End: 2019-06-12
Payer: MEDICARE

## 2019-06-12 VITALS
DIASTOLIC BLOOD PRESSURE: 82 MMHG | HEART RATE: 92 BPM | OXYGEN SATURATION: 89 % | HEIGHT: 60 IN | WEIGHT: 238 LBS | RESPIRATION RATE: 16 BRPM | BODY MASS INDEX: 46.72 KG/M2 | SYSTOLIC BLOOD PRESSURE: 128 MMHG

## 2019-06-12 DIAGNOSIS — Z92.21 HX OF METHOTREXATE THERAPY: ICD-10-CM

## 2019-06-12 DIAGNOSIS — I50.32 CHRONIC DIASTOLIC CHF (CONGESTIVE HEART FAILURE) (HCC): ICD-10-CM

## 2019-06-12 DIAGNOSIS — J96.11 CHRONIC HYPOXEMIC RESPIRATORY FAILURE (HCC): ICD-10-CM

## 2019-06-12 DIAGNOSIS — J84.9 ILD (INTERSTITIAL LUNG DISEASE) (HCC): Primary | ICD-10-CM

## 2019-06-12 DIAGNOSIS — M06.9 RHEUMATOID ARTHRITIS, INVOLVING UNSPECIFIED SITE, UNSPECIFIED RHEUMATOID FACTOR PRESENCE: ICD-10-CM

## 2019-06-12 PROCEDURE — 1090F PRES/ABSN URINE INCON ASSESS: CPT | Performed by: INTERNAL MEDICINE

## 2019-06-12 PROCEDURE — 4040F PNEUMOC VAC/ADMIN/RCVD: CPT | Performed by: INTERNAL MEDICINE

## 2019-06-12 PROCEDURE — 1036F TOBACCO NON-USER: CPT | Performed by: INTERNAL MEDICINE

## 2019-06-12 PROCEDURE — 1123F ACP DISCUSS/DSCN MKR DOCD: CPT | Performed by: INTERNAL MEDICINE

## 2019-06-12 PROCEDURE — G8427 DOCREV CUR MEDS BY ELIG CLIN: HCPCS | Performed by: INTERNAL MEDICINE

## 2019-06-12 PROCEDURE — 99214 OFFICE O/P EST MOD 30 MIN: CPT | Performed by: INTERNAL MEDICINE

## 2019-06-12 PROCEDURE — G8399 PT W/DXA RESULTS DOCUMENT: HCPCS | Performed by: INTERNAL MEDICINE

## 2019-06-12 PROCEDURE — G8417 CALC BMI ABV UP PARAM F/U: HCPCS | Performed by: INTERNAL MEDICINE

## 2019-06-12 RX ORDER — PREDNISONE 10 MG/1
TABLET ORAL
Qty: 30 TABLET | Refills: 5 | Status: ON HOLD | OUTPATIENT
Start: 2019-06-12 | End: 2021-09-24 | Stop reason: CLARIF

## 2019-06-13 NOTE — PROGRESS NOTES
exertion, cough, or wheezing    Past Medical History:    Past Medical History:   Diagnosis Date    Allergic rhinitis 2010    Asthma 2014    Chicken pox     Chronic diastolic CHF (congestive heart failure) ([facility] [address] 3/28/2019    Depression     Diverticulosis of large intestine 2010    Essential hypertension 2015    Gastroesophageal reflux disease without esophagitis 2015    GERD (gastroesophageal reflux disease)     Hyperlipidemia     Hypertension     Interstitial lung disease ([facility] [address]     Mitral valve regurgitation 2011    Mixed hyperlipidemia 3/10/2016    Obesity     Osteoarthritis     Osteoporosis 2010    Polio     Prolonged emergence from general anesthesia     sats dropped    RA (rheumatoid arthritis) ([facility] [address]     Rheumatoid arthritis ([facility] 2010    Sleep apnea 2010    uses BPAP       Social History:    Social History     Tobacco Use   Smoking Status Former Smoker    Packs/day: 1.00    Years: 14.00    Pack years: 14.00    Types: Cigarettes    Last attempt to quit: 1976    Years since quittin.4   Smokeless Tobacco Never Used   Tobacco Comment    quit age 29       Current Medications:  Current Outpatient Medications on File Prior to Visit   Medication Sig Dispense Refill    hydrALAZINE (APRESOLINE) 25 MG tablet Take 1 tablet by mouth every 8 hours 90 tablet 3    furosemide (LASIX) 20 MG tablet Take 2 tablets by mouth daily 60 tablet 3    Calcium Carb-Cholecalciferol (CALCIUM-VITAMIN D) 500-200 MG-UNIT per tablet Take 2 tablets by mouth Daily with lunch 60 tablet 3    albuterol (PROVENTIL) (2.5 MG/3ML) 0.083% nebulizer solution Take 3 mLs by nebulization every 4 hours as needed for Wheezing or Shortness of Breath 120 each 3    guaiFENesin (ALTARUSSIN) 100 MG/5ML syrup Take 10 mLs by mouth every 4 hours as needed for Cough 1 Bottle 0    losartan (COZAAR) 25 MG tablet Take 25 mg by mouth nightly       escitalopram (LEXAPRO) 10 MG tablet Take 10 mg by mouth nightly      montelukast (SINGULAIR) 10 MG tablet Take 10 mg by mouth every morning      saccharomyces boulardii (FLORASTOR) 250 MG capsule Take 250 mg by mouth 2 times daily      traZODone (DESYREL) 50 MG tablet Take 50 mg by mouth every morning      Mirabegron ER 25 MG TB24 Take 1 tablet by mouth every evening      pantoprazole (PROTONIX) 40 MG tablet Take 40 mg by mouth every evening      oxyCODONE-acetaminophen (PERCOCET) 5-325 MG per tablet Take 1 tablet by mouth every 8 hours. Indications: Backache      fluticasone (FLONASE) 50 MCG/ACT nasal spray 1 spray by Each Nare route nightly      amLODIPine (NORVASC) 10 MG tablet Take 10 mg by mouth nightly       albuterol sulfate HFA (VENTOLIN HFA) 108 (90 Base) MCG/ACT inhaler Inhale 2 puffs into the lungs every 6 hours as needed for Wheezing or Shortness of Breath MAY USE ALTERNATIVE PER INSURANCE 1 Inhaler 11    colestipol (COLESTID) 1 g tablet Take 1 g by mouth daily Indications: Diarrhea       gabapentin (NEURONTIN) 600 MG tablet Take 600 mg by mouth 3 times daily.  cetirizine (ZYRTEC) 10 MG tablet Take 10 mg by mouth every morning        No current facility-administered medications on file prior to visit.         REVIEW OF SYSTEMS:    CONSTITUTIONAL: Negative for fevers and chills  HEENT: Negative for oropharyngeal exudate, post nasal drip, sinus pain / pressure, nasal congestion, ear pain  RESPIRATORY:  See HPI  CARDIOVASCULAR: Negative for chest pain, palpitations, edema  GASTROINTESTINAL: Negative for nausea, vomiting, diarrhea, constipation and abdominal pain  HEMATOLOGICAL: Negative for adenopathy  SKIN: Negative for clubbing, cyanosis, skin lesions  EXTREMITIES: Negative for weakness, decreased ROM  NEUROLOGICAL: Negative for unilateral weakness, speech or gait abnormalities    Objective:   PHYSICAL EXAM:        VITALS:    /82 (Site: Right Upper Arm, Position: Sitting, Cuff Size: Large Adult)   Pulse 92   Resp 16   Ht 5' (1.524 m)   Wt 238 lb (108 kg)   SpO2 (!) 89% Comment: on 2 L O2  Breastfeeding? No   BMI 46.48 kg/m²   On 3 L NC  CONSTITUTIONAL:  Awake, alert, cooperative, no apparent distress, and appears stated age  HEENT: No oropharyngeal exudate, PERRL, no cervical adenopathy, no tracheal deviation, thyroid size normal  LUNGS:  No increased work of breathing. Bilateral crackles more prominent lower lobes. No wheezing  CARDIOVASCULAR:  normal S1 and S2 and no JVD  ABDOMEN:  Normal bowel sounds, non-distended and non-tender to palpation  EXT: No edema, no calf tenderness. Pulses are present bilaterally. NEUROLOGIC:  Mental Status Exam:  Level of Alertness:   awake  Orientation:   person, place, time. SKIN:  normal skin color, texture, turgor, no redness, warmth, or swelling     DATA:      Radiology Review:  Pertinent images / reports were reviewed as a part of this visit. CT Chest 4/13/2019  FINDINGS:       Trachea and mainstem bronchi are patent and clear. No enlarged mediastinal lymph node. Calcified mediastinal and hilar lymph nodes consistent with old granulomatous disease. Mild atherosclerotic calcification of aorta and coronary arteries. Prosthetic    cardiac valve. Mildly dilated central pulmonary arteries suggestive of pulmonary hypertension. Heart is not enlarged. No pericardial effusion. No pleural effusion or pneumothorax.       There is interval progression of scattered patchy groundglass opacities with interlobular and intralobular septal thickening (crazy pavement appearance) predominantly involving the upper lobes.       Multilevel mild degenerative changes of thoracic spine. Stable mild chronic compression deformity of L1.       No acute abnormality in the visualized upper abdomen.               Impression       Diffuse scattered patchy groundglass opacities with interlobular septal thickening predominantly involving the upper lobes with interval progression since 4/5/2019.  This is a nonspecific finding and may relate to acute interstitial pneumonia, ARDS,    bacterial pneumonia, hypersensitivity pneumonitis or pulmonary alveolar proteinosis. Recommended clinical correlation and short-term follow-up.       Mildly enlarged central pulmonary arteries suggestive of pulmonary hypertension. CXR 3/25/2019  Findings: AP and lateral views of the chest were obtained. The trachea is midline and the cardiac silhouette is stable in size and configuration. Bilateral interstitial reticular opacities are noted of the lung bases. These are similar to prior studies. No new or confluent pulmonary infiltrate. No effusion or pneumothorax. The bony structures are intact.           Impression   Impression: Bilateral interstitial reticular opacities of the bases may reflect edema or interstitial pneumonitis. CXR 8/10/2018  reveals the following:  Impression       No acute pulmonary pathology or change from the prior study     CT Chest 6/28/2017  Impression       Mild peripheral interstitial fibrosis in the lungs bilaterally,   nonspecific.       Interval improvement, with clearance of patchy mosaic densities   from the prior study.       No superimposed acute inflammatory changes, new infiltrates,   nodular or mass densities are seen. CT Chest 1/13/2017  Impression   1. Interstitial lung disease with mosaic pattern   2. Peripheral septal thickening, interstitial lung disease with   perivascular nodularity. 3. Differential diagnosis includes nonspecific interstitial   pneumonitis, hypersensitivity pneumonitis as well as autoimmune   etiologies such as rheumatoid and sarcoid. CT chest June 20, 2017  COMPARISON: 1/13/2017.       FINDINGS: The tracheobronchial tree is patent. Mild prominence of   the central main pulmonary arteries is seen, unchanged.       Dense right hilar and subcarinal, and scattered paratracheal   calcifications are seen, consistent with old granulomatous   disease.  Peripheral subpleural interstitial fibrotic changes are   seen in the lungs bilaterally, most prominent in the right upper   lobe, left upper lobe and lingular segment, and lower lobes   bilaterally. There has been interval improvement, with significant   clearance of patchy areas of mosaic confluent since the prior   study.       No new infiltrates, dominant nodular or mass densities are seen. There is no pericardial or pleural effusion.       Bony structures are intact. Schmorl's node deformity of the   superior endplate of the F70 vertebra is noted.           Impression       Mild peripheral interstitial fibrosis in the lungs bilaterally,   nonspecific.       Interval improvement, with clearance of patchy mosaic densities   from the prior study.       No superimposed acute inflammatory changes, new infiltrates,   nodular or mass densities are seen. Last PFTs:  DATE OF STUDY: 02/08/2017     Spirometry data is acceptable and reproducible. Room air oxygen saturation is  96%. BMI is 39.5.     SPIROMETRY: FEV1/FVC ratio is 88%. FEV1 is 1.72, which is 93% of predicted. FVC is 1.94, which is 79% of predicted.     LUNG VOLUMES: Show total lung capacity of 4.1, which is 89% of predicted. Residual volume is 2.24, which is 104% of predicted.     DIFFUSION CAPACITY: Shows a DLCO of 14.53, which is 70% of predicted.     IMPRESSION:  1. Normal spirometry. 2. Normal lung volumes. 3. Mild decrease in diffusion capacity. ECHO 3/27/2019  Summary   Left ventricular cavity size is normal. There is concentric hypertrophy of   the septum. Overall left ventricular systolic function appears normal with   an estimated ejection fraction of 60-65%. No regional wall motion   abnormalities are noted. Diastolic filling parameters suggests grade II   diastolic dysfunction.   Mitral annular calcification is present.  Mild mitral regurgitation is   present.   Aortic valve leaflets appear to open adequately.   The left atrium is dilated.   Estimated

## 2019-07-12 ENCOUNTER — TELEPHONE (OUTPATIENT)
Dept: PULMONOLOGY | Age: 79
End: 2019-07-12

## 2020-11-03 PROBLEM — J18.9 PNEUMONIA DUE TO ORGANISM: Status: RESOLVED | Noted: 2019-04-04 | Resolved: 2020-11-03

## 2021-09-22 ENCOUNTER — APPOINTMENT (OUTPATIENT)
Dept: GENERAL RADIOLOGY | Age: 81
DRG: 091 | End: 2021-09-22
Payer: MEDICARE

## 2021-09-22 ENCOUNTER — APPOINTMENT (OUTPATIENT)
Dept: CT IMAGING | Age: 81
DRG: 091 | End: 2021-09-22
Payer: MEDICARE

## 2021-09-22 ENCOUNTER — HOSPITAL ENCOUNTER (INPATIENT)
Age: 81
LOS: 3 days | Discharge: HOME | DRG: 091 | End: 2021-09-25
Attending: EMERGENCY MEDICINE | Admitting: INTERNAL MEDICINE
Payer: MEDICARE

## 2021-09-22 DIAGNOSIS — R07.89 CHEST WALL PAIN: ICD-10-CM

## 2021-09-22 DIAGNOSIS — J18.9 MULTIFOCAL PNEUMONIA: ICD-10-CM

## 2021-09-22 DIAGNOSIS — R50.9 FEVER, UNKNOWN ORIGIN: ICD-10-CM

## 2021-09-22 DIAGNOSIS — S93.401A SPRAIN OF RIGHT ANKLE, UNSPECIFIED LIGAMENT, INITIAL ENCOUNTER: Primary | ICD-10-CM

## 2021-09-22 PROBLEM — G93.40 ACUTE ENCEPHALOPATHY: Status: ACTIVE | Noted: 2021-09-22

## 2021-09-22 LAB
ALBUMIN SERPL-MCNC: 4 G/DL (ref 3.4–5)
ALP BLD-CCNC: 53 U/L (ref 40–129)
ALT SERPL-CCNC: 18 U/L (ref 10–40)
ANION GAP SERPL CALCULATED.3IONS-SCNC: 12 MMOL/L (ref 3–16)
ANISOCYTOSIS: ABNORMAL
AST SERPL-CCNC: 40 U/L (ref 15–37)
BACTERIA: ABNORMAL /HPF
BASE EXCESS VENOUS: 9.7 MMOL/L (ref -2–3)
BASOPHILS ABSOLUTE: 0 K/UL (ref 0–0.2)
BASOPHILS RELATIVE PERCENT: 0 %
BILIRUB SERPL-MCNC: 1 MG/DL (ref 0–1)
BILIRUBIN DIRECT: 0.3 MG/DL (ref 0–0.3)
BILIRUBIN URINE: NEGATIVE
BILIRUBIN, INDIRECT: 0.7 MG/DL (ref 0–1)
BLOOD, URINE: ABNORMAL
BUN BLDV-MCNC: 26 MG/DL (ref 7–20)
CALCIUM SERPL-MCNC: 9.7 MG/DL (ref 8.3–10.6)
CARBOXYHEMOGLOBIN: 0.9 % (ref 0–1.5)
CHLORIDE BLD-SCNC: 90 MMOL/L (ref 99–110)
CLARITY: CLEAR
CO2: 29 MMOL/L (ref 21–32)
COLOR: YELLOW
CREAT SERPL-MCNC: 0.9 MG/DL (ref 0.6–1.2)
EOSINOPHILS ABSOLUTE: 0.1 K/UL (ref 0–0.6)
EOSINOPHILS RELATIVE PERCENT: 1 %
EPITHELIAL CELLS, UA: ABNORMAL /HPF (ref 0–5)
GFR AFRICAN AMERICAN: >60
GFR NON-AFRICAN AMERICAN: >60
GLUCOSE BLD-MCNC: 121 MG/DL (ref 70–99)
GLUCOSE URINE: NEGATIVE MG/DL
HCO3 VENOUS: 35.6 MMOL/L (ref 24–28)
HCT VFR BLD CALC: 30.2 % (ref 36–48)
HEMOGLOBIN, VEN, REDUCED: 5.8 %
HEMOGLOBIN: 9.7 G/DL (ref 12–16)
HYPOCHROMIA: ABNORMAL
KETONES, URINE: NEGATIVE MG/DL
LACTIC ACID: 1.7 MMOL/L (ref 0.4–2)
LEUKOCYTE ESTERASE, URINE: ABNORMAL
LYMPHOCYTES ABSOLUTE: 2 K/UL (ref 1–5.1)
LYMPHOCYTES RELATIVE PERCENT: 15 %
MCH RBC QN AUTO: 30.9 PG (ref 26–34)
MCHC RBC AUTO-ENTMCNC: 32.1 G/DL (ref 31–36)
MCV RBC AUTO: 96.3 FL (ref 80–100)
METHEMOGLOBIN VENOUS: 3.6 % (ref 0–1.5)
MICROSCOPIC EXAMINATION: YES
MONOCYTES ABSOLUTE: 2 K/UL (ref 0–1.3)
MONOCYTES RELATIVE PERCENT: 15 %
MYELOCYTE PERCENT: 1 %
NEUTROPHILS ABSOLUTE: 9.3 K/UL (ref 1.7–7.7)
NEUTROPHILS RELATIVE PERCENT: 68 %
NITRITE, URINE: NEGATIVE
O2 SAT, VEN: 94 %
PCO2, VEN: 54.5 MMHG (ref 41–51)
PDW BLD-RTO: 19.3 % (ref 12.4–15.4)
PH UA: 6.5 (ref 5–8)
PH VENOUS: 7.42 (ref 7.35–7.45)
PLATELET # BLD: 206 K/UL (ref 135–450)
PLATELET SLIDE REVIEW: ADEQUATE
PMV BLD AUTO: 9.1 FL (ref 5–10.5)
PO2, VEN: 72.6 MMHG (ref 25–40)
POLYCHROMASIA: ABNORMAL
POTASSIUM REFLEX MAGNESIUM: 4.7 MMOL/L (ref 3.5–5.1)
PRO-BNP: 431 PG/ML (ref 0–449)
PROCALCITONIN: 0.11 NG/ML (ref 0–0.15)
PROTEIN UA: NEGATIVE MG/DL
RBC # BLD: 3.14 M/UL (ref 4–5.2)
RBC UA: ABNORMAL /HPF (ref 0–4)
SLIDE REVIEW: ABNORMAL
SODIUM BLD-SCNC: 131 MMOL/L (ref 136–145)
SPECIFIC GRAVITY UA: <=1.005 (ref 1–1.03)
TCO2 CALC VENOUS: 37 MMOL/L
TOTAL PROTEIN: 7.2 G/DL (ref 6.4–8.2)
URINE REFLEX TO CULTURE: ABNORMAL
URINE TYPE: ABNORMAL
UROBILINOGEN, URINE: 0.2 E.U./DL
WBC # BLD: 13.5 K/UL (ref 4–11)
WBC UA: ABNORMAL /HPF (ref 0–5)

## 2021-09-22 PROCEDURE — 2580000003 HC RX 258: Performed by: INTERNAL MEDICINE

## 2021-09-22 PROCEDURE — 99285 EMERGENCY DEPT VISIT HI MDM: CPT

## 2021-09-22 PROCEDURE — 82607 VITAMIN B-12: CPT

## 2021-09-22 PROCEDURE — 80076 HEPATIC FUNCTION PANEL: CPT

## 2021-09-22 PROCEDURE — 36415 COLL VENOUS BLD VENIPUNCTURE: CPT

## 2021-09-22 PROCEDURE — 73630 X-RAY EXAM OF FOOT: CPT

## 2021-09-22 PROCEDURE — 81001 URINALYSIS AUTO W/SCOPE: CPT

## 2021-09-22 PROCEDURE — 80048 BASIC METABOLIC PNL TOTAL CA: CPT

## 2021-09-22 PROCEDURE — 71045 X-RAY EXAM CHEST 1 VIEW: CPT

## 2021-09-22 PROCEDURE — 73610 X-RAY EXAM OF ANKLE: CPT

## 2021-09-22 PROCEDURE — 1200000000 HC SEMI PRIVATE

## 2021-09-22 PROCEDURE — 84145 PROCALCITONIN (PCT): CPT

## 2021-09-22 PROCEDURE — 6370000000 HC RX 637 (ALT 250 FOR IP): Performed by: PHYSICIAN ASSISTANT

## 2021-09-22 PROCEDURE — 84443 ASSAY THYROID STIM HORMONE: CPT

## 2021-09-22 PROCEDURE — 6360000002 HC RX W HCPCS: Performed by: INTERNAL MEDICINE

## 2021-09-22 PROCEDURE — U0003 INFECTIOUS AGENT DETECTION BY NUCLEIC ACID (DNA OR RNA); SEVERE ACUTE RESPIRATORY SYNDROME CORONAVIRUS 2 (SARS-COV-2) (CORONAVIRUS DISEASE [COVID-19]), AMPLIFIED PROBE TECHNIQUE, MAKING USE OF HIGH THROUGHPUT TECHNOLOGIES AS DESCRIBED BY CMS-2020-01-R: HCPCS

## 2021-09-22 PROCEDURE — 83880 ASSAY OF NATRIURETIC PEPTIDE: CPT

## 2021-09-22 PROCEDURE — 96372 THER/PROPH/DIAG INJ SC/IM: CPT

## 2021-09-22 PROCEDURE — 82803 BLOOD GASES ANY COMBINATION: CPT

## 2021-09-22 PROCEDURE — 83605 ASSAY OF LACTIC ACID: CPT

## 2021-09-22 PROCEDURE — 2580000003 HC RX 258: Performed by: PHYSICIAN ASSISTANT

## 2021-09-22 PROCEDURE — U0005 INFEC AGEN DETEC AMPLI PROBE: HCPCS

## 2021-09-22 PROCEDURE — 6370000000 HC RX 637 (ALT 250 FOR IP): Performed by: INTERNAL MEDICINE

## 2021-09-22 PROCEDURE — 6360000002 HC RX W HCPCS: Performed by: PHYSICIAN ASSISTANT

## 2021-09-22 PROCEDURE — 73590 X-RAY EXAM OF LOWER LEG: CPT

## 2021-09-22 PROCEDURE — 70450 CT HEAD/BRAIN W/O DYE: CPT

## 2021-09-22 PROCEDURE — 85025 COMPLETE CBC W/AUTO DIFF WBC: CPT

## 2021-09-22 RX ORDER — SODIUM CHLORIDE 0.9 % (FLUSH) 0.9 %
10 SYRINGE (ML) INJECTION PRN
Status: DISCONTINUED | OUTPATIENT
Start: 2021-09-22 | End: 2021-09-25 | Stop reason: HOSPADM

## 2021-09-22 RX ORDER — METOPROLOL SUCCINATE 25 MG/1
25 TABLET, EXTENDED RELEASE ORAL DAILY
COMMUNITY

## 2021-09-22 RX ORDER — ASCORBIC ACID 500 MG
500 TABLET ORAL DAILY
COMMUNITY

## 2021-09-22 RX ORDER — SENNA AND DOCUSATE SODIUM 50; 8.6 MG/1; MG/1
1 TABLET, FILM COATED ORAL 2 TIMES DAILY
Status: DISCONTINUED | OUTPATIENT
Start: 2021-09-22 | End: 2021-09-25 | Stop reason: HOSPADM

## 2021-09-22 RX ORDER — ALBUTEROL SULFATE 90 UG/1
2 AEROSOL, METERED RESPIRATORY (INHALATION) EVERY 4 HOURS PRN
Status: DISCONTINUED | OUTPATIENT
Start: 2021-09-22 | End: 2021-09-25 | Stop reason: HOSPADM

## 2021-09-22 RX ORDER — FLUTICASONE PROPIONATE 50 MCG
1 SPRAY, SUSPENSION (ML) NASAL NIGHTLY
Status: DISCONTINUED | OUTPATIENT
Start: 2021-09-22 | End: 2021-09-25 | Stop reason: HOSPADM

## 2021-09-22 RX ORDER — TRAZODONE HYDROCHLORIDE 50 MG/1
50 TABLET ORAL EVERY MORNING
Status: DISCONTINUED | OUTPATIENT
Start: 2021-09-23 | End: 2021-09-25 | Stop reason: HOSPADM

## 2021-09-22 RX ORDER — ONDANSETRON 2 MG/ML
4 INJECTION INTRAMUSCULAR; INTRAVENOUS EVERY 6 HOURS PRN
Status: DISCONTINUED | OUTPATIENT
Start: 2021-09-22 | End: 2021-09-25 | Stop reason: HOSPADM

## 2021-09-22 RX ORDER — SPIRONOLACTONE 25 MG/1
12.5 TABLET ORAL DAILY
COMMUNITY

## 2021-09-22 RX ORDER — GABAPENTIN 600 MG/1
300 TABLET ORAL 3 TIMES DAILY
Status: DISCONTINUED | OUTPATIENT
Start: 2021-09-22 | End: 2021-09-25 | Stop reason: HOSPADM

## 2021-09-22 RX ORDER — OXYCODONE HYDROCHLORIDE AND ACETAMINOPHEN 5; 325 MG/1; MG/1
1 TABLET ORAL EVERY 6 HOURS PRN
Status: DISCONTINUED | OUTPATIENT
Start: 2021-09-22 | End: 2021-09-25 | Stop reason: HOSPADM

## 2021-09-22 RX ORDER — LOSARTAN POTASSIUM 25 MG/1
25 TABLET ORAL NIGHTLY
Status: DISCONTINUED | OUTPATIENT
Start: 2021-09-22 | End: 2021-09-25 | Stop reason: HOSPADM

## 2021-09-22 RX ORDER — ESCITALOPRAM OXALATE 20 MG/1
20 TABLET ORAL NIGHTLY
Status: DISCONTINUED | OUTPATIENT
Start: 2021-09-23 | End: 2021-09-25 | Stop reason: HOSPADM

## 2021-09-22 RX ORDER — ACETAMINOPHEN 650 MG/1
650 SUPPOSITORY RECTAL EVERY 6 HOURS PRN
Status: DISCONTINUED | OUTPATIENT
Start: 2021-09-22 | End: 2021-09-25 | Stop reason: HOSPADM

## 2021-09-22 RX ORDER — SODIUM CHLORIDE 0.9 % (FLUSH) 0.9 %
10 SYRINGE (ML) INJECTION EVERY 12 HOURS SCHEDULED
Status: DISCONTINUED | OUTPATIENT
Start: 2021-09-22 | End: 2021-09-25 | Stop reason: HOSPADM

## 2021-09-22 RX ORDER — SPIRONOLACTONE 25 MG/1
12.5 TABLET ORAL DAILY
Status: DISCONTINUED | OUTPATIENT
Start: 2021-09-22 | End: 2021-09-25 | Stop reason: HOSPADM

## 2021-09-22 RX ORDER — SODIUM CHLORIDE 9 MG/ML
25 INJECTION, SOLUTION INTRAVENOUS PRN
Status: DISCONTINUED | OUTPATIENT
Start: 2021-09-22 | End: 2021-09-25 | Stop reason: HOSPADM

## 2021-09-22 RX ORDER — ATORVASTATIN CALCIUM 10 MG/1
10 TABLET, FILM COATED ORAL DAILY
Status: DISCONTINUED | OUTPATIENT
Start: 2021-09-23 | End: 2021-09-25 | Stop reason: HOSPADM

## 2021-09-22 RX ORDER — MORPHINE SULFATE 4 MG/ML
4 INJECTION, SOLUTION INTRAMUSCULAR; INTRAVENOUS ONCE
Status: COMPLETED | OUTPATIENT
Start: 2021-09-22 | End: 2021-09-22

## 2021-09-22 RX ORDER — ACETAMINOPHEN 325 MG/1
650 TABLET ORAL ONCE
Status: COMPLETED | OUTPATIENT
Start: 2021-09-22 | End: 2021-09-22

## 2021-09-22 RX ORDER — FOLIC ACID 1 MG/1
1 TABLET ORAL DAILY
COMMUNITY

## 2021-09-22 RX ORDER — METOPROLOL SUCCINATE 25 MG/1
25 TABLET, EXTENDED RELEASE ORAL DAILY
Status: DISCONTINUED | OUTPATIENT
Start: 2021-09-23 | End: 2021-09-25 | Stop reason: HOSPADM

## 2021-09-22 RX ORDER — SACCHAROMYCES BOULARDII 250 MG
250 CAPSULE ORAL 2 TIMES DAILY
Status: DISCONTINUED | OUTPATIENT
Start: 2021-09-22 | End: 2021-09-25 | Stop reason: HOSPADM

## 2021-09-22 RX ORDER — ATORVASTATIN CALCIUM 10 MG/1
10 TABLET, FILM COATED ORAL DAILY
COMMUNITY

## 2021-09-22 RX ORDER — ONDANSETRON 4 MG/1
4 TABLET, ORALLY DISINTEGRATING ORAL EVERY 8 HOURS PRN
Status: DISCONTINUED | OUTPATIENT
Start: 2021-09-22 | End: 2021-09-25 | Stop reason: HOSPADM

## 2021-09-22 RX ORDER — TROSPIUM CHLORIDE 20 MG/1
20 TABLET, FILM COATED ORAL NIGHTLY
Status: DISCONTINUED | OUTPATIENT
Start: 2021-09-22 | End: 2021-09-25 | Stop reason: HOSPADM

## 2021-09-22 RX ORDER — CETIRIZINE HYDROCHLORIDE 10 MG/1
10 TABLET ORAL EVERY MORNING
Status: DISCONTINUED | OUTPATIENT
Start: 2021-09-23 | End: 2021-09-25 | Stop reason: HOSPADM

## 2021-09-22 RX ORDER — MONTELUKAST SODIUM 10 MG/1
10 TABLET ORAL EVERY MORNING
Status: DISCONTINUED | OUTPATIENT
Start: 2021-09-23 | End: 2021-09-25 | Stop reason: HOSPADM

## 2021-09-22 RX ORDER — SODIUM CHLORIDE, SODIUM LACTATE, POTASSIUM CHLORIDE, CALCIUM CHLORIDE 600; 310; 30; 20 MG/100ML; MG/100ML; MG/100ML; MG/100ML
1000 INJECTION, SOLUTION INTRAVENOUS ONCE
Status: COMPLETED | OUTPATIENT
Start: 2021-09-22 | End: 2021-09-22

## 2021-09-22 RX ORDER — ACETAMINOPHEN 325 MG/1
650 TABLET ORAL EVERY 6 HOURS PRN
Status: DISCONTINUED | OUTPATIENT
Start: 2021-09-22 | End: 2021-09-25 | Stop reason: HOSPADM

## 2021-09-22 RX ORDER — MORPHINE SULFATE 2 MG/ML
2 INJECTION, SOLUTION INTRAMUSCULAR; INTRAVENOUS ONCE
Status: COMPLETED | OUTPATIENT
Start: 2021-09-22 | End: 2021-09-22

## 2021-09-22 RX ORDER — DAPSONE 100 MG/1
100 TABLET ORAL DAILY
COMMUNITY

## 2021-09-22 RX ADMIN — APIXABAN 5 MG: 5 TABLET, FILM COATED ORAL at 21:21

## 2021-09-22 RX ADMIN — TROSPIUM CHLORIDE 20 MG: 20 TABLET, FILM COATED ORAL at 21:21

## 2021-09-22 RX ADMIN — SODIUM CHLORIDE, POTASSIUM CHLORIDE, SODIUM LACTATE AND CALCIUM CHLORIDE 1000 ML: 600; 310; 30; 20 INJECTION, SOLUTION INTRAVENOUS at 16:24

## 2021-09-22 RX ADMIN — ACETAMINOPHEN 650 MG: 325 TABLET ORAL at 16:24

## 2021-09-22 RX ADMIN — GABAPENTIN 300 MG: 600 TABLET, FILM COATED ORAL at 21:29

## 2021-09-22 RX ADMIN — MORPHINE SULFATE 2 MG: 2 INJECTION, SOLUTION INTRAMUSCULAR; INTRAVENOUS at 21:21

## 2021-09-22 RX ADMIN — MORPHINE SULFATE 4 MG: 4 INJECTION INTRAVENOUS at 10:14

## 2021-09-22 RX ADMIN — FLUTICASONE PROPIONATE 1 SPRAY: 50 SPRAY, METERED NASAL at 23:38

## 2021-09-22 RX ADMIN — Medication 10 ML: at 21:21

## 2021-09-22 RX ADMIN — Medication 250 MG: at 21:21

## 2021-09-22 RX ADMIN — ACETAMINOPHEN 650 MG: 325 TABLET ORAL at 10:12

## 2021-09-22 RX ADMIN — METHOTREXATE SODIUM 17.5 MG: 2.5 TABLET ORAL at 23:52

## 2021-09-22 RX ADMIN — DOCUSATE SODIUM 50 MG AND SENNOSIDES 8.6 MG 1 TABLET: 8.6; 5 TABLET, FILM COATED ORAL at 21:21

## 2021-09-22 RX ADMIN — OXYCODONE HYDROCHLORIDE AND ACETAMINOPHEN 1 TABLET: 5; 325 TABLET ORAL at 23:53

## 2021-09-22 ASSESSMENT — PAIN SCALES - GENERAL
PAINLEVEL_OUTOF10: 10
PAINLEVEL_OUTOF10: 7
PAINLEVEL_OUTOF10: 10
PAINLEVEL_OUTOF10: 0
PAINLEVEL_OUTOF10: 10
PAINLEVEL_OUTOF10: 10
PAINLEVEL_OUTOF10: 8

## 2021-09-22 ASSESSMENT — PAIN DESCRIPTION - ONSET
ONSET: ON-GOING
ONSET: ON-GOING

## 2021-09-22 ASSESSMENT — ENCOUNTER SYMPTOMS
VOMITING: 0
ABDOMINAL PAIN: 0
SHORTNESS OF BREATH: 0
BACK PAIN: 0
NAUSEA: 0

## 2021-09-22 ASSESSMENT — PAIN DESCRIPTION - PAIN TYPE
TYPE: ACUTE PAIN
TYPE: ACUTE PAIN

## 2021-09-22 ASSESSMENT — PAIN DESCRIPTION - PROGRESSION
CLINICAL_PROGRESSION: NOT CHANGED
CLINICAL_PROGRESSION: GRADUALLY IMPROVING

## 2021-09-22 ASSESSMENT — PAIN DESCRIPTION - LOCATION
LOCATION: ANKLE
LOCATION: ANKLE

## 2021-09-22 ASSESSMENT — PAIN DESCRIPTION - FREQUENCY
FREQUENCY: CONTINUOUS
FREQUENCY: CONTINUOUS

## 2021-09-22 ASSESSMENT — PAIN DESCRIPTION - ORIENTATION
ORIENTATION: RIGHT
ORIENTATION: RIGHT

## 2021-09-22 ASSESSMENT — PAIN - FUNCTIONAL ASSESSMENT
PAIN_FUNCTIONAL_ASSESSMENT: PREVENTS OR INTERFERES SOME ACTIVE ACTIVITIES AND ADLS
PAIN_FUNCTIONAL_ASSESSMENT: PREVENTS OR INTERFERES SOME ACTIVE ACTIVITIES AND ADLS

## 2021-09-22 ASSESSMENT — PAIN DESCRIPTION - DESCRIPTORS
DESCRIPTORS: ACHING;DISCOMFORT
DESCRIPTORS: ACHING;DISCOMFORT

## 2021-09-22 NOTE — ED TRIAGE NOTES
Patient presents to the ED after she slipped yesterday while trying to get into a car. Denies falling, denies LOC, denies hitting head. Patient endorses pain in right knee and ankle. +pulses. Patient states she takes oxycodone for chronic back pain, states she took one at 0730 for her knee pain with no relief. Patient febrile at 100.5. She states this is normal, as she has \"chronic pneumonia\" on which she is on 3L 02 for. Also hx of interstitial lung disease. Patient denies any other pain or shortness of breath at this time, typically ambulates with walker.

## 2021-09-22 NOTE — ED NOTES
Please call Rick Hand (daughter) [phone] for any medical issues or questions. Please talk to daughter on speaker phone with questions related to pt healthcare.       Bruce Ware RN  09/22/21 [facility], RN  09/22/21 [id number]

## 2021-09-22 NOTE — H&P
Hospital Medicine History & Physical      PCP: Aden Gruber    Date of Admission: 9/22/2021    Date of Service: 9/22/2021    Pt seen/examined on 9/22/2021    Admitted to Inpatient with expected LOS greater than two midnights due to medical therapy. Chief Complaint:     Chief Complaint   Patient presents with    Knee Pain    Ankle Pain       History Of Present Illness:      80 y.o. female who presented to [facility] with moderate ankle pain that began yesterday and has persisted until presentation. This is in the context of a mechanical fall yesterday. Family states that she is sleepier than usual and they are concerned that she might have infection as she immunosuppressed on medication for her rheumatoid arthritis. She denies any of these issues and only complains of ankle/knee pain.     Past Medical History:      Reviewed and non-contributory except as noted below      Diagnosis Date    Allergic rhinitis 5/4/2010    Asthma 5/18/2014    Chicken pox     Chronic diastolic CHF (congestive heart failure) ([address] 3/28/2019    Depression     Diverticulosis of large intestine 5/4/2010    Essential hypertension 8/20/2015    Gastroesophageal reflux disease without esophagitis 8/20/2015    GERD (gastroesophageal reflux disease)     Hyperlipidemia     Hypertension     Interstitial lung disease ([address]     Mitral valve regurgitation 2/16/2011    Mixed hyperlipidemia 3/10/2016    Obesity     Osteoarthritis     Osteoporosis 5/4/2010    Polio     Prolonged emergence from general anesthesia     sats dropped    RA (rheumatoid arthritis) ([address]     Rheumatoid arthritis ([address] 5/4/2010    Sleep apnea 05/04/2010    uses BPAP       Past Surgical History:      Reviewed and non-contributory except as noted below      Procedure Laterality Date    APPENDECTOMY      BRONCHOSCOPY N/A 4/5/2019    BRONCHOSCOPY ALVEOLAR LAVAGE- RIGHT/ LEFT UPPER LOBE performed by Claire Mendoza MD at [facility] BRONCHOSCOPY  4/5/2019    BRONCHOSCOPY DIAGNOSTIC OR CELL [address] ONLY performed by Kelle Hinojosa MD at [facility] Bilateral     CHOLECYSTECTOMY      FOOT SURGERY Bilateral     metatarsal removal    HYSTERECTOMY, TOTAL ABDOMINAL      JOINT REPLACEMENT Right 04/25/2016    Dr. Jamie Gilman , shoulder    JOINT REPLACEMENT Right     OTHER SURGICAL HISTORY Right 02/20/2017    RIGHT TOTAL KNEE ARTHROPLASTY             SALPINGO-OOPHORECTOMY      SHOULDER ARTHROPLASTY Right     SHOULDER ARTHROSCOPY Left 11/15/2017    TONSILLECTOMY      TOTAL KNEE ARTHROPLASTY Right        Medications Prior to Admission:      Reviewed and non-contributory except as noted below  Prior to Admission medications    Medication Sig Start Date End Date Taking?  Authorizing Provider   metoprolol succinate (TOPROL XL) 25 MG extended release tablet Take 25 mg by mouth daily   Yes Historical Provider, MD   adalimumab (HUMIRA) 40 MG/0.8ML injection Inject 40 mg into the skin Every 10 days   Yes Historical Provider, MD   apixaban (ELIQUIS) 5 MG TABS tablet Take 5 mg by mouth 2 times daily   Yes Historical Provider, MD   vitamin D (CHOLECALCIFEROL) 25 MCG (1000 UT) TABS tablet Take 1,000 Units by mouth 2 times daily   Yes Historical Provider, MD   dapsone 100 MG tablet Take 100 mg by mouth daily   Yes Historical Provider, MD   methotrexate (RHEUMATREX) 2.5 MG chemo tablet Take 17.5 mg by mouth once a week (wednesdays)   Yes Historical Provider, MD   atorvastatin (LIPITOR) 10 MG tablet Take 10 mg by mouth daily   Yes Historical Provider, MD   spironolactone (ALDACTONE) 25 MG tablet Take 12.5 mg by mouth daily   Yes Historical Provider, MD   folic acid (FOLVITE) 1 MG tablet Take 1 mg by mouth daily   Yes Historical Provider, MD   ascorbic acid (VITAMIN C) 500 MG tablet Take 500 mg by mouth daily   Yes Historical Provider, MD   predniSONE (DELTASONE) 10 MG tablet Take 1 tablet daily with a 20 mg tablet for total of 30 mg daily  Patient taking differently: Take 3 mg by mouth daily Take 1 tablet daily with a 20 mg tablet for total of 30 mg daily 6/12/19  Yes Juliet Starr MD   furosemide (LASIX) 20 MG tablet Take 2 tablets by mouth daily  Patient taking differently: Take 40 mg by mouth daily (take 80 mg with weight gain of >2lb in 24h) 4/17/19  Yes Chip Page DO   Calcium Carb-Cholecalciferol (CALCIUM-VITAMIN D) 500-200 MG-UNIT per tablet Take 2 tablets by mouth Daily with lunch  Patient taking differently: Take 1 tablet by mouth 2 times daily (with meals)  4/17/19  Yes Chip Page DO   albuterol (PROVENTIL) (2.5 MG/3ML) 0.083% nebulizer solution Take 3 mLs by nebulization every 4 hours as needed for Wheezing or Shortness of Breath  Patient taking differently: Take 2.5 mg by nebulization every 6 hours as needed for Wheezing or Shortness of Breath  4/17/19  Yes Chip Coats DO   losartan (COZAAR) 25 MG tablet Take 25 mg by mouth nightly    Yes Historical Provider, MD   escitalopram (LEXAPRO) 10 MG tablet Take 20 mg by mouth nightly    Yes Historical Provider, MD   montelukast (SINGULAIR) 10 MG tablet Take 10 mg by mouth every morning   Yes Historical Provider, MD   saccharomyces boulardii (FLORASTOR) 250 MG capsule Take 250 mg by mouth 2 times daily   Yes Historical Provider, MD   traZODone (DESYREL) 50 MG tablet Take 50 mg by mouth every morning   Yes Historical Provider, MD   Mirabegron ER 25 MG TB24 Take 1 tablet by mouth every evening   Yes Historical Provider, MD   pantoprazole (PROTONIX) 40 MG tablet Take 40 mg by mouth every evening   Yes Historical Provider, MD   oxyCODONE-acetaminophen (PERCOCET) 5-325 MG per tablet Take 1 tablet by mouth every 6 hours as needed.  Indications: Backache    Yes Historical Provider, MD   fluticasone (FLONASE) 50 MCG/ACT nasal spray 1 spray by Each Nare route nightly   Yes Historical Provider, MD   albuterol sulfate HFA (VENTOLIN HFA) 108 (90 Base) MCG/ACT inhaler Inhale 2 puffs into the lungs every 6 hours as needed for Wheezing or Shortness of Breath MAY USE ALTERNATIVE PER INSURANCE  Patient taking differently: Inhale 2 puffs into the lungs every 4-6 hours as needed for Wheezing or Shortness of Breath MAY USE ALTERNATIVE PER INSURANCE 12/21/18  Yes Clyde Woo MD   colestipol (COLESTID) 1 g tablet Take 1 g by mouth daily as needed Indications: Diarrhea    Yes Historical Provider, MD   gabapentin (NEURONTIN) 600 MG tablet Take 300 mg by mouth 3 times daily. Yes Historical Provider, MD   cetirizine (ZYRTEC) 10 MG tablet Take 10 mg by mouth every morning    Yes Historical Provider, MD       Allergies:     Reviewed and non-contributory except as noted below   Sulfa antibiotics, Lamotrigine, Lomotil  [diphenoxylate-atropine], and Sulfacetamide sodium    Social History:      Reviewed and non-contributory except as noted below    TOBACCO:   reports that she quit smoking about 45 years ago. Her smoking use included cigarettes. She has a 14.00 pack-year smoking history. She has never used smokeless tobacco.  ETOH:   reports current alcohol use of about 1.0 - 2.0 standard drinks of alcohol per week. Family History:      Reviewed and non-contributory except as noted below        Problem Relation Age of Onset    Cancer Mother     Heart Disease Father     High Blood Pressure Father     Diabetes Brother     High Blood Pressure Brother     Depression Brother     Arthritis Sister     Asthma Sister     High Cholesterol Sister     Depression Sister     Psoriasis Sister     Depression Brother        REVIEW OF SYSTEMS:   Pertinent positives and negatives as noted in the HPI. All other systems reviewed and negative.     PHYSICAL EXAM PERFORMED:    /68   Pulse 81   Temp 97.5 °F (36.4 °C) (Oral)   Resp 16   SpO2 95%     General appearance:  No acute distress, appears stated age  Eyes: Pupils equal, round, reactive to light, conjunctiva/corneas clear  Ears/Nose/Mouth/Throat: No external lesions or scars, hearing intact to voice  Neck: Trachea midline, no masses noted, no thyromegaly  Respiratory:  Non-labored breathing, clear to auscultation bilaterally  Cardiovascular: Regular rate and rhythm, no murmurs, gallops, or rubs  Abdomen: soft, non-tender, non-distended  Musculoskeletal: R ankle wrapped in ACE wrap, neurovascularly intact, R knee tender to palpation  Skin: normal color, no wounds noted  Psychiatric: A&Ox4, good insight and judgment    Labs:     Recent Labs     09/22/21  1054   WBC 13.5*   HGB 9.7*   HCT 30.2*        Recent Labs     09/22/21  1054   *   K 4.7   CL 90*   CO2 29   BUN 26*   CREATININE 0.9   CALCIUM 9.7     Recent Labs     09/22/21  1054   AST 40*   ALT 18   BILIDIR 0.3   BILITOT 1.0   ALKPHOS 53     No results for input(s): INR in the last 72 hours. No results for input(s): [place] in the last 72 hours. Urinalysis:      Lab Results   Component Value Date    NITRU Negative 09/22/2021    WBCUA 3-5 09/22/2021    BACTERIA Rare 09/22/2021    RBCUA 0-2 09/22/2021    BLOODU TRACE-INTACT 09/22/2021    SPECGRAV <=1.005 09/22/2021    GLUCOSEU Negative 09/22/2021       Radiology:     CT Head WO Contrast   Final Result         Moderate to severe multifocal white matter disease, presumably related to chronic small vessel ischemic changes. No acute intracranial hemorrhage or mass effect. XR CHEST PORTABLE   Final Result   1. Bilateral multifocal asymmetric airspace opacities and reticular opacities suggesting bilateral pneumonia and/or pulmonary edema. XR TIBIA FIBULA RIGHT (2 VIEWS)   Final Result      No fracture identified      XR FOOT RIGHT (MIN 3 VIEWS)   Final Result   1. Extensive postoperative changes. No acute osseous abnormality identified.       XR ANKLE RIGHT (MIN 3 VIEWS)   Final Result      No acute bony pathology          ASSESSMENT:    Active Hospital Problems    Diagnosis Date Noted    Acute encephalopathy [G93.40] 09/22/2021 PLAN:    # Acute encephalopathy  -at baseline per patient  -mild hypoxia, will r/o COVID, continue isolation/precautions for now  -CT head as above  -PT/OT, CM consulted  -Chest XR, UA  -check ABG, TSH, ammonia, B12  -she is on her baseline 3L of oxygen    # Sprained ankle  -XR negative  -analgesia as needed    # HTN  # HLD  # Rheumatoid arthritis  # Depression    # DEWAYNE  -continue home BiPAP    # Interstitial lung disease  # Chronic diastolic heart failure    # Morbid obesity  -Complicating assessment and treatment. Placing patient at risk for multiple co-morbidities as well as early death and contributing to the patient's presentation. Counseled on weight loss. DVT Prophylaxis: Eliquis  Diet: No diet orders on file  Code Status: Prior    PT/OT Eval Status: Ongoing    Dispo: Greyson Roberts pending clinical improvement    Nara Clemons MD    Thank you Siobhan Devries for the opportunity to be involved in this patient's care. If you have any questions or concerns please feel free to contact me at [phone].

## 2021-09-22 NOTE — ED PROVIDER NOTES
ED Attending Attestation Note     Date of evaluation: 9/22/2021    This patient was seen by the advance practice provider. I have seen and examined the patient, agree with the workup, evaluation, management and diagnosis. The care plan has been discussed. My assessment reveals patient here with knee and ankle pain after slipping yesterday getting into car. Found to be febrile. Workup reveals multifocal PNA on xray with leukocytosis. Procal low. Given patient's overall decline and inability to get her home to [place] in current situation, will admit.      Philippe Bro MD  09/22/21 [id number]

## 2021-09-22 NOTE — ED PROVIDER NOTES
[address] ENCOUNTER          PHYSICIAN ASSISTANT NOTE       Date of evaluation: 9/22/2021    Chief Complaint     Knee Pain and Ankle Pain      History of Present Illness     HPI: Kishor Lucero is a 80 y.o. female with history of asthma, CHF, hypertension, interstitial lung disease (on home oxygen) who presents to the emergency department with fall. Yesterday the patient was attempting to get in the car when she twisted her right ankle. She did not fully fall to the ground and denies any head trauma or losing consciousness. She uses a walker at baseline and has had difficulty using the walker in the setting of her right ankle pain. Patient denies sustaining any other injuries. She otherwise has felt well, denies any known fevers, chills, cough, chest pain or shortness of breath. She has not denies any abdominal pain, nausea or vomiting. She denies any changes in bowel movements or urinary symptoms. She is vaccinated against Covid and denies any known sick exposures. With the exception of the above, there are no aggravating or alleviating factors. Review of Systems     Review of Systems   Constitutional: Negative for chills and fever. Respiratory: Negative for shortness of breath. Cardiovascular: Negative for chest pain. Gastrointestinal: Negative for abdominal pain, nausea and vomiting. Genitourinary: Negative for dysuria and frequency. Musculoskeletal: Positive for joint swelling. Negative for back pain. Neurological: Negative for dizziness, syncope and headaches. As stated above, all other systems reviewed and are otherwise negative.      Past Medical, Surgical, Family, and Social History     She has a past medical history of Allergic rhinitis, Asthma, Chicken pox, Chronic diastolic CHF (congestive heart failure) ([facility] [address] Depression, Diverticulosis of large intestine, Essential hypertension, Gastroesophageal reflux disease without esophagitis, GERD (gastroesophageal reflux disease), Hyperlipidemia, Hypertension, Interstitial lung disease ([facility] [address] Mitral valve regurgitation, Mixed hyperlipidemia, Obesity, Osteoarthritis, Osteoporosis, Polio, Prolonged emergence from general anesthesia, RA (rheumatoid arthritis) ([facility] [address] Rheumatoid arthritis ([facility] [address] and Sleep apnea. She has a past surgical history that includes Appendectomy; Cholecystectomy; Salpingo-oophorectomy; Carpal tunnel release (Bilateral); Foot surgery (Bilateral); Total shoulder arthroplasty (Right); other surgical history (Right, 02/20/2017); Hysterectomy, total abdominal; Tonsillectomy; Shoulder arthroscopy (Left, 11/15/2017); joint replacement (Right, 04/25/2016); joint replacement (Right); Total knee arthroplasty (Right); bronchoscopy (N/A, 4/5/2019); and bronchoscopy (4/5/2019). Her family history includes Arthritis in her sister; Asthma in her sister; Cancer in her mother; Depression in her brother, brother, and sister; Diabetes in her brother; Heart Disease in her father; High Blood Pressure in her brother and father; High Cholesterol in her sister; Psoriasis in her sister. She reports that she quit smoking about 45 years ago. Her smoking use included cigarettes. She has a 14.00 pack-year smoking history. She has never used smokeless tobacco. She reports current alcohol use of about 1.0 - 2.0 standard drinks of alcohol per week. She reports that she does not use drugs.     Medications     Current Discharge Medication List      CONTINUE these medications which have NOT CHANGED    Details   metoprolol succinate (TOPROL XL) 25 MG extended release tablet Take 25 mg by mouth daily      adalimumab (HUMIRA) 40 MG/0.8ML injection Inject 40 mg into the skin Every 10 days      apixaban (ELIQUIS) 5 MG TABS tablet Take 5 mg by mouth 2 times daily      vitamin D (CHOLECALCIFEROL) 25 MCG (1000 UT) TABS tablet Take 1,000 Units by mouth 2 times daily      dapsone 100 MG tablet Take 100 mg by mouth daily methotrexate (RHEUMATREX) 2.5 MG chemo tablet Take 17.5 mg by mouth once a week (wednesdays)      atorvastatin (LIPITOR) 10 MG tablet Take 10 mg by mouth daily      spironolactone (ALDACTONE) 25 MG tablet Take 12.5 mg by mouth daily      folic acid (FOLVITE) 1 MG tablet Take 1 mg by mouth daily      ascorbic acid (VITAMIN C) 500 MG tablet Take 500 mg by mouth daily      predniSONE (DELTASONE) 10 MG tablet Take 1 tablet daily with a 20 mg tablet for total of 30 mg daily  Qty: 30 tablet, Refills: 5      furosemide (LASIX) 20 MG tablet Take 2 tablets by mouth daily  Qty: 60 tablet, Refills: 3      Calcium Carb-Cholecalciferol (CALCIUM-VITAMIN D) 500-200 MG-UNIT per tablet Take 2 tablets by mouth Daily with lunch  Qty: 60 tablet, Refills: 3      albuterol (PROVENTIL) (2.5 MG/3ML) 0.083% nebulizer solution Take 3 mLs by nebulization every 4 hours as needed for Wheezing or Shortness of Breath  Qty: 120 each, Refills: 3      losartan (COZAAR) 25 MG tablet Take 25 mg by mouth nightly       escitalopram (LEXAPRO) 10 MG tablet Take 20 mg by mouth nightly       montelukast (SINGULAIR) 10 MG tablet Take 10 mg by mouth every morning      saccharomyces boulardii (FLORASTOR) 250 MG capsule Take 250 mg by mouth 2 times daily      traZODone (DESYREL) 50 MG tablet Take 50 mg by mouth every morning      Mirabegron ER 25 MG TB24 Take 1 tablet by mouth every evening      pantoprazole (PROTONIX) 40 MG tablet Take 40 mg by mouth every evening      oxyCODONE-acetaminophen (PERCOCET) 5-325 MG per tablet Take 1 tablet by mouth every 6 hours as needed.  Indications: Backache       fluticasone (FLONASE) 50 MCG/ACT nasal spray 1 spray by Each Nare route nightly      albuterol sulfate HFA (VENTOLIN HFA) 108 (90 Base) MCG/ACT inhaler Inhale 2 puffs into the lungs every 6 hours as needed for Wheezing or Shortness of Breath MAY USE ALTERNATIVE PER INSURANCE  Qty: 1 Inhaler, Refills: 11    Associated Diagnoses: Bronchitis      colestipol (COLESTID) 1 g tablet Take 1 g by mouth daily as needed Indications: Diarrhea       gabapentin (NEURONTIN) 600 MG tablet Take 300 mg by mouth 3 times daily. cetirizine (ZYRTEC) 10 MG tablet Take 10 mg by mouth every morning              Allergies     She is allergic to sulfa antibiotics, lamotrigine, lomotil  [diphenoxylate-atropine], and sulfacetamide sodium. Physical Exam     INITIAL VITALS: BP: (!) 114/56, Temp: 100.5 °F (38.1 °C), Pulse: 94, Resp: 19, SpO2: 96 %  Physical Exam  Vitals and nursing note reviewed. Constitutional:       General: She is not in acute distress. Appearance: Normal appearance. She is normal weight. She is not ill-appearing, toxic-appearing or diaphoretic. Comments: Elderly female laying in bed with  Nasal cannula in place. HENT:      Head: Normocephalic and atraumatic. Right Ear: External ear normal.      Left Ear: External ear normal.      Nose: Nose normal.      Mouth/Throat:      Mouth: Mucous membranes are moist.      Pharynx: Oropharynx is clear. Eyes:      Extraocular Movements: Extraocular movements intact. Conjunctiva/sclera: Conjunctivae normal.   Neurological:      Mental Status: She is alert. Diagnostic Results     RADIOLOGY:  CT Head WO Contrast   Final Result         Moderate to severe multifocal white matter disease, presumably related to chronic small vessel ischemic changes. No acute intracranial hemorrhage or mass effect. XR CHEST PORTABLE   Final Result   1. Bilateral multifocal asymmetric airspace opacities and reticular opacities suggesting bilateral pneumonia and/or pulmonary edema. XR TIBIA FIBULA RIGHT (2 VIEWS)   Final Result      No fracture identified      XR FOOT RIGHT (MIN 3 VIEWS)   Final Result   1. Extensive postoperative changes. No acute osseous abnormality identified.       XR ANKLE RIGHT (MIN 3 VIEWS)   Final Result      No acute bony pathology        LABS:   Results for orders placed or performed during the hospital encounter of 09/22/21   Urinalysis Reflex to Culture    Specimen: Urine, clean catch   Result Value Ref Range    Color, UA Yellow Straw/Yellow    Clarity, UA Clear Clear    Glucose, Ur Negative Negative mg/dL    Bilirubin Urine Negative Negative    Ketones, Urine Negative Negative mg/dL    Specific Gravity, UA <=1.005 1.005 - 1.030    Blood, Urine TRACE-INTACT (A) Negative    pH, UA 6.5 5.0 - 8.0    Protein, UA Negative Negative mg/dL    Urobilinogen, Urine 0.2 <2.0 E.U./dL    Nitrite, Urine Negative Negative    Leukocyte Esterase, Urine MODERATE (A) Negative    Microscopic Examination YES     Urine Type Voided     Urine Reflex to Culture Not Indicated    CBC Auto Differential   Result Value Ref Range    WBC 13.5 (H) 4.0 - 11.0 K/uL    RBC 3.14 (L) 4.00 - 5.20 M/uL    Hemoglobin 9.7 (L) 12.0 - 16.0 g/dL    Hematocrit 30.2 (L) 36.0 - 48.0 %    MCV 96.3 80.0 - 100.0 fL    MCH 30.9 26.0 - 34.0 pg    MCHC 32.1 31.0 - 36.0 g/dL    RDW 19.3 (H) 12.4 - 15.4 %    Platelets 139 025 - 673 K/uL    MPV 9.1 5.0 - 10.5 fL    PLATELET SLIDE REVIEW Adequate     SLIDE REVIEW see below     Neutrophils % 68.0 %    Lymphocytes % 15.0 %    Monocytes % 15.0 %    Eosinophils % 1.0 %    Basophils % 0.0 %    Neutrophils Absolute 9.3 (H) 1.7 - 7.7 K/uL    Lymphocytes Absolute 2.0 1.0 - 5.1 K/uL    Monocytes Absolute 2.0 (H) 0.0 - 1.3 K/uL    Eosinophils Absolute 0.1 0.0 - 0.6 K/uL    Basophils Absolute 0.0 0.0 - 0.2 K/uL    Myelocyte Percent 1 (A) %    Anisocytosis 1+ (A)     Polychromasia Occasional (A)     Hypochromia 1+ (A)    Basic Metabolic Panel w/ Reflex to MG   Result Value Ref Range    Sodium 131 (L) 136 - 145 mmol/L    Potassium reflex Magnesium 4.7 3.5 - 5.1 mmol/L    Chloride 90 (L) 99 - 110 mmol/L    CO2 29 21 - 32 mmol/L    Anion Gap 12 3 - 16    Glucose 121 (H) 70 - 99 mg/dL    BUN 26 (H) 7 - 20 mg/dL    CREATININE 0.9 0.6 - 1.2 mg/dL    GFR Non-African American >60 >60    GFR African American >60 >60    Calcium 9.7 8.3 - 10.6 mg/dL   Hepatic Function Panel   Result Value Ref Range    Total Protein 7.2 6.4 - 8.2 g/dL    Albumin 4.0 3.4 - 5.0 g/dL    Alkaline Phosphatase 53 40 - 129 U/L    ALT 18 10 - 40 U/L    AST 40 (H) 15 - 37 U/L    Total Bilirubin 1.0 0.0 - 1.0 mg/dL    Bilirubin, Direct 0.3 0.0 - 0.3 mg/dL    Bilirubin, Indirect 0.7 0.0 - 1.0 mg/dL   Procalcitonin   Result Value Ref Range    Procalcitonin 0.11 0.00 - 0.15 ng/mL   Brain Natriuretic Peptide   Result Value Ref Range    Pro- 0 - 449 pg/mL   Microscopic Urinalysis   Result Value Ref Range    WBC, UA 3-5 0 - 5 /HPF    RBC, UA 0-2 0 - 4 /HPF    Epithelial Cells, UA 0-1 0 - 5 /HPF    Bacteria, UA Rare (A) None Seen /HPF   Lactic Acid, Plasma   Result Value Ref Range    Lactic Acid 1.7 0.4 - 2.0 mmol/L   Blood Gas, Venous   Result Value Ref Range    pH, Jabier 7.423 7.350 - 7.450    pCO2, Jabier 54.5 (H) 41.0 - 51.0 mmHg    pO2, Jabier 72.6 (H) 25 - 40 mmHg    HCO3, Venous 35.6 (H) 24.0 - 28.0 mmol/L    Base Excess, Jabier 9.7 (H) -2.0 - 3.0 mmol/L    O2 Sat, Jabier 94 Not established %    Carboxyhemoglobin 0.9 0.0 - 1.5 %    MetHgb, Jabier 3.6 (H) 0.0 - 1.5 %    TC02 (Calc), Jabier 37 mmol/L    Hemoglobin, Jabier, Reduced 5.80 %   Basic Metabolic Panel w/ Reflex to MG   Result Value Ref Range    Sodium 133 (L) 136 - 145 mmol/L    Potassium reflex Magnesium 4.1 3.5 - 5.1 mmol/L    Chloride 94 (L) 99 - 110 mmol/L    CO2 29 21 - 32 mmol/L    Anion Gap 10 3 - 16    Glucose 109 (H) 70 - 99 mg/dL    BUN 22 (H) 7 - 20 mg/dL    CREATININE 0.9 0.6 - 1.2 mg/dL    GFR Non-African American >60 >60    GFR African American >60 >60    Calcium 9.1 8.3 - 10.6 mg/dL   CBC   Result Value Ref Range    WBC 13.9 (H) 4.0 - 11.0 K/uL    RBC 2.83 (L) 4.00 - 5.20 M/uL    Hemoglobin 8.7 (L) 12.0 - 16.0 g/dL    Hematocrit 27.5 (L) 36.0 - 48.0 %    MCV 97.1 80.0 - 100.0 fL    MCH 30.7 26.0 - 34.0 pg    MCHC 31.7 31.0 - 36.0 g/dL    RDW 18.6 (H) 12.4 - 15.4 %    Platelets 853 025 - 549 K/uL    MPV 8.7 5.0 - 10.5 fL   Ammonia   Result Value Ref Range    Ammonia 22 11 - 51 umol/L   Blood Smear Review   Result Value Ref Range    Path Consult Reviewed        RECENT VITALS:  BP: 96/60, Temp: 99 °F (37.2 °C), Pulse: 82, Resp: 18, SpO2: 94 %     Procedures     n/a    ED Course     Nursing Notes, Past Medical Hx,Past Surgical Hx, Social Hx, Allergies, and Family Hx were reviewed. The patient was given the following medications:  Orders Placed This Encounter   Medications    acetaminophen (TYLENOL) tablet 650 mg    morphine (PF) injection 4 mg    acetaminophen (TYLENOL) tablet 650 mg    lactated ringers infusion 1,000 mL    sodium chloride flush 0.9 % injection 10 mL    sodium chloride flush 0.9 % injection 10 mL    0.9 % sodium chloride infusion    DISCONTD: enoxaparin (LOVENOX) injection 40 mg    OR Linked Order Group     ondansetron (ZOFRAN-ODT) disintegrating tablet 4 mg     ondansetron (ZOFRAN) injection 4 mg    sennosides-docusate sodium (SENOKOT-S) 8.6-50 MG tablet 1 tablet    magnesium hydroxide (MILK OF MAGNESIA) 400 MG/5ML suspension 30 mL    OR Linked Order Group     acetaminophen (TYLENOL) tablet 650 mg     acetaminophen (TYLENOL) suppository 650 mg    albuterol sulfate  (90 Base) MCG/ACT inhaler 2 puff     Order Specific Question:   Initiate RT Bronchodilator Protocol     Answer:    Yes    apixaban (ELIQUIS) tablet 5 mg    atorvastatin (LIPITOR) tablet 10 mg    escitalopram (LEXAPRO) tablet 20 mg    fluticasone (FLONASE) 50 MCG/ACT nasal spray 1 spray    cetirizine (ZYRTEC) tablet 10 mg    gabapentin (NEURONTIN) tablet 300 mg    losartan (COZAAR) tablet 25 mg    methotrexate (RHEUMATREX) chemo tablet 17.5 mg    metoprolol succinate (TOPROL XL) extended release tablet 25 mg    trospium (SANCTURA) tablet 20 mg    montelukast (SINGULAIR) tablet 10 mg    oxyCODONE-acetaminophen (PERCOCET) 5-325 MG per tablet 1 tablet    saccharomyces boulardii (FLORASTOR) capsule 250 mg    spironolactone (ALDACTONE) tablet 12.5 mg    traZODone (DESYREL) tablet 50 mg    predniSONE (DELTASONE) tablet 30 mg    morphine (PF) injection 2 mg       CONSULTS:  IP CONSULT TO CASE MANAGEMENT  IP CONSULT TO HOSPITALIST  IP CONSULT TO CASE MANAGEMENT  IP CONSULT TO RESPIRATORY CARE    MEDICAL DECISION MAKING / ASSESSMENT / PLAN     Vitals:    09/23/21 0348 09/23/21 0354 09/23/21 0433 09/23/21 0823   BP: (!) 93/59   96/60   Pulse: 92   82   Resp: 20 20 18   Temp: 98.2 °F (36.8 °C)   99 °F (37.2 °C)   TempSrc: Oral   Oral   SpO2: (!) 89% 92%  94%   Weight: 225 lb 1.4 oz (102.1 kg)      Height:           Lexie Ibrahim is a 80 y.o. female who presents to the emergency department with right ankle injury. Patient was attempting to get in the car yesterday when she slipped, though did not fall fully to the ground. She did suffer an inversion injury to her right ankle. She denies sustaining any other injuries. She uses a walker at baseline to get around and was unable to get around safely today. Of note the patient lives in [place] with her daughter who accompanies her here today, notes that she did not feel safe driving with her to [place] secondary to patient's increased pain. Patient's daughter also feels that she is in an RA flare as she had her recent 6-hour car ride and exerted herself with too much walking than her baseline. Patient is typically sleepy with her RA flares, but the patient's daughter was concerned as the patient is \"more sleepy\" than her typical flares. The patient is oxygenating well on her home liters though is incidentally febrile upon arrival to the emergency department. Patient denies any other associated symptoms with this. Given patient's fever a chest x-ray and urinalysis were obtained. Patient's urinalysis i borderline for infection with moderate leukocytes and only 3-5 white blood cells therefore urine culture will be sent.   Patient's chest x-ray also shows multifocal pneumonia versus pulmonary edema though with a normal BNP. Patient does have a history of interstitial lung disease for which she is on chronic oxygen, I am able to see the documentation from outside hospitals with interpretations of chest x-rays but she does not have any obvious previous readings including multifocal pneumonia or the significant level of opacities. Procalcitonin was obtained which was within normal limits therefore I do not feel that antibiotics are necessary at this time. Patient is immunocompromise as she is on methotrexate and Humira for her rheumatoid arthritis as well as prednisone. The patient's daughter typically increases her prednisone when she is having a flare as well. I had a long discussion with the patient and her daughter who ultimately do not feel that she is safe to travel back to [place] at this time. Patient's daughter is concerned as she is immunocompromise in the setting of this fever with unknown source, and does not feel that she is safe to transfer her mother from wheelchair to [facility] to safely get her home to [place]. Additionally they have been considering rehab placement for her mother at baseline in [place] where they live, as her physical health has continued to decline and made it more difficult for them to safely take care of her at home. She does not have home health care at baseline at this time. In the setting of immunosuppressed fever of unknown though presumed pulmonary source with her documented multifocal pneumonia on chest x-ray here and potential UTI with concern for safely transporting the patient back to her home in [place] I do feel admission for PT/OT as well as observation for potential development of further infectious symptoms is reasonable. At this point in time, patient will require admission to the hospital for further management of their clinical condition.  I spoke with the admitting team who is in agreement with plan for admission. Patient and family are in agreement with plan for admission. Patient will be cared for in the ED prior to a bed becoming available upstairs. The patient was evaluated by myself and the ED Attending Physician, Dr. Melissa Lu. All management and disposition plans were discussed and agreed upon. Clinical Impression     1. Sprain of right ankle, unspecified ligament, initial encounter    2. Fever, unknown origin    3. Multifocal pneumonia      This note was dictated using voice-recognition software, which occasionally leads to inadvertent typographic errors.     Disposition     DISPOSITION Admitted 09/22/2021 05:15:25 PM     Rash[place] Min [place]  09/23/21 9284

## 2021-09-22 NOTE — PROGRESS NOTES
List of Home Medications the patient is currently taking is complete. Home Medication list in EPIC updated to reflect changes noted below. Source of medications in list is interview with patient's daughter.       Medications added:   Metoprolol succinate   Humira   Apixaban   Vitamin D   Vitamin C   Dapsone   Methotrexate   Atorvastatin   Spironolactone   Folic acid    Medications removed:   Amlodipine   Guaifenesin   Hydralazine     Medications adjusted:   Albuterol neb q4h PRN adjusted to q6h PRN   Albuterol MDI q6h PRN adjusted to q4-6h PRN   Calcium-D 2 tablets daily adjusted to 1 tablet BID   Colesipol 1g daily adjusted to 1g daily PRN (diarrhea, does not take when constipated)   Escitalopram 10 mg daily adjusted to 20 mg daily   Furosemide 40 mg daily instructions updated to include PRN dose: 80 mg with weight gain of >2lb in 24h   Gabapentin 600 mg TID adjusted to 300 mg TID   Oxycodone-APAP 5-325 mg q8h adjusted to q6h PRN   Prednisone 30 mg daily adjusted to 3 mg daily        Medication Sig   metoprolol succinate (TOPROL XL) 25 MG extended release tablet Take 25 mg by mouth daily   adalimumab (HUMIRA) 40 MG/0.8ML injection Inject 40 mg into the skin Every 10 days   apixaban (ELIQUIS) 5 MG TABS tablet Take 5 mg by mouth 2 times daily   vitamin D (CHOLECALCIFEROL) 25 MCG (1000 UT) TABS tablet Take 1,000 Units by mouth 2 times daily   dapsone 100 MG tablet Take 100 mg by mouth daily   methotrexate (RHEUMATREX) 2.5 MG chemo tablet Take 17.5 mg by mouth once a week (wednesdays)   atorvastatin (LIPITOR) 10 MG tablet Take 10 mg by mouth daily   spironolactone (ALDACTONE) 25 MG tablet Take 12.5 mg by mouth daily   folic acid (FOLVITE) 1 MG tablet Take 1 mg by mouth daily   ascorbic acid (VITAMIN C) 500 MG tablet Take 500 mg by mouth daily   predniSONE (DELTASONE) 10 MG tablet Take 1 tablet daily with a 20 mg tablet for total of 30 mg daily (Patient taking differently: Take 3 mg by mouth daily Take 1 tablet daily with a 20 mg tablet for total of 30 mg daily)   furosemide (LASIX) 20 MG tablet Take 2 tablets by mouth daily (Patient taking differently: Take 40 mg by mouth daily (take 80 mg with weight gain of >2lb in 24h))   Calcium Carb-Cholecalciferol (CALCIUM-VITAMIN D) 500-200 MG-UNIT per tablet Take 2 tablets by mouth Daily with lunch (Patient taking differently: Take 1 tablet by mouth 2 times daily (with meals) )   albuterol (PROVENTIL) (2.5 MG/3ML) 0.083% nebulizer solution Take 3 mLs by nebulization every 4 hours as needed for Wheezing or Shortness of Breath (Patient taking differently: Take 2.5 mg by nebulization every 6 hours as needed for Wheezing or Shortness of Breath )   losartan (COZAAR) 25 MG tablet Take 25 mg by mouth nightly    escitalopram (LEXAPRO) 10 MG tablet Take 20 mg by mouth nightly    montelukast (SINGULAIR) 10 MG tablet Take 10 mg by mouth every morning   saccharomyces boulardii (FLORASTOR) 250 MG capsule Take 250 mg by mouth 2 times daily   traZODone (DESYREL) 50 MG tablet Take 50 mg by mouth every morning   Mirabegron ER 25 MG TB24 Take 1 tablet by mouth every evening   pantoprazole (PROTONIX) 40 MG tablet Take 40 mg by mouth every evening   oxyCODONE-acetaminophen (PERCOCET) 5-325 MG per tablet Take 1 tablet by mouth every 6 hours as needed. Indications: Backache    fluticasone (FLONASE) 50 MCG/ACT nasal spray 1 spray by Each Nare route nightly   albuterol sulfate HFA (VENTOLIN HFA) 108 (90 Base) MCG/ACT inhaler Inhale 2 puffs into the lungs every 6 hours as needed for Wheezing or Shortness of Breath MAY USE ALTERNATIVE PER INSURANCE (Patient taking differently: Inhale 2 puffs into the lungs every 4-6 hours as needed for Wheezing or Shortness of Breath MAY USE ALTERNATIVE PER INSURANCE)   colestipol (COLESTID) 1 g tablet Take 1 g by mouth daily as needed Indications: Diarrhea    gabapentin (NEURONTIN) 600 MG tablet Take 300 mg by mouth 3 times daily.     cetirizine (ZYRTEC) 10 MG tablet Take 10 mg by mouth every morning        Please call with any questions.   Penelope Ruiz, PharmD, BCPS  Main pharmacy: [id number]  9/22/2021 5:19 PM

## 2021-09-22 NOTE — ED NOTES
Bed: 3-  Expected date:   Expected time:   Means of arrival:   Comments:  Medic unit- knee pain     Little Johnson, JACKSON  09/22/21 0931

## 2021-09-22 NOTE — PROGRESS NOTES
4 Eyes Admission Assessment     I agree as the admission nurse that 2 RN's have performed a thorough Head to Toe Skin Assessment on the patient. ALL assessment sites listed below have been assessed on admission. Areas assessed by both nurses:   [x]   Head, Face, and Ears   [x]   Shoulders, Back, and Chest  [x]   Arms, Elbows, and Hands   [x]   Coccyx, Sacrum, and Ischum  [x]   Legs, Feet, and Heels        Does the Patient have Skin Breakdown?   No  Patient right big toe has red blanchable area r/t arthritis, Swollen/bruised right ankle      Shaji Prevention initiated:  No   Wound Care Orders initiated:  No      WOC nurse consulted for Pressure Injury (Stage 3,4, Unstageable, DTI, NWPT, and Complex wounds):  No      Nurse 1 eSignature: Electronically signed by Cristina Muñoz RN on 9/22/21 at 7:21 PM EDT    **SHARE this note so that the co-signing nurse is able to place an eSignature**    Nurse 2 eSignature: Electronically signed by Kathy Singh on 9/22/21 at 6:45 PM EDT

## 2021-09-22 NOTE — ED NOTES
Patient informed that we will need to obtain urine sample once pain is under control.  Patient verbalized understanding     Ney Sexton RN  09/22/21 6560

## 2021-09-23 LAB
AMMONIA: 22 UMOL/L (ref 11–51)
ANION GAP SERPL CALCULATED.3IONS-SCNC: 10 MMOL/L (ref 3–16)
BUN BLDV-MCNC: 22 MG/DL (ref 7–20)
CALCIUM SERPL-MCNC: 9.1 MG/DL (ref 8.3–10.6)
CHLORIDE BLD-SCNC: 94 MMOL/L (ref 99–110)
CO2: 29 MMOL/L (ref 21–32)
CREAT SERPL-MCNC: 0.9 MG/DL (ref 0.6–1.2)
GFR AFRICAN AMERICAN: >60
GFR NON-AFRICAN AMERICAN: >60
GLUCOSE BLD-MCNC: 109 MG/DL (ref 70–99)
HCT VFR BLD CALC: 27.5 % (ref 36–48)
HEMATOLOGY PATH CONSULT: NORMAL
HEMOGLOBIN: 8.7 G/DL (ref 12–16)
MCH RBC QN AUTO: 30.7 PG (ref 26–34)
MCHC RBC AUTO-ENTMCNC: 31.7 G/DL (ref 31–36)
MCV RBC AUTO: 97.1 FL (ref 80–100)
PDW BLD-RTO: 18.6 % (ref 12.4–15.4)
PLATELET # BLD: 176 K/UL (ref 135–450)
PMV BLD AUTO: 8.7 FL (ref 5–10.5)
POTASSIUM REFLEX MAGNESIUM: 4.1 MMOL/L (ref 3.5–5.1)
PRO-BNP: 719 PG/ML (ref 0–449)
RBC # BLD: 2.83 M/UL (ref 4–5.2)
SARS-COV-2: NOT DETECTED
SODIUM BLD-SCNC: 133 MMOL/L (ref 136–145)
TSH REFLEX: 3.58 UIU/ML (ref 0.27–4.2)
VITAMIN B-12: 422 PG/ML (ref 211–911)
WBC # BLD: 13.9 K/UL (ref 4–11)

## 2021-09-23 PROCEDURE — 36415 COLL VENOUS BLD VENIPUNCTURE: CPT

## 2021-09-23 PROCEDURE — 80048 BASIC METABOLIC PNL TOTAL CA: CPT

## 2021-09-23 PROCEDURE — 97530 THERAPEUTIC ACTIVITIES: CPT

## 2021-09-23 PROCEDURE — 6370000000 HC RX 637 (ALT 250 FOR IP): Performed by: INTERNAL MEDICINE

## 2021-09-23 PROCEDURE — 2580000003 HC RX 258: Performed by: INTERNAL MEDICINE

## 2021-09-23 PROCEDURE — 83880 ASSAY OF NATRIURETIC PEPTIDE: CPT

## 2021-09-23 PROCEDURE — 85027 COMPLETE CBC AUTOMATED: CPT

## 2021-09-23 PROCEDURE — 97116 GAIT TRAINING THERAPY: CPT

## 2021-09-23 PROCEDURE — 1200000000 HC SEMI PRIVATE

## 2021-09-23 PROCEDURE — 97166 OT EVAL MOD COMPLEX 45 MIN: CPT

## 2021-09-23 PROCEDURE — 97161 PT EVAL LOW COMPLEX 20 MIN: CPT

## 2021-09-23 PROCEDURE — 97535 SELF CARE MNGMENT TRAINING: CPT

## 2021-09-23 PROCEDURE — 82140 ASSAY OF AMMONIA: CPT

## 2021-09-23 PROCEDURE — 94660 CPAP INITIATION&MGMT: CPT

## 2021-09-23 RX ADMIN — OXYCODONE HYDROCHLORIDE AND ACETAMINOPHEN 1 TABLET: 5; 325 TABLET ORAL at 11:54

## 2021-09-23 RX ADMIN — APIXABAN 5 MG: 5 TABLET, FILM COATED ORAL at 08:52

## 2021-09-23 RX ADMIN — TRAZODONE HYDROCHLORIDE 50 MG: 50 TABLET ORAL at 08:51

## 2021-09-23 RX ADMIN — GABAPENTIN 300 MG: 600 TABLET, FILM COATED ORAL at 14:53

## 2021-09-23 RX ADMIN — OXYCODONE HYDROCHLORIDE AND ACETAMINOPHEN 1 TABLET: 5; 325 TABLET ORAL at 06:04

## 2021-09-23 RX ADMIN — Medication 250 MG: at 21:31

## 2021-09-23 RX ADMIN — Medication 250 MG: at 08:51

## 2021-09-23 RX ADMIN — CETIRIZINE HYDROCHLORIDE 10 MG: 10 TABLET, FILM COATED ORAL at 08:53

## 2021-09-23 RX ADMIN — GABAPENTIN 300 MG: 600 TABLET, FILM COATED ORAL at 08:51

## 2021-09-23 RX ADMIN — PREDNISONE 30 MG: 20 TABLET ORAL at 08:51

## 2021-09-23 RX ADMIN — GABAPENTIN 300 MG: 600 TABLET, FILM COATED ORAL at 21:31

## 2021-09-23 RX ADMIN — MONTELUKAST 10 MG: 10 TABLET, FILM COATED ORAL at 08:52

## 2021-09-23 RX ADMIN — Medication 10 ML: at 21:32

## 2021-09-23 RX ADMIN — DOCUSATE SODIUM 50 MG AND SENNOSIDES 8.6 MG 1 TABLET: 8.6; 5 TABLET, FILM COATED ORAL at 21:32

## 2021-09-23 RX ADMIN — TROSPIUM CHLORIDE 20 MG: 20 TABLET, FILM COATED ORAL at 21:32

## 2021-09-23 RX ADMIN — OXYCODONE HYDROCHLORIDE AND ACETAMINOPHEN 1 TABLET: 5; 325 TABLET ORAL at 18:59

## 2021-09-23 RX ADMIN — Medication 10 ML: at 11:26

## 2021-09-23 RX ADMIN — APIXABAN 5 MG: 5 TABLET, FILM COATED ORAL at 21:31

## 2021-09-23 RX ADMIN — DOCUSATE SODIUM 50 MG AND SENNOSIDES 8.6 MG 1 TABLET: 8.6; 5 TABLET, FILM COATED ORAL at 08:52

## 2021-09-23 RX ADMIN — ATORVASTATIN CALCIUM 10 MG: 10 TABLET, FILM COATED ORAL at 08:52

## 2021-09-23 RX ADMIN — SPIRONOLACTONE 12.5 MG: 25 TABLET ORAL at 08:52

## 2021-09-23 RX ADMIN — ESCITALOPRAM OXALATE 20 MG: 20 TABLET ORAL at 21:31

## 2021-09-23 ASSESSMENT — PAIN DESCRIPTION - ORIENTATION
ORIENTATION: RIGHT
ORIENTATION: MID
ORIENTATION: RIGHT

## 2021-09-23 ASSESSMENT — PAIN DESCRIPTION - LOCATION
LOCATION: BACK
LOCATION: BACK
LOCATION: ANKLE
LOCATION: ANKLE
LOCATION: ANKLE;BACK
LOCATION: ANKLE

## 2021-09-23 ASSESSMENT — PAIN SCALES - GENERAL
PAINLEVEL_OUTOF10: 7
PAINLEVEL_OUTOF10: 5
PAINLEVEL_OUTOF10: 7
PAINLEVEL_OUTOF10: 9
PAINLEVEL_OUTOF10: 8
PAINLEVEL_OUTOF10: 3
PAINLEVEL_OUTOF10: 6
PAINLEVEL_OUTOF10: 9
PAINLEVEL_OUTOF10: 9

## 2021-09-23 ASSESSMENT — PAIN DESCRIPTION - DESCRIPTORS
DESCRIPTORS: ACHING;DISCOMFORT
DESCRIPTORS: ACHING

## 2021-09-23 ASSESSMENT — PAIN DESCRIPTION - FREQUENCY
FREQUENCY: CONTINUOUS
FREQUENCY: INTERMITTENT
FREQUENCY: CONTINUOUS

## 2021-09-23 ASSESSMENT — PAIN DESCRIPTION - PAIN TYPE
TYPE: ACUTE PAIN
TYPE: ACUTE PAIN
TYPE: CHRONIC PAIN
TYPE: ACUTE PAIN
TYPE: ACUTE PAIN
TYPE: CHRONIC PAIN

## 2021-09-23 ASSESSMENT — PAIN - FUNCTIONAL ASSESSMENT
PAIN_FUNCTIONAL_ASSESSMENT: ACTIVITIES ARE NOT PREVENTED
PAIN_FUNCTIONAL_ASSESSMENT: PREVENTS OR INTERFERES SOME ACTIVE ACTIVITIES AND ADLS
PAIN_FUNCTIONAL_ASSESSMENT: PREVENTS OR INTERFERES SOME ACTIVE ACTIVITIES AND ADLS

## 2021-09-23 ASSESSMENT — PAIN DESCRIPTION - ONSET
ONSET: ON-GOING
ONSET: AWAKENED FROM SLEEP
ONSET: ON-GOING

## 2021-09-23 ASSESSMENT — PAIN DESCRIPTION - PROGRESSION
CLINICAL_PROGRESSION: GRADUALLY IMPROVING
CLINICAL_PROGRESSION: NOT CHANGED
CLINICAL_PROGRESSION: GRADUALLY WORSENING
CLINICAL_PROGRESSION: GRADUALLY WORSENING
CLINICAL_PROGRESSION: NOT CHANGED

## 2021-09-23 ASSESSMENT — PAIN SCALES - WONG BAKER: WONGBAKER_NUMERICALRESPONSE: 2

## 2021-09-23 NOTE — PLAN OF CARE
Problem: Falls - Risk of:  Goal: Will remain free from falls  Description: Will remain free from falls  Outcome: Ongoing  Note: Fall precautions in place. Bed/chair alarm on. Pt calls out appropriately. Call light within reach. Walker, gait belt, and tamia stedy used for ambulation. Electronically signed by Mary Baum RN on 9/23/2021 at 4:40 PM       Problem: HEMODYNAMIC STATUS  Goal: Patient has stable vital signs and fluid balance  Outcome: Ongoing  Note: Pt vital signs stable as documented in flowsheets. Will continue to monitor per protocol  Electronically signed by Mary Baum RN on 9/23/2021 at 4:43 PM       Problem: Pain:  Goal: Control of acute pain  Description: Control of acute pain  Outcome: Ongoing  Note: Percocet given for ankle and back pain. Pain assessment documented in flowsheets. Pt repositioned for comfort. Electronically signed by Mary Baum RN on 9/23/2021 at 4:45 PM       Problem: OXYGENATION/RESPIRATORY FUNCTION  Goal: Patient will achieve/maintain normal respiratory rate/effort  Description: Respiratory rate and effort will be within normal limits for the patient  Note: Pt RR WDL. O2 sat above 90% on 3L of O2. Pt denies SOB or dyspnea    Electronically signed by Mary Baum RN on 9/23/2021 at 4:43 PM       Problem: OXYGENATION/RESPIRATORY FUNCTION  Goal: Patient will achieve/maintain normal respiratory rate/effort  Description: Respiratory rate and effort will be within normal limits for the patient  Note: Pt RR WDL. O2 sat above 90% on 3L of O2.  Pt denies SOB or dyspnea    Electronically signed by Mary Baum RN on 9/23/2021 at 4:43 PM

## 2021-09-23 NOTE — PLAN OF CARE
Problem: Falls - Risk of:  Goal: Will remain free from falls  Description: Will remain free from falls  Outcome: Ongoing   Fall precautions in place. Bed alarm activated, low position, wheels locked. Call light and belongings within reach. Continue to monitor safety. Problem: OXYGENATION/RESPIRATORY FUNCTION  Goal: Patient will maintain patent airway  Outcome: Ongoing   Lungs with crackles, patient with ILD. Oxygen at baseline, 3L    Problem: HEMODYNAMIC STATUS  Goal: Patient has stable vital signs and fluid balance  Outcome: Ongoing  BP 96/59, cozaar held. Purewick in place. Strict I & O's.      Problem: ACTIVITY INTOLERANCE/IMPAIRED MOBILITY  Goal: Mobility/activity is maintained at optimum level for patient  Outcome: Ongoing   Up with tamia steady. Patient has difficulty bearing weight on R ankle. Problem: Pain:  Goal: Control of acute pain  Description: Control of acute pain  Outcome: Ongoing   Medicated with IV morphine x 1 for R ankle pain and percocet q 6. Continue to re-assess.

## 2021-09-23 NOTE — PROGRESS NOTES
This Rn gave phone update to daughter Priscilla Sánchez. All questions answered.     Electronically signed by Bev Bernstein RN on 9/23/2021 at 6:30 PM

## 2021-09-23 NOTE — PROGRESS NOTES
Occupational Therapy   Occupational Therapy Initial Assessment and Treatment    Date: 2021   Patient Name: Trice Atwood  MRN: [id number]     : 1940    Date of Service: 2021    Discharge Recommendations:  Trice Atwood scored a 16/24 on the AM-PAC ADL Inpatient form. Current research shows that an AM-PAC score of 17 or less is typically not associated with a discharge to the patient's home setting. Based on the patient's AM-PAC score and their current ADL deficits, it is recommended that the patient have 3-5 sessions per week of Occupational Therapy at d/c to increase the patient's independence. Please see assessment section for further patient specific details. If patient discharges prior to next session this note will serve as a discharge summary. Please see below for the latest assessment towards goals. OT Equipment Recommendations  Equipment Needed:  (defer recommendations to discharge facility (may need w/c for access to bathrooms during trip home))    Assessment   Performance deficits / Impairments: Decreased functional mobility ; Decreased ADL status; Decreased balance;Decreased endurance  Assessment: In [facility] from [place] for a . Pt had a fall when getting into car. At baseline, pt is independent w/ ADLs, functional transfers/mobility using wh walker (only receives supervision for showers). At this time, pt is limited by pain RLE and only tolerating ~6' of mobility w/ increased effort/time, cues, and w/ Min A. Pt's daughter was Facetimed during treatment to observe how pt is moving s/p fall. Pt/dtr expressing awareness that pt will require IP placement upon discharge - pt/dtr are hopeful for travel by car to placement at [facility] in [place] [address]. made aware. Discussed concerns for ability to access toilet during trip. Pt will benefit from continued IP OT upon discharge. Continue per POC during admission.   Treatment Diagnosis: impaired ADLs/transfers s/p fall  Prognosis: Good  Decision Making: Medium Complexity  OT Education: OT Role;Plan of Care;Transfer Training;ADL Adaptive Strategies  Patient Education: would benefit from further education and reinforcement  REQUIRES OT FOLLOW UP: Yes  Safety Devices  Safety Devices in place: Yes  Type of devices: Nurse notified; Left in chair;Chair alarm in place;Call light within reach           Patient Diagnosis(es): The primary encounter diagnosis was Sprain of right ankle, unspecified ligament, initial encounter. Diagnoses of Fever, unknown origin and Multifocal pneumonia were also pertinent to this visit. has a past medical history of Allergic rhinitis, Asthma, Chicken pox, Chronic diastolic CHF (congestive heart failure) ([address] Depression, Diverticulosis of large intestine, Essential hypertension, Gastroesophageal reflux disease without esophagitis, GERD (gastroesophageal reflux disease), Hyperlipidemia, Hypertension, Interstitial lung disease ([address] Mitral valve regurgitation, Mixed hyperlipidemia, Obesity, Osteoarthritis, Osteoporosis, Polio, Prolonged emergence from general anesthesia, RA (rheumatoid arthritis) ([address] Rheumatoid arthritis ([address] and Sleep apnea. has a past surgical history that includes Appendectomy; Cholecystectomy; Salpingo-oophorectomy; Carpal tunnel release (Bilateral); Foot surgery (Bilateral); Total shoulder arthroplasty (Right); other surgical history (Right, 02/20/2017); Hysterectomy, total abdominal; Tonsillectomy; Shoulder arthroscopy (Left, 11/15/2017); joint replacement (Right, 04/25/2016); joint replacement (Right); Total knee arthroplasty (Right); bronchoscopy (N/A, 4/5/2019); and bronchoscopy (4/5/2019).     Treatment Diagnosis: impaired ADLs/transfers s/p fall      Restrictions  Restrictions/Precautions  Restrictions/Precautions: Isolation, Up as Tolerated (Droplet + for COVID 19 r/o)    Subjective   General  Chart Reviewed: Yes  Patient assessed for rehabilitation services?: Yes  Additional Pertinent Hx: 80 y.o. F to the ED after she slipped yesterday while trying to get into a car. Hospital Course: R Ankle: neg; R Foot: neg; R Tib/Fib: neg; CT Head: neg. PMH: Asthma, CHF, HTN, Hyperlipidemia, OA, RA, Appy, Gertrudis, ASUNCION, R TKR, R TSR. Family / Caregiver Present: No  Referring Practitioner: Dr. Brown Minor  Diagnosis: Fever, Unknown Origin    Subjective  Subjective: In bed on entry. Agreeable to OT. Reports in town for a  (lives in [place] - 7 hour drive away).     Patient Currently in Pain: Yes - R ankle pain, not rated, nurse aware        Social/Functional History  Social/Functional History  Lives With: Family (dtr)  Type of Home: House  Home Layout: One level  Home Access: Stairs to enter with rails  Entrance Stairs - Number of Steps: 4 VERONICA with rail  Bathroom Shower/Tub: Walk-in shower, Shower chair with back  [employer]: Handicap height (toilet close to sink vanity)  Bathroom Equipment: Grab bars in shower, Shower chair  Bathroom Accessibility: Accessible  Home Equipment: 4 wheeled walker, Cane, Wheelchair-manual, Oxygen (3L O2 continuous)  Receives Help From: Family  ADL Assistance: Independent (reports that her dtr assists \"sometimes\")  Homemaking Assistance: Needs assistance (dtr performs)  Homemaking Responsibilities: No  Ambulation Assistance: Independent (with rollator)  Transfer Assistance: Independent  Active : No (dtr does all driving for pt)  Additional Comments: dtr works \"Sometimes\" per pt.; pt in town from [place] for        Objective   Vision: Impaired  Vision Exceptions: Wears glasses at all times  Hearing: Within functional limits      Orientation  Overall Orientation Status: Within Functional Limits        Balance  Sitting Balance: Stand by assistance  Standing Balance: Minimal assistance    Functional Mobility  Functional - Mobility Device: Rolling Walker  Activity: Other (6' bed to chair)  Assist Level: Minimal assistance  Functional Mobility Comments: Note: slow, effortful,    Toilet Transfers  Toilet - Technique: Stand step (using wh walker)  Equipment Used: Standard bedside commode (simulated w/ bedside chair)  Toilet Transfer: Minimal assistance    ADL  Feeding: Setup  Grooming: Setup  LE Dressing: Maximum assistance  Toileting: Maximum assistance (Purewick; requires hands on assist for standing, clothing up/down)     Coordination  Movements Are Fluid And Coordinated: No  Quality of Movement Other  Comment: rheumatoid arthritic hands (ulnar deviated - digits; weak  bilaterally; R shoulder discomfort w/ shoulder flex)        Bed mobility  Rolling to Left: Moderate assistance  Supine to Sit: Moderate assistance  Scooting: Moderate assistance     Transfers  Stand Step Transfers: Minimal assistance (using wh walker; step-by-step cues, slow, effortful, flexed trunk, poor  on walker w/ R hand 2* RA deformity/decreased hand  strength)  Sit to stand: Minimal assistance  Stand to sit: Minimal assistance        Cognition  Overall Cognitive Status: WFL           Sensation  Overall Sensation Status: WFL        LUE AROM (degrees)  LUE AROM : WFL  RUE AROM (degrees)  RUE AROM : WFL  RUE General AROM: some discomfort w/ shoulder flex- baseline                  Pt seen by OT for eval and treat.  Treatment included: bed mobility, functional transfer, ADL, pt education         Plan   Plan  Times per week: 2-5  Times per day: Daily  Current Treatment Recommendations: Strengthening, Balance Training, Functional Mobility Training, Endurance Training, Safety Education & Training, Equipment Evaluation, Education, & procurement, Self-Care / ADL                                                    AM-PAC Score        AM-[facility] Inpatient Daily Activity Raw Score: 16 (09/23/21 1046)  AM-PAC Inpatient ADL T-Scale Score : 35.96 (09/23/21 1046)  ADL Inpatient CMS 0-100% Score: 53.32 (09/23/21 1046)  ADL Inpatient CMS G-Code Modifier : CK (09/23/21 1046)    Goals  Short term goals  Time Frame for Short term goals: Discharge  Short term goal 1: BSC transfer w/ CGA  Short term goal 2: LB dressing w/ or w/o AE, CGA  Short term goal 3: bed mobility w/ Min A  Patient Goals   Patient goals : to increase independent status       Therapy Time   Individual Concurrent Group Co-treatment   Time In 0915         Time Out 1030         Minutes 75              Timed Code Treatment Minutes:  60 Minutes    Total Treatment Minutes:  [facility] OTR/L #[id number]

## 2021-09-23 NOTE — ACP (ADVANCE CARE PLANNING)
Advance Care Planning     Advance Care Planning Inpatient Note  Spiritual Care Department    Today's Date: 2021  Unit: [facility] 4 PCU    Received request from IDT Member. Upon review of chart and communication with care team, patient's decision making abilities are not in question. . Patient and I spoke by phone due to isolation status. Goals of ACP Conversation:  Facilitate a discussion related to patient's goals of care as they align with the patient's values and beliefs. Confirm her decision makers; request her advanced directive documents be brought in for EMR    Health Care Decision Makers:       Primary Decision Maker: Rosie Martinez - Child - [phone]    Secondary Decision Maker: Emily Menjivar - Child - [phone]    Secondary Decision Maker: Randy Fabry -  [phone]    Summary:  Verified [address] (Patient Wishes):  Healthcare Power of /Advance Directive Appointment of [address]  Living Will/Advance Directive  Pt has HCPOA and [employer]; however they are not currently in the chart     Assessment:   I spoke with this pt by phone. I provided her with pastoral support; she shared she has been to [facility] often, but now lives in [place] with her daughter Shayan Sen. She was in town at this time for the  of a friend. Pt does have advanced directives completed - a HCPOA and a LW. She has named her daughter Preet Laureano as her HCPOA. Her other children, Berenice Arce and Chhaya, are both secondary decision makers; however they are not named in her HCPOA according to the pt, but she still trusts them with her health care decisions. I encouraged pt to have Shayan Sen scan documents into [employer], or to have them brought in so we can place in her medical record. Pt expressed understanding.       Interventions:  Requested patient/family to submit existing document for our records: Healthcare Power of /Advance Directive Appointment of [address]  Living Will/Advance Directive  Provided education on documents for clarity and greater understanding  Confirmed pt's health care decision makers    Care Preferences Communicated:   No    Outcomes/Plan:  Existing advance directive reviewed with patient; no changes to patient's previously recorded wishes.     Electronically signed by Pastor Yuen, [employer] on 9/23/2021 at 10:39 AM

## 2021-09-23 NOTE — PROGRESS NOTES
Hospital Medicine Progress Note    PCP: Tami Burns      Chief Complaint:     Chief Complaint   Patient presents with    Knee Pain    Ankle Pain     80 y.o. female with history of asthma, chronic diastolic CHF, hypertension, chronic respiratory failure with interstitial lung disease (on home oxygen), chronic lumbar back pain (s/p L1/L2 branch ablations and steroid injections, on daily percocet),  who presented to [facility] with moderate ankle pain that began after a fall day prior. She slipped day prior to presentation,  while trying to get into a car, and developed pain in right knee and ankle. Denies falling, denies LOC, denies hitting head. She uses a walker at baseline due to her rheumatoid arthritis and has had difficulty using the walker in the setting of her right ankle pain. Patient states she takes oxycodone for chronic back pain, states she took one at morning of presentation for her knee pain with no relief. She otherwise has felt well, denies any known fevers, chills, cough, chest pain or shortness of breath. She denies abdominal pain, nausea or vomiting, changes in bowel movements or urinary symptoms. She is vaccinated against COVID and denies any known sick exposures. On presentation, she was febrile at 100.5. She stated this is normal, as she has \"chronic pneumonia\" on which she is on 3L 02 for, and  interstitial lung disease. Family stated that she is sleepier than usual and they are concerned that she might have infection as she is immunosuppressed on medication for her rheumatoid arthritis. Interval HPI  No new complaints  No chest pain  No dyspnea    Noted leg edema, R> Left. Pitting      Medications: reviewed    Allergies:    Sulfa antibiotics, Lamotrigine, Lomotil  [diphenoxylate-atropine], and Sulfacetamide sodium    REVIEW OF SYSTEMS:   Pertinent positives and negatives as noted in the HPI. All other systems reviewed and negative.     PHYSICAL EXAM PERFORMED:    /67   Pulse 90   Temp 99.1 °F (37.3 °C) (Oral)   Resp 20   Ht 5' (1.524 m)   Wt 225 lb 1.4 oz (102.1 kg)   SpO2 94%   BMI 43.96 kg/m²     General appearance:  No acute distress, appears stated age  Respiratory:  Non-labored breathing, clear to auscultation bilaterally  Cardiovascular: Regular rate and rhythm, no murmurs, gallops, or rubs  Abdomen: soft, non-tender, non-distended  Musculoskeletal: R ankle wrapped in ACE wrap, neurovascularly intact, R knee tender to palpation  Skin: normal color, no wounds noted  Psychiatric: A&Ox4, good insight and judgment    Labs:     Recent Labs     09/22/21  1054 09/23/21  0447   WBC 13.5* 13.9*   HGB 9.7* 8.7*   HCT 30.2* 27.5*    176     Recent Labs     09/22/21  1054 09/23/21  0447   * 133*   K 4.7 4.1   CL 90* 94*   CO2 29 29   BUN 26* 22*   CREATININE 0.9 0.9   CALCIUM 9.7 9.1     Recent Labs     09/22/21  1054   AST 40*   ALT 18   BILIDIR 0.3   BILITOT 1.0   ALKPHOS 53     No results for input(s): INR in the last 72 hours. No results for input(s): Ayo Neche in the last 72 hours. Urinalysis:      Lab Results   Component Value Date    NITRU Negative 09/22/2021    WBCUA 3-5 09/22/2021    BACTERIA Rare 09/22/2021    RBCUA 0-2 09/22/2021    BLOODU TRACE-INTACT 09/22/2021    SPECGRAV <=1.005 09/22/2021    GLUCOSEU Negative 09/22/2021       Radiology:     CT Head WO Contrast   Final Result         Moderate to severe multifocal white matter disease, presumably related to chronic small vessel ischemic changes. No acute intracranial hemorrhage or mass effect. XR CHEST PORTABLE   Final Result   1. Bilateral multifocal asymmetric airspace opacities and reticular opacities suggesting bilateral pneumonia and/or pulmonary edema. XR TIBIA FIBULA RIGHT (2 VIEWS)   Final Result      No fracture identified      XR FOOT RIGHT (MIN 3 VIEWS)   Final Result   1. Extensive postoperative changes.  No acute osseous abnormality identified. XR ANKLE RIGHT (MIN 3 VIEWS)   Final Result      No acute bony pathology            ASSESSMENT/PLAN:      # Acute encephalopathy, possibly toxic encephalopathy sec to narcotics. POA  Fall: POA  - Presented following a fall  - Family state she has been sleepier than normal  - On chronic narcotics for pain  - Head CT: Moderate to severe multifocal white matter disease, presumably related to chronic small vessel ischemic changes. No acute intracranial hemorrhage or mass effect  - COVID: Negative  - UA: no strong features of infection  -TSH, ammonia, B12: All WNL  -PT/OT, CM consulted: SNF placement      SIRS POA  - Fever 38.1; Tachycardic 94 (very mild)and leucocytosis 13 (although on chronic prednisone)  - Has also had soft Bps  - No localizing s/s of infection  - Chest XR: Bilateral multifocal asymmetric airspace opacities and reticular opacities suggesting bilateral pneumonia and/or pulmonary edema  - Pro-calcitonin 0.11  - UA: neg for infection  - Monitor off antibx and re-eval if fever recurs  - Follow cx data      #Chronic hypoxic respiratory failure: POA  # Interstitial lung disease. POA  Pulmonary infiltrates.  POA  -She is on her baseline 3L of oxygen  -Chest XR: Bilateral multifocal asymmetric airspace opacities and reticular opacities   - She has a hx of diastolic CHF, EF 13-11%, Grade 2 DD  - H/e also hx of ILD: She was seen by pulmonology in 2019, who recommended initiation of systemic glucocorticoid therapy given concern for BOOP/cryptogenic organizing pneumonia with long slow taper of steroids, given no improvement on antibiotics and no identifiable infectious cause  - Currently on chronic steroids, and dapsone for PCP ppx while on steroids.   -  Followed by pulmonology (Dr. Abigail Tabares) o/p      # Chronic diastolic heart failure; mild acute exac  - She has a hx of diastolic CHF, EF 91-95%, Grade 2 DD  - Some LE edema   - On lasix 20 mg and spironolactone at home  - Some evidence for acute component, with mild fluid o/l on exam  - Check pro-BNP; consider 1 dose IV lasix, then resume home diuretics  - Place on telemetry      # Sprained ankle  -XR: Extensive postoperative changes negative for fractures  -analgesia as needed  - PT/OT      DEWAYNE  -Continue home BiPAP      #Chronic pain, narcotic dependent  #Chronic lumbar back pain (s/p L1/L2 branch ablations and steroid injections, - Opioid dependent      # Rheumatoid arthritis  #Immunosuppression  - On Humira, MTX and Prednisone      Morbid obesity due to excess calories Body mass index is 43.96 kg/m². - Complicating assessment and treatment. Placing patient at risk for multiple co-morbidities as well as early death and contributing to the patient's presentation.  on weight loss when appropriate.       Chronic anticoagulation  - On Eliquis  -  Will verify indication        DVT Prophylaxis: Eliquis  Diet: ADULT DIET; Regular  Code Status: Full Code    PT/OT Eval Status: Ongoing    Dispo: PT/OT eval noted  Will need SNF:   For DC pending progress and SNF placement [facility]), likely in 1-2 days if nil new.     Jame Lane MD

## 2021-09-23 NOTE — PROGRESS NOTES
Physical Therapy    Facility/Department: [place] [address] [facility]  Initial Assessment & Treatment    NAME: Carlitos Patricio  : 1940  MRN: [id number]    Date of Service: 2021    Discharge Recommendations:  Carlitos Patricio scored a 14/24 on the AM-PAC short mobility form. Current research shows that an AM-PAC score of 17 or less is typically not associated with a discharge to the patient's home setting. Based on the patient's AM-PAC score and their current functional mobility deficits, it is recommended that the patient have 3-5 sessions per week of Physical Therapy at d/c to increase the patient's independence. Please see assessment section for further patient specific details. If patient discharges prior to next session this note will serve as a discharge summary. Please see below for the latest assessment towards goals. Assessment   Body structures, Functions, Activity limitations: Decreased functional mobility ; Decreased balance;Decreased safe awareness;Decreased endurance;Decreased strength  Assessment: pt is a 80 y.o. female who presented to [facility] with moderate ankle pain that began yesterday and has persisted until presentation. This is in the context of a mechanical fall yesterday. Pt is currently functioning below baseline. Pt would benefit from continued inpatient therapy 3-5x/week at D/C to regain strength. Treatment Diagnosis: weakness  Prognosis: Good  Decision Making: Low Complexity  Clinical Presentation: evolving  Barriers to Learning: none  REQUIRES PT FOLLOW UP: Yes  Activity Tolerance  Activity Tolerance: Patient Tolerated treatment well;Patient limited by endurance  Activity Tolerance: 90%-94% on 3L O2 with activity       Patient Diagnosis(es): The primary encounter diagnosis was Sprain of right ankle, unspecified ligament, initial encounter. Diagnoses of Fever, unknown origin and Multifocal pneumonia were also pertinent to this visit.      has a past medical history of Allergic rhinitis, Asthma, Chicken pox, Chronic diastolic CHF (congestive heart failure) ([address] Depression, Diverticulosis of large intestine, Essential hypertension, Gastroesophageal reflux disease without esophagitis, GERD (gastroesophageal reflux disease), Hyperlipidemia, Hypertension, Interstitial lung disease ([address] Mitral valve regurgitation, Mixed hyperlipidemia, Obesity, Osteoarthritis, Osteoporosis, Polio, Prolonged emergence from general anesthesia, RA (rheumatoid arthritis) ([address] Rheumatoid arthritis ([address] and Sleep apnea. has a past surgical history that includes Appendectomy; Cholecystectomy; Salpingo-oophorectomy; Carpal tunnel release (Bilateral); Foot surgery (Bilateral); Total shoulder arthroplasty (Right); other surgical history (Right, 02/20/2017); Hysterectomy, total abdominal; Tonsillectomy; Shoulder arthroscopy (Left, 11/15/2017); joint replacement (Right, 04/25/2016); joint replacement (Right); Total knee arthroplasty (Right); bronchoscopy (N/A, 4/5/2019); and bronchoscopy (4/5/2019). Restrictions  Restrictions/Precautions  Restrictions/Precautions: Isolation, Up as Tolerated (Droplet + for COVID 19 r/o)  Vision/Hearing  Vision: Impaired  Vision Exceptions: Wears glasses at all times  Hearing: Within functional limits     Subjective  General  Chart Reviewed: Yes  Patient assessed for rehabilitation services?: Yes  Additional Pertinent Hx: 80 y.o. female who [facility]. with moderate ankle pain that began yesterday and has persisted until presentation. This is in the context of a mechanical fall yesterday.   Response To Previous Treatment: Not applicable  Referring Practitioner: Adonay Adkins MD  Referral Date : 09/22/21  Diagnosis: RLE pain  Follows Commands: Within Functional Limits  Subjective  Subjective: pt supine in bed upon arrival, agreeable to PT eval  Pain Screening  Patient Currently in Pain:  (RLE pain - increased w/ WB, nurse informed)  Pain Assessment  Pain Assessment: Faces  Hoffman-Velazquez Pain Rating: Hurts a little bit  Pain Type: Acute pain  Pain Location: Ankle  Pain Orientation: Right  Vital Signs  Patient Currently in Pain: Yes       Orientation  Orientation  Overall Orientation Status: Within Normal Limits  Social/Functional History  Social/Functional History  Lives With: Family (dtr)  Type of Home: House  Home Layout: One level  Home Access: Stairs to enter with rails  Entrance Stairs - Number of Steps: 4 VERONICA with rail  Bathroom Shower/Tub: Walk-in shower, Shower chair with back  [employer]: Handicap height (toilet close to sink vanity)  Bathroom Equipment: Grab bars in shower, Shower chair  Bathroom Accessibility: Accessible  Home Equipment: 4 wheeled walker, Cane, Wheelchair-manual, Oxygen (3L O2 continuous)  Receives Help From: Family  ADL Assistance: Independent (reports that her dtr assists \"sometimes\")  Homemaking Assistance: Needs assistance (dtr performs)  Homemaking Responsibilities: No  Ambulation Assistance: Independent (with rollator)  Transfer Assistance: Independent  Active : No (dtr does all driving for pt)  Additional Comments: dtr works \"Sometimes\" per pt.; pt in town from [place] for   Cognition        Objective          AROM RLE (degrees)  RLE AROM: WFL  RLE General AROM: limited today 2/2 pain from injury  AROM LLE (degrees)  LLE AROM : WFL  Strength RLE  Strength RLE: Exception  Comment: not formally assessed 2/2 pain in RLE  Strength LLE  Strength LLE: WFL  Comment: grossly 3/5  Tone RLE  RLE Tone: Normotonic  Tone LLE  LLE Tone: Normotonic  Motor Control  Gross Motor?: WFL  Sensation  Overall Sensation Status: WFL  Bed mobility  Rolling to Left: Moderate assistance  Supine to Sit: Moderate assistance  Scooting:  Moderate assistance  Transfers  Sit to Stand: Minimal Assistance  Stand to sit: Minimal Assistance  Bed to Chair: Minimal assistance  Ambulation  Ambulation?: Yes  Ambulation 1  Surface: level tile  Device: Rolling Walker  Assistance: Minimal assistance  Quality of Gait: forward flexed posture, dec miguel angel, wide HOANG, dec stance time RLE, shuffled steps, assist with RW and wt shifting  Distance: 5  Comments: bed to chair  Stairs/Curb  Stairs?: No     Balance  Posture: Fair  Sitting - Static: Good  Sitting - Dynamic: Fair;+  Standing - Static: Fair  Standing - Dynamic: Fair;-  Comments: sat EOB ~20 min      Treatment included gait/transfer training, pt education. Plan   Plan  Times per week: 2-5  Current Treatment Recommendations: Strengthening, Safety Education & Training, Balance Training, Endurance Training, Functional Mobility Training, Transfer Training, Gait Training  Safety Devices  Type of devices:  All fall risk precautions in place, Call light within reach, Chair alarm in place, Left in chair, Nurse notified, Gait belt  Restraints  Initially in place: No    G-Code       OutComes Score                                                  AM-PAC Score  AM-PAC Inpatient Mobility Raw Score : 14 (09/23/21 1059)  AM-PAC Inpatient T-Scale Score : 38.1 (09/23/21 1059)  Mobility Inpatient CMS 0-100% Score: 61.29 (09/23/21 1059)  Mobility Inpatient CMS G-Code Modifier : CL (09/23/21 1059)          Goals  Short term goals  Time Frame for Short term goals: Until D/C  Short term goal 1: Supine to/from sit with SBA  Short term goal 2: Sit to/from stand with SBA  Short term goal 3: Ambulate 22' with RW and SBA  Short term goal 4: Perform 15 reps of BLE in 3 days  Long term goals  Time Frame for Long term goals : STGs=LTGs  Patient Goals   Patient goals : \"walk\"       Therapy Time   Individual Concurrent Group Co-treatment   Time In 0915         Time Out 1030         Minutes 75         Timed Code Treatment Minutes: [address], PT   Electronically signed by John Mckeon PT on 9/23/2021 at 11:01 AM

## 2021-09-23 NOTE — CARE COORDINATION
CM spoke with patient at bedside with daughter on speaker phone. Patient is from [place], was in town for a  and had mechanical fall. Patient and daughter agree that she will need some rehab before returning home. They prefer to have her at rehab at home in [place]. CM faxed a referral to [facility] [phone], left a voicemail for admissions at [phone].       Radha Trent, RN, BSN,   4th Floor Progressive Care Unit  [phone]

## 2021-09-23 NOTE — FLOWSHEET NOTE
Spiritual Care Note  Referral for acp conversation. See ACP note. Also able to provide empathic listening support to pt after her fall. Pt shared about her health, her move to [place] and her family support. Pt also requested communion, so I referred her name to Fr. Kaitlin Capps. Pt was thankful to be able to receive sacrament. 9/23/2021  Chaplain Vin Brunson [facility]     09/23/21 1106   Encounter Summary   Services provided to: Patient   Referral/Consult From: Palliative Care   Support System Children   Continue Visiting   ( acp 9 23 21)   Complexity of Encounter Moderate   Length of Encounter 45 minutes   Advance Care Planning Yes   Routine   Type Initial   Assessment Calm; Approachable;Coping   Intervention Active listening;Explored feelings, thoughts, concerns;Prayer;Sustaining presence/ Ministry of presence; Discussed illness/injury and it's impact; Discussed belief system/Restorationist practices/karen   Outcome Acceptance;Comfort;Engaged in conversation;Expressed feelings/needs/concerns

## 2021-09-24 ENCOUNTER — APPOINTMENT (OUTPATIENT)
Dept: VASCULAR LAB | Age: 81
DRG: 091 | End: 2021-09-24
Payer: MEDICARE

## 2021-09-24 LAB
ANION GAP SERPL CALCULATED.3IONS-SCNC: 7 MMOL/L (ref 3–16)
BUN BLDV-MCNC: 21 MG/DL (ref 7–20)
CALCIUM SERPL-MCNC: 9.2 MG/DL (ref 8.3–10.6)
CHLORIDE BLD-SCNC: 95 MMOL/L (ref 99–110)
CO2: 31 MMOL/L (ref 21–32)
CREAT SERPL-MCNC: 0.9 MG/DL (ref 0.6–1.2)
GFR AFRICAN AMERICAN: >60
GFR NON-AFRICAN AMERICAN: >60
GLUCOSE BLD-MCNC: 109 MG/DL (ref 70–99)
HCT VFR BLD CALC: 26.2 % (ref 36–48)
HEMOGLOBIN: 8.4 G/DL (ref 12–16)
MCH RBC QN AUTO: 30.8 PG (ref 26–34)
MCHC RBC AUTO-ENTMCNC: 32 G/DL (ref 31–36)
MCV RBC AUTO: 96.3 FL (ref 80–100)
PDW BLD-RTO: 18.3 % (ref 12.4–15.4)
PLATELET # BLD: 188 K/UL (ref 135–450)
PMV BLD AUTO: 9 FL (ref 5–10.5)
POTASSIUM REFLEX MAGNESIUM: 4.3 MMOL/L (ref 3.5–5.1)
RBC # BLD: 2.72 M/UL (ref 4–5.2)
SODIUM BLD-SCNC: 133 MMOL/L (ref 136–145)
WBC # BLD: 13.7 K/UL (ref 4–11)

## 2021-09-24 PROCEDURE — 6370000000 HC RX 637 (ALT 250 FOR IP): Performed by: INTERNAL MEDICINE

## 2021-09-24 PROCEDURE — 6360000002 HC RX W HCPCS: Performed by: INTERNAL MEDICINE

## 2021-09-24 PROCEDURE — 94761 N-INVAS EAR/PLS OXIMETRY MLT: CPT

## 2021-09-24 PROCEDURE — 2700000000 HC OXYGEN THERAPY PER DAY

## 2021-09-24 PROCEDURE — 85027 COMPLETE CBC AUTOMATED: CPT

## 2021-09-24 PROCEDURE — 97530 THERAPEUTIC ACTIVITIES: CPT

## 2021-09-24 PROCEDURE — 97535 SELF CARE MNGMENT TRAINING: CPT

## 2021-09-24 PROCEDURE — 80048 BASIC METABOLIC PNL TOTAL CA: CPT

## 2021-09-24 PROCEDURE — 93970 EXTREMITY STUDY: CPT

## 2021-09-24 PROCEDURE — 36415 COLL VENOUS BLD VENIPUNCTURE: CPT

## 2021-09-24 PROCEDURE — 2580000003 HC RX 258: Performed by: INTERNAL MEDICINE

## 2021-09-24 PROCEDURE — 97116 GAIT TRAINING THERAPY: CPT

## 2021-09-24 PROCEDURE — 94660 CPAP INITIATION&MGMT: CPT

## 2021-09-24 PROCEDURE — 1200000000 HC SEMI PRIVATE

## 2021-09-24 RX ORDER — PREDNISONE 1 MG/1
3 TABLET ORAL DAILY
COMMUNITY

## 2021-09-24 RX ORDER — OYSTER SHELL CALCIUM WITH VITAMIN D 500; 200 MG/1; [IU]/1
1 TABLET, FILM COATED ORAL DAILY
COMMUNITY

## 2021-09-24 RX ORDER — PREDNISONE 1 MG/1
4 TABLET ORAL DAILY
Status: DISCONTINUED | OUTPATIENT
Start: 2021-09-25 | End: 2021-09-25 | Stop reason: HOSPADM

## 2021-09-24 RX ORDER — FUROSEMIDE 10 MG/ML
40 INJECTION INTRAMUSCULAR; INTRAVENOUS ONCE
Status: COMPLETED | OUTPATIENT
Start: 2021-09-24 | End: 2021-09-24

## 2021-09-24 RX ORDER — FUROSEMIDE 40 MG/1
80 TABLET ORAL DAILY PRN
COMMUNITY

## 2021-09-24 RX ADMIN — TROSPIUM CHLORIDE 20 MG: 20 TABLET, FILM COATED ORAL at 20:33

## 2021-09-24 RX ADMIN — ATORVASTATIN CALCIUM 10 MG: 10 TABLET, FILM COATED ORAL at 09:50

## 2021-09-24 RX ADMIN — GABAPENTIN 300 MG: 600 TABLET, FILM COATED ORAL at 20:33

## 2021-09-24 RX ADMIN — GABAPENTIN 300 MG: 600 TABLET, FILM COATED ORAL at 16:00

## 2021-09-24 RX ADMIN — FUROSEMIDE 40 MG: 10 INJECTION, SOLUTION INTRAMUSCULAR; INTRAVENOUS at 15:59

## 2021-09-24 RX ADMIN — DICLOFENAC SODIUM 4 G: 10 GEL TOPICAL at 11:56

## 2021-09-24 RX ADMIN — ESCITALOPRAM OXALATE 20 MG: 20 TABLET ORAL at 20:33

## 2021-09-24 RX ADMIN — MONTELUKAST 10 MG: 10 TABLET, FILM COATED ORAL at 09:50

## 2021-09-24 RX ADMIN — Medication 10 ML: at 09:54

## 2021-09-24 RX ADMIN — FUROSEMIDE 40 MG: 10 INJECTION, SOLUTION INTRAMUSCULAR; INTRAVENOUS at 11:55

## 2021-09-24 RX ADMIN — Medication 250 MG: at 09:51

## 2021-09-24 RX ADMIN — APIXABAN 5 MG: 5 TABLET, FILM COATED ORAL at 20:33

## 2021-09-24 RX ADMIN — OXYCODONE HYDROCHLORIDE AND ACETAMINOPHEN 1 TABLET: 5; 325 TABLET ORAL at 16:00

## 2021-09-24 RX ADMIN — DICLOFENAC SODIUM 4 G: 10 GEL TOPICAL at 20:36

## 2021-09-24 RX ADMIN — TRAZODONE HYDROCHLORIDE 50 MG: 50 TABLET ORAL at 09:51

## 2021-09-24 RX ADMIN — PREDNISONE 30 MG: 20 TABLET ORAL at 09:50

## 2021-09-24 RX ADMIN — Medication 10 ML: at 20:33

## 2021-09-24 RX ADMIN — OXYCODONE HYDROCHLORIDE AND ACETAMINOPHEN 1 TABLET: 5; 325 TABLET ORAL at 08:17

## 2021-09-24 RX ADMIN — APIXABAN 5 MG: 5 TABLET, FILM COATED ORAL at 09:51

## 2021-09-24 RX ADMIN — CETIRIZINE HYDROCHLORIDE 10 MG: 10 TABLET, FILM COATED ORAL at 09:51

## 2021-09-24 RX ADMIN — Medication 250 MG: at 20:33

## 2021-09-24 RX ADMIN — GABAPENTIN 300 MG: 600 TABLET, FILM COATED ORAL at 09:51

## 2021-09-24 ASSESSMENT — PAIN DESCRIPTION - PROGRESSION
CLINICAL_PROGRESSION: NOT CHANGED
CLINICAL_PROGRESSION: NOT CHANGED

## 2021-09-24 ASSESSMENT — PAIN DESCRIPTION - PAIN TYPE
TYPE: CHRONIC PAIN
TYPE: CHRONIC PAIN

## 2021-09-24 ASSESSMENT — PAIN SCALES - GENERAL
PAINLEVEL_OUTOF10: 0
PAINLEVEL_OUTOF10: 5
PAINLEVEL_OUTOF10: 6
PAINLEVEL_OUTOF10: 0
PAINLEVEL_OUTOF10: 8
PAINLEVEL_OUTOF10: 9
PAINLEVEL_OUTOF10: 0

## 2021-09-24 ASSESSMENT — PAIN DESCRIPTION - DESCRIPTORS
DESCRIPTORS: ACHING
DESCRIPTORS: ACHING

## 2021-09-24 ASSESSMENT — PAIN DESCRIPTION - ONSET
ONSET: ON-GOING
ONSET: ON-GOING

## 2021-09-24 ASSESSMENT — PAIN DESCRIPTION - ORIENTATION
ORIENTATION: MID
ORIENTATION: MID

## 2021-09-24 ASSESSMENT — PAIN DESCRIPTION - FREQUENCY
FREQUENCY: CONTINUOUS
FREQUENCY: CONTINUOUS

## 2021-09-24 ASSESSMENT — PAIN DESCRIPTION - LOCATION
LOCATION: BACK
LOCATION: BACK

## 2021-09-24 NOTE — PROGRESS NOTES
Report called to evelia at [facility], they are aware that patient will not arrive until tomorrow during the day

## 2021-09-24 NOTE — CONSULTS
Clinical Pharmacy Progress Note  Pharmacy to assist with Home Medication information    Admit date: 9/22/2021     Subjective/Objective:  Patient is a 74yr old female with planned discharge for today. Pharmacy has been asked by Dr. Leila Morley to assist with obtaining home medication information. This was completed upon admission 9/22 per pharmacist.  Please see copied not below from 9/22. A Perfect Serve message has been sent to Dr. Leila Morley. Thank you, please call with questions. Jacqueline OlsonD., [facility]   9/24/2021 2:43 PM  Wireless: [phone]            List of Home Medications the patient is currently taking is complete. Home Medication list in EPIC updated to reflect changes noted below. Source of medications in list is interview with patient's daughter.       Medications added:   Metoprolol succinate   Humira   Apixaban   Vitamin D   Vitamin C   Dapsone   Methotrexate   Atorvastatin   Spironolactone   Folic acid    Medications removed:   Amlodipine   Guaifenesin   Hydralazine     Medications adjusted:   Albuterol neb q4h PRN adjusted to q6h PRN   Albuterol MDI q6h PRN adjusted to q4-6h PRN   Calcium-D 2 tablets daily adjusted to 1 tablet BID   Colesipol 1g daily adjusted to 1g daily PRN (diarrhea, does not take when constipated)   Escitalopram 10 mg daily adjusted to 20 mg daily   Furosemide 40 mg daily instructions updated to include PRN dose: 80 mg with weight gain of >2lb in 24h   Gabapentin 600 mg TID adjusted to 300 mg TID   Oxycodone-APAP 5-325 mg q8h adjusted to q6h PRN   Prednisone 30 mg daily adjusted to 3 mg daily

## 2021-09-24 NOTE — CARE COORDINATION
Case Management Assessment            Discharge Note                    Date / Time of Note: 9/24/2021 2:57 PM                  Discharge Note Completed by: Crystal Zepeda RN    Patient Name: Sharif Walsh   YOB: 1940  Diagnosis: Fever, unknown origin [R50.9]  Acute encephalopathy [G93.40]  Sprain of right ankle, unspecified ligament, initial encounter [S93.401A]  Multifocal pneumonia [J18.9]   Date / Time: 9/22/2021  9:04 AM    Current PCP: Shilpa Franklin (Inactive)  Clinic patient: No    Hospitalization in the last 30 days: No    Advance Directives:  Code Status: Full Code  [place] DNR form completed and on chart: Not Indicated    Financial:  Payor: MEDICARE / Plan: MEDICARE PART A AND B / Product Type: *No Product type* /      Pharmacy:    Gene Nelson [address] [phone]  [facility] [address]  Phone: [phone] Fax: [facility], [address] [place] [address] [phone] - f [phone]  [facility] [address]  [address] [address]  Phone: [phone] Fax: [phone] [address] [phone] - X Derrell Swartz [address] [place] [address]  Phone: [phone] Fax: [phone]    [facility] [address] [place] [address] [place] [phone] - F [phone]  [address] [facility] [address]  Phone: [phone] Fax: [phone]    [facility] [address] [place] [facility], [address] [place] [address] [address] [phone] - f [phone] [address] [facility]  [address] [facility] [address] [facility] [address] [address]  Phone: [phone] Fax: [phone]      Assistance purchasing medications?: Potential Assistance Purchasing Medications: No  Assistance provided by Case Management: None at this time    Does patient want to participate in local refill/ meds to beds program?: No    Meds To Beds General Rules:  1.  Can ONLY be done Monday- Friday between 8: 30am-5pm  2. Prescription(s) must be in pharmacy by 3pm to be filled same day  3. Copy of patient's insurance/ prescription drug card and patient face sheet must be sent along with the prescription(s)  4. Cost of Rx cannot be added to hospital bill. If financial assistance is needed, please contact unit  or ;  or  CANNOT provide pharmacy voucher for patients co-pays  5. Patients can then  the prescription on their way out of the hospital at discharge, or pharmacy can deliver to the bedside if staff is available. (payment due at time of pick-up or delivery - cash, check, or card accepted)     Able to afford home medications/ co-pay costs: Yes    ADLS:  Current PT AM-PAC Score: 16 /24  Current OT AM-PAC Score: 17 /24      DISCHARGE Disposition: [facility] Phone: [phone] Fax: [phone]    LOC at discharge: Skilled  [address] Completed: Yes    Notification completed in HENS/PAS?:  Not Applicable    IMM Completed:   Not Indicated    Transportation:  Transportation PLAN for discharge: family   Mode of Transport: Private Car    Home Care:  Home Care ordered at discharge: Not [address]: Not Applicable  Orders faxed: No    Durable Medical Equipment:  DME Provider: n/a  Equipment obtained during hospitalization: defer    Home Oxygen and Respiratory Equipment:  Oxygen needed at discharge?: Yes  [address]: Not Applicable  Portable tank available for discharge?: Yes, patient has own portable concentrator          Additional CM Notes:   Patient is discharging to [facility] in [place] [address] [facility]. Orders faxed to [phone], nurse to call report to [phone]. Patient's daughter to transport home in the morning of 9/25/21.       The Plan for Transition of Care is related to the following treatment goals of Fever, unknown origin [R50.9]  Acute encephalopathy [G93.40]  Sprain of right ankle, unspecified ligament, initial encounter [S93.401A]  Multifocal pneumonia [J18.9]    The Patient and/or patient representative Silvia Perez and her family were provided with a choice of provider and agrees with the discharge plan Yes    Freedom of choice list was provided with basic dialogue that supports the patient's individualized plan of care/goals and shares the quality data associated with the providers.  Yes    Care Transitions patient: No    Calli Mendez RN  [facility]  Case Management Department  Ph: [phone]  Fax: [phone]

## 2021-09-24 NOTE — PROGRESS NOTES
Occupational Therapy  Facility/Department: [place] [address] [facility]U  Daily Treatment Note  NAME: Armani Gage  : 1940  MRN: [id number]    Date of Service: 2021    Discharge Recommendations:  Armani Gage scored a 17/24 on the AM-PAC ADL Inpatient form. Current research shows that an AM-PAC score of 17 or less is typically not associated with a discharge to the patient's home setting. Based on the patient's AM-PAC score and their current ADL deficits, it is recommended that the patient have 3-5 sessions per week of Occupational Therapy at d/c to increase the patient's independence. Please see assessment section for further patient specific details. If patient discharges prior to next session this note will serve as a discharge summary. Please see below for the latest assessment towards goals. Assessment   Performance deficits / Impairments: Decreased functional mobility ; Decreased ADL status; Decreased balance;Decreased endurance  Assessment: Daughter on Facetime during OT treatment. Pt making functional progress - able to ambulate in room and ambulate to/from toilet using wh walker, CGA. Varying levels of assist for transfers (CGA from bed/toilet; Mod A from low chair). Pt is requiring hands on assist for mobility and all standing activity. Plans for SNF at discharge are appropriate. Daughter indicating she does not feel she can physically manage pt in her current state. Pt will benefit from ongoing OT at discharge - pt/family wanting for placement at [facility] in [place]. Dtr and dtr's  are intending to travel w/ pt to [place].   Treatment Diagnosis: impaired ADLs/transfers s/p fall  Prognosis: Good  OT Education: OT Role;Plan of Care;Transfer Training;ADL Adaptive Strategies  Patient Education: would benefit from further education and reinforcement  REQUIRES OT FOLLOW UP: Yes  Activity Tolerance  Activity Tolerance: Patient Tolerated treatment well  Safety Devices  Safety Devices in place: Yes  Type of devices: Nurse notified; Left in chair;Chair alarm in place;Call light within reach (nurse in room)         Patient Diagnosis(es): The primary encounter diagnosis was Sprain of right ankle, unspecified ligament, initial encounter. Diagnoses of Fever, unknown origin and Multifocal pneumonia were also pertinent to this visit. has a past medical history of Allergic rhinitis, Asthma, Chicken pox, Chronic diastolic CHF (congestive heart failure) ([address] Depression, Diverticulosis of large intestine, Essential hypertension, Gastroesophageal reflux disease without esophagitis, GERD (gastroesophageal reflux disease), Hyperlipidemia, Hypertension, Interstitial lung disease ([address] Mitral valve regurgitation, Mixed hyperlipidemia, Obesity, Osteoarthritis, Osteoporosis, Polio, Prolonged emergence from general anesthesia, RA (rheumatoid arthritis) ([address] Rheumatoid arthritis ([address] and Sleep apnea. has a past surgical history that includes Appendectomy; Cholecystectomy; Salpingo-oophorectomy; Carpal tunnel release (Bilateral); Foot surgery (Bilateral); Total shoulder arthroplasty (Right); other surgical history (Right, 02/20/2017); Hysterectomy, total abdominal; Tonsillectomy; Shoulder arthroscopy (Left, 11/15/2017); joint replacement (Right, 04/25/2016); joint replacement (Right); Total knee arthroplasty (Right); bronchoscopy (N/A, 4/5/2019); and bronchoscopy (4/5/2019). Restrictions  Position Activity Restriction  Other position/activity restrictions: Up as Tolerated    Subjective   General  Chart Reviewed: Yes  Patient assessed for rehabilitation services?: Yes  Additional Pertinent Hx: 80 y.o. F to the ED after she slipped yesterday while trying to get into a car. Hospital Course: R Ankle: neg; R Foot: neg; R Tib/Fib: neg; CT Head: neg; COVID (-). PMH: Asthma, CHF, HTN, Hyperlipidemia, OA, RA, Appy, Gertrudis, ASUNCION, R TKR, R TSR.   Family / Caregiver Present: Yes (Facetime call with dtr)  Referring Practitioner: Dr. Amelie Alpers  Diagnosis: Fever, Unknown Origin    Subjective  Subjective: In bed on entry. Reports pain improved today. Omar Qureshi to participate in OT today. Vital Signs  Patient Currently in Pain:  (min c/o R ankle pain w/ mobility - \"I want to stay ahead of the pain, though. \")     Orientation  Orientation  Overall Orientation Status:  (oriented w/ conversation; dtr on Facetime stating pt appeared \"a little off\" this morning)    Objective    ADL  Grooming: Setup  UE Bathing: Stand by assistance;Setup (seated)  LE Bathing: Maximum assistance  UE Dressing: Minimal assistance (don/doff gown, cardiac monitor and O2 tubing)  LE Dressing: Moderate assistance (assist to thread both feet into depends, CGA pullup)  Toileting: Contact guard assistance           Balance  Sitting Balance: Supervision (static, CGA when reaching forward for LB dressing)  Standing Balance: Contact guard assistance    Functional Mobility  Functional - Mobility Device: Rolling Walker  Activity: To/from bathroom; Other (to/from doorway in room (~24'))  Assist Level: Contact guard assistance  Functional Mobility Comments: Note: steady    Toilet Transfers  Toilet - Technique: Ambulating (using wh walker)  Equipment Used: Standard toilet (w/ bar)  Toilet Transfer: Contact guard assistance    Bed mobility  Rolling to Left: Minimal assistance  Supine to Sit: Minimal assistance (to facilitate roll onto side, increased time/effort to reach for bar and to elevate trunk)  Scooting: Contact guard assistance (to EOB)     Transfers  Sit to stand:  (CGA (from bed, toilet w/ bar);  Mod A (from low chair - c/o RA wrist inflammation))  Stand to sit: Contact guard assistance                          Cognition  Overall Cognitive Status: [facility]                                            Plan   Plan  Times per week: 2-5  Times per day: Daily  Current Treatment Recommendations: Strengthening, Balance Training, Functional Mobility Training, Endurance Training, Safety Education & Training, Equipment Evaluation, Education, & procurement, Self-Care / ADL                                                   AM-PAC Score        AM-PAC Inpatient Daily Activity Raw Score: 17 (09/24/21 1227)  AM-PAC Inpatient ADL T-Scale Score : 37.26 (09/24/21 1227)  ADL Inpatient CMS 0-100% Score: 50.11 (09/24/21 1227)  ADL Inpatient CMS G-Code Modifier : CK (09/24/21 1227)    Goals  Short term goals  Time Frame for Short term goals: Discharge  Short term goal 1: BSC transfer w/ CGA (d/c goal 9/24 - amb to BR); revised goal: toilet transfer w/ SBA (not met)  Short term goal 2: LB dressing w/ or w/o AE, CGA (not met)  Short term goal 3: bed mobility w/ Min A (MET 9/24); revised goal: bed mobility w/ CGA (not met)  Patient Goals   Patient goals : to increase independent status       Therapy Time   Individual Concurrent Group Co-treatment   Time In 1120         Time Out 1215         Minutes [address] [place] [address] OTR/L [id number]

## 2021-09-24 NOTE — PROGRESS NOTES
Hypertension, Interstitial lung disease ([address] Mitral valve regurgitation, Mixed hyperlipidemia, Obesity, Osteoarthritis, Osteoporosis, Polio, Prolonged emergence from general anesthesia, RA (rheumatoid arthritis) ([address] Rheumatoid arthritis ([address] and Sleep apnea. has a past surgical history that includes Appendectomy; Cholecystectomy; Salpingo-oophorectomy; Carpal tunnel release (Bilateral); Foot surgery (Bilateral); Total shoulder arthroplasty (Right); other surgical history (Right, 02/20/2017); Hysterectomy, total abdominal; Tonsillectomy; Shoulder arthroscopy (Left, 11/15/2017); joint replacement (Right, 04/25/2016); joint replacement (Right); Total knee arthroplasty (Right); bronchoscopy (N/A, 4/5/2019); and bronchoscopy (4/5/2019). Restrictions  Restrictions/Precautions  Restrictions/Precautions:  (..)  Position Activity Restriction  Other position/activity restrictions: Up as Tolerated  Subjective   General  Chart Reviewed: Yes  Additional Pertinent Hx: 80 y.[facility]. [facility] with moderate ankle pain that began yesterday and has persisted until presentation. This is in the context of a mechanical fall yesterday. Subjective  Subjective: Pt found sitting in chair. Agreeable to PT. Pain Screening  Patient Currently in Pain: Denies  Vital Signs  Patient Currently in Pain: Denies       Orientation     Cognition      Objective   Bed mobility  Sit to Supine: Stand by assistance  Transfers  Sit to Stand:  Moderate Assistance (from low chair, CGA from commode with grab bar)  Stand to sit: Contact guard assistance  Ambulation  Ambulation?: Yes  Ambulation 1  Device: Rolling Walker  Other Apparatus: O2 (3L)  Assistance: Contact guard assistance  Quality of Gait: decreased miguel angel, decreased bilat step length/height, no LOB  Distance: 80', 10'                                 G-Code     OutComes Score                                                     AM-PAC Score  AM-PAC Inpatient Mobility Raw Score : 16 (09/24/21 1405)  AM-PAC Inpatient T-Scale Score : 40.78 (09/24/21 1405)  Mobility Inpatient CMS 0-100% Score: 54.16 (09/24/21 1405)  Mobility Inpatient CMS G-Code Modifier : CK (09/24/21 1405)          Goals  Short term goals  Time Frame for Short term goals: Until D/C  Short term goal 1: Supine to/from sit with SBA. Ongoing  Short term goal 2: Sit to/from stand with SBA. Ongoing  Short term goal 3: Ambulate 22' with RW and SBA. Ongoing  Short term goal 4: Perform 15 reps of BLE in 3 days.   Ongoing  Long term goals  Time Frame for Long term goals : STGs=LTGs  Patient Goals   Patient goals : \"walk\"    Plan    Plan  Times per week: 2-5  Current Treatment Recommendations: Strengthening, Safety Education & Training, Balance Training, Endurance Training, Functional Mobility Training, Transfer Training, Gait Training  Safety Devices  Type of devices: Call light within reach, Nurse notified, Left in bed, Bed alarm in place  Restraints  Initially in place: No     Therapy Time   Individual Concurrent Group Co-treatment   Time In 1335         Time Out 1400         Minutes 25               Timed Code Treatment Minutes:  10    Total Treatment Minutes:  [address], PT

## 2021-09-24 NOTE — PLAN OF CARE
Problem: Falls - Risk of:  Goal: Will remain free from falls  Description: Will remain free from falls  Outcome: Ongoing     Problem: Falls - Risk of:  Goal: Absence of physical injury  Description: Absence of physical injury  Outcome: Ongoing   Patient will be discharged tomorrow morning to go to rehab with daughter. Patient tolerating ambulation around room with therapy today.  Will continue to get patient to and from bathroom a she tolerates

## 2021-09-24 NOTE — PROGRESS NOTES
Hospital Medicine Progress Note    PCP: Tami Burns (Inactive)      Chief Complaint:     Chief Complaint   Patient presents with    Knee Pain    Ankle Pain     80 y.o. female with history of asthma, chronic diastolic CHF, hypertension, chronic respiratory failure with interstitial lung disease (on home oxygen), chronic lumbar back pain (s/p L1/L2 branch ablations and steroid injections, on daily percocet),  who presented to [facility] with moderate ankle pain that began after a fall day prior. She slipped day prior to presentation,  while trying to get into a car, and developed pain in right knee and ankle. Denies falling, denies LOC, denies hitting head. She uses a walker at baseline due to her rheumatoid arthritis and has had difficulty using the walker in the setting of her right ankle pain. Patient states she takes oxycodone for chronic back pain, states she took one at morning of presentation for her knee pain with no relief. She otherwise has felt well, denies any known fevers, chills, cough, chest pain or shortness of breath. She denies abdominal pain, nausea or vomiting, changes in bowel movements or urinary symptoms. She is vaccinated against COVID and denies any known sick exposures. On presentation, she was febrile at 100.5. She stated this is normal, as she has \"chronic pneumonia\" on which she is on 3L 02 for, and  interstitial lung disease. Family stated that she is sleepier than usual and they are concerned that she might have infection as she is immunosuppressed on medication for her rheumatoid arthritis. Interval HPI  No new complaints  No chest pain  No dyspnea    Noted leg edema, R> Left. Pitting      Medications: reviewed    Allergies:    Sulfa antibiotics, Lamotrigine, Lomotil  [diphenoxylate-atropine], and Sulfacetamide sodium    REVIEW OF SYSTEMS:   Pertinent positives and negatives as noted in the HPI.  All other systems reviewed and (Dr. Kim Pozo) o/p      # Chronic diastolic heart failure; mild acute exac  - She has a hx of diastolic CHF, EF 34-22%, Grade 2 DD  - Some LE edema   - On lasix 20 mg and spironolactone at home  - Some evidence for acute component, with mild fluid o/l on exam  - Check pro-BNP; consider 1-2 doses IV lasix, then resume home diuretics  - Place on telemetry      # Sprained ankle  -XR: Extensive postoperative changes negative for fractures  -analgesia as needed  - PT/OT      DEWAYNE  -Continue home BiPAP      #Chronic pain, narcotic dependent  #Chronic lumbar back pain (s/p L1/L2 branch ablations and steroid injections, - Opioid dependent      # Rheumatoid arthritis  #Immunosuppression  - On Humira, MTX and Prednisone      Morbid obesity due to excess calories Body mass index is 43.7 kg/m². - Complicating assessment and treatment. Placing patient at risk for multiple co-morbidities as well as early death and contributing to the patient's presentation.  on weight loss when appropriate.       Chronic anticoagulation  - On Eliquis  -  Will verify indication        DVT Prophylaxis: Eliquis  Diet: ADULT DIET; Regular;  Low Sodium (2 gm)  Code Status: Full Code    PT/OT Eval Status: Ongoing    Dispo: PT/OT eval noted  Will need SNF  For DC with daughter, to take her to SNF in [place]

## 2021-09-24 NOTE — DISCHARGE INSTR - COC
Continuity of Care Form    Patient Name: Jarad Serrano   :  1940  MRN:  [id number]    Admit date:  2021  Discharge date:  2021    Code Status Order: Full Code   Advance Directives:      Admitting Physician:  Karin Beltran MD  PCP: Aden Gruber (Inactive)    Discharging Nurse: Santiago Ackerman [facility] Unit/Room#: 0768/6222-79  Discharging Unit Phone Number: [phone]    Emergency Contact:   Extended Emergency Contact Information  Primary Emergency Contact: [address] of [address] [place] [address] Phone: [phone]  Relation: Child  Secondary Emergency Contact: [address] Phone: [phone]  Mobile Phone: [phone]  Relation: Child    Past Surgical History:  Past Surgical History:   Procedure Laterality Date    APPENDECTOMY      BRONCHOSCOPY N/A 2019    BRONCHOSCOPY ALVEOLAR LAVAGE- RIGHT/ LEFT UPPER LOBE performed by Claire Mendoza MD at [facility]  2019    BRONCHOSCOPY DIAGNOSTIC OR CELL [address] performed by Claire Mendoza MD at [address] [facility] Bilateral     CHOLECYSTECTOMY      FOOT SURGERY Bilateral     metatarsal removal    HYSTERECTOMY, TOTAL ABDOMINAL      JOINT REPLACEMENT Right 2016    Dr. Roberto Bridge , shoulder    JOINT REPLACEMENT Right     OTHER SURGICAL HISTORY Right 2017    RIGHT TOTAL KNEE ARTHROPLASTY             SALPINGO-OOPHORECTOMY      SHOULDER ARTHROPLASTY Right     SHOULDER ARTHROSCOPY Left 11/15/2017    TONSILLECTOMY      TOTAL KNEE ARTHROPLASTY Right        Immunization History:   Immunization History   Administered Date(s) Administered    COVID-19, Moderna, PF, 100mcg/0.5mL 2021, 2021    Influenza Nasal 10/22/2008    Influenza Vaccine, unspecified formulation 2016, 2017    Influenza, High Dose (Fluzone 65 yrs and older) 10/08/2015, 2017, 10/05/2018    Pneumococcal Conjugate 13-valent Goyo Neighbours) 10/19/2015    Pneumococcal Polysaccharide ([id number]) 09/30/2009    Tdap (Boostrix, Adacel) 08/22/2014    Zoster Live (Zostavax) 09/30/2009    Zoster Recombinant (Shingrix) 03/06/2018       Active Problems:  Patient Active Problem List   Diagnosis Code    Candidiasis of skin and nails B37.2    Dermatophytosis of nail B35.1    Depressive disorder, not elsewhere classified F32.9    Obstructive sleep apnea syndrome G47.33    Diverticulosis of large intestine K57.30    Allergic rhinitis J30.9    Osteoporosis M81.0    Rheumatoid arthritis ([facility]) M06.9    Diaphragmatic hernia K44.9    Mitral valve regurgitation I34.0    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    Complete tear of left rotator cuff M75.122    Biceps tendinitis on right M75.21    Postmenopausal osteoporosis M81.0    Asthma J45.909    Arthritis of right acromioclavicular joint M19.011    Glenohumeral arthritis right M19.019    Essential hypertension I10    Gastroesophageal reflux disease without esophagitis K21.9    Mixed hyperlipidemia E78.2    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    Chronic pain of right knee M25.561, G89.29    Morbid obesity with BMI of 40.0-44.9, adult ([facility]) E66.01, Z68.41    Chronic nonseasonal allergic rhinitis due to pollen J30.89    Mild intermittent asthma without complication A30.55    Interstitial lung disease ([facility]) J84.9    Chronic low back pain M54.5, G89.29    Disorder resulting from impaired renal function N25.9    Spinal stenosis M48.00    Status post total right knee replacement using cement on 2/20/2017 Z96.651    Major depressive disorder with single episode, in partial remission ([facility]) F32.4    Rheumatoid arthritis of multiple sites with negative rheumatoid factor ([facility]) M06.09    Osteoarthritis of lumbar spine M47.816    Immunosuppression ([facility]) D84.9    S/P left rotator cuff repair Z98.890    Primary osteoarthritis of left shoulder M19.012    Recurrent cold sores B00.1    Diarrhea R19.7    Nausea with vomiting R11.2    Chronic diarrhea K52.9    Hypokalemia E87.6    Hypomagnesemia E83.42    Weight loss R63.4    Vertigo R42    Acute diverticulitis K57.92    Hypoxia R09.02    Chronic diastolic CHF (congestive heart failure) (H[facility]) I50.32    HB (heart block) I45.9    Multifocal pneumonia J18.9    Acute encephalopathy G93.40       Isolation/Infection:   Isolation            No Isolation          Patient Infection Status       Infection Onset Added Last Indicated Last Indicated By Review Planned Expiration Resolved Resolved By    None active    Resolved    COVID-19 Rule Out 09/22/21 09/22/21 09/22/21 COVID-19 (Ordered)   09/23/21 Rule-Out Test Resulted            Nurse Assessment:  Last Vital Signs: /66   Pulse 79   Temp 98.1 °F (36.7 °C) (Oral)   Resp 16   Ht 5' (1.524 m)   Wt 223 lb 12.3 oz (101.5 kg)   SpO2 94%   BMI 43.70 kg/m²     Last documented pain score (0-10 scale): Pain Level: 0  Last Weight:   Wt Readings from Last 1 Encounters:   09/24/21 223 lb 12.3 oz (101.5 kg)     Mental Status:  oriented, alert, coherent, logical, thought processes intact and able to concentrate and follow conversation    IV Access:  - None    Nursing Mobility/ADLs:  Walking   Assisted  Transfer  Assisted  Bathing  Assisted  Dressing  Assisted  Toileting  Assisted  Feeding  Independent  Med Admin  Independent  Med Delivery   whole    Wound Care Documentation and Therapy:        Elimination:  Continence:   · Bowel:  Yes  · Bladder: Yes  Urinary Catheter: None   Colostomy/Ileostomy/Ileal Conduit: No       Date of Last BM: 9/24/2021    Intake/Output Summary (Last 24 hours) at 9/24/2021 1413  Last data filed at 9/23/2021 1608  Gross per 24 hour   Intake --   Output 1000 ml   Net -1000 ml     I/O last 3 completed shifts:  In: -   Out: 1000 [Urine:1000]    Safety Concerns:     History of Falls (last 30 days) and At Risk for Falls    Impairments/Disabilities: None    Nutrition Therapy:  Current Nutrition Therapy:   - Oral Diet:  Low Sodium (2gm)    Routes of Feeding: Oral  Liquids: No Restrictions  Daily Fluid Restriction: no  Last Modified Barium Swallow with Video (Video Swallowing Test): not done    Treatments at the Time of Hospital Discharge:   Respiratory Treatments: ***  Oxygen Therapy:  is on oxygen at 3 L/min per nasal cannula. Ventilator:    - BiPAP    ,   only when sleeping, device from home and with 4-5 L/min oxygen     Heart Failure Instructions for Daily Management  Patient was treated for chronic diastolic heart failure. she  will require the following:     Please weigh daily on the same scale and approximately the same time of day. Report weight gain of 3 pounds/day or 5 pounds/week to :  Dr Ruth Ann Dalton -- her cardiologist in [place] .  Please use hospital discharge weight as baseline reference.  Please monitor for signs and symptoms of and report to MD:  o Worsening Heart Failure: sudden weight gain, shortness of breath, lower extremity or general edema/swelling, abdominal bloating/swelling, inability to lie flat, intolerance to usual activity, or cough (especially at night). Report these finding even if no increase in weight.  o Dehydration:  having difficulty or a decrease in urination, dizziness, worsening fatigue, or new onset/worsening of generalized weakness.  Please continue a LOW SODIUM diet and LIMIT fluid intake to 48 - 64 ounces ( 1.5 - 2 liters) per day.  Call  Dr Ruth Ann Dalton -- her cardiologist in [place]  with any questions or concerns.  Please continue heart failure education to patient and family/support system.  See After Visit Summary for hospital follow up appointment details.  Consider spiritual care referral for support and/or completion of advance directives .    Consider: [facility]  telehealth program if patient agreeable and able to participate and palliative care consult for ongoing goals of care, end of life, and/or chronic disease management discussions. Rehab Therapies: Physical Therapy and Occupational Therapy  Weight Bearing Status/Restrictions: No weight bearing restirctions  Other Medical Equipment (for information only, NOT a DME order):  walker  Other Treatments: ***    Patient's personal belongings (please select all that are sent with patient):  Glasses    RN SIGNATURE:  Electronically signed by Brittany Gr RN on 9/24/21 at 6:01 PM EDT    CASE MANAGEMENT/SOCIAL WORK SECTION    Inpatient Status Date: ***    Readmission Risk Assessment Score:  Readmission Risk              Risk of Unplanned Readmission:  19           Discharging to Facility/ Agency   · Name: [facility]  · Address: [facility], [address] [place] [address] [address]  · Phone:[phone]  · Fax:[phone]    Dialysis Facility (if applicable)   · Name:  · Address:  · Dialysis Schedule:  · Phone:  · Fax:    / signature: {Esignature:[id number]}    PHYSICIAN SECTION    Prognosis: Fair    Condition at Discharge: Stable    Rehab Potential (if transferring to Rehab): Fair    Recommended Labs or Other Treatments After Discharge:   F/u with Pulmonary, rheum in [place]    F/u with PCP in 5-7 days    Physician Certification: I certify the above information and transfer of Carlitos Patricio  is necessary for the continuing treatment of the diagnosis listed and that she requires [facility] for greater 30 days. Update Admission H&P: No change in H&P.  Please see DC summary    PHYSICIAN SIGNATURE:  Electronically signed by Jose Montanez MD on 9/24/21 at 2:19 PM EDT

## 2021-09-24 NOTE — DISCHARGE SUMMARY
Hospital Discharge Summary    Patient's PCP: Mikala Price (Inactive)  Admit Date: 9/22/2021   Discharge Date: 9/24/2021    Admitting Physician: Dr. Aureliano Morocho MD  Discharge Physician: Dr. Roya Smalls MD   Consults: none    HPI: 80 y.o. female with history of asthma, chronic diastolic CHF, hypertension, chronic respiratory failure with interstitial lung disease (on home oxygen), chronic lumbar back pain (s/p L1/L2 branch ablations and steroid injections, on daily percocet),  who presented to [facility]. with moderate ankle pain that began after a fall day prior.     She slipped day prior to presentation,  while trying to get into a car, and developed pain in right knee and ankle. Denies falling, denies LOC, denies hitting head. She uses a walker at baseline due to her rheumatoid arthritis and has had difficulty using the walker in the setting of her right ankle pain.      Patient states she takes oxycodone for chronic back pain, states she took one at morning of presentation for her knee pain with no relief.      She otherwise has felt well, denies any known fevers, chills, cough, chest pain or shortness of breath.  She denies abdominal pain, nausea or vomiting, changes in bowel movements or urinary symptoms.       She is vaccinated against COVID and denies any known sick exposures.     On presentation, she was febrile at 100.5. She stated this is normal, as she has \"chronic pneumonia\" on which she is on 3L 02 for, and  interstitial lung disease.      Family stated that she is sleepier than usual and they are concerned that she might have infection as she is immunosuppressed on medication for her rheumatoid arthritis.       Brief hospital course:  Given the concern of the patients presentation and the concern of the possible multi-factorial etiology contributing to patients symptomatology.  Patient was admitted and evaluated and found to have:       Discharge Diagnoses:   # Acute encephalopathy, possibly toxic encephalopathy sec to narcotics. POA  Fall: POA  - Presented following a fall  - Family state she has been sleepier than normal  - On chronic narcotics for pain  - Head CT: Moderate to severe multifocal white matter disease, presumably related to chronic small vessel ischemic changes. No acute intracranial hemorrhage or mass effect  - COVID: Negative  - UA: no strong features of infection  -TSH, ammonia, B12: All WNL  -PT/OT, CM consulted: SNF placement        SIRS POA  - Fever 38.1; Tachycardic 94 (very mild)and leucocytosis 13 (although on chronic prednisone)  - Has also had soft Bps  - No localizing s/s of infection  - Chest XR: Bilateral multifocal asymmetric airspace opacities and reticular opacities suggesting bilateral pneumonia and/or pulmonary edema  - Pro-calcitonin 0.11  - UA: neg for infection  - Monitor off antibx and re-eval if fever recurs  - Follow cx data        #Chronic hypoxic respiratory failure: POA  # Interstitial lung disease. POA  Pulmonary infiltrates. POA  -She is on her baseline 3L of oxygen  -Chest XR: Bilateral multifocal asymmetric airspace opacities and reticular opacities   - She has a hx of diastolic CHF, EF 34-99%, Grade 2 DD  - H/e also hx of ILD: She was seen by pulmonology in 2019, who recommended initiation of systemic glucocorticoid therapy given concern for BOOP/cryptogenic organizing pneumonia with long slow taper of steroids, given no improvement on antibiotics and no identifiable infectious cause  - Currently on chronic steroids, and dapsone for PCP ppx while on steroids.   -  Followed by pulmonology (Dr. Ana Cristina Breen) o/p        # Chronic diastolic heart failure; mild acute exac  - She has a hx of diastolic CHF, EF 87-46%, Grade 2 DD  - Some LE edema   - On lasix 20 mg and spironolactone at home  - Some evidence for acute component, with mild fluid o/l on exam  - Pro-BNP mildly elevated at 719.  Was given1 dose IV lasix, then resumed home diuretics  - Place on telemetry        # Sprained ankle  -XR: Extensive postoperative changes negative for fractures  -analgesia as needed  - LE dopplers: No DVT  - PT/OT        DEWAYNE  -Continue home BiPAP        #Chronic pain, narcotic dependent  #Chronic lumbar back pain (s/p L1/L2 branch ablations and steroid injections, - Opioid dependent        # Rheumatoid arthritis  #Immunosuppression  - On Humira, MTX and Prednisone: Weaned to 4mg daily by her rheumatologist        Morbid obesity due to excess calories Body mass index is 43.96 kg/m². - Complicating assessment and treatment. Placing patient at risk for multiple co-morbidities as well as early death and contributing to the patient's presentation.  on weight loss when appropriate.        Chronic anticoagulation  - On Eliquis  - States it is \"for my heart\"         Physical Exam: /66   Pulse 79   Temp 98.1 °F (36.7 °C) (Oral)   Resp 16   Ht 5' (1.524 m)   Wt 223 lb 12.3 oz (101.5 kg)   SpO2 94%   BMI 43.70 kg/m²     No results for input(s): POCGLU in the last 72 hours. General appearance:  No acute distress, appears stated age  Respiratory:  Non-labored breathing, clear to auscultation bilaterally  Cardiovascular: Regular rate and rhythm, no murmurs, gallops, or rubs  Abdomen: soft, non-tender, non-distended  Musculoskeletal: R ankle wrapped in ACE wrap, neurovascularly intact, R knee tender to palpation  Skin: normal color, no wounds noted  Psychiatric: A&Ox4, good insight and judgment    LABS:  Recent Labs     09/24/21  0542   WBC 13.7*   HGB 8.4*         Recent Labs     09/24/21  0542   *   K 4.3   CL 95*   CO2 31   BUN 21*   CREATININE 0.9   GLUCOSE 109*     No results for input(s): INR in the last 72 hours.         Discharge Medications:   [place], [address] [facility] Medication Instructions TZO:[id number]    Printed on:09/25/21 1744   Medication Information                      adalimumab (HUMIRA) 40 MG/0.8ML injection  Inject 40 mg into the skin oxyCODONE-acetaminophen (PERCOCET) 5-325 MG per tablet  Take 1 tablet by mouth every 6 hours as needed. Indications: Backache              pantoprazole (PROTONIX) 40 MG tablet  Take 40 mg by mouth every evening             predniSONE (DELTASONE) 1 MG tablet  Take 3 mg by mouth daily             saccharomyces boulardii (FLORASTOR) 250 MG capsule  Take 250 mg by mouth 2 times daily             spironolactone (ALDACTONE) 25 MG tablet  Take 12.5 mg by mouth daily             traZODone (DESYREL) 50 MG tablet  Take 50 mg by mouth every morning             vitamin D (CHOLECALCIFEROL) 25 MCG (1000 UT) TABS tablet  Take 1,000 Units by mouth 2 times daily                Activity: activity as tolerated  Diet: cardiac diet  Wound Care: none needed    Disposition: SNF in [place]  Discharged Condition: Stable  Follow Up: Primary Care Physician in one week    Total time spent on discharge, finalizing medications, referrals and arranging outpatient follow up was more than 30 minutes    Thank you Dr. Siobhan Devries (Inactive) for the opportunity to be involved in this patients care. If you have any questions or concerns please feel free to contact me at [phone].

## 2021-09-25 VITALS
OXYGEN SATURATION: 96 % | HEART RATE: 72 BPM | HEIGHT: 60 IN | RESPIRATION RATE: 18 BRPM | WEIGHT: 223.77 LBS | SYSTOLIC BLOOD PRESSURE: 128 MMHG | TEMPERATURE: 97.3 F | BODY MASS INDEX: 43.93 KG/M2 | DIASTOLIC BLOOD PRESSURE: 76 MMHG

## 2021-09-25 LAB
ANION GAP SERPL CALCULATED.3IONS-SCNC: 10 MMOL/L (ref 3–16)
BUN BLDV-MCNC: 27 MG/DL (ref 7–20)
CALCIUM SERPL-MCNC: 9.3 MG/DL (ref 8.3–10.6)
CHLORIDE BLD-SCNC: 94 MMOL/L (ref 99–110)
CO2: 33 MMOL/L (ref 21–32)
CREAT SERPL-MCNC: 0.9 MG/DL (ref 0.6–1.2)
GFR AFRICAN AMERICAN: >60
GFR NON-AFRICAN AMERICAN: >60
GLUCOSE BLD-MCNC: 123 MG/DL (ref 70–99)
HCT VFR BLD CALC: 27 % (ref 36–48)
HEMOGLOBIN: 8.7 G/DL (ref 12–16)
MCH RBC QN AUTO: 31.2 PG (ref 26–34)
MCHC RBC AUTO-ENTMCNC: 32.2 G/DL (ref 31–36)
MCV RBC AUTO: 96.9 FL (ref 80–100)
PDW BLD-RTO: 17.9 % (ref 12.4–15.4)
PLATELET # BLD: 215 K/UL (ref 135–450)
PMV BLD AUTO: 8.7 FL (ref 5–10.5)
POTASSIUM REFLEX MAGNESIUM: 4.2 MMOL/L (ref 3.5–5.1)
RBC # BLD: 2.79 M/UL (ref 4–5.2)
SODIUM BLD-SCNC: 137 MMOL/L (ref 136–145)
WBC # BLD: 9.6 K/UL (ref 4–11)

## 2021-09-25 PROCEDURE — 2700000000 HC OXYGEN THERAPY PER DAY

## 2021-09-25 PROCEDURE — 6370000000 HC RX 637 (ALT 250 FOR IP): Performed by: INTERNAL MEDICINE

## 2021-09-25 PROCEDURE — 80048 BASIC METABOLIC PNL TOTAL CA: CPT

## 2021-09-25 PROCEDURE — 94660 CPAP INITIATION&MGMT: CPT

## 2021-09-25 PROCEDURE — 94761 N-INVAS EAR/PLS OXIMETRY MLT: CPT

## 2021-09-25 PROCEDURE — 36415 COLL VENOUS BLD VENIPUNCTURE: CPT

## 2021-09-25 PROCEDURE — 85027 COMPLETE CBC AUTOMATED: CPT

## 2021-09-25 RX ADMIN — OXYCODONE HYDROCHLORIDE AND ACETAMINOPHEN 1 TABLET: 5; 325 TABLET ORAL at 00:39

## 2021-09-25 ASSESSMENT — PAIN SCALES - GENERAL
PAINLEVEL_OUTOF10: 3
PAINLEVEL_OUTOF10: 7

## 2021-09-29 NOTE — PROGRESS NOTES
Physician Progress Note      Brenda NUÑEZ #:                  [id number]  :                       1940  ADMIT DATE:       2021 9:04 AM  DISCH DATE:        2021 6:41 AM  RESPONDING  PROVIDER #:        Parul Bhat MD          QUERY TEXT:    Patient admitted with encephalopathy and fall. Noted documentation of   Chronic PNA in  PN. In order to support the diagnosis of ***, please   include additional clinical indicators in your documentation. Or please   document if the diagnosis of *** has been ruled out after further study. The medical record reflects the following:  Risk Factors: 69y/o female immunocompromised on humira and methotrexate, Hx of   ISLD and chronic PNA  Clinical Indicators:  PN: \"On presentation, she was febrile at 100.5. She   stated this is normal, as she has \"chronic pneumonia\" on which she is on 3L 02   for, and  interstitial lung disease; SIRS POA- Fever 38.1; Tachycardic 94   (very mild)and leucocytosis 13 (although on chronic prednisone). \"  Per ED PN:   \"Procalcitonin was obtained which was within normal limits therefore I do not   feel that antibiotics are necessary at this time. Patient is immunocompromise   as she is on methotrexate and Humira for her rheumatoid arthritis as well as   prednisone. \"  On admit WBC 13.5  W  Treatment: Prednisone 30 mg, IVF bolus 1,000ml  Options provided:  -- Sepsis due to chronic PNA  -- Chronic PNA was ruled out, SIRS with encephalopathy  -- Other - I will add my own diagnosis  -- Disagree - Not applicable / Not valid  -- Disagree - Clinically unable to determine / Unknown  -- Refer to Clinical Documentation Reviewer    PROVIDER RESPONSE TEXT:    Provider disagreed with this query.   Please refer to current documentation    Query created by: Mitra Sherwood on 2021 8:23 AM      Electronically signed by:  Parul Bhat MD 2021 4:53 PM

## 2022-01-01 NOTE — PROGRESS NOTES
Internal Medicine PGY-1 Resident Progress Note        PCP: Marcos Mars    Date of Admission: 4/4/2019    Chief Complaint: Confusion; shortness of breath; fevers     Hospital Course:     Vahe Santiago is a 70 y.o. female with a past medical history of interstitial lung disease (on chronic steroids), rheumatoid arthritis (on Humira and leflunamide), HFpEF (grade II diastolic dysfunction), hypertension and depression who presented to [facility] with confusion and shortness of breath at home, recently discharged on 3/28 on 3L O2 while being treated for PNA. Had run of Mobitz II HB with PVCs on night of admission, seen by EPS (recommended strict compliance with nightly CPAP for paroxysmal asymptomatic AV block, noctural). She was admitted with sepsis 2/2 HCAP started on broad-spectrum abx and underwent bronchoscopy (4/5) with BAL of BEN and RUL which identified bronchial inflammation and increased mucoid secretions. Work-up for infectious PNA has since all returned negative, including search for bacterial, viral and fungal pathogens. CT chest 4/8 worsening multifocal PNA on ILD. Antibiotics were discontinued following 5 days of administration given limited clinical improvement. She has persistently been on 5 L O2 NC and became afebrile once antibiotics were discontinued. 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 Of note she has received BL L1, L2 medial branch ablations on 2/27 at [facility], followed by steroid facet injections at [facility] on 1/23/2019 and has been complaining of lumbar back pain throughout admission, nothing worse than her baseline for which she is taking her home percocet. Subjective:     Everyday patient reports subjective improvements in breathing. +dry cough.  No chest Addendum 1 of 1   [** ADDENDUM #1 **   Addendum:      Compared with CTA chest dated 4/9/2019 and CT chest dated 4/5/2019. Crazy pavement appearance appears minimally improved since CTA chest dated    4/9/2019. Final      CT ABDOMEN PELVIS W IV CONTRAST Additional Contrast? None   Final Result      1. No evidence of obstruction or free air. No acute abdominal or pelvic abnormality clearly identified. 2. Diverticulosis without evidence of focal diverticulitis. 3. Incidental indeterminate pedunculated cystic type lesion in the posterior left kidney. Follow-up CT renal mass protocol or MRI renal mass protocol in 3 months is recommended to evaluate the lesion further and to document stability. CTA PULMONARY W CONTRAST   Final Result      1. No evidence of pulmonary embolism. 2. Extensive bilateral groundglass opacities are increased compared to 4/5/2019 likely representing multifocal pneumonia superimposed on chronic interstitial fibrosis. CT CHEST WO CONTRAST   Final Result       The central airways are patent. Multifocal ill-defined patchy upper zone and left lower lobe predominant    peripheral nonspecific airspace opacities now seen since 2017 and may be    inflammatory, infectious. Clinically correlate. Background of chronic interstitial changes again noted. XR CHEST PORTABLE   Final Result      Mild bilateral interstitial and airspace opacities, not significantly changed compared to prior, representing edema or infection. Assessment/Plan:    Ang Prasad is a 70 y.o. female with a past medical history of interstitial lung disease (on chronic steroids), rheumatoid arthritis (on Humira and leflunamide), HFpEF admitted for SOB and confusion, suspect HCAP as cause.      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 Mcgovern Watkins note yesterday  - add calcium and vitamin D supplements for osteoporosis ppx while on steroids  - continue dapsone 100 mg PO qD as PCP ppx while on steroids  - protonix 40 mg PO BID  - acapella and IS  - pulmonology following    Sepsis 2/2 Cryptogenic organizing pneumonia, infectious pneumonia ruled-out  Sepsis resolved  On admission Tmax 102.6, RR 22, CT chest: multifocal patchy upper and LL airspace opacities, suspect infectious  - abx d/c'd  - supplemental O2 - currently 4L, goal SpO2 >88%  - Albuterol nebulizers   - procal mildly elevated at 0.27  - elevated ESR and CRP, RF, anti-CCP consistent with Rheumatoid Arthritis  - resp panel PCR neg, AFB neg, histo/strep/legionella neg, blood culture NGtD  - aspergillus negative, 1,3 beta d glucan indeterminate, low nl, PCP sputum induction pending  - MRI lumbar spine cancelled d/t resolution of fevers and back pain   - probiotics added   - ID signed off    2nd degree heart block - Mobitz type two, has not recurred  - CPAP nightly for DEWAYNE  - Paroxysmal AV block - asymptomatic, nocturnal, in the setting of not using CPAP  - no recommendation for pacemaker as of now, unless block persists despite respiration improvement  - cardiology following    HFpEF -Last echocardiogram (3/25/2019) showing grade II diastolic dysfunction. Patient does not appear to be volume overloaded at this time.   -Furosemide 40 mg daily     Interstitial Lung Disease -Patient has a long history of ILD (suspect 2/2 RA and/or MTX-induced)   -followed by pulmonology (Dr. 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 Jairo Alves, her rheumatologist     Hypertension   -Continue home losartan 25 mg nighty   -hydralazine 25 mg PO TID  -resume home amlodipine for elevated Bps, now that steroids have initiated      Depression   -lexapro 10 mg PO qD  -trazodone 50 mg PO qHs    Morbid obesity w/ BMI: 39.4  Complicating assessment and treatment.  Placing patient at risk for multiple co-morbidities as well as early death and contributing to the patient's presentation.      DVT Prophylaxis: Lovenox   Diet: General diet  Code Status: Limited (no x 4)      PT/OT Eval Status: 19/24 OT  Mena Hayden MD    I will discuss the patient with the senior resident and Clovia Crigler, MD Emiliano Baltimore, MD  Internal Medicine Resident PGY-1 Statement Selected
